# Patient Record
Sex: FEMALE | Race: WHITE | NOT HISPANIC OR LATINO | Employment: UNEMPLOYED | URBAN - METROPOLITAN AREA
[De-identification: names, ages, dates, MRNs, and addresses within clinical notes are randomized per-mention and may not be internally consistent; named-entity substitution may affect disease eponyms.]

---

## 2017-08-05 ENCOUNTER — HOSPITAL ENCOUNTER (OUTPATIENT)
Dept: RADIOLOGY | Facility: MEDICAL CENTER | Age: 47
End: 2017-08-05
Attending: PHYSICAL MEDICINE & REHABILITATION
Payer: MEDICAID

## 2017-08-05 DIAGNOSIS — M54.16 LUMBAR RADICULOPATHY: ICD-10-CM

## 2017-08-05 DIAGNOSIS — M25.562 LEFT KNEE PAIN, UNSPECIFIED CHRONICITY: ICD-10-CM

## 2017-08-05 DIAGNOSIS — M51.36 DEGENERATION OF LUMBAR INTERVERTEBRAL DISC: ICD-10-CM

## 2017-08-05 PROCEDURE — 72148 MRI LUMBAR SPINE W/O DYE: CPT

## 2017-11-09 ENCOUNTER — OFFICE VISIT (OUTPATIENT)
Dept: MEDICAL GROUP | Facility: MEDICAL CENTER | Age: 47
End: 2017-11-09
Attending: INTERNAL MEDICINE
Payer: MEDICAID

## 2017-11-09 VITALS
DIASTOLIC BLOOD PRESSURE: 72 MMHG | HEIGHT: 63 IN | TEMPERATURE: 98.1 F | OXYGEN SATURATION: 93 % | WEIGHT: 238.2 LBS | RESPIRATION RATE: 16 BRPM | BODY MASS INDEX: 42.21 KG/M2 | SYSTOLIC BLOOD PRESSURE: 108 MMHG | HEART RATE: 96 BPM

## 2017-11-09 DIAGNOSIS — F51.01 PRIMARY INSOMNIA: ICD-10-CM

## 2017-11-09 DIAGNOSIS — R00.0 SINUS TACHYCARDIA: ICD-10-CM

## 2017-11-09 DIAGNOSIS — E11.9 TYPE 2 DIABETES MELLITUS WITHOUT COMPLICATION, WITH LONG-TERM CURRENT USE OF INSULIN (HCC): ICD-10-CM

## 2017-11-09 DIAGNOSIS — G89.29 CHRONIC BILATERAL LOW BACK PAIN WITH LEFT-SIDED SCIATICA: ICD-10-CM

## 2017-11-09 DIAGNOSIS — M54.42 CHRONIC BILATERAL LOW BACK PAIN WITH LEFT-SIDED SCIATICA: ICD-10-CM

## 2017-11-09 DIAGNOSIS — F33.1 MODERATE EPISODE OF RECURRENT MAJOR DEPRESSIVE DISORDER (HCC): ICD-10-CM

## 2017-11-09 DIAGNOSIS — I10 ESSENTIAL HYPERTENSION: ICD-10-CM

## 2017-11-09 DIAGNOSIS — G47.33 OSA (OBSTRUCTIVE SLEEP APNEA): ICD-10-CM

## 2017-11-09 DIAGNOSIS — E11.42 DIABETIC POLYNEUROPATHY ASSOCIATED WITH TYPE 2 DIABETES MELLITUS (HCC): ICD-10-CM

## 2017-11-09 DIAGNOSIS — E06.3 HYPOTHYROIDISM DUE TO HASHIMOTO'S THYROIDITIS: ICD-10-CM

## 2017-11-09 DIAGNOSIS — K21.9 GASTROESOPHAGEAL REFLUX DISEASE, ESOPHAGITIS PRESENCE NOT SPECIFIED: ICD-10-CM

## 2017-11-09 DIAGNOSIS — E03.8 HYPOTHYROIDISM DUE TO HASHIMOTO'S THYROIDITIS: ICD-10-CM

## 2017-11-09 DIAGNOSIS — Z79.4 TYPE 2 DIABETES MELLITUS WITHOUT COMPLICATION, WITH LONG-TERM CURRENT USE OF INSULIN (HCC): ICD-10-CM

## 2017-11-09 DIAGNOSIS — K22.4 ESOPHAGEAL SPASM: ICD-10-CM

## 2017-11-09 PROBLEM — M54.50 CHRONIC LOW BACK PAIN: Status: ACTIVE | Noted: 2017-11-09

## 2017-11-09 PROBLEM — E11.40 DIABETIC NEUROPATHY (HCC): Status: ACTIVE | Noted: 2017-11-09

## 2017-11-09 PROCEDURE — 99204 OFFICE O/P NEW MOD 45 MIN: CPT | Performed by: INTERNAL MEDICINE

## 2017-11-09 RX ORDER — CYCLOBENZAPRINE HCL 10 MG
10 TABLET ORAL 3 TIMES DAILY PRN
Qty: 30 TAB | Refills: 0 | Status: SHIPPED | OUTPATIENT
Start: 2017-11-09 | End: 2018-02-28 | Stop reason: SDUPTHER

## 2017-11-09 RX ORDER — GARLIC 180 MG
40 TABLET, DELAYED RELEASE (ENTERIC COATED) ORAL 2 TIMES DAILY
COMMUNITY
Start: 2017-11-09 | End: 2022-07-21

## 2017-11-09 RX ORDER — HYDROCHLOROTHIAZIDE 25 MG/1
25 TABLET ORAL DAILY
Qty: 30 TAB | Status: SHIPPED | DISCHARGE
Start: 2017-11-09 | End: 2017-12-12

## 2017-11-09 RX ORDER — OMEPRAZOLE 40 MG/1
40 CAPSULE, DELAYED RELEASE ORAL 2 TIMES DAILY
Status: SHIPPED | DISCHARGE
Start: 2017-11-09 | End: 2018-01-11 | Stop reason: SDUPTHER

## 2017-11-09 RX ORDER — LEVOTHYROXINE SODIUM 0.15 MG/1
150 TABLET ORAL
Qty: 30 TAB | Status: SHIPPED | DISCHARGE
Start: 2017-11-09 | End: 2018-01-11 | Stop reason: SDUPTHER

## 2017-11-09 RX ORDER — PHENOL 1.4 %
20 AEROSOL, SPRAY (ML) MUCOUS MEMBRANE
COMMUNITY
Start: 2017-11-09 | End: 2022-02-17

## 2017-11-09 RX ORDER — ATORVASTATIN CALCIUM 20 MG/1
20 TABLET, FILM COATED ORAL DAILY
Qty: 30 TAB | Status: SHIPPED | DISCHARGE
Start: 2017-11-09 | End: 2020-02-12 | Stop reason: SDUPTHER

## 2017-11-09 RX ORDER — TRAMADOL HYDROCHLORIDE 50 MG/1
50-100 TABLET ORAL 3 TIMES DAILY
Status: SHIPPED | DISCHARGE
Start: 2017-11-09 | End: 2018-03-01 | Stop reason: SDUPTHER

## 2017-11-09 RX ORDER — TRAZODONE HYDROCHLORIDE 150 MG/1
150 TABLET ORAL
Qty: 30 TAB | Refills: 1 | Status: SHIPPED | OUTPATIENT
Start: 2017-11-09 | End: 2018-01-11 | Stop reason: SDUPTHER

## 2017-11-09 RX ORDER — SPIRONOLACTONE 25 MG/1
25 TABLET ORAL DAILY
Qty: 30 TAB | Refills: 3 | Status: SHIPPED | DISCHARGE
Start: 2017-11-09 | End: 2020-02-12 | Stop reason: SDUPTHER

## 2017-11-09 RX ORDER — GABAPENTIN 300 MG/1
300 CAPSULE ORAL
COMMUNITY
Start: 2017-11-09 | End: 2018-01-11 | Stop reason: SDUPTHER

## 2017-11-09 RX ORDER — BUPROPION HYDROCHLORIDE 150 MG/1
150 TABLET, EXTENDED RELEASE ORAL 2 TIMES DAILY
Qty: 60 TAB | Status: SHIPPED | DISCHARGE
Start: 2017-11-09 | End: 2018-04-20

## 2017-11-09 ASSESSMENT — PATIENT HEALTH QUESTIONNAIRE - PHQ9: CLINICAL INTERPRETATION OF PHQ2 SCORE: 0

## 2017-11-09 ASSESSMENT — PAIN SCALES - GENERAL: PAINLEVEL: 5=MODERATE PAIN

## 2017-11-09 NOTE — ASSESSMENT & PLAN NOTE
Reports a history of esophageal spasm which causes pain and states that she has needed to dilation in the past of her esophagus although she did not have a severe stricture. She was seeing a GI doctor who recently left the state has not reestablished. She states the trazodone she takes is for the esophageal spasms.

## 2017-11-09 NOTE — PROGRESS NOTES
Sarah Friedman is a 46 y.o. female here for numerous chronic medical problems listed below, establish care    HPI:  Has been seen at numerous clinics around [facility] including [facility]  Chronic low back pain  Reports a long history of low back pain. She has been treated conservatively in the past with 4 rounds of physical therapy. She currently sees Dr. Yu for pain management who has her on tramadol. States she has had 3 epidurals in the past which have helped but can't get one approved through her insurance currently. She tries to minimize her tramadol use and only take it when needed. She uses ibuprofen 800 mg about twice a day to help with the pain as well. She will occasionally take Flexeril as needed for muscle spasms. She also has been seen by [facility] [place] who recommended surgery but she was unwilling to have this done. She had an MRI of her lumbar spine done back in August which showed moderate foraminal stenosis at multiple levels.     Type 2 diabetes mellitus without complication (CMS-HCC)  Patient reports she was diagnosed with diabetes in 2010. She has been on pills until one year ago when she was started on insulin due to poor control of her blood sugars. States that she recently had a hemoglobin A1c which was 6.6. She sees for Cherelle landaverde with endocrinology for management. She currently takes fixed toes a, metformin 1000 mg twice daily, basaglar 52 units daily at bedtime. Uses gabapentin for her neuropathy, 300 mg at night.    Moderate episode of recurrent major depressive disorder (CMS-HCC)  Patient reports that she was placed on Wellbutrin and has not come as helpful for her mental health but has noticed it's helped with weight loss. She has an appointment to see Dr. Lamb's for psychiatry coming up in the next week to talk about medication management. She denies suicidal ideation.     Hypothyroidism due to Hashimoto's thyroiditis  Reports being diagnosed with hypothyroidism  "related to Hashimoto's disease when she was 11 years old. States she's been on thyroid labs since then. Her endocrinologist, Emma Dey, recently did thyroid testing on her and did not recommend a dose adjustment. Her levothyroxine dose as 150 µg daily.     Primary insomnia  Patient reports increased difficulty with sleep lately. States that she has trouble falling asleep at night and also trouble staying asleep. She does have obstructive sleep apnea but has stopped wearing her mask because she tends to be a side sleeper and states that the mask leaks when she is on her side. She takes trazodone which she states is for her esophageal spasms 100 mg at night. She also takes 20 mg of melatonin at night which used to be helpful for her sleep but has stopped working. Her gabapentin is also at night to try to help with sleep.     RADHA (obstructive sleep apnea)  Patient follows with pulmonology for her sleep apnea. She recently stopped wearing her mask because she states that it leaks when she lays on her side which is how she normally sleeps. She states she's tried \"a dozen masks\" and none of them have been effective for her.    GERD (gastroesophageal reflux disease)  Reports a long history of heartburn with a hiatal hernia. She was seeing a GI specialist who recently moved out of state. [facility] was [facility] and has not established with a new provider yet. She was put on omeprazole 40 mg twice daily. She has been having difficulty with insurance covering this prescription and has only been able to get it once a day. She denies melena, hematochezia, hematemesis, nausea, vomiting. When she takes the omeprazole her symptoms are well controlled.    Sinus tachycardia  Patient currently sees a cardiologist through [facility]. She states that she is on a medication that is only being trialed in the [place], corlanor, for her sinus tachycardia.    Essential hypertension  Currently takes Wellbutrin and " spironolactone. Blood pressure in clinic today was 108/72.    Esophageal spasm  Reports a history of esophageal spasm which causes pain and states that she has needed to dilation in the past of her esophagus although she did not have a severe stricture. She was seeing a GI doctor who recently left the state has not reestablished. She states the trazodone she takes is for the esophageal spasms.    Current medicines (including changes today)  Current Outpatient Prescriptions   Medication Sig Dispense Refill   • omeprazole (PRILOSEC) 40 MG delayed-release capsule Take 1 Cap by mouth 2 times a day.     • levothyroxine (SYNTHROID) 150 MCG Tab Take 1 Tab by mouth Every morning on an empty stomach. 30 Tab    • tramadol (ULTRAM) 50 MG Tab Take 1-2 Tabs by mouth 3 times a day.     • gabapentin (NEURONTIN) 300 MG Cap Take 1 Cap by mouth every bedtime.     • insulin glargine (BASAGLAR KWIKPEN) 100 UNIT/ML Solution Pen-injector injection Inject 52 Units as instructed every evening.     • buPROPion SR (WELLBUTRIN-SR) 150 MG TABLET SR 12 HR sustained-release tablet Take 1 Tab by mouth 2 times a day. 60 Tab    • hydrochlorothiazide (HYDRODIURIL) 25 MG Tab Take 1 Tab by mouth every day. 30 Tab    • spironolactone (ALDACTONE) 25 MG Tab Take 1 Tab by mouth every day. 30 Tab 3   • atorvastatin (LIPITOR) 20 MG Tab Take 1 Tab by mouth every day. 30 Tab    • ivabradine (CORLANOR) 5 MG Tab tablet Take 1 Tab by mouth 2 times a day, with meals. 60 Tab    • cyclobenzaprine (FLEXERIL) 10 MG Tab Take 1 Tab by mouth 3 times a day as needed. 30 Tab 0   • Black Cohosh 40 MG Cap Take  by mouth 2 Times a Day.     • Melatonin 10 MG Tab Take 20 mg by mouth every bedtime.     • trazodone (DESYREL) 150 MG Tab Take 1 Tab by mouth every bedtime. 30 Tab 1   • metformin (GLUCOPHAGE) 1000 MG tablet Take 1,000 mg by mouth 2 times a day, with meals.     • liraglutide (VICTOZA) 18 MG/3ML Solution Pen-injector injection Inject 1.8 mg as instructed every day.      • ibuprofen (MOTRIN) 800 MG Tab Take 800 mg by mouth every 8 hours as needed.     • acyclovir (ZOVIRAX) 200 MG Cap Take 800 mg by mouth as needed.       No current facility-administered medications for this visit.      She  has a past medical history of Depression; Diabetes (CMS-[facility]); Diabetic neuropathy (CMS-[facility]); Esophageal spasm; GERD (gastroesophageal reflux disease); Hashimoto's disease; Hiatal hernia; Hyperlipidemia; Hypothyroid; and Tachycardia.  She  has a past surgical history that includes tonsillectomy and adenoidectomy; other orthopedic surgery; other; colonoscopy; and endoscopy.  Social History   Substance Use Topics   • Smoking status: Never Smoker   • Smokeless tobacco: Never Used   • Alcohol use No     Social History     Social History Narrative   • No narrative on file     Family History   Problem Relation Age of Onset   • Cancer Mother 70     pancreatic, breast in 30's   • Heart Disease Father    • Cancer Father      lung   • Diabetes Sister    • Diabetes Maternal Uncle    • Heart Disease Maternal Grandmother      MI   • Hyperlipidemia Maternal Grandmother    • Diabetes Maternal Grandfather    • Hyperlipidemia Maternal Grandfather    • Stroke Neg Hx          ROS  As above in HPI  All other systems reviewed and are negative  Complaining of dizziness     Objective:     Blood pressure 108/72, pulse 96, temperature 36.7 °C (98.1 °F), resp. rate 16, weight 108 kg (238 lb 3.2 oz), SpO2 93 %, not currently breastfeeding. Body mass index is 42.2 kg/m².  Physical Exam:    Constitutional: Alert, no distress.  Skin: Warm, dry, good turgor, no rashes in visible areas.  Eye: Equal, round and reactive, conjunctiva clear, lids normal.  ENMT: Lips without lesions, fair dentition, oropharynx clear, TM's clear bilaterally.  Neck: Trachea midline, no masses, no thyromegaly. No cervical or supraclavicular lymphadenopathy.  Respiratory: Unlabored respiratory effort, lungs clear to auscultation, no wheezes, no  ritu.  Cardiovascular: Normal S1, S2, no murmur, no edema.  Abdomen: Soft, obese, non-tender, no masses, no hepatosplenomegaly.  Psych: Alert and oriented x3, normal affect and mood.        Assessment and Plan:   The following treatment plan was discussed    1. Type 2 diabetes mellitus without complication, with long-term current use of insulin (CMS-[facility])  Currently well controlled. Patient will continue current medications and follow-up with endocrinology.  -Victoza 1.8 daily  -Metformin 1000 mg twice a day  - insulin glargine (BASAGLAR KWIKPEN) 100 UNIT/ML Solution Pen-injector injection; Inject 52 Units as instructed every evening.  - atorvastatin (LIPITOR) 20 MG Tab; Take 1 Tab by mouth every day.  Dispense: 30 Tab  -Follow up with endocrinology    2. Chronic bilateral low back pain with left-sided sciatica  Partially controlled. She follows with Dr. Yu as pain management. She is working on getting another epidural done. Tramadol prescription through Dr. Yu  -Ibuprofen 800 mg twice a day when necessary  - tramadol (ULTRAM) 50 MG Tab; Take 1-2 Tabs by mouth 3 times a day.  - cyclobenzaprine (FLEXERIL) 10 MG Tab; Take 1 Tab by mouth 3 times a day as needed.  Dispense: 30 Tab; Refill: 0  -Follow up with pain management    3. Moderate episode of recurrent major depressive disorder (CMS-[facility])  Partially controlled. Patient will establish with psychiatry later this week for management.  - buPROPion SR (WELLBUTRIN-SR) 150 MG TABLET SR 12 HR sustained-release tablet; Take 1 Tab by mouth 2 times a day.  Dispense: 60 Tab  -Follow up psychiatry    4. Gastroesophageal reflux disease, esophagitis presence not specified  Controlled with current medications. Patient plans to establish with a new gastroenterologist.  - omeprazole (PRILOSEC) 40 MG delayed-release capsule; Take 1 Cap by mouth 2 times a day.    5. Hypothyroidism due to Hashimoto's thyroiditis  Stable, recently had thyroid labs through her endocrinologist.  She will continue specialty care  - levothyroxine (SYNTHROID) 150 MCG Tab; Take 1 Tab by mouth Every morning on an empty stomach.  Dispense: 30 Tab  -Follow up endocrinology    6. RADHA (obstructive sleep apnea)  Uncontrolled. Patient has stopped wearing her CPAP. I encouraged her to start using it again as her sleep has been poor without it. I encouraged her to follow-up with her pulmonologist to discuss possibility of different mask.  -Encouraged compliance with CPAP therapy  -Follow-up pulmonology    7. Primary insomnia  Uncontrolled. Suspect related in part to noncompliance with CPAP. We will increase her trazodone dose to see if helpful. She will follow up in 4 weeks  - Melatonin 10 MG Tab; Take 20 mg by mouth every bedtime.  -Increase trazodone (DESYREL) 150 MG Tab; Take 1 Tab by mouth every bedtime.  Dispense: 30 Tab; Refill: 1  -Follow up in 4 weeks    8. Sinus tachycardia  Controlled. Patient follows with cardiology.  - ivabradine (CORLANOR) 5 MG Tab tablet; Take 1 Tab by mouth 2 times a day, with meals.  Dispense: 60 Tab  -Continue follow-up with cardiology    9. Essential hypertension  Stable, well controlled. Continue current medications. Could discuss at next visit stopping HCTZ given blood pressure was 108/72 today.  - hydrochlorothiazide (HYDRODIURIL) 25 MG Tab; Take 1 Tab by mouth every day.  Dispense: 30 Tab  - spironolactone (ALDACTONE) 25 MG Tab; Take 1 Tab by mouth every day.  Dispense: 30 Tab; Refill: 3  -Discuss at next visit stopping HCTZ    10. Esophageal spasm  Stable. Patient will reestablish with a new GI provider for further care.  -Follow up with GI    11. Diabetic polyneuropathy associated with type 2 diabetes mellitus (CMS-HCC)  Stable, controlled with current medications. Patient will continue.  - gabapentin (NEURONTIN) 300 MG Cap; Take 1 Cap by mouth every bedtime.        Followup: Return in about 4 weeks (around 12/7/2017) for insomnia, dizziness, HTN.

## 2017-11-09 NOTE — ASSESSMENT & PLAN NOTE
"Patient follows with pulmonology for her sleep apnea. She recently stopped wearing her mask because she states that it leaks when she lays on her side which is how she normally sleeps. She states she's tried \"a dozen masks\" and none of them have been effective for her.  "

## 2017-11-09 NOTE — ASSESSMENT & PLAN NOTE
Patient reports that she was placed on Wellbutrin and has not come as helpful for her mental health but has noticed it's helped with weight loss. She has an appointment to see Dr. Lamb's for psychiatry coming up in the next week to talk about medication management. She denies suicidal ideation.

## 2017-11-09 NOTE — ASSESSMENT & PLAN NOTE
Reports a long history of low back pain. She has been treated conservatively in the past with 4 rounds of physical therapy. She currently sees Dr. Yu for pain management who has her on tramadol. States she has had 3 epidurals in the past which have helped but can't get one approved through her insurance currently. She tries to minimize her tramadol use and only take it when needed. She uses ibuprofen 800 mg about twice a day to help with the pain as well. She will occasionally take Flexeril as needed for muscle spasms. She also has been seen by [facility] [place] who recommended surgery but she was unwilling to have this done. She had an MRI of her lumbar spine done back in August which showed moderate foraminal stenosis at multiple levels.

## 2017-11-09 NOTE — ASSESSMENT & PLAN NOTE
Patient reports increased difficulty with sleep lately. States that she has trouble falling asleep at night and also trouble staying asleep. She does have obstructive sleep apnea but has stopped wearing her mask because she tends to be a side sleeper and states that the mask leaks when she is on her side. She takes trazodone which she states is for her esophageal spasms 100 mg at night. She also takes 20 mg of melatonin at night which used to be helpful for her sleep but has stopped working. Her gabapentin is also at night to try to help with sleep.

## 2017-11-09 NOTE — ASSESSMENT & PLAN NOTE
Patient currently sees a cardiologist through [facility]. She states that she is on a medication that is only being trialed in the [place], corlanor, for her sinus tachycardia.

## 2017-11-09 NOTE — ASSESSMENT & PLAN NOTE
Reports being diagnosed with hypothyroidism related to Hashimoto's disease when she was 11 years old. States she's been on thyroid labs since then. Her endocrinologist, Emma Dey, recently did thyroid testing on her and did not recommend a dose adjustment. Her levothyroxine dose as 150 µg daily.

## 2017-11-09 NOTE — ASSESSMENT & PLAN NOTE
Reports a long history of heartburn with a hiatal hernia. She was seeing a GI specialist who recently moved out of state. She was [facility] and has not established with a new provider yet. She was put on omeprazole 40 mg twice daily. She has been having difficulty with insurance covering this prescription and has only been able to get it once a day. She denies melena, hematochezia, hematemesis, nausea, vomiting. When she takes the omeprazole her symptoms are well controlled.

## 2017-11-10 DIAGNOSIS — G89.29 CHRONIC BILATERAL LOW BACK PAIN WITH LEFT-SIDED SCIATICA: ICD-10-CM

## 2017-11-10 DIAGNOSIS — M54.42 CHRONIC BILATERAL LOW BACK PAIN WITH LEFT-SIDED SCIATICA: ICD-10-CM

## 2017-11-11 NOTE — TELEPHONE ENCOUNTER
Pt called late afternoon Friday to request refill for tramadol.     Was the patient seen in the last year in this department? Yes     Does patient have an active prescription for medications requested? Yes     Received Request Via: Patient

## 2017-11-13 RX ORDER — TRAMADOL HYDROCHLORIDE 50 MG/1
50-100 TABLET ORAL 3 TIMES DAILY
Qty: 30 TAB | Refills: 0 | OUTPATIENT
Start: 2017-11-13

## 2017-11-21 ENCOUNTER — TELEPHONE (OUTPATIENT)
Dept: MEDICAL GROUP | Facility: MEDICAL CENTER | Age: 47
End: 2017-11-21

## 2017-11-21 NOTE — TELEPHONE ENCOUNTER
Pt notified that tramadol will not be refilled by Dr Almeida, that she will need to follow up with Dr Mckay.

## 2017-12-12 ENCOUNTER — OFFICE VISIT (OUTPATIENT)
Dept: MEDICAL GROUP | Facility: MEDICAL CENTER | Age: 47
End: 2017-12-12
Attending: INTERNAL MEDICINE
Payer: MEDICAID

## 2017-12-12 VITALS
BODY MASS INDEX: 41.64 KG/M2 | OXYGEN SATURATION: 96 % | RESPIRATION RATE: 16 BRPM | TEMPERATURE: 98 F | SYSTOLIC BLOOD PRESSURE: 100 MMHG | DIASTOLIC BLOOD PRESSURE: 62 MMHG | WEIGHT: 235 LBS | HEART RATE: 84 BPM | HEIGHT: 63 IN

## 2017-12-12 DIAGNOSIS — N92.1 MENORRHAGIA WITH IRREGULAR CYCLE: ICD-10-CM

## 2017-12-12 DIAGNOSIS — E66.01 MORBID OBESITY WITH BMI OF 40.0-44.9, ADULT (HCC): ICD-10-CM

## 2017-12-12 DIAGNOSIS — G43.009 MIGRAINE WITHOUT AURA AND WITHOUT STATUS MIGRAINOSUS, NOT INTRACTABLE: ICD-10-CM

## 2017-12-12 DIAGNOSIS — R42 LIGHTHEADEDNESS: ICD-10-CM

## 2017-12-12 PROCEDURE — 99212 OFFICE O/P EST SF 10 MIN: CPT | Performed by: INTERNAL MEDICINE

## 2017-12-12 PROCEDURE — 99214 OFFICE O/P EST MOD 30 MIN: CPT | Performed by: INTERNAL MEDICINE

## 2017-12-12 RX ORDER — HYDROCHLOROTHIAZIDE 12.5 MG/1
12.5 CAPSULE, GELATIN COATED ORAL DAILY
Qty: 30 CAP | Status: SHIPPED | DISCHARGE
Start: 2017-12-12 | End: 2018-05-30

## 2017-12-12 RX ORDER — SUMATRIPTAN 100 MG/1
TABLET, FILM COATED ORAL
Qty: 10 TAB | Refills: 0 | Status: SHIPPED | OUTPATIENT
Start: 2017-12-12 | End: 2018-03-02 | Stop reason: SDUPTHER

## 2017-12-12 NOTE — ASSESSMENT & PLAN NOTE
Patient complains of lightheadedness worsening over the past several months. States that when she walks a few steps, she will get a sudden onset of lightheadedness, has to stop to prevent falling. She denies room spinning. She does have vertigo but states this is completely different. She feels like her hearing becomes muffled, feels faint, and sometimes legs and arms give out. States it happens usually about twice a day, 5 days a week. The sensation lasts anywhere from a few seconds to a few minutes. She endorses good hydration. She denies any associated chest pain, shortness of breath, or palpitations. She had an episode of this in the clinic when I asked her to get up from the exam chair and onto the table. I took her blood pressure shortly after the episode and it was similar to initially, at 100/70. She takes hydrochlorothiazide 12-1/2 mg daily which she states helps with the swelling in her feet, in addition to spironolactone 25 mg daily. This is prescribed by a an outside physician. Sometimes be lightheadedness will be followed by a headache as discussed above.

## 2017-12-12 NOTE — ASSESSMENT & PLAN NOTE
Patient reports a history of regular menstrual cycles until this past month. She had her cycle started on 11/16 and an done 11/30. It then started up again on 12/5 and has 2 new to until present. States she is passing some clots and is requiring a super plus tampon every 4-6 hours. She has normally had regular periods in the past. She complains of lightheadedness but states that this was present before the heavy periods started. She denies chest pain or shortness of breath.

## 2017-12-12 NOTE — PROGRESS NOTES
Subjective:   Sarah Friedman is a 46 y.o. female here today for heavy periods, worsening headaches and lightheadedness    Menorrhagia with irregular cycle  Patient reports a history of regular menstrual cycles until this past month. She had her cycle started on 11/16 and an done 11/30. It then started up again on 12/5 and has 2 new to until present. States she is passing some clots and is requiring a super plus tampon every 4-6 hours. She has normally had regular periods in the past. She complains of lightheadedness but states that this was present before the heavy periods started. She denies chest pain or shortness of breath.    Lightheadedness  Patient complains of lightheadedness worsening over the past several months. States that when she walks a few steps, she will get a sudden onset of lightheadedness, has to stop to prevent falling. She denies room spinning. She does have vertigo but states this is completely different. She feels like her hearing becomes muffled, feels faint, and sometimes legs and arms give out. States it happens usually about twice a day, 5 days a week. The sensation lasts anywhere from a few seconds to a few minutes. She endorses good hydration. She denies any associated chest pain, shortness of breath, or palpitations. She had an episode of this in the clinic when I asked her to get up from the exam chair and onto the table. I took her blood pressure shortly after the episode and it was similar to initially, at 100/70. She takes hydrochlorothiazide 12-1/2 mg daily which she states helps with the swelling in her feet, in addition to spironolactone 25 mg daily. This is prescribed by a an outside physician. Sometimes be lightheadedness will be followed by a headache as discussed above.    Migraine without aura and without status migrainosus, not intractable  Patient reports years of migraine headaches although they have become more frequent lately and have changed in character. She  endorses severe pain that starts in the back of her head and radiates to the top. She describes it as bilateral, pounding, associated with nausea and photophobia. She has not found any medication to be helpful for the headaches including Excedrin Migraine, tramadol, ibuprofen. She has never taken any migraine specific medication. States these headaches usually occur about 3 times per week. They sometimes follow a lightheadedness episode, but not always. Sometimes they can be triggered by movement of her neck into a certain position.        Current medicines (including changes today)  Current Outpatient Prescriptions   Medication Sig Dispense Refill   • hydrochlorothiazide (MICROZIDE) 12.5 MG capsule Take 1 Cap by mouth every day. 30 Cap    • sumatriptan (IMITREX) 100 MG tablet Take 1/2 to 1 full tab daily as needed for migraine headache 10 Tab 0   • omeprazole (PRILOSEC) 40 MG delayed-release capsule Take 1 Cap by mouth 2 times a day.     • levothyroxine (SYNTHROID) 150 MCG Tab Take 1 Tab by mouth Every morning on an empty stomach. 30 Tab    • tramadol (ULTRAM) 50 MG Tab Take 1-2 Tabs by mouth 3 times a day.     • gabapentin (NEURONTIN) 300 MG Cap Take 1 Cap by mouth every bedtime.     • insulin glargine (BASAGLAR KWIKPEN) 100 UNIT/ML Solution Pen-injector injection Inject 52 Units as instructed every evening.     • buPROPion SR (WELLBUTRIN-SR) 150 MG TABLET SR 12 HR sustained-release tablet Take 1 Tab by mouth 2 times a day. 60 Tab    • spironolactone (ALDACTONE) 25 MG Tab Take 1 Tab by mouth every day. 30 Tab 3   • atorvastatin (LIPITOR) 20 MG Tab Take 1 Tab by mouth every day. 30 Tab    • ivabradine (CORLANOR) 5 MG Tab tablet Take 1 Tab by mouth 2 times a day, with meals. 60 Tab    • cyclobenzaprine (FLEXERIL) 10 MG Tab Take 1 Tab by mouth 3 times a day as needed. 30 Tab 0   • Black Cohosh 40 MG Cap Take  by mouth 2 Times a Day.     • Melatonin 10 MG Tab Take 20 mg by mouth every bedtime.     • trazodone  "(DESYREL) 150 MG Tab Take 1 Tab by mouth every bedtime. 30 Tab 1   • metformin (GLUCOPHAGE) 1000 MG tablet Take 1,000 mg by mouth 2 times a day, with meals.     • liraglutide (VICTOZA) 18 MG/3ML Solution Pen-injector injection Inject 1.8 mg as instructed every day.     • ibuprofen (MOTRIN) 800 MG Tab Take 800 mg by mouth every 8 hours as needed.     • acyclovir (ZOVIRAX) 200 MG Cap Take 800 mg by mouth as needed.       No current facility-administered medications for this visit.      She  has a past medical history of Depression; Diabetes (CMS-[facility]); Diabetic neuropathy (CMS-[facility]); Esophageal spasm; GERD (gastroesophageal reflux disease); Hashimoto's disease; Hiatal hernia; Hyperlipidemia; Hypothyroid; and Tachycardia.    ROS   As above in HPI     Objective:     Blood pressure 100/62, pulse 84, temperature 36.7 °C (98 °F), resp. rate 16, height 1.6 m (5' 2.99\"), weight 106.6 kg (235 lb), SpO2 96 %. Body mass index is 41.64 kg/m².   Physical Exam:  Constitutional: Alert, no distress.  Skin: Warm, dry, good turgor, no rashes in visible areas.  Eye: Equal, round and reactive, conjunctiva clear, lids normal.  Psych: Alert and oriented x3, normal affect and mood.      Assessment and Plan:   The following treatment plan was discussed    1. Menorrhagia with irregular cycle  Differential includes griffin-menopausal irregular bleeding, fibroid, endometrial hyperplasia, endometrial carcinoma.  Will obtain transvaginal US for evaluation of endometrial stripe and for fibroids.  GYN referral placed at patient's request  - REFERRAL TO GYNECOLOGY  - US-GYN-PELVIS TRANSVAGINAL; Future  - CBC WITHOUT DIFFERENTIAL; Future    2. Lightheadedness  May be related to relative hypotension Given patient's blood pressure is in the 100s over 70s and she does take hydrochlorothiazide. Her symptoms sound orthostatic as they tend to occur after she changes position. However, there was not a dramatic change in her blood pressure when she became " symptomatic in office today. May also be related to her migraine headaches as the episodes tend to be followed by headache. Less likely arrhythmia given no associated palpitations or chest pain and no syncope.  -Discussed moving the hydrochlorothiazide dose down to every other day to see if she is less symptomatic days when she does not take the medication  -Follow up CT head to ensure no structural abnormalities given concurrent change in headaches  -Could consider cardiac monitor/EKG if above workup is unrevealing    3. Migraine without aura and without status migrainosus, not intractable  Uncontrolled. Patient has never tried any migraine specific medication. We will give her some Imitrex. Given change in character and frequency of headaches, we will obtain a CT as well. Her headaches are quite enough that we could justify a daily prophylactic medication, but I would like to see if she responds to abortive therapy first.  - sumatriptan (IMITREX) 100 MG tablet; Take 1/2 to 1 full tab daily as needed for migraine headache  Dispense: 10 Tab; Refill: 0  - CT-HEAD W/O; Future  -If good response to Imitrex, could consider daily prophylactic migraine medication. Topamax might be a good choice given obesity.  -follow up 4 weeks    4. Morbid obesity with BMI of 40.0-44.9, adult (CMS-HCC)  - Patient identified as having weight management issue.  Appropriate orders and counseling given.      Followup: Return in about 4 weeks (around 1/9/2018) for review imaging, headache.

## 2017-12-12 NOTE — ASSESSMENT & PLAN NOTE
Patient reports years of migraine headaches although they have become more frequent lately and have changed in character. She endorses severe pain that starts in the back of her head and radiates to the top. She describes it as bilateral, pounding, associated with nausea and photophobia. She has not found any medication to be helpful for the headaches including Excedrin Migraine, tramadol, ibuprofen. She has never taken any migraine specific medication. States these headaches usually occur about 3 times per week. They sometimes follow a lightheadedness episode, but not always. Sometimes they can be triggered by movement of her neck into a certain position.

## 2018-01-02 ENCOUNTER — TELEPHONE (OUTPATIENT)
Dept: MEDICAL GROUP | Facility: MEDICAL CENTER | Age: 48
End: 2018-01-02

## 2018-01-02 NOTE — TELEPHONE ENCOUNTER
1. Caller Name: [facility] [employer]                                      Call Back Number: [phone]      Patient approves a detailed voicemail message: yes    Pt has a CT of the head scheduled for Friday that requires a Peer to Peer . The authorizations dept has stated this is due to her having obtained an auth for a CT scan of the sinuses. Insurance is requiring a Peer to Peer review since the pt will be having to CT's of the same body part. The number is [phone], her ID # is [id number]

## 2018-01-05 ENCOUNTER — HOSPITAL ENCOUNTER (OUTPATIENT)
Dept: RADIOLOGY | Facility: MEDICAL CENTER | Age: 48
End: 2018-01-05
Attending: INTERNAL MEDICINE
Payer: MEDICAID

## 2018-01-05 ENCOUNTER — HOSPITAL ENCOUNTER (OUTPATIENT)
Dept: RADIOLOGY | Facility: MEDICAL CENTER | Age: 48
End: 2018-01-05
Attending: OTOLARYNGOLOGY
Payer: MEDICAID

## 2018-01-05 DIAGNOSIS — G43.009 MIGRAINE WITHOUT AURA AND WITHOUT STATUS MIGRAINOSUS, NOT INTRACTABLE: ICD-10-CM

## 2018-01-05 DIAGNOSIS — J32.9 CHRONIC SINUSITIS, UNSPECIFIED LOCATION: ICD-10-CM

## 2018-01-05 DIAGNOSIS — N92.1 MENORRHAGIA WITH IRREGULAR CYCLE: ICD-10-CM

## 2018-01-05 DIAGNOSIS — J34.3 HYPERTROPHY OF NASAL TURBINATES: ICD-10-CM

## 2018-01-05 DIAGNOSIS — R42 LIGHTHEADEDNESS: ICD-10-CM

## 2018-01-05 PROCEDURE — 70450 CT HEAD/BRAIN W/O DYE: CPT

## 2018-01-05 PROCEDURE — 70486 CT MAXILLOFACIAL W/O DYE: CPT

## 2018-01-05 PROCEDURE — 76830 TRANSVAGINAL US NON-OB: CPT

## 2018-01-08 PROBLEM — D21.9 FIBROIDS: Status: ACTIVE | Noted: 2018-01-08

## 2018-01-08 PROBLEM — N92.1 MENORRHAGIA WITH IRREGULAR CYCLE: Status: RESOLVED | Noted: 2017-12-12 | Resolved: 2018-01-08

## 2018-01-08 NOTE — TELEPHONE ENCOUNTER
From result review, appears patient had the CT head done.  Unsure if this was covered by insurance.  Will follow up with the patient.    Zuleima Almeida M.D.

## 2018-01-11 ENCOUNTER — OFFICE VISIT (OUTPATIENT)
Dept: MEDICAL GROUP | Facility: MEDICAL CENTER | Age: 48
End: 2018-01-11
Attending: INTERNAL MEDICINE
Payer: MEDICAID

## 2018-01-11 VITALS
HEIGHT: 63 IN | WEIGHT: 235 LBS | SYSTOLIC BLOOD PRESSURE: 120 MMHG | HEART RATE: 104 BPM | BODY MASS INDEX: 41.64 KG/M2 | DIASTOLIC BLOOD PRESSURE: 82 MMHG | TEMPERATURE: 97.8 F | RESPIRATION RATE: 16 BRPM | OXYGEN SATURATION: 96 %

## 2018-01-11 DIAGNOSIS — K21.9 GASTROESOPHAGEAL REFLUX DISEASE, ESOPHAGITIS PRESENCE NOT SPECIFIED: ICD-10-CM

## 2018-01-11 DIAGNOSIS — K44.9 HIATAL HERNIA: ICD-10-CM

## 2018-01-11 DIAGNOSIS — G89.29 CHRONIC BILATERAL LOW BACK PAIN WITH LEFT-SIDED SCIATICA: ICD-10-CM

## 2018-01-11 DIAGNOSIS — Z79.4 TYPE 2 DIABETES MELLITUS WITHOUT COMPLICATION, WITH LONG-TERM CURRENT USE OF INSULIN (HCC): ICD-10-CM

## 2018-01-11 DIAGNOSIS — E11.42 DIABETIC POLYNEUROPATHY ASSOCIATED WITH TYPE 2 DIABETES MELLITUS (HCC): ICD-10-CM

## 2018-01-11 DIAGNOSIS — E06.3 HYPOTHYROIDISM DUE TO HASHIMOTO'S THYROIDITIS: ICD-10-CM

## 2018-01-11 DIAGNOSIS — F51.01 PRIMARY INSOMNIA: ICD-10-CM

## 2018-01-11 DIAGNOSIS — M54.42 CHRONIC BILATERAL LOW BACK PAIN WITH LEFT-SIDED SCIATICA: ICD-10-CM

## 2018-01-11 DIAGNOSIS — E03.8 HYPOTHYROIDISM DUE TO HASHIMOTO'S THYROIDITIS: ICD-10-CM

## 2018-01-11 DIAGNOSIS — D25.9 UTERINE LEIOMYOMA, UNSPECIFIED LOCATION: ICD-10-CM

## 2018-01-11 DIAGNOSIS — E11.9 TYPE 2 DIABETES MELLITUS WITHOUT COMPLICATION, WITH LONG-TERM CURRENT USE OF INSULIN (HCC): ICD-10-CM

## 2018-01-11 DIAGNOSIS — B00.2 ORAL HERPES: ICD-10-CM

## 2018-01-11 PROCEDURE — 99213 OFFICE O/P EST LOW 20 MIN: CPT | Performed by: INTERNAL MEDICINE

## 2018-01-11 PROCEDURE — 99214 OFFICE O/P EST MOD 30 MIN: CPT | Performed by: INTERNAL MEDICINE

## 2018-01-11 RX ORDER — OMEPRAZOLE 40 MG/1
40 CAPSULE, DELAYED RELEASE ORAL 2 TIMES DAILY
Qty: 60 CAP | Refills: 6 | Status: SHIPPED | OUTPATIENT
Start: 2018-01-11 | End: 2018-08-24 | Stop reason: SDUPTHER

## 2018-01-11 RX ORDER — GABAPENTIN 300 MG/1
900 CAPSULE ORAL
Qty: 90 CAP | Refills: 6 | Status: SHIPPED | OUTPATIENT
Start: 2018-01-11 | End: 2018-08-29 | Stop reason: SDUPTHER

## 2018-01-11 RX ORDER — IBUPROFEN 800 MG/1
800 TABLET ORAL EVERY 8 HOURS PRN
Qty: 90 TAB | Refills: 6 | Status: SHIPPED | OUTPATIENT
Start: 2018-01-11 | End: 2018-05-22 | Stop reason: SDUPTHER

## 2018-01-11 RX ORDER — TRAZODONE HYDROCHLORIDE 150 MG/1
150 TABLET ORAL
Qty: 30 TAB | Refills: 1 | Status: SHIPPED | OUTPATIENT
Start: 2018-01-11 | End: 2018-03-14 | Stop reason: SDUPTHER

## 2018-01-11 RX ORDER — LEVOTHYROXINE SODIUM 0.15 MG/1
150 TABLET ORAL
Qty: 30 TAB | Refills: 6 | Status: SHIPPED | OUTPATIENT
Start: 2018-01-11 | End: 2018-01-16

## 2018-01-11 RX ORDER — ACYCLOVIR 800 MG/1
800 TABLET ORAL 2 TIMES DAILY
Qty: 10 TAB | Refills: 3 | Status: SHIPPED | OUTPATIENT
Start: 2018-01-11 | End: 2019-10-05 | Stop reason: SDUPTHER

## 2018-01-11 ASSESSMENT — PAIN SCALES - GENERAL: PAINLEVEL: 4=SLIGHT-MODERATE PAIN

## 2018-01-11 NOTE — ASSESSMENT & PLAN NOTE
Patient is unsure when her last A1c was completed. She currently takes metformin, Victoza. Does not check blood sugars at home.

## 2018-01-11 NOTE — ASSESSMENT & PLAN NOTE
Patient reports that she has continued to have heavy bleeding following her last visit. She had 2 weeks of bleeding nonstop in November, a break of 5 days, and then another heavy period in December. Her transvaginal ultrasound came back showing numerous fibroids. She reports that both her mom and her grandmother had to have hysterectomies for fibroids.

## 2018-01-11 NOTE — ASSESSMENT & PLAN NOTE
Patient states that her last thyroid labs were done over a year ago. She currently takes 150 µg of Synthroid daily.

## 2018-01-11 NOTE — ASSESSMENT & PLAN NOTE
Patient is requesting a refill of her omeprazole 40 mg twice daily today. She has history of a hiatal hernia as well as severe GERD. She has failed once daily omeprazole at 40 mg dose as well as Prevacid, Nexium, and ranitidine all at their maximum doses. Without her medication, she reports severe painful heartburn. She is established with GI but does not have an appointment for an endoscopy for several months.

## 2018-01-12 NOTE — ASSESSMENT & PLAN NOTE
Patient takes ibuprofen 800 mg 2-3 times daily as needed for pain. She is requesting refills of this today.

## 2018-01-12 NOTE — PROGRESS NOTES
Subjective:   Sarah Friedman is a 47 y.o. female here today for follow-up on imaging studies, medication refills    GERD (gastroesophageal reflux disease)  Patient is requesting a refill of her omeprazole 40 mg twice daily today. She has history of a hiatal hernia as well as severe GERD. She has failed once daily omeprazole at 40 mg dose as well as Prevacid, Nexium, and ranitidine all at their maximum doses. Without her medication, she reports severe painful heartburn. She is established with GI but does not have an appointment for an endoscopy for several months.     Fibroids with menorrhagia  Patient reports that she has continued to have heavy bleeding following her last visit. She had 2 weeks of bleeding nonstop in November, a break of 5 days, and then another heavy period in December. Her transvaginal ultrasound came back showing numerous fibroids. She reports that both her mom and her grandmother had to have hysterectomies for fibroids.    Hypothyroidism due to Hashimoto's thyroiditis  Patient states that her last thyroid labs were done over a year ago. She currently takes 150 µg of Synthroid daily.    Type 2 diabetes mellitus without complication (CMS-[facility])  Patient is unsure when her last A1c was completed. She currently takes metformin, Victoza. Does not check blood sugars at home.    Oral herpes  Has a history of oral herpes with infrequent outbreaks. She has a prescription of acyclovir on hand for any outbreaks, and she just uses up her last pills for her current outbreak which is now healing. She is requesting a refill today.    Primary insomnia  Well-controlled with trazodone 150 mg at night. Patient is requesting a refill today.    Chronic low back pain  Patient takes ibuprofen 800 mg 2-3 times daily as needed for pain. She is requesting refills of this today.       Current medicines (including changes today)  Current Outpatient Prescriptions   Medication Sig Dispense Refill   • levothyroxine  (SYNTHROID) 150 MCG Tab Take 1 Tab by mouth Every morning on an empty stomach. 30 Tab 6   • ibuprofen (MOTRIN) 800 MG Tab Take 1 Tab by mouth every 8 hours as needed. 90 Tab 6   • acyclovir (ZOVIRAX) 800 MG Tab Take 1 Tab by mouth 2 times a day for 5 days. 10 Tab 3   • liraglutide (VICTOZA) 18 MG/3ML Solution Pen-injector injection Inject 0.3 mL as instructed every day. 3 PEN 6   • gabapentin (NEURONTIN) 300 MG Cap Take 3 Caps by mouth every bedtime. 90 Cap 6   • metformin (GLUCOPHAGE) 1000 MG tablet Take 1 Tab by mouth 2 times a day, with meals. 60 Tab 6   • omeprazole (PRILOSEC) 40 MG delayed-release capsule Take 1 Cap by mouth 2 times a day. 60 Cap 6   • trazodone (DESYREL) 150 MG Tab Take 1 Tab by mouth every bedtime. 30 Tab 1   • hydrochlorothiazide (MICROZIDE) 12.5 MG capsule Take 1 Cap by mouth every day. 30 Cap    • sumatriptan (IMITREX) 100 MG tablet Take 1/2 to 1 full tab daily as needed for migraine headache 10 Tab 0   • tramadol (ULTRAM) 50 MG Tab Take 1-2 Tabs by mouth 3 times a day.     • insulin glargine (BASAGLAR KWIKPEN) 100 UNIT/ML Solution Pen-injector injection Inject 52 Units as instructed every evening.     • buPROPion SR (WELLBUTRIN-SR) 150 MG TABLET SR 12 HR sustained-release tablet Take 1 Tab by mouth 2 times a day. 60 Tab    • spironolactone (ALDACTONE) 25 MG Tab Take 1 Tab by mouth every day. 30 Tab 3   • atorvastatin (LIPITOR) 20 MG Tab Take 1 Tab by mouth every day. 30 Tab    • ivabradine (CORLANOR) 5 MG Tab tablet Take 1 Tab by mouth 2 times a day, with meals. 60 Tab    • cyclobenzaprine (FLEXERIL) 10 MG Tab Take 1 Tab by mouth 3 times a day as needed. 30 Tab 0   • Black Cohosh 40 MG Cap Take  by mouth 2 Times a Day.     • Melatonin 10 MG Tab Take 20 mg by mouth every bedtime.       No current facility-administered medications for this visit.      She  has a past medical history of Depression; Diabetes (CMS-HCC); Diabetic neuropathy (CMS-HCC); Esophageal spasm; GERD (gastroesophageal  "reflux disease); Hashimoto's disease; Hiatal hernia; Hyperlipidemia; Hypothyroid; and Tachycardia.    ROS   As above in HPI  Continues to experience intermittent episodes of lightheadedness when she quickly stands     Objective:     Blood pressure 120/82, pulse (!) 104, temperature 36.6 °C (97.8 °F), resp. rate 16, height 1.6 m (5' 2.99\"), weight 106.6 kg (235 lb), SpO2 96 %, not currently breastfeeding. Body mass index is 41.64 kg/m².   Physical Exam:  Constitutional: Alert, no distress.  Skin: Warm, dry, good turgor, no rashes in visible areas.  Eye: Equal, round and reactive, conjunctiva clear, lids normal.  Psych: Alert and oriented x3, normal affect and mood.    Results and Imaging:   Transvaginal US (1/5/18):    FINDINGS:    Both transabdominal and transvaginal scanning was performed to optimally visualize the pelvis.    UTERUS:    The uterus measures 5.74 cm x 9.86 cm x 5.85 cm.  The uterine myometrium demonstrating multiple calcification as well as multiple larger fibroids. The largest prior fibroid is in the uterine fundus measuring up to 2.7 cm. The calcifications measure up to 8 mm. There are nabothian cysts in the cervix.   Questionable small fibroid near the endometrium in the lower uterine segment, may be submucosal.    The endometrium measures 0.70 cm.      OVARIES:    The right ovary measures 3.91 cm x 5.18 cm x 3.51 cm. There is a 3.4 x 2.1 x 2.5 cm the simple cyst in the right ovary.. Duplex Doppler examination of the right ovary shows normal venous outflow and arterial inflow of blood.    The left ovary measures 2.71 cm x 3.46 cm x 2.30 cm. The left ovary is normal in size and appearance. Duplex Doppler examination of the left ovary shows normal venous outflow and arterial inflow of blood.    No adnexal masses are seen.    There is no free fluid seen.   Impression           1. Very heterogeneous uterus with multiple coarse calcifications and fibroids. Questionable small fibroid near the " endometrium in the lower uterine segment, may be submucosal.    2. Simple right ovarian cyst.     CT head (1/5/18):    COMPARISON:  None available    FINDINGS:  There is no evidence of acute intracranial hemorrhage, mass, mass-effect or shift of midline structures.    Gray-white matter differentiation is grossly preserved.    Ventricle size and brain parenchymal volume are appropriate for this patient's stated age.    The basal cisterns are patent.    There are no abnormal extra-axial fluid collections.    No depressed or widely  calvarial fracture is seen.    The visualized paranasal sinuses and temporal bone structures are aerated.    ___________________________________   Impression         1. No acute intracranial abnormality. No evidence of acute intracranial hemorrhage or mass lesion.         Assessment and Plan:   The following treatment plan was discussed    1. Hypothyroidism due to Hashimoto's thyroiditis  We will continue current medications and obtain up-to-date labs.  - levothyroxine (SYNTHROID) 150 MCG Tab; Take 1 Tab by mouth Every morning on an empty stomach.  Dispense: 30 Tab; Refill: 6  - TSH WITH REFLEX TO FT4; Future    2. Diabetic polyneuropathy associated with type 2 diabetes mellitus (CMS-HCC)  Stable, well-controlled. Continue current medications which were refilled today.  - gabapentin (NEURONTIN) 300 MG Cap; Take 3 Caps by mouth every bedtime.  Dispense: 90 Cap; Refill: 6    3. Gastroesophageal reflux disease, esophagitis presence not specified  Stable, well-controlled. Continue current medications which were refilled today.  - omeprazole (PRILOSEC) 40 MG delayed-release capsule; Take 1 Cap by mouth 2 times a day.  Dispense: 60 Cap; Refill: 6    4. Primary insomnia  Stable, well-controlled. Continue current medications which were refilled today.  - trazodone (DESYREL) 150 MG Tab; Take 1 Tab by mouth every bedtime.  Dispense: 30 Tab; Refill: 1      5. Type 2 diabetes mellitus without  complication, with long-term current use of insulin (CMS-HCC)  Stable, we will continue current medications and obtain updated labs.  - liraglutide (VICTOZA) 18 MG/3ML Solution Pen-injector injection; Inject 0.3 mL as instructed every day.  Dispense: 3 PEN; Refill: 6  - metformin (GLUCOPHAGE) 1000 MG tablet; Take 1 Tab by mouth 2 times a day, with meals.  Dispense: 60 Tab; Refill: 6  - HEMOGLOBIN A1C; Future  - LIPID PROFILE; Future  - COMP METABOLIC PANEL; Future  - MICROALBUMIN CREAT RATIO URINE; Future    6. Uterine leiomyoma, unspecified location  Discussed findings with patient which is the cause of her menorrhagia. She has a referral to Dr. Lainez of gynecology. I gave her this information today is that she can follow-up. We discussed the likelihood of a hysterectomy.  -Follow-up GYN    7. Oral herpes  With infrequent outbreaks. I have given her a prescription of acyclovir to have on hand to take at the onset of symptoms.  - acyclovir (ZOVIRAX) 800 MG Tab; Take 1 Tab by mouth 2 times a day for 5 days.  Dispense: 10 Tab; Refill: 3    8. Chronic bilateral low back pain with left-sided sciatica  Stable, controlled with current medications. Refills given today.  - ibuprofen (MOTRIN) 800 MG Tab; Take 1 Tab by mouth every 8 hours as needed.  Dispense: 90 Tab; Refill: 6        Followup: Return in about 3 months (around 4/11/2018), or if symptoms worsen or fail to improve.

## 2018-01-12 NOTE — ASSESSMENT & PLAN NOTE
Has a history of oral herpes with infrequent outbreaks. She has a prescription of acyclovir on hand for any outbreaks, and she just uses up her last pills for her current outbreak which is now healing. She is requesting a refill today.

## 2018-01-15 ENCOUNTER — HOSPITAL ENCOUNTER (OUTPATIENT)
Dept: LAB | Facility: MEDICAL CENTER | Age: 48
End: 2018-01-15
Attending: INTERNAL MEDICINE
Payer: MEDICAID

## 2018-01-15 DIAGNOSIS — E11.9 TYPE 2 DIABETES MELLITUS WITHOUT COMPLICATION, WITH LONG-TERM CURRENT USE OF INSULIN (HCC): ICD-10-CM

## 2018-01-15 DIAGNOSIS — E03.8 HYPOTHYROIDISM DUE TO HASHIMOTO'S THYROIDITIS: ICD-10-CM

## 2018-01-15 DIAGNOSIS — N92.1 MENORRHAGIA WITH IRREGULAR CYCLE: ICD-10-CM

## 2018-01-15 DIAGNOSIS — Z79.4 TYPE 2 DIABETES MELLITUS WITHOUT COMPLICATION, WITH LONG-TERM CURRENT USE OF INSULIN (HCC): ICD-10-CM

## 2018-01-15 DIAGNOSIS — E06.3 HYPOTHYROIDISM DUE TO HASHIMOTO'S THYROIDITIS: ICD-10-CM

## 2018-01-15 LAB
ALBUMIN SERPL BCP-MCNC: 4 G/DL (ref 3.2–4.9)
ALBUMIN/GLOB SERPL: 1.3 G/DL
ALP SERPL-CCNC: 36 U/L (ref 30–99)
ALT SERPL-CCNC: 11 U/L (ref 2–50)
ANION GAP SERPL CALC-SCNC: 7 MMOL/L (ref 0–11.9)
AST SERPL-CCNC: 17 U/L (ref 12–45)
BILIRUB SERPL-MCNC: 0.4 MG/DL (ref 0.1–1.5)
BUN SERPL-MCNC: 13 MG/DL (ref 8–22)
CALCIUM SERPL-MCNC: 9 MG/DL (ref 8.5–10.5)
CHLORIDE SERPL-SCNC: 99 MMOL/L (ref 96–112)
CHOLEST SERPL-MCNC: 114 MG/DL (ref 100–199)
CO2 SERPL-SCNC: 28 MMOL/L (ref 20–33)
CREAT SERPL-MCNC: 0.76 MG/DL (ref 0.5–1.4)
CREAT UR-MCNC: 135.9 MG/DL
ERYTHROCYTE [DISTWIDTH] IN BLOOD BY AUTOMATED COUNT: 47 FL (ref 35.9–50)
EST. AVERAGE GLUCOSE BLD GHB EST-MCNC: 137 MG/DL
GLOBULIN SER CALC-MCNC: 3 G/DL (ref 1.9–3.5)
GLUCOSE SERPL-MCNC: 83 MG/DL (ref 65–99)
HBA1C MFR BLD: 6.4 % (ref 0–5.6)
HCT VFR BLD AUTO: 33.9 % (ref 37–47)
HDLC SERPL-MCNC: 34 MG/DL
HGB BLD-MCNC: 10.3 G/DL (ref 12–16)
LDLC SERPL CALC-MCNC: 60 MG/DL
MCH RBC QN AUTO: 23.3 PG (ref 27–33)
MCHC RBC AUTO-ENTMCNC: 30.4 G/DL (ref 33.6–35)
MCV RBC AUTO: 76.7 FL (ref 81.4–97.8)
MICROALBUMIN UR-MCNC: <0.7 MG/DL
MICROALBUMIN/CREAT UR: NORMAL MG/G (ref 0–30)
PLATELET # BLD AUTO: 419 K/UL (ref 164–446)
PMV BLD AUTO: 8.8 FL (ref 9–12.9)
POTASSIUM SERPL-SCNC: 4.1 MMOL/L (ref 3.6–5.5)
PROT SERPL-MCNC: 7 G/DL (ref 6–8.2)
RBC # BLD AUTO: 4.42 M/UL (ref 4.2–5.4)
SODIUM SERPL-SCNC: 134 MMOL/L (ref 135–145)
T4 FREE SERPL-MCNC: 1.02 NG/DL (ref 0.53–1.43)
TRIGL SERPL-MCNC: 101 MG/DL (ref 0–149)
TSH SERPL DL<=0.005 MIU/L-ACNC: 0.26 UIU/ML (ref 0.38–5.33)
WBC # BLD AUTO: 10.3 K/UL (ref 4.8–10.8)

## 2018-01-15 PROCEDURE — 36415 COLL VENOUS BLD VENIPUNCTURE: CPT

## 2018-01-15 PROCEDURE — 82043 UR ALBUMIN QUANTITATIVE: CPT

## 2018-01-15 PROCEDURE — 80053 COMPREHEN METABOLIC PANEL: CPT

## 2018-01-15 PROCEDURE — 82570 ASSAY OF URINE CREATININE: CPT

## 2018-01-15 PROCEDURE — 80061 LIPID PANEL: CPT

## 2018-01-15 PROCEDURE — 84443 ASSAY THYROID STIM HORMONE: CPT

## 2018-01-15 PROCEDURE — 85027 COMPLETE CBC AUTOMATED: CPT

## 2018-01-15 PROCEDURE — 83036 HEMOGLOBIN GLYCOSYLATED A1C: CPT

## 2018-01-15 PROCEDURE — 84439 ASSAY OF FREE THYROXINE: CPT

## 2018-01-16 DIAGNOSIS — D50.0 IRON DEFICIENCY ANEMIA DUE TO CHRONIC BLOOD LOSS: ICD-10-CM

## 2018-01-16 DIAGNOSIS — E06.3 HYPOTHYROIDISM DUE TO HASHIMOTO'S THYROIDITIS: ICD-10-CM

## 2018-01-16 DIAGNOSIS — E03.8 HYPOTHYROIDISM DUE TO HASHIMOTO'S THYROIDITIS: ICD-10-CM

## 2018-01-16 PROBLEM — D50.9 IRON DEFICIENCY ANEMIA: Status: ACTIVE | Noted: 2018-01-16

## 2018-01-16 RX ORDER — LANOLIN ALCOHOL/MO/W.PET/CERES
325 CREAM (GRAM) TOPICAL 2 TIMES DAILY WITH MEALS
Qty: 60 TAB | Refills: 1 | Status: SHIPPED | OUTPATIENT
Start: 2018-01-16 | End: 2018-03-16

## 2018-01-16 RX ORDER — LEVOTHYROXINE SODIUM 137 UG/1
137 TABLET ORAL
Qty: 30 TAB | Refills: 2 | Status: SHIPPED | OUTPATIENT
Start: 2018-01-16 | End: 2018-05-08 | Stop reason: SDUPTHER

## 2018-01-25 ENCOUNTER — HOSPITAL ENCOUNTER (OUTPATIENT)
Facility: MEDICAL CENTER | Age: 48
End: 2018-01-25
Attending: SPECIALIST
Payer: MEDICAID

## 2018-01-25 PROCEDURE — 88305 TISSUE EXAM BY PATHOLOGIST: CPT

## 2018-02-21 ENCOUNTER — TELEPHONE (OUTPATIENT)
Dept: MEDICAL GROUP | Facility: MEDICAL CENTER | Age: 48
End: 2018-02-21

## 2018-02-21 RX ORDER — PEN NEEDLE, DIABETIC 32GX 5/32"
NEEDLE, DISPOSABLE MISCELLANEOUS
Qty: 100 EACH | Refills: 3 | Status: SHIPPED | OUTPATIENT
Start: 2018-02-21 | End: 2018-08-09 | Stop reason: SDUPTHER

## 2018-02-21 RX ORDER — PEN NEEDLE, DIABETIC 32GX 5/32"
NEEDLE, DISPOSABLE MISCELLANEOUS
COMMUNITY
Start: 2018-01-02 | End: 2018-02-21 | Stop reason: SDUPTHER

## 2018-02-21 NOTE — TELEPHONE ENCOUNTER
Pt called asking to have her script for her pen needles renewed, pt was receiving this from previous Dr but would like to have this through our office.

## 2018-02-23 ENCOUNTER — TELEPHONE (OUTPATIENT)
Dept: MEDICAL GROUP | Facility: MEDICAL CENTER | Age: 48
End: 2018-02-23

## 2018-02-24 NOTE — TELEPHONE ENCOUNTER
Voice mail was left for Pt advising that the paperwork submitted to the prior auth for her medication was approved.

## 2018-02-28 DIAGNOSIS — M54.42 CHRONIC BILATERAL LOW BACK PAIN WITH LEFT-SIDED SCIATICA: ICD-10-CM

## 2018-02-28 DIAGNOSIS — G89.29 CHRONIC BILATERAL LOW BACK PAIN WITH LEFT-SIDED SCIATICA: ICD-10-CM

## 2018-02-28 RX ORDER — CYCLOBENZAPRINE HCL 10 MG
TABLET ORAL
Qty: 30 TAB | Refills: 3 | Status: SHIPPED | OUTPATIENT
Start: 2018-02-28 | End: 2018-05-08 | Stop reason: SDUPTHER

## 2018-03-01 DIAGNOSIS — G89.29 CHRONIC BILATERAL LOW BACK PAIN WITH LEFT-SIDED SCIATICA: ICD-10-CM

## 2018-03-01 DIAGNOSIS — M54.42 CHRONIC BILATERAL LOW BACK PAIN WITH LEFT-SIDED SCIATICA: ICD-10-CM

## 2018-03-01 PROBLEM — K22.4 ESOPHAGEAL DYSMOTILITY: Status: ACTIVE | Noted: 2018-03-01

## 2018-03-01 RX ORDER — TRAMADOL HYDROCHLORIDE 50 MG/1
50 TABLET ORAL 3 TIMES DAILY PRN
Qty: 90 TAB | Refills: 0 | Status: SHIPPED | OUTPATIENT
Start: 2018-03-01 | End: 2018-03-31

## 2018-03-02 DIAGNOSIS — G43.009 MIGRAINE WITHOUT AURA AND WITHOUT STATUS MIGRAINOSUS, NOT INTRACTABLE: ICD-10-CM

## 2018-03-02 RX ORDER — SUMATRIPTAN 100 MG/1
TABLET, FILM COATED ORAL
Qty: 10 TAB | Refills: 3 | Status: SHIPPED | OUTPATIENT
Start: 2018-03-02 | End: 2019-05-08 | Stop reason: SDUPTHER

## 2018-03-14 ENCOUNTER — HOSPITAL ENCOUNTER (OUTPATIENT)
Dept: RADIOLOGY | Facility: MEDICAL CENTER | Age: 48
End: 2018-03-14
Attending: INTERNAL MEDICINE
Payer: MEDICAID

## 2018-03-14 DIAGNOSIS — K21.9 GASTROESOPHAGEAL REFLUX DISEASE, ESOPHAGITIS PRESENCE NOT SPECIFIED: ICD-10-CM

## 2018-03-14 DIAGNOSIS — R13.14 DYSPHAGIA, PHARYNGOESOPHAGEAL PHASE: ICD-10-CM

## 2018-03-14 DIAGNOSIS — F51.01 PRIMARY INSOMNIA: ICD-10-CM

## 2018-03-14 PROCEDURE — 92611 MOTION FLUOROSCOPY/SWALLOW: CPT

## 2018-03-14 PROCEDURE — 74230 X-RAY XM SWLNG FUNCJ C+: CPT

## 2018-03-15 RX ORDER — TRAZODONE HYDROCHLORIDE 150 MG/1
TABLET ORAL
Qty: 90 TAB | Refills: 1 | Status: SHIPPED | OUTPATIENT
Start: 2018-03-15 | End: 2018-09-10 | Stop reason: SDUPTHER

## 2018-03-15 NOTE — OP THERAPY EVALUATION
[facility]  Speech-Language Pathology Department  Modified Barium Swallow Study Summary    Patient: Sarah Friedman     Date of Evaluation:  3/14/2018    Referring MD: Ata Khalil M.D.     Patient History/Reason for Referral:  Pt notes long history of esophageal dysphagia in which she noted food sticking at mid-chest area.  She has had dilations which were helpful for motility.  In past 4-5 months pt feels food is sticking in her throat and she feels like she is choking.  Pt is currently on regular foods/liquids.  Her last dilation was in 2015.    Procedure Results:  The examination was conducted with videofluoroscopy from a   Lateral and Anterior view.  The following textures were presented:   Applesauce, Diced Fruit, Cookie and Thin Liquid       The following observations were made:    Oral Phase Puree Thick Liquids Thin Liquids Mechanical Soft Solid   Anterior spillage        Residue in oral cavity after the swallow WFL N/A WFL WFL WFL   Decreased mastication        Loss of bolus control        Piecemeal deglutition        Slow oral transit time        Premature spillage into the valleculae        Premature spillage into the pyriform sinus           Other Observations:         Pharyngeal Phase Puree Thick Liquids Thin Liquids Mechanical Soft Solid   Delayed triggering of the pharyngeal swallow WFL N/A WFL  WFL WFL   Absent initiation of swallow        Residue on tongue base        Residue on posterior pharyngeal wall        Residue in valleculae        Residue in pyriform sinus        Penetration        Aspiration          Other Observations:  Pt c/o mixed fruit being stuck in her throat when no residue was present.  Coughing/throat clearing when airway/throat were clear.                              Esophageal Phase:  A-P view completed with cookie and thin liquids.  Appeared to have slow motility with cookie, thin liquids did not seem to be a problem.                                   Impressions:  Oral-pharyngeal phase of swallow is within functional limits (WFL).  Presents with s/s of esophageal dysphagia.         Recommendations: Diet: Soft to regular/as tolerated       Liquid:  Thin                                        Medications:  As tolerated        Compensatory Strategies:  1.  Slow rate of intake to allow for esophageal motility  2.  Upright during and 30 mins after meals  3.  Reflux precautions               Medicare only:                    Thank you for the referral.    For further questions, please call [phone].        Eloina Silva MA, CCC-SLP   Speech-Language Pathologist

## 2018-03-16 ENCOUNTER — OFFICE VISIT (OUTPATIENT)
Dept: MEDICAL GROUP | Facility: MEDICAL CENTER | Age: 48
End: 2018-03-16
Attending: INTERNAL MEDICINE
Payer: MEDICAID

## 2018-03-16 VITALS
DIASTOLIC BLOOD PRESSURE: 68 MMHG | HEART RATE: 100 BPM | SYSTOLIC BLOOD PRESSURE: 112 MMHG | TEMPERATURE: 97.3 F | WEIGHT: 235 LBS | RESPIRATION RATE: 16 BRPM | OXYGEN SATURATION: 96 % | BODY MASS INDEX: 41.64 KG/M2 | HEIGHT: 63 IN

## 2018-03-16 DIAGNOSIS — M25.572 CHRONIC PAIN OF LEFT ANKLE: ICD-10-CM

## 2018-03-16 DIAGNOSIS — G89.29 CHRONIC PAIN OF LEFT ANKLE: ICD-10-CM

## 2018-03-16 PROCEDURE — 99212 OFFICE O/P EST SF 10 MIN: CPT | Performed by: INTERNAL MEDICINE

## 2018-03-16 PROCEDURE — 99214 OFFICE O/P EST MOD 30 MIN: CPT | Performed by: INTERNAL MEDICINE

## 2018-03-16 RX ORDER — DIVALPROEX SODIUM 250 MG/1
500 TABLET, DELAYED RELEASE ORAL 2 TIMES DAILY
COMMUNITY
Start: 2018-02-26 | End: 2020-08-17

## 2018-03-16 ASSESSMENT — PAIN SCALES - GENERAL: PAINLEVEL: 4=SLIGHT-MODERATE PAIN

## 2018-03-16 NOTE — PROGRESS NOTES
PharmD Consult received for:  1.) Warfarin dosing (new start):   · Indication: DVT  · INR trend - 2.1 => 2.4  · Per chart, seems patient to d/c to SNF today, would recommend a schedule of 5 mg PO MWF, 7.5 mg all other days.  · Order INRs at SNF  · Will sign off.      Thank you for the consult,    Elida Mary PharmD  [id number]      **Note: Consults are reviewed Monday-Friday 7:30-4pm. The above recommendations are only suggested. The recommendations should be considered in conjunction with all patient factors.**       Subjective:   Sarah Friedman is a 47 y.o. female here today for left ankle pain    Left ankle pain  Patient reports about 3 months ago, she started to develop pain over the anterior part of her left ankle. She denies any history of injury or recent injury to the ankle. She describes the pain as a deep intense burning that last anywhere from one to 3 minutes. It comes on out of the blue and may happen in any position. It doesn't seem to be related to walking or moving. She denies associated swelling. She has not found anything that makes it better or worse. She has tried ibuprofen, Tylenol, tramadol and stretching without relief. She is still able to bear weight on the ankle when it is painful. She does have a history of cysts in the tendon sheath of her hand that she had to have surgically removed.       Current medicines (including changes today)  Current Outpatient Prescriptions   Medication Sig Dispense Refill   • divalproex (DEPAKOTE) 250 MG Tablet Delayed Response Take 1 Tab by mouth every day.     • traZODone (DESYREL) 150 MG Tab TAKE ONE TABLET BY MOUTH AT BEDTIME 90 Tab 1   • sumatriptan (IMITREX) 100 MG tablet Take 1/2 to 1 full tab daily as needed for migraine headache 10 Tab 3   • tramadol (ULTRAM) 50 MG Tab Take 1 Tab by mouth 3 times a day as needed for up to 30 days. 90 Tab 0   • cyclobenzaprine (FLEXERIL) 10 MG Tab TAKE ONE TABLET BY MOUTH THREE TIMES DAILY AS NEEDED 30 Tab 3   • BD PEN NEEDLE JOSE J U/F 32G X 4 MM Misc Use to inject insulin nightly as directed 100 Each 3   • levothyroxine (SYNTHROID) 137 MCG Tab Take 1 Tab by mouth Every morning on an empty stomach. 30 Tab 2   • ibuprofen (MOTRIN) 800 MG Tab Take 1 Tab by mouth every 8 hours as needed. 90 Tab 6   • liraglutide (VICTOZA) 18 MG/3ML Solution Pen-injector injection Inject 0.3 mL as instructed every day. 3 PEN 6   • gabapentin (NEURONTIN) 300 MG Cap Take 3 Caps by mouth every bedtime. 90 Cap 6   • metformin (GLUCOPHAGE) 1000 MG tablet  "Take 1 Tab by mouth 2 times a day, with meals. 60 Tab 6   • omeprazole (PRILOSEC) 40 MG delayed-release capsule Take 1 Cap by mouth 2 times a day. 60 Cap 6   • hydrochlorothiazide (MICROZIDE) 12.5 MG capsule Take 1 Cap by mouth every day. 30 Cap    • insulin glargine (BASAGLAR KWIKPEN) 100 UNIT/ML Solution Pen-injector injection Inject 52 Units as instructed every evening.     • buPROPion SR (WELLBUTRIN-SR) 150 MG TABLET SR 12 HR sustained-release tablet Take 1 Tab by mouth 2 times a day. 60 Tab    • spironolactone (ALDACTONE) 25 MG Tab Take 1 Tab by mouth every day. 30 Tab 3   • atorvastatin (LIPITOR) 20 MG Tab Take 1 Tab by mouth every day. 30 Tab    • ivabradine (CORLANOR) 5 MG Tab tablet Take 1 Tab by mouth 2 times a day, with meals. 60 Tab    • Black Cohosh 40 MG Cap Take  by mouth 2 Times a Day.     • Melatonin 10 MG Tab Take 20 mg by mouth every bedtime.       No current facility-administered medications for this visit.      She  has a past medical history of Depression; Diabetes (CMS-[facility]); Diabetic neuropathy (CMS-[facility]); Esophageal spasm; GERD (gastroesophageal reflux disease); Hashimoto's disease; Hiatal hernia; Hyperlipidemia; Hypothyroid; and Tachycardia.    ROS   As above in HPI     Objective:     Blood pressure 112/68, pulse 100, temperature 36.3 °C (97.3 °F), resp. rate 16, height 1.6 m (5' 2.99\"), weight 106.6 kg (235 lb), SpO2 96 %, not currently breastfeeding. Body mass index is 41.64 kg/m².   Physical Exam:  Constitutional: Alert, no distress.  Skin: Warm, dry, good turgor, no rashes in visible areas.  Eye: Equal, round and reactive, conjunctiva clear, lids normal.  MSK: Tenderness to palpation over anterior anterior lateral aspects of the ankle without swelling, redness, overlying rash. Full active and passive range of motion. Sensation intact.      Assessment and Plan:   The following treatment plan was discussed    1. Chronic pain of left ankle  Differential includes osteoarthritis of the ankle, " neuroma, nerve impingement, ganglion cyst. We will start with an x-ray. If unrevealing, would consider an MRI.  - DX-ANKLE 3+ VIEWS LEFT; Future        Followup: Return in about 3 months (around 6/16/2018), or if symptoms worsen or fail to improve.

## 2018-03-16 NOTE — ASSESSMENT & PLAN NOTE
Patient reports about 3 months ago, she started to develop pain over the anterior part of her left ankle. She denies any history of injury or recent injury to the ankle. She describes the pain as a deep intense burning that last anywhere from one to 3 minutes. It comes on out of the blue and may happen in any position. It doesn't seem to be related to walking or moving. She denies associated swelling. She has not found anything that makes it better or worse. She has tried ibuprofen, Tylenol, tramadol and stretching without relief. She is still able to bear weight on the ankle when it is painful. She does have a history of cysts in the tendon sheath of her hand that she had to have surgically removed.

## 2018-03-22 ENCOUNTER — HOSPITAL ENCOUNTER (OUTPATIENT)
Dept: RADIOLOGY | Facility: MEDICAL CENTER | Age: 48
End: 2018-03-22
Attending: INTERNAL MEDICINE
Payer: MEDICAID

## 2018-03-22 DIAGNOSIS — M25.572 CHRONIC PAIN OF LEFT ANKLE: ICD-10-CM

## 2018-03-22 DIAGNOSIS — G89.29 CHRONIC PAIN OF LEFT ANKLE: ICD-10-CM

## 2018-03-22 PROCEDURE — 73610 X-RAY EXAM OF ANKLE: CPT | Mod: LT

## 2018-03-23 DIAGNOSIS — G89.29 CHRONIC PAIN OF LEFT ANKLE: ICD-10-CM

## 2018-03-23 DIAGNOSIS — M25.572 CHRONIC PAIN OF LEFT ANKLE: ICD-10-CM

## 2018-04-03 ENCOUNTER — HOSPITAL ENCOUNTER (OUTPATIENT)
Dept: RADIOLOGY | Facility: MEDICAL CENTER | Age: 48
End: 2018-04-03
Attending: INTERNAL MEDICINE
Payer: MEDICAID

## 2018-04-03 ENCOUNTER — TELEPHONE (OUTPATIENT)
Dept: MEDICAL GROUP | Facility: MEDICAL CENTER | Age: 48
End: 2018-04-03

## 2018-04-03 DIAGNOSIS — S93.492D SPRAIN OF ANTERIOR TALOFIBULAR LIGAMENT OF LEFT ANKLE, SUBSEQUENT ENCOUNTER: ICD-10-CM

## 2018-04-03 DIAGNOSIS — G89.29 CHRONIC PAIN OF LEFT ANKLE: ICD-10-CM

## 2018-04-03 DIAGNOSIS — M25.572 CHRONIC PAIN OF LEFT ANKLE: ICD-10-CM

## 2018-04-03 PROBLEM — S93.492A SPRAIN OF ANTERIOR TALOFIBULAR LIGAMENT OF LEFT ANKLE: Status: ACTIVE | Noted: 2018-03-16

## 2018-04-03 PROCEDURE — 73721 MRI JNT OF LWR EXTRE W/O DYE: CPT | Mod: LT

## 2018-04-03 NOTE — TELEPHONE ENCOUNTER
----- Message from Zuleima Almeida M.D. sent at 4/3/2018  2:23 PM PDT -----  Please inform patient that her MRI of the ankle came back showing a chronic sprain of the ligament right over where she is having pain.  The best treatment course for this is physical therapy and I have placed a referral for that today.  No need for surgical or orthopedic intervention.    Zuleima Almeida M.D.

## 2018-04-20 ENCOUNTER — APPOINTMENT (OUTPATIENT)
Dept: ADMISSIONS | Facility: MEDICAL CENTER | Age: 48
End: 2018-04-20
Attending: SPECIALIST
Payer: MEDICAID

## 2018-04-23 PROCEDURE — 93005 ELECTROCARDIOGRAM TRACING: CPT

## 2018-04-24 ENCOUNTER — HOSPITAL ENCOUNTER (OUTPATIENT)
Facility: MEDICAL CENTER | Age: 48
End: 2018-04-24
Attending: SPECIALIST | Admitting: SPECIALIST
Payer: MEDICAID

## 2018-04-24 VITALS
HEIGHT: 63 IN | BODY MASS INDEX: 43.79 KG/M2 | HEART RATE: 45 BPM | DIASTOLIC BLOOD PRESSURE: 58 MMHG | WEIGHT: 247.14 LBS | OXYGEN SATURATION: 86 % | SYSTOLIC BLOOD PRESSURE: 112 MMHG | TEMPERATURE: 96.8 F

## 2018-04-24 LAB — GLUCOSE BLD-MCNC: 154 MG/DL (ref 65–99)

## 2018-04-24 PROCEDURE — A4338 INDWELLING CATHETER LATEX: HCPCS | Performed by: SPECIALIST

## 2018-04-24 PROCEDURE — 502703 HCHG DEVICE, LIGASURE V SEALER: Performed by: SPECIALIST

## 2018-04-24 PROCEDURE — 160009 HCHG ANES TIME/MIN: Performed by: SPECIALIST

## 2018-04-24 PROCEDURE — 700101 HCHG RX REV CODE 250

## 2018-04-24 PROCEDURE — 160002 HCHG RECOVERY MINUTES (STAT): Performed by: SPECIALIST

## 2018-04-24 PROCEDURE — A9270 NON-COVERED ITEM OR SERVICE: HCPCS

## 2018-04-24 PROCEDURE — 160041 HCHG SURGERY MINUTES - EA ADDL 1 MIN LEVEL 4: Performed by: SPECIALIST

## 2018-04-24 PROCEDURE — 700111 HCHG RX REV CODE 636 W/ 250 OVERRIDE (IP)

## 2018-04-24 PROCEDURE — 501579 HCHG TROCAR, STEP 5MM: Performed by: SPECIALIST

## 2018-04-24 PROCEDURE — 501838 HCHG SUTURE GENERAL: Performed by: SPECIALIST

## 2018-04-24 PROCEDURE — C1771 REP DEV, URINARY, W/SLING: HCPCS | Performed by: SPECIALIST

## 2018-04-24 PROCEDURE — 160029 HCHG SURGERY MINUTES - 1ST 30 MINS LEVEL 4: Performed by: SPECIALIST

## 2018-04-24 PROCEDURE — 82962 GLUCOSE BLOOD TEST: CPT

## 2018-04-24 PROCEDURE — 500892 HCHG PACK, PERI-GYN: Performed by: SPECIALIST

## 2018-04-24 PROCEDURE — 502240 HCHG MISC OR SUPPLY RC 0272: Performed by: SPECIALIST

## 2018-04-24 PROCEDURE — 700102 HCHG RX REV CODE 250 W/ 637 OVERRIDE(OP)

## 2018-04-24 PROCEDURE — 160036 HCHG PACU - EA ADDL 30 MINS PHASE I: Performed by: SPECIALIST

## 2018-04-24 PROCEDURE — 500854 HCHG NEEDLE, INSUFFLATION FOR STEP: Performed by: SPECIALIST

## 2018-04-24 PROCEDURE — 160048 HCHG OR STATISTICAL LEVEL 1-5: Performed by: SPECIALIST

## 2018-04-24 PROCEDURE — 160035 HCHG PACU - 1ST 60 MINS PHASE I: Performed by: SPECIALIST

## 2018-04-24 PROCEDURE — 88307 TISSUE EXAM BY PATHOLOGIST: CPT

## 2018-04-24 PROCEDURE — 501330 HCHG SET, CYSTO IRRIG TUBING: Performed by: SPECIALIST

## 2018-04-24 PROCEDURE — 500886 HCHG PACK, LAPAROSCOPY: Performed by: SPECIALIST

## 2018-04-24 PROCEDURE — 501577 HCHG TROCAR, STEP 11MM: Performed by: SPECIALIST

## 2018-04-24 PROCEDURE — 502587 HCHG PACK, D&C: Performed by: SPECIALIST

## 2018-04-24 DEVICE — SLING TOT OBTRYX I HALO: Type: IMPLANTABLE DEVICE | Status: FUNCTIONAL

## 2018-04-24 RX ORDER — SIMETHICONE 80 MG
80 TABLET,CHEWABLE ORAL EVERY 8 HOURS PRN
Status: DISCONTINUED | OUTPATIENT
Start: 2018-04-24 | End: 2018-04-24 | Stop reason: HOSPADM

## 2018-04-24 RX ORDER — BUPIVACAINE HYDROCHLORIDE 2.5 MG/ML
INJECTION, SOLUTION EPIDURAL; INFILTRATION; INTRACAUDAL
Status: DISCONTINUED
Start: 2018-04-24 | End: 2018-04-24 | Stop reason: HOSPADM

## 2018-04-24 RX ORDER — KETAMINE HYDROCHLORIDE 50 MG/ML
INJECTION, SOLUTION INTRAMUSCULAR; INTRAVENOUS
Status: DISCONTINUED
Start: 2018-04-24 | End: 2018-04-24 | Stop reason: HOSPADM

## 2018-04-24 RX ORDER — ACETAMINOPHEN 500 MG
TABLET ORAL
Status: COMPLETED
Start: 2018-04-24 | End: 2018-04-24

## 2018-04-24 RX ORDER — EPINEPHRINE 1 MG/ML
INJECTION INTRAMUSCULAR; INTRAVENOUS; SUBCUTANEOUS
Status: DISCONTINUED
Start: 2018-04-24 | End: 2018-04-24 | Stop reason: HOSPADM

## 2018-04-24 RX ORDER — ONDANSETRON 2 MG/ML
4 INJECTION INTRAMUSCULAR; INTRAVENOUS EVERY 6 HOURS PRN
Status: DISCONTINUED | OUTPATIENT
Start: 2018-04-24 | End: 2018-04-24 | Stop reason: HOSPADM

## 2018-04-24 RX ORDER — CELECOXIB 200 MG/1
CAPSULE ORAL
Status: COMPLETED
Start: 2018-04-24 | End: 2018-04-24

## 2018-04-24 RX ORDER — OXYCODONE HCL 5 MG/5 ML
SOLUTION, ORAL ORAL
Status: COMPLETED
Start: 2018-04-24 | End: 2018-04-24

## 2018-04-24 RX ORDER — BUPIVACAINE HYDROCHLORIDE AND EPINEPHRINE 2.5; 5 MG/ML; UG/ML
INJECTION, SOLUTION EPIDURAL; INFILTRATION; INTRACAUDAL; PERINEURAL
Status: DISCONTINUED | OUTPATIENT
Start: 2018-04-24 | End: 2018-04-24 | Stop reason: HOSPADM

## 2018-04-24 RX ORDER — GABAPENTIN 300 MG/1
CAPSULE ORAL
Status: COMPLETED
Start: 2018-04-24 | End: 2018-04-24

## 2018-04-24 RX ORDER — SODIUM CHLORIDE, SODIUM LACTATE, POTASSIUM CHLORIDE, CALCIUM CHLORIDE 600; 310; 30; 20 MG/100ML; MG/100ML; MG/100ML; MG/100ML
INJECTION, SOLUTION INTRAVENOUS CONTINUOUS
Status: DISCONTINUED | OUTPATIENT
Start: 2018-04-24 | End: 2018-04-24 | Stop reason: HOSPADM

## 2018-04-24 RX ORDER — MAGNESIUM SULFATE HEPTAHYDRATE 500 MG/ML
INJECTION, SOLUTION INTRAMUSCULAR; INTRAVENOUS
Status: DISCONTINUED
Start: 2018-04-24 | End: 2018-04-24 | Stop reason: HOSPADM

## 2018-04-24 RX ORDER — METOCLOPRAMIDE HYDROCHLORIDE 5 MG/ML
10 INJECTION INTRAMUSCULAR; INTRAVENOUS EVERY 4 HOURS PRN
Status: DISCONTINUED | OUTPATIENT
Start: 2018-04-24 | End: 2018-04-24 | Stop reason: HOSPADM

## 2018-04-24 RX ORDER — BUPIVACAINE HYDROCHLORIDE AND EPINEPHRINE 2.5; 5 MG/ML; UG/ML
INJECTION, SOLUTION INFILTRATION; PERINEURAL
Status: DISCONTINUED | OUTPATIENT
Start: 2018-04-24 | End: 2018-04-24 | Stop reason: HOSPADM

## 2018-04-24 RX ORDER — OXYCODONE HYDROCHLORIDE 5 MG/1
10 TABLET ORAL
Status: DISCONTINUED | OUTPATIENT
Start: 2018-04-24 | End: 2018-04-24 | Stop reason: HOSPADM

## 2018-04-24 RX ORDER — ACETAMINOPHEN 500 MG
1000 TABLET ORAL EVERY 6 HOURS
Status: DISCONTINUED | OUTPATIENT
Start: 2018-04-24 | End: 2018-04-24 | Stop reason: HOSPADM

## 2018-04-24 RX ADMIN — GABAPENTIN 300 MG: 300 CAPSULE ORAL at 07:30

## 2018-04-24 RX ADMIN — SODIUM CHLORIDE, SODIUM LACTATE, POTASSIUM CHLORIDE, CALCIUM CHLORIDE 1000 ML: 600; 310; 30; 20 INJECTION, SOLUTION INTRAVENOUS at 07:45

## 2018-04-24 RX ADMIN — CELECOXIB 400 MG: 200 CAPSULE ORAL at 07:40

## 2018-04-24 RX ADMIN — OXYCODONE HYDROCHLORIDE 10 MG: 5 SOLUTION ORAL at 11:45

## 2018-04-24 RX ADMIN — FENTANYL CITRATE 50 MCG: 50 INJECTION, SOLUTION INTRAMUSCULAR; INTRAVENOUS at 14:15

## 2018-04-24 RX ADMIN — FENTANYL CITRATE 50 MCG: 50 INJECTION, SOLUTION INTRAMUSCULAR; INTRAVENOUS at 13:00

## 2018-04-24 RX ADMIN — ACETAMINOPHEN 1000 MG: 500 TABLET, FILM COATED ORAL at 07:40

## 2018-04-24 RX ADMIN — FENTANYL CITRATE 25 MCG: 50 INJECTION, SOLUTION INTRAMUSCULAR; INTRAVENOUS at 12:00

## 2018-04-24 RX ADMIN — FENTANYL CITRATE 25 MCG: 50 INJECTION, SOLUTION INTRAMUSCULAR; INTRAVENOUS at 11:45

## 2018-04-24 ASSESSMENT — PAIN SCALES - GENERAL
PAINLEVEL_OUTOF10: 6
PAINLEVEL_OUTOF10: 4
PAINLEVEL_OUTOF10: 6
PAINLEVEL_OUTOF10: 4
PAINLEVEL_OUTOF10: 3
PAINLEVEL_OUTOF10: 4
PAINLEVEL_OUTOF10: 3
PAINLEVEL_OUTOF10: 8
PAINLEVEL_OUTOF10: 3
PAINLEVEL_OUTOF10: 8
PAINLEVEL_OUTOF10: 3
PAINLEVEL_OUTOF10: 7
PAINLEVEL_OUTOF10: 10
PAINLEVEL_OUTOF10: 3
PAINLEVEL_OUTOF10: 3
PAINLEVEL_OUTOF10: 10
PAINLEVEL_OUTOF10: 8
PAINLEVEL_OUTOF10: 4
PAINLEVEL_OUTOF10: 3
PAINLEVEL_OUTOF10: 6

## 2018-04-24 NOTE — DISCHARGE INSTRUCTIONS
ACTIVITY: Rest and take it easy for the first 24 hours.  A responsible adult is recommended to remain with you during that time.  It is normal to feel sleepy.  We encourage you to not do anything that requires balance, judgment or coordination.    MILD FLU-LIKE SYMPTOMS ARE NORMAL. YOU MAY EXPERIENCE GENERALIZED MUSCLE ACHES, THROAT IRRITATION, HEADACHE AND/OR SOME NAUSEA.    FOR 24 HOURS DO NOT:  Drive, operate machinery or run household appliances.  Drink beer or alcoholic beverages.   Make important decisions or sign legal documents.    SPECIAL INSTRUCTIONS: see attached instruction sheet    DIET: To avoid nausea, slowly advance diet as tolerated, avoiding spicy or greasy foods for the first day.  Add more substantial food to your diet according to your physician's instructions.  Babies can be fed formula or breast milk as soon as they are hungry.  INCREASE FLUIDS AND FIBER TO AVOID CONSTIPATION.    SURGICAL DRESSING/BATHING: ok to shower after catheter is removed. May remove bandage in 48 hours. Keep incision clean and dry. No tub baths or hot tubs for 6 weeks.    FOLLOW-UP APPOINTMENT:  A follow-up appointment should be arranged with your doctor Migel in am; call to schedule.    You should CALL YOUR PHYSICIAN if you develop:  Fever greater than 101 degrees F.  Pain not relieved by medication, or persistent nausea or vomiting.  Excessive bleeding (blood soaking through dressing) or unexpected drainage from the wound.  Extreme redness or swelling around the incision site, drainage of pus or foul smelling drainage.  Inability to urinate or empty your bladder within 8 hours.  Problems with breathing or chest pain.    You should call 911 if you develop problems with breathing or chest pain.  If you are unable to contact your doctor or surgical center, you should go to the nearest emergency room or urgent care center.  Physician's telephone #: [phone]    If any questions arise, call your doctor.  If your  doctor is not available, please feel free to call the Surgical Center at [phone].  The Center is open Monday through Friday from 7AM to 7PM.  You can also call the HEALTH HOTLINE open 24 hours/day, 7 days/week and speak to a nurse at [phone], or toll free at [phone].    A registered nurse may call you a few days after your surgery to see how you are doing after your procedure.    MEDICATIONS: Resume taking daily medication.  Take prescribed pain medication with food.  If no medication is prescribed, you may take non-aspirin pain medication if needed.  PAIN MEDICATION CAN BE VERY CONSTIPATING.  Take a stool softener or laxative such as senokot, pericolace, or milk of magnesia if needed.    Prescription given for pain medication in pre op visit.  Last pain medication given at 11:45    If your physician has prescribed pain medication that includes Acetaminophen (Tylenol), do not take additional Acetaminophen (Tylenol) while taking the prescribed medication.    Depression / Suicide Risk    As you are discharged from this [facility] facility, it is important to learn how to keep safe from harming yourself.    Recognize the warning signs:  · Abrupt changes in personality, positive or negative- including increase in energy   · Giving away possessions  · Change in eating patterns- significant weight changes-  positive or negative  · Change in sleeping patterns- unable to sleep or sleeping all the time   · Unwillingness or inability to communicate  · Depression  · Unusual sadness, discouragement and loneliness  · Talk of wanting to die  · Neglect of personal appearance   · Rebelliousness- reckless behavior  · Withdrawal from people/activities they love  · Confusion- inability to concentrate     If you or a loved one observes any of these behaviors or has concerns about self-harm, here's what you can do:  · Talk about it- your feelings and reasons for harming yourself  · Remove any means that you might  use to hurt yourself (examples: pills, rope, extension cords, firearm)  · Get professional help from the community (Mental Health, Substance Abuse, psychological counseling)  · Do not be alone:Call your Safe Contact- someone whom you trust who will be there for you.  · Call your local CRISIS HOTLINE 717-8697 or 487-255-0946  · Call your local Children's Mobile Crisis Response Team [place] (083) 759-7586 or www.Geni  · Call the toll free National Suicide Prevention Hotlines   · National Suicide Prevention Lifeline 770-747-VYGQ (5761)  · National Hope Line Network 800-SUICIDE (726-0285)

## 2018-04-24 NOTE — OP REPORT
DATE OF SERVICE:  4/24/2018     PREOPERATIVE DIAGNOSES:  Symptomatic uterine fibroids with associated menometrorrhagia,   dysmenorrhea, pelvic pain, symptomatic pelvic floor relaxation, Type II stress   urinary incontinence on urodynamic studies     POSTOPERATIVE DIAGNOSES:  Same; final pathology pending; pelvic endometriosis     PROCEDURE:  Laparoscopic-assisted vaginal hysterectomy, bilateral    salpingo-oophorectomy, anterior and posterior vaginal repair, enterocele    repair, perineoplasty, sacrospinous vault suspension, transobturator tape mid    urethral sling procedure, cystoscopy.     SURGEON:  Francisco Javier Lainez MD     ASSISTANT:  Amaury Rob MD     ANESTHESIA:  General     ANESTHESIOLOGIST: Anesthesiologist: Beryl Gregg M.D.     ESTIMATED BLOOD LOSS FOR THE PROCEDURE:  100 mL.     FINDINGS:  Bulbous uterus with bilateral cystic adnexa with endometriosis    lesions located in the posterior cul-de-sac and bilateral pelvic sidewalls,    good support of the anterior and posterior vaginal walls in addition to apical   vaginal tissue without any undue tension in the suture sites.  Cystoscopy    revealed bilateral ureteral efflux, normal bladder, no undue tension noted at    the level of the mid urethra after sling placement on cystoscopic evaluation.     DESCRIPTION OF PROCEDURE:  The patient was taken to the operating room where    general anesthesia was performed without difficulty.  The patient was then    prepped and draped in usual sterile fashion with lower extremities placed in    Monty stirrups.  Attention was first turned to the perineum where a weighted    speculum was placed with the use of Moreno retractor.  Single tooth tenaculum    was placed on the anterior lip of the cervix.  Hulka uterine manipulator was    then placed.  Single tooth tenaculum and retractor was then removed.     Attention was then turned to the umbilicus where a 1 cm incision was made.  A    Veress needle was placed, 3.5 L of  carboperitoneum were then obtained.  A 10    mm trocar port was then placed.  Two separate ports were placed approximately    1/3 of the way from the anterior supra iliac spine lateral to the rectus    muscle under direct laparoscopic visualization with 5 mm ports placed.     Attention was then turned to the pelvis where the distal portion of the    fallopian tubes was then grasped just below the ovary.  This area was grasped,   cauterized, and transected at the level of the infundibulopelvic ligament.     Once the ureters were noted to be coursing far inferior to the operative    field, the broad round main portion of broad ligaments were then isolated,    cauterized, and transected on each side.  The vesicouterine peritoneum was    grasped, incised, the bladder flap was created.  Uterine vessels were then    clamped, cauterized and transected on each side.  Attention was then turned to   the perineum where a weighted speculum was placed with the use of Moreno    retractor, a thyroid tenaculum was placed on the anterior lip, one on the    posterior lips of the cervix.  Cervix was then injected with 10 mL of 0.25%    Marcaine with epinephrine in a circumferential fashion starting anteriorly,    proceeding posterior, proceeding back anterior once again.  The bladder was    pushed anterior and cephalad.  The anterior cul-de-sac was entered sharply.     Right angle Suzanne retractor was placed upon sharp entry in the anterior    cul-de-sac.  A large duckbill speculum was placed upon sharp entry in the    posterior cul-de-sac.  Uterosacral cardinal complex was then grasped with ZED    clamps, transected and suture ligated with 0 Vicryl suture bilaterally.  The    uterus, both fallopian tubes then handed off the field as specimen.  Excellent   hemostasis was noted.  The anterior and posterior leafs of the peritoneum in    addition to the uterosacral cardinal complexes were reapproximated in the    midline in a pursestring  suture using 2-0 Vicryl.  The vaginal cuff was then    reapproximated with figure-of-eight sutures using 0 Vicryl reapproximating    both uterosacral cardinal complexes of the respective vaginal apices.  Once    the vaginal cuff was closed, the anterior vaginal mucosa was then injected    with 10 mL of 0.25% Marcaine with epinephrine.  An incision was made in the    anterior vaginal mucosa.  The vaginal mucosa was then dissected off the    underlying pubocervical fascia kailee until the levator muscles were then    reached.  Horizontal mattress sutures were then used to reapproximate the    pubocervical fascia in the midline in a double 0 closure, thereby reducing the   midline cystocele.  A 10 mL of 0.25% Marcaine with epinephrine were injected    with 5 mL on the right, 5 mL on the left lateral to the clitoris labia crural    fold followed by stab incision made with the use 11 blade scalpel followed by    the placement of the Obtryx halo needles through the labial crural incision    sites through the obturator membrane out through the vaginal mucosal incision    at the level of the mid urethra.  The sling was then applied to the Obtryx    halo needle.  Needle was then taken back up through their original track.     Tensioning of position was then performed.  The Scott catheter was removed,    which had been placed at the beginning of the case for placement of 30-degree    cystoscope, which revealed normal urinary bladder, bilateral ureteral efflux    and no undue tension noted at the level of the mid urethra.  The sling was    then released under direct cystoscopic evaluation.  Tensioning of position was   then finalized.  All excess vaginal mucosa and sling material was then    excised, cystoscope removed.  Scott catheter replaced.  The vaginal mucosa was   then reapproximated with 2-0 Vicryl in running locking fashion in one    segment.  Posterior vaginal mucosa was then injected with 10 mL of 0.25%    Marcaine  with epinephrine.  The vaginal mucosa was then dissected off the    underlying rectovaginal fascia kailee until the levator muscles were then    reached.  Dissection on the sacrospinous processes ischial spine was then    performed on the right, 3 cm medial along the sacrospinous ligament with    straight catheterization needle device, 0 Ethibond suture was taken through    the sacrospinous ligament taken out through the apical portion of the vaginal    cuff on the overlying vaginal mucosa.  Once tied down, there was good    reapproximation of the cuff to the sacrospinous ligament.  The rectovaginal    septum was then reapproximated to the posterior aspect of the vaginal cuff to    reduce a large enterocele located at the superior aspect of dissection in a    double pursestring suture.  Horizontal mattress sutures were then used to    reapproximate the rectovaginal fascia in the midline in a double 0 closure,    thereby reducing the midline rectocele.  Excess vaginal mucosa was then    trimmed.  The vaginal mucosa was then reapproximated with 2-0 Vicryl in    running locking fashion in one segment for a perineoplasty on the perineum.     Attention was then turned back up to the abdomen and pelvis.  Pelvis was    inspected and noted to be hemostatic, 3.5 L of carboperitoneum removed    followed by removal of the ports under direct laparoscopic visualization.  The   incisions were then reapproximated with single interrupted sutures using 4-0    Vicryl, injected with 20 mL of 0.25% Marcaine with epinephrine.  The patient    tolerated the procedure well and was awoken from general anesthesia, was taken   to recovery in stable condition.        ____________________________________     BELINDA MACIAS MD

## 2018-04-24 NOTE — OR SURGEON
Immediate Post OP Note    PreOp Diagnosis: Symptomatic uterine fibroids with associated menometrorrhagia,   dysmenorrhea, pelvic pain, symptomatic pelvic floor relaxation, Type II stress   urinary incontinence on urodynamic studies    PostOp Diagnosis: Same; final pathology pending; pelvic endometriosis    Procedure(s):  VAGINAL HYSTERECTOMY SCOPE TOTAL - Wound Class: Clean Contaminated  SALPINGO OOPHORECTOMY - Wound Class: Clean  ANTERIOR AND POSTERIOR REPAIR - Wound Class: Clean Contaminated  ENTEROCELE REPAIR- PERINEOPLASTY - Wound Class: Clean Contaminated  BLADDER SLING FEMALE- TOT - Wound Class: Clean Contaminated  VAGINAL SUSPENSION- SACROSPINOUS VAULT - Wound Class: Clean Contaminated  CYSTOSCOPY - Wound Class: Clean Contaminated    Surgeon(s):  BILL Verde M.D.    Anesthesiologist/Type of Anesthesia:  Anesthesiologist: Beryl Gregg M.D./General    Surgical Staff:  Circulator: Radha Cha RBETHANY; Rosalina Bryant R.N.  Scrub Person: Sharlene Benavides    Specimens removed if any:  Uterus bilateral fallopian tubes and ovaries    Estimated Blood Loss: 100ccs    Findings: Multiple endometriosis implants located throughout the pelvis in the anterior and the posterior cul de sacs and bilateral pelvic sidewalls, good support of the anterior and the posterior and the apical vaginal tissue without any undue tension at any suture sites, cystoscopy revealed bilateral ureteral efflux, normal bladder and no undue tension at mid urethra after sling placement.    Complications: None        4/24/2018 11:04 AM Francisco Javier Lainez M.D.

## 2018-04-24 NOTE — OR NURSING
1132-Received from OR via [employer]. S/P Total Lap hyst.   bandaid to abd. Scott cath in place. 200cc of urine In bag.  Bedside report received from RN. And Anesthesiologist.    1145-complaining abd. Cramping. Oxycodone and fent. 50mcg. Given.  Sitting up drinking water.    1200-partner at bedside.  Fent. Given.     1300-continues to complain of abd. Pain and cramping. 7/10, fent. Given as needed for pain. 02 sat drops to low 80's on room air. 02 placed back on at 2L.    1400-unable to keep o2 sat greater than 90% on room air. IS given.    1500-up to recliner. Continues to work on IS, pulling 1000.    1600-02 sat 90-93% on room air. IS sent home with pt.    1630-meets discharge criteria. Iv removed. Instructions explained to partner and pt.   All questions answered. Discharged home with responsible party. Escorted to car via wheelchair.

## 2018-04-30 NOTE — H&P
DATE OF SCHEDULED SURGERY:  2018    REASON FOR SURGERY:  Symptomatic uterine fibroids with associated   menometrorrhagia, dysmenorrhea, pelvic pain, symptomatic pelvic floor   relaxation, stress urinary incontinence is noted on urodynamic studies.    Risks, benefits, and alternatives have been addressed with the patient in   detail and she wishes to proceed forward with an attempted definitive surgical   correction with laparoscopic-assisted vaginal hysterectomy, bilateral   salpingo-oophorectomy as she is not interested in proceeding forward with   ovarian conservation after counseling, anterior and posterior vaginal repair,   enterocele repair, perineoplasty, sacrospinous vault suspension,   transobturator tape mid urethral sling procedure, and cystoscopy.  Risks,   benefits, alternatives of all individual portions of the surgery were   addressed with the patient in detail over several visits.  She has asked   appropriate questions, signed the appropriate consents and wishes to proceed   forward with surgery as planned.  Of note, she does understand her pelvic   pain, mixed urinary incontinence symptoms may be unimproved or actually   worsened with the surgery as described above requiring additional medical or   surgical therapy, and even understanding these limitations of surgery, she is   interested in proceeding forward with surgery nonetheless.    PAST MEDICAL HISTORY:  Anemia, migraine headaches, blood clots, hiatal hernia,   hemorrhoids, arthritis, lumbago, adult onset diabetes mellitus.    PAST SURGICAL HISTORY:  Tonsillectomy and adenoidectomy in .    OBSTETRICAL HISTORY:  Two previous deliveries, largest infant 8 pounds 12   ounces in  and .  She has had 1 therapeutic .    GYNECOLOGIC HISTORY:  The patient does report menometrorrhagia, dysmenorrhea,   passage of large clots, incapacitating dysmenorrhea and pelvic pain and bulge   at the vaginal introitus, stress urinary  incontinence with mixed urinary   incontinence symptoms.  She denies any previous sexually transmitted diseases   or pelvic infections.    SOCIAL HISTORY:  She denies use of any alcohol, tobacco, or recreational drug   use.    MEDICATIONS:  Atorvastatin 20 mg p.o. daily, Basaglar KwikPen 100 units as   noted, Corlanor 5 mg p.o. daily, gabapentin 300 mg 3 times daily, ibuprofen   p.r.n., levothyroxine for her hypothyroidism 137 mcg p.o. daily, metformin   1000 mg twice daily, omeprazole 40 mg p.o. daily, Pyridium p.r.n.,   spironolactone/hydrochlorothiazide 25/25 mg tablet p.o. daily, trazodone 150   mg tablet p.o. daily, Wellbutrin 150 mg tablet p.o. daily.    ALLERGIES:  DILANTIN AND MORPHINE.    PHYSICAL EXAMINATION:  VITAL SIGNS:  She is afebrile, hemodynamically stable.  HEART:  Regular rate and rhythm.  CHEST:  Clear to auscultation bilaterally.  ABDOMEN:  Soft, nondistended, nontender.  Bowel sounds are present.  PELVIC:  Exam shows normal external female genitalia.  Small uterus.  No   adnexal mass or tenderness to palpation were appreciated.  She does have a   grade II anterior and posterior apical vaginal relaxation with minimal   tenderness to palpation on examination.  Rectovaginal exam confirmed the   above.  She is guaiac negative.  She does have an irregularly shaped uterus on   bimanual examination suggestive of uterine fibroids.  EXTREMITIES:  Nontender.    LABORATORY DATA:  Endometrial biopsy shows no evidence of hyperplasia or   malignancy.  Pelvic ultrasound shows multiple uterine fibroids with submucosal   fibroids appreciated.  Urodynamic studies did demonstrate confirmed type 2   stress urinary incontinence.  Primary care provider has done a Pap smear,   which she reports was within normal limits.    ASSESSMENT AND PLAN:  A 47-year-old woman  3, para 2 with symptomatic   uterine fibroids with associated menometrorrhagia, dysmenorrhea, pelvic pain,   symptomatic pelvic floor relaxation,  type 2 stress urinary incontinence as   demonstrated on urodynamic studies with mixed urinary incontinence symptoms,   and even understanding the limitations of surgery is interested in proceeding   forward with surgery nonetheless.       ____________________________________     MD JASON MOORE / SYLVIA    DD:  04/16/2018 08:58:43  DT:  04/16/2018 11:02:24    D#:  [id number]  Job#:  [id number]

## 2018-05-05 ENCOUNTER — PATIENT MESSAGE (OUTPATIENT)
Dept: MEDICAL GROUP | Facility: MEDICAL CENTER | Age: 48
End: 2018-05-05

## 2018-05-05 DIAGNOSIS — G89.18 POST-OPERATIVE PAIN: ICD-10-CM

## 2018-05-05 DIAGNOSIS — M54.42 CHRONIC BILATERAL LOW BACK PAIN WITH LEFT-SIDED SCIATICA: ICD-10-CM

## 2018-05-05 DIAGNOSIS — G89.29 CHRONIC BILATERAL LOW BACK PAIN WITH LEFT-SIDED SCIATICA: ICD-10-CM

## 2018-05-08 DIAGNOSIS — M54.42 CHRONIC BILATERAL LOW BACK PAIN WITH LEFT-SIDED SCIATICA: ICD-10-CM

## 2018-05-08 DIAGNOSIS — E06.3 HYPOTHYROIDISM DUE TO HASHIMOTO'S THYROIDITIS: ICD-10-CM

## 2018-05-08 DIAGNOSIS — G89.18 POST-OPERATIVE PAIN: ICD-10-CM

## 2018-05-08 DIAGNOSIS — E03.8 HYPOTHYROIDISM DUE TO HASHIMOTO'S THYROIDITIS: ICD-10-CM

## 2018-05-08 DIAGNOSIS — G89.29 CHRONIC BILATERAL LOW BACK PAIN WITH LEFT-SIDED SCIATICA: ICD-10-CM

## 2018-05-08 RX ORDER — LEVOTHYROXINE SODIUM 137 UG/1
TABLET ORAL
Qty: 30 TAB | Refills: 3 | Status: SHIPPED | OUTPATIENT
Start: 2018-05-08 | End: 2018-09-10 | Stop reason: SDUPTHER

## 2018-05-08 RX ORDER — TRAMADOL HYDROCHLORIDE 50 MG/1
50 TABLET ORAL EVERY 8 HOURS PRN
Qty: 90 TAB | Refills: 0 | Status: SHIPPED | OUTPATIENT
Start: 2018-05-08 | End: 2018-05-30 | Stop reason: SDUPTHER

## 2018-05-08 RX ORDER — CYCLOBENZAPRINE HCL 10 MG
TABLET ORAL
Qty: 90 TAB | Refills: 3 | Status: SHIPPED | OUTPATIENT
Start: 2018-05-08 | End: 2019-09-30 | Stop reason: SDUPTHER

## 2018-05-08 RX ORDER — TRAMADOL HYDROCHLORIDE 50 MG/1
TABLET ORAL
Qty: 90 TAB | Refills: 0 | OUTPATIENT
Start: 2018-05-08 | End: 2018-06-07

## 2018-05-15 ENCOUNTER — HOSPITAL ENCOUNTER (OUTPATIENT)
Facility: MEDICAL CENTER | Age: 48
End: 2018-05-15
Attending: INTERNAL MEDICINE
Payer: MEDICAID

## 2018-05-15 ENCOUNTER — NON-PROVIDER VISIT (OUTPATIENT)
Dept: MEDICAL GROUP | Facility: MEDICAL CENTER | Age: 48
End: 2018-05-15
Attending: INTERNAL MEDICINE
Payer: MEDICAID

## 2018-05-15 VITALS
TEMPERATURE: 97.9 F | SYSTOLIC BLOOD PRESSURE: 120 MMHG | HEART RATE: 95 BPM | OXYGEN SATURATION: 98 % | HEIGHT: 63 IN | RESPIRATION RATE: 16 BRPM | DIASTOLIC BLOOD PRESSURE: 80 MMHG

## 2018-05-15 DIAGNOSIS — R39.9 UTI SYMPTOMS: ICD-10-CM

## 2018-05-15 DIAGNOSIS — R30.0 DYSURIA: ICD-10-CM

## 2018-05-15 LAB
APPEARANCE UR: CLEAR
BILIRUB UR STRIP-MCNC: NEGATIVE MG/DL
COLOR UR AUTO: YELLOW
GLUCOSE UR STRIP.AUTO-MCNC: NEGATIVE MG/DL
KETONES UR STRIP.AUTO-MCNC: NORMAL MG/DL
LEUKOCYTE ESTERASE UR QL STRIP.AUTO: NORMAL
NITRITE UR QL STRIP.AUTO: NEGATIVE
PH UR STRIP.AUTO: 6 [PH] (ref 5–8)
PROT UR QL STRIP: NORMAL MG/DL
RBC UR QL AUTO: NORMAL
SP GR UR STRIP.AUTO: 1.01
UROBILINOGEN UR STRIP-MCNC: NEGATIVE MG/DL

## 2018-05-15 PROCEDURE — 99213 OFFICE O/P EST LOW 20 MIN: CPT | Performed by: INTERNAL MEDICINE

## 2018-05-15 PROCEDURE — 87086 URINE CULTURE/COLONY COUNT: CPT

## 2018-05-15 PROCEDURE — 81002 URINALYSIS NONAUTO W/O SCOPE: CPT | Performed by: INTERNAL MEDICINE

## 2018-05-16 DIAGNOSIS — R39.9 UTI SYMPTOMS: ICD-10-CM

## 2018-05-18 LAB
BACTERIA UR CULT: ABNORMAL
BACTERIA UR CULT: ABNORMAL
SIGNIFICANT IND 70042: ABNORMAL
SITE SITE: ABNORMAL
SOURCE SOURCE: ABNORMAL

## 2018-05-22 ENCOUNTER — OFFICE VISIT (OUTPATIENT)
Dept: MEDICAL GROUP | Facility: MEDICAL CENTER | Age: 48
End: 2018-05-22
Attending: INTERNAL MEDICINE
Payer: MEDICAID

## 2018-05-22 VITALS
BODY MASS INDEX: 42.45 KG/M2 | RESPIRATION RATE: 16 BRPM | SYSTOLIC BLOOD PRESSURE: 80 MMHG | TEMPERATURE: 97.9 F | HEIGHT: 63 IN | HEART RATE: 70 BPM | OXYGEN SATURATION: 96 % | DIASTOLIC BLOOD PRESSURE: 58 MMHG | WEIGHT: 239.6 LBS

## 2018-05-22 DIAGNOSIS — N30.00 ACUTE CYSTITIS WITHOUT HEMATURIA: ICD-10-CM

## 2018-05-22 DIAGNOSIS — D50.0 IRON DEFICIENCY ANEMIA DUE TO CHRONIC BLOOD LOSS: ICD-10-CM

## 2018-05-22 DIAGNOSIS — I95.1 ORTHOSTATIC HYPOTENSION: ICD-10-CM

## 2018-05-22 DIAGNOSIS — R42 DIZZINESS: ICD-10-CM

## 2018-05-22 PROCEDURE — 99213 OFFICE O/P EST LOW 20 MIN: CPT | Performed by: INTERNAL MEDICINE

## 2018-05-22 PROCEDURE — 99214 OFFICE O/P EST MOD 30 MIN: CPT | Performed by: INTERNAL MEDICINE

## 2018-05-22 RX ORDER — IBUPROFEN 800 MG/1
800 TABLET ORAL EVERY 8 HOURS PRN
Qty: 90 TAB | Refills: 6 | Status: SHIPPED | OUTPATIENT
Start: 2018-05-22 | End: 2019-07-01 | Stop reason: SDUPTHER

## 2018-05-22 RX ORDER — CEPHALEXIN 500 MG/1
500 CAPSULE ORAL 2 TIMES DAILY
Qty: 14 CAP | Refills: 0 | Status: SHIPPED | OUTPATIENT
Start: 2018-05-22 | End: 2018-05-29

## 2018-05-22 ASSESSMENT — PAIN SCALES - GENERAL: PAINLEVEL: 5=MODERATE PAIN

## 2018-05-22 NOTE — ASSESSMENT & PLAN NOTE
Complains of dizziness every time she stands up, has been a problem for almost a year but worse over the past few months.  She feels a sensation like she is about to pass out, lightheaded, and like everything moves into slow motion.  Hears her heart pounding in her ears.  Sometimes a/w sweating and blurred vision.  Lasts about 1-5 min before spontaneously resolving.

## 2018-05-23 NOTE — ASSESSMENT & PLAN NOTE
Presents for dysuria, increased urgency and frequency x 2 weeks.  Urine culture from walk in visit growing ,000 cfu strep viridans.  Post op hysterectomy 4 weeks.  Having some vaginal spotting but denies hematuria.  Denies fevers, chills although takes ibuprofen daily.

## 2018-05-23 NOTE — PROGRESS NOTES
Subjective:   Sarah Friedman is a 47 y.o. female here today for follow up dysuria, dizziness    Dizziness  Complains of dizziness every time she stands up, has been a problem for almost a year but worse over the past few months.  She feels a sensation like she is about to pass out, lightheaded, and like everything moves into slow motion.  Hears her heart pounding in her ears.  Sometimes a/w sweating and blurred vision.  Lasts about 1-5 min before spontaneously resolving.      Acute cystitis without hematuria  Presents for dysuria, increased urgency and frequency x 2 weeks.  Urine culture from walk in visit growing ,000 cfu strep viridans.  Post op hysterectomy 4 weeks.  Having some vaginal spotting but denies hematuria.  Denies fevers, chills although takes ibuprofen daily.       Current medicines (including changes today)  Current Outpatient Prescriptions   Medication Sig Dispense Refill   • cephALEXin (KEFLEX) 500 MG Cap Take 1 Cap by mouth 2 times a day for 7 days. 14 Cap 0   • metformin (GLUCOPHAGE) 1000 MG tablet Take 1 Tab by mouth 2 times a day, with meals. 60 Tab 6   • ibuprofen (MOTRIN) 800 MG Tab Take 1 Tab by mouth every 8 hours as needed. 90 Tab 6   • cyclobenzaprine (FLEXERIL) 10 MG Tab TAKE ONE TABLET BY MOUTH THREE TIMES DAILY AS NEEDED 90 Tab 3   • tramadol (ULTRAM) 50 MG Tab Take 1 Tab by mouth every 8 hours as needed for Severe Pain for up to 30 days. 90 Tab 0   • levothyroxine (SYNTHROID) 137 MCG Tab TAKE ONE TABLET BY MOUTH EVERY MORNING ON AN EMPTY STOMACH 30 Tab 3   • Multiple Vitamins-Minerals (MULTIVITAMIN PO) Take 1 Tab by mouth every day.     • divalproex (DEPAKOTE) 250 MG Tablet Delayed Response Take 1 Tab by mouth 2 Times a Day.     • traZODone (DESYREL) 150 MG Tab TAKE ONE TABLET BY MOUTH AT BEDTIME 90 Tab 1   • sumatriptan (IMITREX) 100 MG tablet Take 1/2 to 1 full tab daily as needed for migraine headache 10 Tab 3   • BD PEN NEEDLE JOSE J U/F 32G X 4 MM Misc Use to inject  "insulin nightly as directed 100 Each 3   • liraglutide (VICTOZA) 18 MG/3ML Solution Pen-injector injection Inject 0.3 mL as instructed every day. 3 PEN 6   • gabapentin (NEURONTIN) 300 MG Cap Take 3 Caps by mouth every bedtime. 90 Cap 6   • omeprazole (PRILOSEC) 40 MG delayed-release capsule Take 1 Cap by mouth 2 times a day. 60 Cap 6   • hydrochlorothiazide (MICROZIDE) 12.5 MG capsule Take 1 Cap by mouth every day. 30 Cap    • insulin glargine (BASAGLAR KWIKPEN) 100 UNIT/ML Solution Pen-injector injection Inject 52 Units as instructed every evening.     • spironolactone (ALDACTONE) 25 MG Tab Take 1 Tab by mouth every day. 30 Tab 3   • atorvastatin (LIPITOR) 20 MG Tab Take 1 Tab by mouth every day. 30 Tab    • ivabradine (CORLANOR) 5 MG Tab tablet Take 1 Tab by mouth 2 times a day, with meals. 60 Tab    • Black Cohosh 40 MG Cap Take 40 mg by mouth 2 Times a Day.     • Melatonin 10 MG Tab Take 20 mg by mouth every bedtime.       No current facility-administered medications for this visit.      She  has a past medical history of Blood clotting disorder ([facility]) (2005); Bowel habit changes; Depression; Diabetes ([facility]); Diabetic neuropathy ([facility]); Esophageal spasm; GERD (gastroesophageal reflux disease); Hashimoto's disease; Hiatal hernia; High cholesterol; Hyperlipidemia; Hypothyroid; Sleep apnea; Snoring; and Tachycardia.    ROS   Denies chest pain, shortness of breath  As above in HPI     Objective:     Blood pressure (!) 80/58, pulse 70, temperature 36.6 °C (97.9 °F), resp. rate 16, height 1.6 m (5' 2.99\"), weight 108.7 kg (239 lb 9.6 oz), SpO2 96 %, not currently breastfeeding. Body mass index is 42.45 kg/m².   Physical Exam:  Orthostatic vitals: +drop of 20 points systolic when sitting to standing and +drop of 10 diastolic points when laying to standing  Constitutional: Alert, no distress.  Skin: Warm, dry, good turgor, no rashes in visible areas.  Eye: Equal, round and reactive, conjunctiva clear, lids " normal.  Cardiovascular: Regular rate and rhythm, no murmurs appreciated, no lower extremity edema        Assessment and Plan:   The following treatment plan was discussed    1. Acute cystitis without hematuria  Based on symptoms and positive urine culture, will treat with 7 days course of keflex  - cephALEXin (KEFLEX) 500 MG Cap; Take 1 Cap by mouth 2 times a day for 7 days.  Dispense: 14 Cap; Refill: 0    2. Dizziness  Orthostatics positive.  Will have her stop the HCTZ and follow up in 1 week to recheck    3. Iron deficiency anemia due to chronic blood loss  Will repeat CBC now that she is 1 month post op to assess for anemia which could be contributing to dizziiness  - CBC WITHOUT DIFFERENTIAL; Future    4. Orthostatic hypotension  As discussed above  -stop HCTZ  -trazodone also common cause, patient reluctant to stop as she takesi t for her esophageal spasms  -home BP monnitoring  -f/u 1 week for repeat orthostats        Followup: Return in about 1 week (around 5/29/2018) for blood pressure.

## 2018-05-30 ENCOUNTER — OFFICE VISIT (OUTPATIENT)
Dept: MEDICAL GROUP | Facility: MEDICAL CENTER | Age: 48
End: 2018-05-30
Attending: INTERNAL MEDICINE
Payer: MEDICAID

## 2018-05-30 VITALS
HEIGHT: 63 IN | OXYGEN SATURATION: 97 % | HEART RATE: 78 BPM | WEIGHT: 241.9 LBS | TEMPERATURE: 98.1 F | RESPIRATION RATE: 16 BRPM | DIASTOLIC BLOOD PRESSURE: 60 MMHG | BODY MASS INDEX: 42.86 KG/M2 | SYSTOLIC BLOOD PRESSURE: 94 MMHG

## 2018-05-30 DIAGNOSIS — I95.1 ORTHOSTATIC HYPOTENSION: ICD-10-CM

## 2018-05-30 DIAGNOSIS — G89.29 CHRONIC BILATERAL LOW BACK PAIN WITH LEFT-SIDED SCIATICA: ICD-10-CM

## 2018-05-30 DIAGNOSIS — M54.42 CHRONIC BILATERAL LOW BACK PAIN WITH LEFT-SIDED SCIATICA: ICD-10-CM

## 2018-05-30 PROBLEM — N30.00 ACUTE CYSTITIS WITHOUT HEMATURIA: Status: RESOLVED | Noted: 2018-05-22 | Resolved: 2018-05-30

## 2018-05-30 PROBLEM — R42 DIZZINESS: Status: RESOLVED | Noted: 2018-05-22 | Resolved: 2018-05-30

## 2018-05-30 PROCEDURE — 99214 OFFICE O/P EST MOD 30 MIN: CPT | Performed by: INTERNAL MEDICINE

## 2018-05-30 PROCEDURE — 99213 OFFICE O/P EST LOW 20 MIN: CPT | Performed by: INTERNAL MEDICINE

## 2018-05-30 RX ORDER — HYDROCHLOROTHIAZIDE 12.5 MG/1
TABLET ORAL
Status: SHIPPED | DISCHARGE
Start: 2018-05-30 | End: 2018-12-11 | Stop reason: SDUPTHER

## 2018-05-30 RX ORDER — TRAMADOL HYDROCHLORIDE 50 MG/1
50 TABLET ORAL EVERY 8 HOURS PRN
Qty: 90 TAB | Refills: 0 | Status: SHIPPED | OUTPATIENT
Start: 2018-06-07 | End: 2018-07-07

## 2018-05-30 ASSESSMENT — PAIN SCALES - GENERAL: PAINLEVEL: 10=SEVERE PAIN

## 2018-05-30 NOTE — ASSESSMENT & PLAN NOTE
At last visit, patient had positive orthostatic vitals. We discussed stopping her hydrochlorothiazide. She reports doing this but then developing worsening leg swelling. She has started taking the HCTZ every other day and has noticed resolution of the dizziness as well as good control of the leg swelling.

## 2018-05-30 NOTE — ASSESSMENT & PLAN NOTE
Patient reports recently pulling a muscle in her back when she bent over to reach for food in the grocery store. She states that she felt something pull over the left lower back. She continues to have pain as she has had several more episodes of straining the same area over the past few days. She has been taking her tramadol and Flexeril in the evenings to help with the pain.

## 2018-05-30 NOTE — PROGRESS NOTES
Subjective:   Sarah Friedman is a 47 y.o. female here today for follow up dizziness, acute on chronic back pain    Chronic low back pain  Patient reports recently pulling a muscle in her back when she bent over to reach for food in the grocery store. She states that she felt something pull over the left lower back. She continues to have pain as she has had several more episodes of straining the same area over the past few days. She has been taking her tramadol and Flexeril in the evenings to help with the pain.    Orthostatic hypotension  At last visit, patient had positive orthostatic vitals. We discussed stopping her hydrochlorothiazide. She reports doing this but then developing worsening leg swelling. She has started taking the HCTZ every other day and has noticed resolution of the dizziness as well as good control of the leg swelling.       Current medicines (including changes today)  Current Outpatient Prescriptions   Medication Sig Dispense Refill   • [START ON 6/7/2018] tramadol (ULTRAM) 50 MG Tab Take 1 Tab by mouth every 8 hours as needed for Severe Pain for up to 30 days. 90 Tab 0   • hydroCHLOROthiazide (HYDRODIURIL) 12.5 MG tablet Take 1 tab by mouth every other day for leg swelling     • metformin (GLUCOPHAGE) 1000 MG tablet Take 1 Tab by mouth 2 times a day, with meals. 60 Tab 6   • ibuprofen (MOTRIN) 800 MG Tab Take 1 Tab by mouth every 8 hours as needed. 90 Tab 6   • cyclobenzaprine (FLEXERIL) 10 MG Tab TAKE ONE TABLET BY MOUTH THREE TIMES DAILY AS NEEDED 90 Tab 3   • levothyroxine (SYNTHROID) 137 MCG Tab TAKE ONE TABLET BY MOUTH EVERY MORNING ON AN EMPTY STOMACH 30 Tab 3   • Multiple Vitamins-Minerals (MULTIVITAMIN PO) Take 1 Tab by mouth every day.     • divalproex (DEPAKOTE) 250 MG Tablet Delayed Response Take 1 Tab by mouth 2 Times a Day.     • traZODone (DESYREL) 150 MG Tab TAKE ONE TABLET BY MOUTH AT BEDTIME 90 Tab 1   • sumatriptan (IMITREX) 100 MG tablet Take 1/2 to 1 full tab daily as  "needed for migraine headache 10 Tab 3   • BD PEN NEEDLE JOSE J U/F 32G X 4 MM Misc Use to inject insulin nightly as directed 100 Each 3   • liraglutide (VICTOZA) 18 MG/3ML Solution Pen-injector injection Inject 0.3 mL as instructed every day. 3 PEN 6   • gabapentin (NEURONTIN) 300 MG Cap Take 3 Caps by mouth every bedtime. 90 Cap 6   • omeprazole (PRILOSEC) 40 MG delayed-release capsule Take 1 Cap by mouth 2 times a day. 60 Cap 6   • insulin glargine (BASAGLAR KWIKPEN) 100 UNIT/ML Solution Pen-injector injection Inject 52 Units as instructed every evening.     • spironolactone (ALDACTONE) 25 MG Tab Take 1 Tab by mouth every day. 30 Tab 3   • atorvastatin (LIPITOR) 20 MG Tab Take 1 Tab by mouth every day. 30 Tab    • ivabradine (CORLANOR) 5 MG Tab tablet Take 1 Tab by mouth 2 times a day, with meals. 60 Tab    • Black Cohosh 40 MG Cap Take 40 mg by mouth 2 Times a Day.     • Melatonin 10 MG Tab Take 20 mg by mouth every bedtime.       No current facility-administered medications for this visit.      She  has a past medical history of Blood clotting disorder ([facility]) (2005); Bowel habit changes; Depression; Diabetes ([facility]); Diabetic neuropathy ([facility]); Esophageal spasm; GERD (gastroesophageal reflux disease); Hashimoto's disease; Hiatal hernia; High cholesterol; Hyperlipidemia; Hypothyroid; Sleep apnea; Snoring; and Tachycardia.    ROS   As above in HPI     Objective:     Blood pressure (!) 94/60, pulse 78, temperature 36.7 °C (98.1 °F), resp. rate 16, height 1.6 m (5' 2.99\"), weight 109.7 kg (241 lb 14.4 oz), SpO2 97 %, not currently breastfeeding. Body mass index is 42.86 kg/m².   Physical Exam:  Constitutional: Alert, no distress.  Skin: Warm, dry, good turgor, no rashes in visible areas.  Eye: Equal, round and reactive, conjunctiva clear, lids normal.  MSK: point tenderness to palpation over left upper gluteal region, normal gait, no focal sensory or motor deficits, slight difficulty getting up out of the " chair      Assessment and Plan:   The following treatment plan was discussed    1. Chronic bilateral low back pain with left-sided sciatica  Stable, with recent flare.  I have refilled her tramadol which will not be due until 6/7/18  - tramadol (ULTRAM) 50 MG Tab; Take 1 Tab by mouth every 8 hours as needed for Severe Pain for up to 30 days.  Dispense: 90 Tab; Refill: 0    2. Orthostatic hypotension  Improved with reducing HCTZ to every other day, currently without symptoms of dizziness.  - hydroCHLOROthiazide (HYDRODIURIL) 12.5 MG tablet; Take 1 tab by mouth every other day for leg swelling        Followup: Return if symptoms worsen or fail to improve.

## 2018-06-08 ENCOUNTER — PATIENT MESSAGE (OUTPATIENT)
Dept: MEDICAL GROUP | Facility: MEDICAL CENTER | Age: 48
End: 2018-06-08

## 2018-06-08 DIAGNOSIS — E11.9 TYPE 2 DIABETES MELLITUS WITHOUT COMPLICATION, WITH LONG-TERM CURRENT USE OF INSULIN (HCC): ICD-10-CM

## 2018-06-08 DIAGNOSIS — Z79.4 TYPE 2 DIABETES MELLITUS WITHOUT COMPLICATION, WITH LONG-TERM CURRENT USE OF INSULIN (HCC): ICD-10-CM

## 2018-06-21 DIAGNOSIS — E11.9 TYPE 2 DIABETES MELLITUS WITHOUT COMPLICATION, WITH LONG-TERM CURRENT USE OF INSULIN (HCC): ICD-10-CM

## 2018-06-21 DIAGNOSIS — Z79.4 TYPE 2 DIABETES MELLITUS WITHOUT COMPLICATION, WITH LONG-TERM CURRENT USE OF INSULIN (HCC): ICD-10-CM

## 2018-06-27 DIAGNOSIS — Z90.710 S/P TOTAL HYSTERECTOMY: ICD-10-CM

## 2018-06-27 RX ORDER — ESTRADIOL 1 MG/1
1 TABLET ORAL DAILY
Qty: 30 TAB | Status: SHIPPED | DISCHARGE
Start: 2018-06-27 | End: 2018-09-10 | Stop reason: SDUPTHER

## 2018-07-13 ENCOUNTER — PATIENT MESSAGE (OUTPATIENT)
Dept: MEDICAL GROUP | Facility: MEDICAL CENTER | Age: 48
End: 2018-07-13

## 2018-07-13 DIAGNOSIS — G89.29 CHRONIC BILATERAL LOW BACK PAIN WITH LEFT-SIDED SCIATICA: ICD-10-CM

## 2018-07-13 DIAGNOSIS — M54.42 CHRONIC BILATERAL LOW BACK PAIN WITH LEFT-SIDED SCIATICA: ICD-10-CM

## 2018-07-13 RX ORDER — TRAMADOL HYDROCHLORIDE 50 MG/1
50 TABLET ORAL EVERY 4 HOURS PRN
Qty: 90 TAB | Refills: 0 | Status: SHIPPED | OUTPATIENT
Start: 2018-07-13 | End: 2018-08-12

## 2018-07-18 ENCOUNTER — TELEPHONE (OUTPATIENT)
Dept: MEDICAL GROUP | Facility: MEDICAL CENTER | Age: 48
End: 2018-07-18

## 2018-07-18 ENCOUNTER — PATIENT MESSAGE (OUTPATIENT)
Dept: MEDICAL GROUP | Facility: MEDICAL CENTER | Age: 48
End: 2018-07-18
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
## 2018-07-20 ENCOUNTER — GYNECOLOGY VISIT (OUTPATIENT)
Dept: OBGYN | Facility: CLINIC | Age: 48
End: 2018-07-20
Payer: MEDICAID

## 2018-07-20 VITALS — BODY MASS INDEX: 41.64 KG/M2 | WEIGHT: 235 LBS | SYSTOLIC BLOOD PRESSURE: 90 MMHG | DIASTOLIC BLOOD PRESSURE: 58 MMHG

## 2018-07-20 DIAGNOSIS — Z12.39 SCREENING FOR BREAST CANCER: ICD-10-CM

## 2018-07-20 DIAGNOSIS — N95.1 MENOPAUSAL SYMPTOMS: ICD-10-CM

## 2018-07-20 PROCEDURE — 99204 OFFICE O/P NEW MOD 45 MIN: CPT | Performed by: OBSTETRICS & GYNECOLOGY

## 2018-07-20 RX ORDER — ESTRADIOL 0.05 MG/D
1 PATCH TRANSDERMAL
Qty: 4 PATCH | Refills: 1 | Status: SHIPPED | OUTPATIENT
Start: 2018-07-20 | End: 2018-09-10

## 2018-07-20 NOTE — PROGRESS NOTES
Subjective:      Sarah Friedman is a 47 y.o. female who presents with Gynecologic Exam (discuss pain due to hysterectomy)        CC: menopausal symptoms    HPI: 46 yo  female on Estradiol 1.0 mg daily s/p LAVH, BSO, A&P Repair and sling by Dr. Lainez in  presents with complaint of menopausal symtpoms.  After the surgery she had severe hot flashes all day and night long and severe mood swings, and felt like she was going crazy.  She has been on Estradiol for several months and the symptoms are improved, but she is still having hot flashes.  She continues with mood swings.   No vaginal dryness or pain with intercourse. No leaking of urine.  No bowel or bladder complaints. She is taking Gabapentin 900 mg nightly for neuropathy.    Hx of DVT in  with negative w/u for thrombophilia per patient report.     ROS REVIEW OF SYSTEMS:    Pertinent positives and negatives mentioned in HPI.    All other systems reviewed and are negative or noncontributory.       Objective:     BP (!) 90/58   Wt 106.6 kg (235 lb)   LMP 04/15/2018   BMI 41.64 kg/m²      Physical Exam  GENERAL: Alert, in no apparent distress  PSYCHIATRIC: Appropriate affect, intact insight and judgement.  NECK:  Nontender, no masses.  No Thyromegaly or nodules. No lymphadenopathy.  RESPIRATORY: Normal respiratory effort.  Lungs clear to auscultation.   CARDIOVASCULAR: RRR, no murmur, gallop, or rub.  ABDOMEN: Soft, morbidly obese, nontender, nondistended.  No palpable masses.  No rebound or guarding.  No inguinal lymphadenopathy.  No hernias.  No hepatosplenomegaly.  BACK: No CVA tenderness  SKIN: no rash  EXT: no edema, calves NT.    BREAST: No masses or tenderness.  No skin changes.  No nipple inversion or discharge. No axillary lymphadenopathy.      GENITOURINARY:  Normal external genitalia, no lesions.  Normal urethral meatus, no masses or tenderness.  Normal bladder without fullness or masses.  Vagina well estrogenized, no vaginal discharge  or lesions.  Cervix absent.  Vaginal vault apex well supported.  Uterus absent.  Adnexa nontender, no masses.  Normal anus and perineum.  Rectal exam - not indicated.              Assessment/Plan:     1. Menopausal symptoms  Reviewed the risks, benefits, alternatives, and indications of hormone replacement therapy.  Specifically, that it is recommended that hrt is used at the lowest possible dose for the shortest period of time to control symptoms around the onset of menopause.  Risks of estrogen only hrt include increased risk of thrombosis. The patient feels her symptoms are severe enough to justify the risk.  She understands that she has multiple medical problems and hx of DVT that put her at risk for thrombosis, heart attack, and stroke.  She feels she can not live with the severe hot flashes and mood problems.  Will change to estradiol patch 0.5 mg/day, 1 patch weekly, to minimize thrombotic risk.  She is already on Neurontin at a dose that is indicated for hot flashes.  Already taking Trazadone for pain, so I am hesitant to try Effexor.     2. Screening for breast cancer  - MA-SCREEN MAMMO W/CAD-BILAT; Future    F/U 1 month

## 2018-08-09 DIAGNOSIS — Z79.4 TYPE 2 DIABETES MELLITUS WITHOUT COMPLICATION, WITH LONG-TERM CURRENT USE OF INSULIN (HCC): ICD-10-CM

## 2018-08-09 DIAGNOSIS — E11.9 TYPE 2 DIABETES MELLITUS WITHOUT COMPLICATION, WITH LONG-TERM CURRENT USE OF INSULIN (HCC): ICD-10-CM

## 2018-08-10 RX ORDER — PEN NEEDLE, DIABETIC 32GX 5/32"
NEEDLE, DISPOSABLE MISCELLANEOUS
Qty: 100 EACH | Refills: 5 | Status: SHIPPED | OUTPATIENT
Start: 2018-08-10 | End: 2019-05-20 | Stop reason: SDUPTHER

## 2018-08-10 NOTE — TELEPHONE ENCOUNTER
Was the patient seen in the last year in this department? Yes    Does patient have an active prescription for medications requested? Yes    Received Request Via: Patient     Pt is asking for Rx for alcohol prep pads as well.

## 2018-08-11 ENCOUNTER — PATIENT MESSAGE (OUTPATIENT)
Dept: MEDICAL GROUP | Facility: MEDICAL CENTER | Age: 48
End: 2018-08-11

## 2018-08-14 RX ORDER — PEN NEEDLE, DIABETIC 31 GX5/16"
NEEDLE, DISPOSABLE MISCELLANEOUS
Qty: 100 EACH | Refills: 11 | Status: SHIPPED | OUTPATIENT
Start: 2018-08-14 | End: 2019-08-27

## 2018-08-24 DIAGNOSIS — K21.9 GASTROESOPHAGEAL REFLUX DISEASE, ESOPHAGITIS PRESENCE NOT SPECIFIED: ICD-10-CM

## 2018-08-24 RX ORDER — OMEPRAZOLE 40 MG/1
CAPSULE, DELAYED RELEASE ORAL
Qty: 60 CAP | Refills: 0 | Status: SHIPPED | OUTPATIENT
Start: 2018-08-24 | End: 2018-09-28 | Stop reason: SDUPTHER

## 2018-08-24 NOTE — TELEPHONE ENCOUNTER
Was the patient seen in the last year in this department? Yes    Does patient have an active prescription for medications requested? Yes    Received Request Via: Pharmacy

## 2018-08-29 DIAGNOSIS — E11.42 DIABETIC POLYNEUROPATHY ASSOCIATED WITH TYPE 2 DIABETES MELLITUS (HCC): ICD-10-CM

## 2018-08-29 RX ORDER — GABAPENTIN 300 MG/1
CAPSULE ORAL
Qty: 90 CAP | Refills: 6 | Status: SHIPPED | OUTPATIENT
Start: 2018-08-29 | End: 2019-03-25 | Stop reason: SDUPTHER

## 2018-09-10 ENCOUNTER — GYNECOLOGY VISIT (OUTPATIENT)
Dept: OBGYN | Facility: CLINIC | Age: 48
End: 2018-09-10
Payer: MEDICAID

## 2018-09-10 VITALS — WEIGHT: 240.8 LBS | SYSTOLIC BLOOD PRESSURE: 110 MMHG | BODY MASS INDEX: 42.67 KG/M2 | DIASTOLIC BLOOD PRESSURE: 66 MMHG

## 2018-09-10 DIAGNOSIS — N95.1 MENOPAUSAL SYNDROME ON HORMONE REPLACEMENT THERAPY: ICD-10-CM

## 2018-09-10 DIAGNOSIS — E06.3 HYPOTHYROIDISM DUE TO HASHIMOTO'S THYROIDITIS: ICD-10-CM

## 2018-09-10 DIAGNOSIS — F51.01 PRIMARY INSOMNIA: ICD-10-CM

## 2018-09-10 DIAGNOSIS — L65.9 HAIR LOSS: ICD-10-CM

## 2018-09-10 DIAGNOSIS — E66.01 MORBID OBESITY WITH BMI OF 40.0-44.9, ADULT (HCC): ICD-10-CM

## 2018-09-10 DIAGNOSIS — Z79.890 MENOPAUSAL SYNDROME ON HORMONE REPLACEMENT THERAPY: ICD-10-CM

## 2018-09-10 DIAGNOSIS — E03.8 HYPOTHYROIDISM DUE TO HASHIMOTO'S THYROIDITIS: ICD-10-CM

## 2018-09-10 DIAGNOSIS — R63.5 WEIGHT GAIN: ICD-10-CM

## 2018-09-10 DIAGNOSIS — Z90.710 S/P TOTAL HYSTERECTOMY: ICD-10-CM

## 2018-09-10 PROBLEM — R42 LIGHTHEADEDNESS: Status: RESOLVED | Noted: 2017-12-12 | Resolved: 2018-09-10

## 2018-09-10 PROBLEM — D21.9 FIBROIDS: Status: RESOLVED | Noted: 2018-01-08 | Resolved: 2018-09-10

## 2018-09-10 PROCEDURE — 99214 OFFICE O/P EST MOD 30 MIN: CPT | Performed by: OBSTETRICS & GYNECOLOGY

## 2018-09-10 RX ORDER — ESTRADIOL 1 MG/1
1 TABLET ORAL DAILY
Qty: 30 TAB | Refills: 11 | Status: SHIPPED | OUTPATIENT
Start: 2018-09-10 | End: 2020-02-12 | Stop reason: SDUPTHER

## 2018-09-10 NOTE — NON-PROVIDER
GYN visit follow up. Pt requests refill for Estrace 1 mg tab  WT: 240.8 lb (per patient checked ward at home did preferred not to use our scale)  BP: 110/66  Pt states she is concerned she is gaining weight and hair loss.  Good # [phone]

## 2018-09-10 NOTE — PROGRESS NOTES
Chief Complaint   Patient presents with   • Other     GYN visit, discuss menopause and hormones       History of present illness: Prior patient of mine from [facility]  47 y.o.  presents with above chief complaint. Patient had a LAVH/BSO done 2018 with Dr. Lainez for bleeding, pain, ovarian cysts. After surgery had severe hot flashes and night sweats. Started HRT, estradiol 1mg daily, from Dr. Lainez. Hot flashes markedly improved. Pt feels much better. Pt aware of risks of hormones and her quality of life was so severely impacted prior to HRT that she prefers to have the risks of HRT vs not take HRT.  Now has hair loss and weight gain. Thinks it is due to her estrogen. Wonders what she can do about it. Mom had a hx of hair loss as well.  Since pt last saw me she had a complete hysterectomy. Very happy with surgery results.   Pt sees her cardiologist regularly due to hx of sinus tachycardia.   Pt also has a hx of a lower extremity DVT years ago, unprovoked.    ROS: Pertinent positives documented in HPI and all other systems reviewed & are negative    POBHx: 2 , 1     PGYNHx: no abnl pap, last pap  per pt prior to hysterectomy  LAVH/BSO 2018 due to menorrhagia, ovarian cysts, pelvic pain    All PMH, PSH, meds, allergies, social history and FH reviewed and updated today:  Past Medical History:   Diagnosis Date   • Blood clotting disorder (HCC)     DVT   • Bowel habit changes     constipation   • Depression    • Diabetes (HCC)    • Diabetic neuropathy (HCC)    • Esophageal spasm    • GERD (gastroesophageal reflux disease)    • Hashimoto's disease    • Hiatal hernia    • High cholesterol    • Hyperlipidemia    • Hypothyroid    • Sleep apnea     uses cpap   • Snoring     sleep study done   • Tachycardia        Past Surgical History:   Procedure Laterality Date   • VAGINAL HYSTERECTOMY SCOPE TOTAL N/A 2018    Procedure: VAGINAL HYSTERECTOMY SCOPE TOTAL;  Surgeon: Francisco Javier Lainez M.D.;  " Location: SURGERY SAME DAY [facility] ORS;  Service: Gynecology   • SALPINGO OOPHORECTOMY Bilateral 4/24/2018    Procedure: SALPINGO OOPHORECTOMY;  Surgeon: Francisco Javier Lainez M.D.;  Location: SURGERY SAME DAY [facility] ORS;  Service: Gynecology   • ANTERIOR AND POSTERIOR REPAIR  4/24/2018    Procedure: ANTERIOR AND POSTERIOR REPAIR;  Surgeon: Francisco Javier Lainez M.D.;  Location: SURGERY SAME DAY [facility] ORS;  Service: Gynecology   • ENTEROCELE REPAIR  4/24/2018    Procedure: ENTEROCELE REPAIR- PERINEOPLASTY;  Surgeon: Francisco Javier Lainez M.D.;  Location: SURGERY SAME DAY [facility] ORS;  Service: Gynecology   • BLADDER SLING FEMALE  4/24/2018    Procedure: BLADDER SLING FEMALE- TOT;  Surgeon: Francisco Javier Lainez M.D.;  Location: SURGERY SAME DAY [facility] ORS;  Service: Gynecology   • VAGINAL SUSPENSION N/A 4/24/2018    Procedure: VAGINAL SUSPENSION- SACROSPINOUS VAULT;  Surgeon: Francisco Javier Lainez M.D.;  Location: SURGERY SAME DAY [facility];  Service: Gynecology   • CYSTOSCOPY N/A 4/24/2018    Procedure: CYSTOSCOPY;  Surgeon: Francisco Javier Lainez M.D.;  Location: SURGERY SAME DAY [facility];  Service: Gynecology   • ENDOSCOPY  2016   • COLONOSCOPY  2016   • OTHER  2016    esophageal dilation   • OTHER ORTHOPEDIC SURGERY  2012    bone removed from toe after fracture   • TONSILLECTOMY AND ADENOIDECTOMY  1977   • OTHER  1971, 1978, 1985    left facial cheek and right upper thight reconstructive surgery       Allergies:   Allergies   Allergen Reactions   • Betadine Prepstick Plus [Povidone-Iodine] Rash     Rash swelling   • Dilaudid [Hydromorphone Hcl] Itching     Itching \"head to toe\"   • Morphine Itching     Itching \"head to toe\"   • Tape Itching     Itching \"leave scars\"       Social History     Social History   • Marital status: Single     Spouse name: N/A   • Number of children: N/A   • Years of education: N/A     Occupational History   • Not on file.     Social History Main Topics   • Smoking status: Never Smoker   • Smokeless " tobacco: Never Used   • Alcohol use No   • Drug use: No      Comment: previous medical marijuana use for pain   • Sexual activity: Yes     Partners: Female     Other Topics Concern   • Not on file     Social History Narrative   • No narrative on file       Family History   Problem Relation Age of Onset   • Cancer Mother 70        pancreatic, breast in 30's   • Heart Disease Father    • Cancer Father         lung   • Diabetes Sister    • Diabetes Maternal Uncle    • Heart Disease Maternal Grandmother         MI   • Hyperlipidemia Maternal Grandmother    • Diabetes Maternal Grandfather    • Hyperlipidemia Maternal Grandfather    • Stroke Neg Hx        Physical exam:  Blood pressure 110/66, weight 109.2 kg (240 lb 12.8 oz).    GENERAL APPEARANCE: obese, alert, no distress, cooperative  NEURO Awake, alert and oriented x 3, Normal gait, no sensory deficits  SKIN No rashes, or ulcers or lesions seen  PSYCHIATRIC: Patient shows appropriate affect, is alert and oriented x3, intact judgment and insight.      Assessment:  1. Menopausal syndrome on hormone replacement therapy     2. Hair loss     3. Weight gain     4. S/P total hysterectomy     5. Morbid obesity with BMI of 40.0-44.9, adult (HCC)         Plan:  Lengthy discussion with patient in regards to normal menopausal symptoms and treatment options available. We discussed in detail risks of HRT including DVT, heart attack, PE, stroke. Pt aware of these risks and desires to continue with HRT due to quality of life issues.  Rx estradiol 1mg daily to pharmacy  Pt has appt next week with her cardiologist and will discuss HRT with her as well  Advised pt to take baby ASA daily, she will discuss w/ her cardiologist first  Recommend regular daily exercise to help with weight gain  Mammogram order given to patient  For hair loss we will start topical Rogaine  F/u yearly for annual examination  No need for pap testing due to prior hysterectomy    Spent 30 minutes in face-to-face  patient contact in which greater than 50% of that visit was spent in counseling/coordination of care including medical and surgical options of care.

## 2018-09-11 RX ORDER — LEVOTHYROXINE SODIUM 137 UG/1
TABLET ORAL
Qty: 30 TAB | Refills: 3 | Status: SHIPPED | OUTPATIENT
Start: 2018-09-11 | End: 2019-01-24 | Stop reason: SDUPTHER

## 2018-09-11 RX ORDER — TRAZODONE HYDROCHLORIDE 150 MG/1
TABLET ORAL
Qty: 90 TAB | Refills: 2 | Status: SHIPPED | OUTPATIENT
Start: 2018-09-11 | End: 2019-06-17 | Stop reason: SDUPTHER

## 2018-09-28 ENCOUNTER — HOSPITAL ENCOUNTER (OUTPATIENT)
Dept: LAB | Facility: MEDICAL CENTER | Age: 48
End: 2018-09-28
Attending: INTERNAL MEDICINE
Payer: MEDICAID

## 2018-09-28 ENCOUNTER — HOSPITAL ENCOUNTER (OUTPATIENT)
Dept: LAB | Facility: MEDICAL CENTER | Age: 48
End: 2018-09-28
Attending: NURSE PRACTITIONER
Payer: MEDICAID

## 2018-09-28 DIAGNOSIS — K21.9 GASTROESOPHAGEAL REFLUX DISEASE, ESOPHAGITIS PRESENCE NOT SPECIFIED: ICD-10-CM

## 2018-09-28 LAB
ALBUMIN SERPL BCP-MCNC: 3.9 G/DL (ref 3.2–4.9)
ALBUMIN/GLOB SERPL: 1.1 G/DL
ALP SERPL-CCNC: 38 U/L (ref 30–99)
ALT SERPL-CCNC: 9 U/L (ref 2–50)
ANION GAP SERPL CALC-SCNC: 13 MMOL/L (ref 0–11.9)
AST SERPL-CCNC: 18 U/L (ref 12–45)
BILIRUB SERPL-MCNC: 0.3 MG/DL (ref 0.1–1.5)
BUN SERPL-MCNC: 21 MG/DL (ref 8–22)
CALCIUM SERPL-MCNC: 10.1 MG/DL (ref 8.5–10.5)
CHLORIDE SERPL-SCNC: 99 MMOL/L (ref 96–112)
CHOLEST SERPL-MCNC: 140 MG/DL (ref 100–199)
CO2 SERPL-SCNC: 27 MMOL/L (ref 20–33)
CREAT SERPL-MCNC: 0.82 MG/DL (ref 0.5–1.4)
EST. AVERAGE GLUCOSE BLD GHB EST-MCNC: 146 MG/DL
GLOBULIN SER CALC-MCNC: 3.5 G/DL (ref 1.9–3.5)
GLUCOSE SERPL-MCNC: 65 MG/DL (ref 65–99)
HBA1C MFR BLD: 6.7 % (ref 0–5.6)
HDLC SERPL-MCNC: 43 MG/DL
LDLC SERPL CALC-MCNC: 68 MG/DL
POTASSIUM SERPL-SCNC: 4 MMOL/L (ref 3.6–5.5)
PROT SERPL-MCNC: 7.4 G/DL (ref 6–8.2)
SODIUM SERPL-SCNC: 139 MMOL/L (ref 135–145)
T4 FREE SERPL-MCNC: 1.2 NG/DL (ref 0.53–1.43)
TRIGL SERPL-MCNC: 144 MG/DL (ref 0–149)
TSH SERPL DL<=0.005 MIU/L-ACNC: 0.27 UIU/ML (ref 0.38–5.33)

## 2018-09-28 PROCEDURE — 84439 ASSAY OF FREE THYROXINE: CPT

## 2018-09-28 PROCEDURE — 36415 COLL VENOUS BLD VENIPUNCTURE: CPT

## 2018-09-28 PROCEDURE — 83036 HEMOGLOBIN GLYCOSYLATED A1C: CPT

## 2018-09-28 PROCEDURE — 80061 LIPID PANEL: CPT

## 2018-09-28 PROCEDURE — 84443 ASSAY THYROID STIM HORMONE: CPT

## 2018-09-28 PROCEDURE — 80053 COMPREHEN METABOLIC PANEL: CPT

## 2018-09-28 RX ORDER — OMEPRAZOLE 40 MG/1
40 CAPSULE, DELAYED RELEASE ORAL 2 TIMES DAILY
Qty: 60 CAP | Refills: 11 | Status: SHIPPED | OUTPATIENT
Start: 2018-09-28 | End: 2019-03-04 | Stop reason: SDUPTHER

## 2018-10-12 ENCOUNTER — HOSPITAL ENCOUNTER (OUTPATIENT)
Dept: LAB | Facility: MEDICAL CENTER | Age: 48
End: 2018-10-12
Attending: PODIATRIST
Payer: MEDICAID

## 2018-10-12 LAB
ALBUMIN SERPL BCP-MCNC: 3.8 G/DL (ref 3.2–4.9)
ALP SERPL-CCNC: 38 U/L (ref 30–99)
ALT SERPL-CCNC: 9 U/L (ref 2–50)
AST SERPL-CCNC: 17 U/L (ref 12–45)
BILIRUB CONJ SERPL-MCNC: <0.1 MG/DL (ref 0.1–0.5)
BILIRUB INDIRECT SERPL-MCNC: NORMAL MG/DL (ref 0–1)
BILIRUB SERPL-MCNC: 0.2 MG/DL (ref 0.1–1.5)
ERYTHROCYTE [SEDIMENTATION RATE] IN BLOOD BY WESTERGREN METHOD: 18 MM/HOUR (ref 0–20)
PROT SERPL-MCNC: 7 G/DL (ref 6–8.2)
RHEUMATOID FACT SER IA-ACNC: <10 IU/ML (ref 0–14)
URATE SERPL-MCNC: 6.1 MG/DL (ref 1.9–8.2)

## 2018-10-12 PROCEDURE — 84550 ASSAY OF BLOOD/URIC ACID: CPT

## 2018-10-12 PROCEDURE — 80076 HEPATIC FUNCTION PANEL: CPT

## 2018-10-12 PROCEDURE — 86431 RHEUMATOID FACTOR QUANT: CPT

## 2018-10-12 PROCEDURE — 86038 ANTINUCLEAR ANTIBODIES: CPT

## 2018-10-12 PROCEDURE — 36415 COLL VENOUS BLD VENIPUNCTURE: CPT

## 2018-10-12 PROCEDURE — 85652 RBC SED RATE AUTOMATED: CPT

## 2018-10-15 LAB — NUCLEAR IGG SER QL IA: NORMAL

## 2018-11-27 ENCOUNTER — PATIENT MESSAGE (OUTPATIENT)
Dept: MEDICAL GROUP | Facility: MEDICAL CENTER | Age: 48
End: 2018-11-27

## 2018-11-27 DIAGNOSIS — M54.50 CHRONIC BILATERAL LOW BACK PAIN WITHOUT SCIATICA: ICD-10-CM

## 2018-11-27 DIAGNOSIS — G89.29 CHRONIC BILATERAL LOW BACK PAIN WITHOUT SCIATICA: ICD-10-CM

## 2018-11-27 RX ORDER — TRAMADOL HYDROCHLORIDE 50 MG/1
100 TABLET ORAL EVERY 6 HOURS PRN
Qty: 42 TAB | Refills: 0 | Status: SHIPPED | OUTPATIENT
Start: 2018-11-27 | End: 2018-11-27 | Stop reason: SDUPTHER

## 2018-11-27 RX ORDER — TRAMADOL HYDROCHLORIDE 50 MG/1
100 TABLET ORAL EVERY 6 HOURS PRN
Qty: 42 TAB | Refills: 0 | Status: SHIPPED | OUTPATIENT
Start: 2018-11-27 | End: 2018-12-04

## 2018-12-11 DIAGNOSIS — I95.1 ORTHOSTATIC HYPOTENSION: ICD-10-CM

## 2018-12-11 RX ORDER — HYDROCHLOROTHIAZIDE 12.5 MG/1
TABLET ORAL
Qty: 30 TAB | Refills: 0 | Status: SHIPPED | OUTPATIENT
Start: 2018-12-11 | End: 2019-02-04 | Stop reason: SDUPTHER

## 2019-01-24 DIAGNOSIS — E11.9 TYPE 2 DIABETES MELLITUS WITHOUT COMPLICATION, WITH LONG-TERM CURRENT USE OF INSULIN (HCC): ICD-10-CM

## 2019-01-24 DIAGNOSIS — Z79.4 TYPE 2 DIABETES MELLITUS WITHOUT COMPLICATION, WITH LONG-TERM CURRENT USE OF INSULIN (HCC): ICD-10-CM

## 2019-01-24 DIAGNOSIS — E03.8 HYPOTHYROIDISM DUE TO HASHIMOTO'S THYROIDITIS: ICD-10-CM

## 2019-01-24 DIAGNOSIS — E06.3 HYPOTHYROIDISM DUE TO HASHIMOTO'S THYROIDITIS: ICD-10-CM

## 2019-01-24 RX ORDER — LIRAGLUTIDE 6 MG/ML
1.8 INJECTION SUBCUTANEOUS DAILY
Qty: 9 ML | Refills: 3 | Status: SHIPPED | OUTPATIENT
Start: 2019-01-24 | End: 2019-05-24 | Stop reason: SDUPTHER

## 2019-01-24 RX ORDER — LEVOTHYROXINE SODIUM 137 UG/1
TABLET ORAL
Qty: 30 TAB | Refills: 3 | Status: SHIPPED | OUTPATIENT
Start: 2019-01-24 | End: 2019-05-28 | Stop reason: SDUPTHER

## 2019-01-28 ENCOUNTER — HOSPITAL ENCOUNTER (OUTPATIENT)
Dept: LAB | Facility: MEDICAL CENTER | Age: 49
End: 2019-01-28
Attending: INTERNAL MEDICINE
Payer: MEDICAID

## 2019-01-28 LAB
25(OH)D3 SERPL-MCNC: 27 NG/ML (ref 30–100)
ALBUMIN SERPL BCP-MCNC: 3.7 G/DL (ref 3.2–4.9)
ALBUMIN/GLOB SERPL: 1.3 G/DL
ALP SERPL-CCNC: 29 U/L (ref 30–99)
ALT SERPL-CCNC: 9 U/L (ref 2–50)
ANION GAP SERPL CALC-SCNC: 9 MMOL/L (ref 0–11.9)
AST SERPL-CCNC: 14 U/L (ref 12–45)
BILIRUB SERPL-MCNC: 0.2 MG/DL (ref 0.1–1.5)
BUN SERPL-MCNC: 15 MG/DL (ref 8–22)
CALCIUM SERPL-MCNC: 8.9 MG/DL (ref 8.5–10.5)
CHLORIDE SERPL-SCNC: 102 MMOL/L (ref 96–112)
CHOLEST SERPL-MCNC: 127 MG/DL (ref 100–199)
CO2 SERPL-SCNC: 27 MMOL/L (ref 20–33)
CREAT SERPL-MCNC: 0.73 MG/DL (ref 0.5–1.4)
ERYTHROCYTE [DISTWIDTH] IN BLOOD BY AUTOMATED COUNT: 49.2 FL (ref 35.9–50)
FASTING STATUS PATIENT QL REPORTED: NORMAL
GLOBULIN SER CALC-MCNC: 2.8 G/DL (ref 1.9–3.5)
GLUCOSE SERPL-MCNC: 155 MG/DL (ref 65–99)
HCT VFR BLD AUTO: 35.2 % (ref 37–47)
HDLC SERPL-MCNC: 35 MG/DL
HGB BLD-MCNC: 10.3 G/DL (ref 12–16)
LDLC SERPL CALC-MCNC: 62 MG/DL
MCH RBC QN AUTO: 23.1 PG (ref 27–33)
MCHC RBC AUTO-ENTMCNC: 29.3 G/DL (ref 33.6–35)
MCV RBC AUTO: 78.9 FL (ref 81.4–97.8)
PLATELET # BLD AUTO: 340 K/UL (ref 164–446)
PMV BLD AUTO: 9.1 FL (ref 9–12.9)
POTASSIUM SERPL-SCNC: 4.2 MMOL/L (ref 3.6–5.5)
PROT SERPL-MCNC: 6.5 G/DL (ref 6–8.2)
RBC # BLD AUTO: 4.46 M/UL (ref 4.2–5.4)
SODIUM SERPL-SCNC: 138 MMOL/L (ref 135–145)
TRIGL SERPL-MCNC: 148 MG/DL (ref 0–149)
TSH SERPL DL<=0.005 MIU/L-ACNC: 2.22 UIU/ML (ref 0.38–5.33)
WBC # BLD AUTO: 7.2 K/UL (ref 4.8–10.8)

## 2019-01-28 PROCEDURE — 84443 ASSAY THYROID STIM HORMONE: CPT

## 2019-01-28 PROCEDURE — 80053 COMPREHEN METABOLIC PANEL: CPT

## 2019-01-28 PROCEDURE — 85027 COMPLETE CBC AUTOMATED: CPT

## 2019-01-28 PROCEDURE — 36415 COLL VENOUS BLD VENIPUNCTURE: CPT

## 2019-01-28 PROCEDURE — 80061 LIPID PANEL: CPT

## 2019-01-28 PROCEDURE — 82306 VITAMIN D 25 HYDROXY: CPT

## 2019-02-02 DIAGNOSIS — Z79.4 TYPE 2 DIABETES MELLITUS WITHOUT COMPLICATION, WITH LONG-TERM CURRENT USE OF INSULIN (HCC): ICD-10-CM

## 2019-02-02 DIAGNOSIS — E11.9 TYPE 2 DIABETES MELLITUS WITHOUT COMPLICATION, WITH LONG-TERM CURRENT USE OF INSULIN (HCC): ICD-10-CM

## 2019-02-04 DIAGNOSIS — I95.1 ORTHOSTATIC HYPOTENSION: ICD-10-CM

## 2019-02-05 RX ORDER — HYDROCHLOROTHIAZIDE 12.5 MG/1
TABLET ORAL
Qty: 30 TAB | Refills: 3 | Status: SHIPPED | OUTPATIENT
Start: 2019-02-05 | End: 2020-02-12 | Stop reason: SDUPTHER

## 2019-02-07 ENCOUNTER — HOSPITAL ENCOUNTER (OUTPATIENT)
Dept: RADIOLOGY | Facility: MEDICAL CENTER | Age: 49
End: 2019-02-07
Attending: PHYSICIAN ASSISTANT
Payer: MEDICAID

## 2019-02-07 DIAGNOSIS — M51.36 OTHER INTERVERTEBRAL DISC DEGENERATION, LUMBAR REGION: ICD-10-CM

## 2019-02-07 PROCEDURE — 72148 MRI LUMBAR SPINE W/O DYE: CPT

## 2019-02-13 ENCOUNTER — OFFICE VISIT (OUTPATIENT)
Dept: MEDICAL GROUP | Facility: MEDICAL CENTER | Age: 49
End: 2019-02-13
Attending: INTERNAL MEDICINE
Payer: MEDICAID

## 2019-02-13 VITALS
DIASTOLIC BLOOD PRESSURE: 64 MMHG | OXYGEN SATURATION: 96 % | SYSTOLIC BLOOD PRESSURE: 116 MMHG | BODY MASS INDEX: 41.46 KG/M2 | HEART RATE: 94 BPM | WEIGHT: 234 LBS | RESPIRATION RATE: 16 BRPM | TEMPERATURE: 97.8 F | HEIGHT: 63 IN

## 2019-02-13 DIAGNOSIS — N39.41 URGE INCONTINENCE: ICD-10-CM

## 2019-02-13 DIAGNOSIS — Z79.4 TYPE 2 DIABETES MELLITUS WITHOUT COMPLICATION, WITH LONG-TERM CURRENT USE OF INSULIN (HCC): ICD-10-CM

## 2019-02-13 DIAGNOSIS — M47.816 SPONDYLOSIS OF LUMBAR REGION WITHOUT MYELOPATHY OR RADICULOPATHY: ICD-10-CM

## 2019-02-13 DIAGNOSIS — G89.29 CHRONIC BILATERAL LOW BACK PAIN WITH LEFT-SIDED SCIATICA: ICD-10-CM

## 2019-02-13 DIAGNOSIS — R00.0 SINUS TACHYCARDIA: ICD-10-CM

## 2019-02-13 DIAGNOSIS — E11.9 TYPE 2 DIABETES MELLITUS WITHOUT COMPLICATION, WITH LONG-TERM CURRENT USE OF INSULIN (HCC): ICD-10-CM

## 2019-02-13 DIAGNOSIS — M48.061 NEUROFORAMINAL STENOSIS OF LUMBAR SPINE: ICD-10-CM

## 2019-02-13 DIAGNOSIS — M25.562 CHRONIC PAIN OF LEFT KNEE: ICD-10-CM

## 2019-02-13 DIAGNOSIS — M54.42 CHRONIC BILATERAL LOW BACK PAIN WITH LEFT-SIDED SCIATICA: ICD-10-CM

## 2019-02-13 DIAGNOSIS — G89.29 CHRONIC PAIN OF LEFT KNEE: ICD-10-CM

## 2019-02-13 DIAGNOSIS — D50.9 IRON DEFICIENCY ANEMIA, UNSPECIFIED IRON DEFICIENCY ANEMIA TYPE: ICD-10-CM

## 2019-02-13 LAB
HBA1C MFR BLD: 6.5 % (ref ?–5.8)
INT CON NEG: NEGATIVE
INT CON POS: POSITIVE

## 2019-02-13 PROCEDURE — 99213 OFFICE O/P EST LOW 20 MIN: CPT | Performed by: INTERNAL MEDICINE

## 2019-02-13 PROCEDURE — 83036 HEMOGLOBIN GLYCOSYLATED A1C: CPT | Performed by: INTERNAL MEDICINE

## 2019-02-13 PROCEDURE — 99214 OFFICE O/P EST MOD 30 MIN: CPT | Performed by: INTERNAL MEDICINE

## 2019-02-13 RX ORDER — TRAMADOL HYDROCHLORIDE 50 MG/1
100 TABLET ORAL EVERY 6 HOURS PRN
Qty: 42 TAB | Refills: 0 | Status: SHIPPED | OUTPATIENT
Start: 2019-02-13 | End: 2019-02-20

## 2019-02-13 RX ORDER — OXYBUTYNIN CHLORIDE 5 MG/1
5 TABLET, EXTENDED RELEASE ORAL DAILY
Qty: 30 TAB | Refills: 3 | Status: SHIPPED | OUTPATIENT
Start: 2019-02-13 | End: 2019-06-17 | Stop reason: SDUPTHER

## 2019-02-13 RX ORDER — RISPERIDONE 0.5 MG/1
TABLET ORAL
COMMUNITY
Start: 2019-02-08 | End: 2020-09-17

## 2019-02-13 ASSESSMENT — PATIENT HEALTH QUESTIONNAIRE - PHQ9
6. FEELING BAD ABOUT YOURSELF - OR THAT YOU ARE A FAILURE OR HAVE LET YOURSELF OR YOUR FAMILY DOWN: MORE THAN HALF THE DAYS
SUM OF ALL RESPONSES TO PHQ9 QUESTIONS 1 AND 2: 3
1. LITTLE INTEREST OR PLEASURE IN DOING THINGS: SEVERAL DAYS
4. FEELING TIRED OR HAVING LITTLE ENERGY: NEARLY EVERY DAY
8. MOVING OR SPEAKING SO SLOWLY THAT OTHER PEOPLE COULD HAVE NOTICED. OR THE OPPOSITE, BEING SO FIGETY OR RESTLESS THAT YOU HAVE BEEN MOVING AROUND A LOT MORE THAN USUAL: NOT AT ALL
SUM OF ALL RESPONSES TO PHQ QUESTIONS 1-9: 18
2. FEELING DOWN, DEPRESSED, IRRITABLE, OR HOPELESS: MORE THAN HALF THE DAYS
7. TROUBLE CONCENTRATING ON THINGS, SUCH AS READING THE NEWSPAPER OR WATCHING TELEVISION: NEARLY EVERY DAY
3. TROUBLE FALLING OR STAYING ASLEEP OR SLEEPING TOO MUCH: NEARLY EVERY DAY
9. THOUGHTS THAT YOU WOULD BE BETTER OFF DEAD, OR OF HURTING YOURSELF: SEVERAL DAYS
5. POOR APPETITE OR OVEREATING: NEARLY EVERY DAY

## 2019-02-13 ASSESSMENT — PAIN SCALES - GENERAL: PAINLEVEL: 8=MODERATE-SEVERE PAIN

## 2019-02-13 NOTE — ASSESSMENT & PLAN NOTE
Her hemoglobin A1c in clinic today was 6.5.  She continues to take the Victoza, basaglar, and metformin daily

## 2019-02-13 NOTE — ASSESSMENT & PLAN NOTE
She reports an initial injury of her knee in 2004 when she fell.  She fell again in 2017 and reinjured the knee.  She reports that for the past several months, she has had worsening daily pain.  She has significant discomfort when going up or down stairs and she is unable to bear all of her body weight on the left leg.  In 2017, she completed physical therapy without improvement.  She also tried a cortisone injection in the knee without improvement.  She has not had an MRI of the knee.

## 2019-02-13 NOTE — ASSESSMENT & PLAN NOTE
She has been following up with her cardiologist and is currently wearing a Holter monitor.  There have not been any recent adjustments in her medications.

## 2019-02-13 NOTE — PROGRESS NOTES
Subjective:   Sarah Friedman is a 48 y.o. female here today for incontinence, knee pain, follow-up diabetes, back pain, review labs, numerous chronic medical issues discussed below    Sinus tachycardia  She has been following up with her cardiologist and is currently wearing a Holter monitor.  There have not been any recent adjustments in her medications.      Urge incontinence  She reports that about a month ago, she started to have incontinence of urine.  Feels a great urgency to urinate and if she does not make it to the bathroom immediately, she will have an accident.  She states that she was on oxybutynin many years ago around the time she was diagnosed with diabetes but has not taken it for a while now.  She denies dysuria, hematuria, UTI symptoms and she usually is able to tell when she has a bladder infection because she gets very symptomatic.    Chronic pain of left knee  She reports an initial injury of her knee in 2004 when she fell.  She fell again in 2017 and reinjured the knee.  She reports that for the past several months, she has had worsening daily pain.  She has significant discomfort when going up or down stairs and she is unable to bear all of her body weight on the left leg.  In 2017, she completed physical therapy without improvement.  She also tried a cortisone injection in the knee without improvement.  She has not had an MRI of the knee.    Type 2 diabetes mellitus without complication (HCC)  Her hemoglobin A1c in clinic today was 6.5.  She continues to take the Victoza, basaglar, and metformin daily    Chronic low back pain  She continues to complain of low back pain which she states has been gradually worsening.  She is taking ibuprofen almost 3 times a day most days as well as tramadol occasionally.  Her last fill for tramadol was in November and she has 2 tablets remaining.  She is requesting a refill of this today.  She is now seeing [facility] for pain management and she  recently had an MRI done through them.  She has follow-up with them next week.    Iron deficiency anemia  She has a history of iron deficiency anemia which was initially thought secondary to menorrhagia.  However, she is status post hysterectomy numerous months ago and her most recent blood work still shows persistent anemia.  She denies melena or hematochezia.  She has never had a colonoscopy as she is under 49.  She is not currently taking any supplemental iron.         Current medicines (including changes today)  Current Outpatient Prescriptions   Medication Sig Dispense Refill   • tramadol (ULTRAM) 50 MG Tab Take 2 Tabs by mouth every 6 hours as needed for Moderate Pain for up to 7 days. 42 Tab 0   • oxybutynin SR (DITROPAN-XL) 5 MG TABLET SR 24 HR Take 1 Tab by mouth every day. 30 Tab 3   • metformin (GLUCOPHAGE) 1000 MG tablet TAKE ONE TABLET BY MOUTH TWICE DAILY WITH MEALS 60 Tab 5   • hydroCHLOROthiazide (HYDRODIURIL) 12.5 MG tablet TAKE 1 TABLET BY MOUTH EVERY OTHER DAY FOR LEG SWELLING 30 Tab 3   • VICTOZA 18 MG/3ML Solution Pen-injector injection INJECT 0.3 ML AS INSTRUCTED EVERY DAY 9 mL 3   • levothyroxine (SYNTHROID) 137 MCG Tab TAKE 1 TABLET BY MOUTH IN THE MORNING ON  AN  EMPTY  STOMACH 30 Tab 3   • omeprazole (PRILOSEC) 40 MG delayed-release capsule Take 1 Cap by mouth 2 times a day. 60 Cap 11   • traZODone (DESYREL) 150 MG Tab TAKE 1 TABLET BY MOUTH AT BEDTIME 90 Tab 2   • minoxidil (MINOXIDIL FOR WOMEN) 2 % external solution Apply 1mL to scalp topically twice daily 1 Bottle 11   • estradiol (ESTRACE) 1 MG Tab Take 1 Tab by mouth every day. 30 Tab 11   • gabapentin (NEURONTIN) 300 MG Cap TAKE THREE CAPSULES BY MOUTH AT BEDTIME 90 Cap 6   • Alcohol Swabs (ALCOHOL PREP) Pads Use one pad before giving victoza or insulin injection or before checking blood sugar 100 Each 11   • BD PEN NEEDLE JOSE J U/F 32G X 4 MM Misc Use to inject insulin nightly as directed 100 Each 5   • mupirocin (BACTROBAN) 2 %  "Ointment Apply small amount to sun burned surface on chest twice daily until area heals 45 g 0   • glucose blood (TRUE METRIX BLOOD GLUCOSE TEST) strip Use to test blood sugar three times a day and as needed for symptoms of high or low blood sugar 100 Strip 11   • ibuprofen (MOTRIN) 800 MG Tab Take 1 Tab by mouth every 8 hours as needed. 90 Tab 6   • cyclobenzaprine (FLEXERIL) 10 MG Tab TAKE ONE TABLET BY MOUTH THREE TIMES DAILY AS NEEDED 90 Tab 3   • Multiple Vitamins-Minerals (MULTIVITAMIN PO) Take 1 Tab by mouth every day.     • divalproex (DEPAKOTE) 250 MG Tablet Delayed Response Take 1 Tab by mouth 2 Times a Day.     • sumatriptan (IMITREX) 100 MG tablet Take 1/2 to 1 full tab daily as needed for migraine headache 10 Tab 3   • insulin glargine (BASAGLAR KWIKPEN) 100 UNIT/ML Solution Pen-injector injection Inject 52 Units as instructed every evening.     • spironolactone (ALDACTONE) 25 MG Tab Take 1 Tab by mouth every day. 30 Tab 3   • atorvastatin (LIPITOR) 20 MG Tab Take 1 Tab by mouth every day. 30 Tab    • ivabradine (CORLANOR) 5 MG Tab tablet Take 1 Tab by mouth 2 times a day, with meals. 60 Tab    • Black Cohosh 40 MG Cap Take 40 mg by mouth 2 Times a Day.     • Melatonin 10 MG Tab Take 20 mg by mouth every bedtime.       No current facility-administered medications for this visit.      She  has a past medical history of Blood clotting disorder ([facility]) (2005); Bowel habit changes; Depression; Diabetes ([facility]); Diabetic neuropathy ([facility]); Esophageal spasm; GERD (gastroesophageal reflux disease); Hashimoto's disease; Hiatal hernia; High cholesterol; Hyperlipidemia; Hypothyroid; Sleep apnea; Snoring; and Tachycardia.    ROS   Denies chest pain, shortness of breath  As above in HPI     Objective:     Blood pressure 116/64, pulse 94, temperature 36.6 °C (97.8 °F), temperature source Temporal, resp. rate 16, height 1.6 m (5' 2.99\"), weight 106.1 kg (234 lb), SpO2 96 %, not currently breastfeeding. Body mass index is " 41.46 kg/m².   Physical Exam:  Constitutional: Alert, no distress.  Skin: Warm, dry, good turgor, no rashes in visible areas.  Eye: Equal, round and reactive, conjunctiva clear, lids normal.  Psych: Alert and oriented x3, normal affect and mood.  MSK: Left knee without significant effusion, tender to palpation along medial joint line and medial peripatellar region, crepitus with passive range of motion    Results and Imaging:   Lab Results   Component Value Date/Time    WBC 7.2 01/28/2019 08:35 AM    RBC 4.46 01/28/2019 08:35 AM    HEMOGLOBIN 10.3 (L) 01/28/2019 08:35 AM    HEMATOCRIT 35.2 (L) 01/28/2019 08:35 AM    MCV 78.9 (L) 01/28/2019 08:35 AM    MCH 23.1 (L) 01/28/2019 08:35 AM    MCHC 29.3 (L) 01/28/2019 08:35 AM    MPV 9.1 01/28/2019 08:35 AM      Lab Results   Component Value Date/Time    CHOLSTRLTOT 127 01/28/2019 08:35 AM    LDL 62 01/28/2019 08:35 AM    HDL 35 (A) 01/28/2019 08:35 AM    TRIGLYCERIDE 148 01/28/2019 08:35 AM       Lab Results   Component Value Date/Time    SODIUM 138 01/28/2019 08:35 AM    POTASSIUM 4.2 01/28/2019 08:35 AM    CHLORIDE 102 01/28/2019 08:35 AM    CO2 27 01/28/2019 08:35 AM    GLUCOSE 155 (H) 01/28/2019 08:35 AM    BUN 15 01/28/2019 08:35 AM    CREATININE 0.73 01/28/2019 08:35 AM     Lab Results   Component Value Date/Time    ALKPHOSPHAT 29 (L) 01/28/2019 08:35 AM    ASTSGOT 14 01/28/2019 08:35 AM    ALTSGPT 9 01/28/2019 08:35 AM    TBILIRUBIN 0.2 01/28/2019 08:35 AM         Ref. Range 1/28/2019 08:35   25-Hydroxy   Vitamin D 25 Latest Ref Range: 30 - 100 ng/mL 27 (L)   TSH Latest Ref Range: 0.380 - 5.330 uIU/mL 2.220     POC A1c in clinic today was 6.5    Assessment and Plan:   The following treatment plan was discussed    1. Chronic bilateral low back pain with left-sided sciatica  She is seeing pain management and recently had an MRI done.  She follows up with them next week.  I have refilled her tramadol which she uses rarely.  -Continue pain management follow-up  -  tramadol (ULTRAM) 50 MG Tab; Take 2 Tabs by mouth every 6 hours as needed for Moderate Pain for up to 7 days.  Dispense: 42 Tab; Refill: 0    2. Spondylosis of lumbar region without myelopathy or radiculopathy  - tramadol (ULTRAM) 50 MG Tab; Take 2 Tabs by mouth every 6 hours as needed for Moderate Pain for up to 7 days.  Dispense: 42 Tab; Refill: 0    3. Neuroforaminal stenosis of lumbar spine  - tramadol (ULTRAM) 50 MG Tab; Take 2 Tabs by mouth every 6 hours as needed for Moderate Pain for up to 7 days.  Dispense: 42 Tab; Refill: 0    4. Type 2 diabetes mellitus without complication, with long-term current use of insulin (HCC)  Stable, well-controlled with current medications.  -Continue metformin 1000 mg twice daily, Victoza, long-acting insulin  - POCT  A1C    5. Urge incontinence  Uncontrolled.  We will try her back on oxybutynin at low-dose as below.  She will follow-up in 4 weeks.  - oxybutynin SR (DITROPAN-XL) 5 MG TABLET SR 24 HR; Take 1 Tab by mouth every day.  Dispense: 30 Tab; Refill: 3    6. Sinus tachycardia  She will continue under the care of her cardiologist who will follow-up her current Holter monitor and manage her cardiac medications    7. Iron deficiency anemia, unspecified iron deficiency anemia type  Unclear etiology now that she has persistent anemia status post hysterectomy.  We will get iron studies as well as a fit test to evaluate for iron deficiency and possibility of a GI bleed.  - IRON/TOTAL IRON BIND; Future  - FERRITIN; Future  - OCCULT BLOOD FECES IMMUNOASSAY; Future    8. Chronic pain of left knee  Uncontrolled.  Suspect she has some meniscal or ligamentous damage related to injury 15 years ago and reinjury in 2017.  We will start with an x-ray but will likely need to proceed with an MRI.  Of note, she has not responded to cortisone injection or physical therapy in the past 12 months.  - DX-KNEE COMPLETE 4+ LEFT; Future  -will likely need MRI if negative      Followup: Return in  about 4 weeks (around 3/13/2019), or if symptoms worsen or fail to improve, for knee pain, bladder issues, anemia.

## 2019-02-13 NOTE — ASSESSMENT & PLAN NOTE
She reports that about a month ago, she started to have incontinence of urine.  Feels a great urgency to urinate and if she does not make it to the bathroom immediately, she will have an accident.  She states that she was on oxybutynin many years ago around the time she was diagnosed with diabetes but has not taken it for a while now.  She denies dysuria, hematuria, UTI symptoms and she usually is able to tell when she has a bladder infection because she gets very symptomatic.

## 2019-02-13 NOTE — ASSESSMENT & PLAN NOTE
She has a history of iron deficiency anemia which was initially thought secondary to menorrhagia.  However, she is status post hysterectomy numerous months ago and her most recent blood work still shows persistent anemia.  She denies melena or hematochezia.  She has never had a colonoscopy as she is under 49.  She is not currently taking any supplemental iron.

## 2019-02-13 NOTE — ASSESSMENT & PLAN NOTE
She continues to complain of low back pain which she states has been gradually worsening.  She is taking ibuprofen almost 3 times a day most days as well as tramadol occasionally.  Her last fill for tramadol was in November and she has 2 tablets remaining.  She is requesting a refill of this today.  She is now seeing [facility] for pain management and she recently had an MRI done through them.  She has follow-up with them next week.

## 2019-02-16 ENCOUNTER — HOSPITAL ENCOUNTER (OUTPATIENT)
Facility: MEDICAL CENTER | Age: 49
End: 2019-02-16
Attending: INTERNAL MEDICINE
Payer: MEDICAID

## 2019-02-16 PROCEDURE — 82274 ASSAY TEST FOR BLOOD FECAL: CPT

## 2019-02-21 DIAGNOSIS — D50.9 IRON DEFICIENCY ANEMIA, UNSPECIFIED IRON DEFICIENCY ANEMIA TYPE: ICD-10-CM

## 2019-02-25 ENCOUNTER — HOSPITAL ENCOUNTER (OUTPATIENT)
Dept: LAB | Facility: MEDICAL CENTER | Age: 49
End: 2019-02-25
Attending: PSYCHIATRY & NEUROLOGY
Payer: MEDICAID

## 2019-02-25 ENCOUNTER — HOSPITAL ENCOUNTER (OUTPATIENT)
Dept: RADIOLOGY | Facility: MEDICAL CENTER | Age: 49
End: 2019-02-25
Attending: INTERNAL MEDICINE
Payer: MEDICAID

## 2019-02-25 ENCOUNTER — HOSPITAL ENCOUNTER (OUTPATIENT)
Dept: LAB | Facility: MEDICAL CENTER | Age: 49
End: 2019-02-25
Attending: INTERNAL MEDICINE
Payer: MEDICAID

## 2019-02-25 DIAGNOSIS — G89.29 CHRONIC PAIN OF LEFT KNEE: ICD-10-CM

## 2019-02-25 DIAGNOSIS — M25.562 CHRONIC PAIN OF LEFT KNEE: ICD-10-CM

## 2019-02-25 DIAGNOSIS — D50.9 IRON DEFICIENCY ANEMIA, UNSPECIFIED IRON DEFICIENCY ANEMIA TYPE: ICD-10-CM

## 2019-02-25 LAB
FERRITIN SERPL-MCNC: 4.5 NG/ML (ref 10–291)
IRON SATN MFR SERPL: 8 % (ref 15–55)
IRON SERPL-MCNC: 45 UG/DL (ref 40–170)
TIBC SERPL-MCNC: 594 UG/DL (ref 250–450)
VALPROATE SERPL-MCNC: 39.4 UG/ML (ref 50–100)

## 2019-02-25 PROCEDURE — 83550 IRON BINDING TEST: CPT

## 2019-02-25 PROCEDURE — 36415 COLL VENOUS BLD VENIPUNCTURE: CPT

## 2019-02-25 PROCEDURE — 82728 ASSAY OF FERRITIN: CPT

## 2019-02-25 PROCEDURE — 83540 ASSAY OF IRON: CPT

## 2019-02-25 PROCEDURE — 73564 X-RAY EXAM KNEE 4 OR MORE: CPT | Mod: LT

## 2019-02-25 PROCEDURE — 80164 ASSAY DIPROPYLACETIC ACD TOT: CPT

## 2019-02-26 ENCOUNTER — PATIENT MESSAGE (OUTPATIENT)
Dept: MEDICAL GROUP | Facility: MEDICAL CENTER | Age: 49
End: 2019-02-26

## 2019-02-26 ENCOUNTER — HOSPITAL ENCOUNTER (OUTPATIENT)
Dept: LAB | Facility: MEDICAL CENTER | Age: 49
End: 2019-02-26
Attending: INTERNAL MEDICINE
Payer: MEDICAID

## 2019-02-26 ENCOUNTER — HOSPITAL ENCOUNTER (OUTPATIENT)
Dept: LAB | Facility: MEDICAL CENTER | Age: 49
End: 2019-02-26
Attending: NURSE PRACTITIONER
Payer: MEDICAID

## 2019-02-26 DIAGNOSIS — K21.9 GASTROESOPHAGEAL REFLUX DISEASE, ESOPHAGITIS PRESENCE NOT SPECIFIED: ICD-10-CM

## 2019-02-26 DIAGNOSIS — R00.0 SINUS TACHYCARDIA: ICD-10-CM

## 2019-02-26 DIAGNOSIS — I35.1 AORTIC VALVE INSUFFICIENCY, ETIOLOGY OF CARDIAC VALVE DISEASE UNSPECIFIED: ICD-10-CM

## 2019-02-26 LAB
25(OH)D3 SERPL-MCNC: 37 NG/ML (ref 30–100)
ALBUMIN SERPL BCP-MCNC: 3.7 G/DL (ref 3.2–4.9)
ALBUMIN/GLOB SERPL: 1.3 G/DL
ALP SERPL-CCNC: 33 U/L (ref 30–99)
ALT SERPL-CCNC: 12 U/L (ref 2–50)
ANION GAP SERPL CALC-SCNC: 7 MMOL/L (ref 0–11.9)
ANISOCYTOSIS BLD QL SMEAR: ABNORMAL
AST SERPL-CCNC: 23 U/L (ref 12–45)
BASOPHILS # BLD AUTO: ABNORMAL % (ref 0–1.8)
BASOPHILS # BLD: ABNORMAL K/UL (ref 0–0.12)
BILIRUB SERPL-MCNC: 0.2 MG/DL (ref 0.1–1.5)
BUN SERPL-MCNC: 15 MG/DL (ref 8–22)
CALCIUM SERPL-MCNC: 9.4 MG/DL (ref 8.5–10.5)
CHLORIDE SERPL-SCNC: 101 MMOL/L (ref 96–112)
CHOLEST SERPL-MCNC: 131 MG/DL (ref 100–199)
CO2 SERPL-SCNC: 28 MMOL/L (ref 20–33)
COMMENT 1642: NORMAL
CREAT SERPL-MCNC: 0.83 MG/DL (ref 0.5–1.4)
EOSINOPHIL # BLD AUTO: ABNORMAL K/UL (ref 0–0.51)
EOSINOPHIL NFR BLD: ABNORMAL % (ref 0–6.9)
ERYTHROCYTE [DISTWIDTH] IN BLOOD BY AUTOMATED COUNT: 50.1 FL (ref 35.9–50)
FASTING STATUS PATIENT QL REPORTED: NORMAL
GLOBULIN SER CALC-MCNC: 2.9 G/DL (ref 1.9–3.5)
GLUCOSE SERPL-MCNC: 159 MG/DL (ref 65–99)
GLUCOSE SERPL-MCNC: 160 MG/DL (ref 65–99)
HCT VFR BLD AUTO: 36 % (ref 37–47)
HDLC SERPL-MCNC: 30 MG/DL
HGB BLD-MCNC: 10.7 G/DL (ref 12–16)
HYPOCHROMIA BLD QL SMEAR: ABNORMAL
IMM GRANULOCYTES # BLD AUTO: ABNORMAL K/UL (ref 0–0.11)
IMM GRANULOCYTES NFR BLD AUTO: ABNORMAL % (ref 0–0.9)
LDLC SERPL CALC-MCNC: 65 MG/DL
LYMPHOCYTES # BLD AUTO: ABNORMAL K/UL (ref 1–4.8)
LYMPHOCYTES NFR BLD: ABNORMAL % (ref 22–41)
MACROCYTES BLD QL SMEAR: ABNORMAL
MCH RBC QN AUTO: 23.7 PG (ref 27–33)
MCHC RBC AUTO-ENTMCNC: 29.7 G/DL (ref 33.6–35)
MCV RBC AUTO: 79.8 FL (ref 81.4–97.8)
MICROCYTES BLD QL SMEAR: ABNORMAL
MONOCYTES # BLD AUTO: ABNORMAL K/UL (ref 0–0.85)
MONOCYTES NFR BLD AUTO: ABNORMAL % (ref 0–13.4)
MORPHOLOGY BLD-IMP: NORMAL
NEUTROPHILS # BLD AUTO: ABNORMAL K/UL (ref 2–7.15)
NEUTROPHILS NFR BLD: ABNORMAL % (ref 44–72)
NRBC # BLD AUTO: ABNORMAL K/UL
NRBC BLD-RTO: ABNORMAL /100 WBC
OVALOCYTES BLD QL SMEAR: NORMAL
PLATELET # BLD AUTO: 321 K/UL (ref 164–446)
PLATELET BLD QL SMEAR: NORMAL
PMV BLD AUTO: 8.4 FL (ref 9–12.9)
POIKILOCYTOSIS BLD QL SMEAR: NORMAL
POTASSIUM SERPL-SCNC: 4.3 MMOL/L (ref 3.6–5.5)
PROT SERPL-MCNC: 6.6 G/DL (ref 6–8.2)
RBC # BLD AUTO: 4.51 M/UL (ref 4.2–5.4)
RBC BLD AUTO: PRESENT
SODIUM SERPL-SCNC: 136 MMOL/L (ref 135–145)
TRIGL SERPL-MCNC: 179 MG/DL (ref 0–149)
TSH SERPL DL<=0.005 MIU/L-ACNC: 0.45 UIU/ML (ref 0.38–5.33)
WBC # BLD AUTO: 6.7 K/UL (ref 4.8–10.8)

## 2019-02-26 PROCEDURE — 85025 COMPLETE CBC W/AUTO DIFF WBC: CPT

## 2019-02-26 PROCEDURE — 36415 COLL VENOUS BLD VENIPUNCTURE: CPT

## 2019-02-26 PROCEDURE — 80053 COMPREHEN METABOLIC PANEL: CPT

## 2019-02-26 PROCEDURE — 82947 ASSAY GLUCOSE BLOOD QUANT: CPT

## 2019-02-26 PROCEDURE — 80061 LIPID PANEL: CPT

## 2019-02-26 PROCEDURE — 85027 COMPLETE CBC AUTOMATED: CPT

## 2019-02-26 PROCEDURE — 84681 ASSAY OF C-PEPTIDE: CPT

## 2019-02-26 PROCEDURE — 84443 ASSAY THYROID STIM HORMONE: CPT

## 2019-02-26 PROCEDURE — 82306 VITAMIN D 25 HYDROXY: CPT

## 2019-02-27 LAB — HEMOCCULT STL QL IA: NEGATIVE

## 2019-02-28 NOTE — TELEPHONE ENCOUNTER
"From: Sarah Friedman  To: Zuleima Almeida M.D.  Sent: 2/26/2019 7:08 PM PST  Subject: Non-Urgent Medical Question    Hi Dr. Almeida! I'd like you to refer a cardiologist to me. I was told not to worry about a \"leaky aortic valve\" today. I think there may be more going on than my current (and VERY busy) cardi dr was able to say. My heart doesn't \"feel right\". Also, could you please re-prescribe/preauth my omeprazole for twice a day @40mg? I read your note, and will add 65mg of an iron supplement. Is that enough? Last time, I had to call the insurance comp. to get them to release this dosage. Maybe [facility] can finesse them? Thanks so much! Sarah"

## 2019-03-01 LAB — C PEPTIDE SERPL-MCNC: 4.6 NG/ML (ref 0.8–3.5)

## 2019-03-04 RX ORDER — OMEPRAZOLE 40 MG/1
40 CAPSULE, DELAYED RELEASE ORAL 2 TIMES DAILY
Qty: 60 CAP | Refills: 11 | Status: SHIPPED | OUTPATIENT
Start: 2019-03-04 | End: 2020-02-12 | Stop reason: SDUPTHER

## 2019-03-07 ENCOUNTER — TELEPHONE (OUTPATIENT)
Dept: MEDICAL GROUP | Facility: MEDICAL CENTER | Age: 49
End: 2019-03-07

## 2019-03-07 NOTE — TELEPHONE ENCOUNTER
FINAL PRIOR AUTHORIZATION STATUS:    1.  Name of Medication & Dose: Omeprazole     2. Prior Auth Status: Approved through 3/6/2020     3. Action Taken: Pharmacy Notified: yes Patient Notified: yes

## 2019-03-08 ENCOUNTER — OFFICE VISIT (OUTPATIENT)
Dept: MEDICAL GROUP | Facility: MEDICAL CENTER | Age: 49
End: 2019-03-08
Attending: INTERNAL MEDICINE
Payer: MEDICAID

## 2019-03-08 VITALS
TEMPERATURE: 97.9 F | HEART RATE: 84 BPM | DIASTOLIC BLOOD PRESSURE: 72 MMHG | WEIGHT: 251 LBS | OXYGEN SATURATION: 95 % | SYSTOLIC BLOOD PRESSURE: 112 MMHG | BODY MASS INDEX: 44.47 KG/M2 | RESPIRATION RATE: 16 BRPM | HEIGHT: 63 IN

## 2019-03-08 DIAGNOSIS — E66.01 MORBID OBESITY WITH BMI OF 40.0-44.9, ADULT (HCC): ICD-10-CM

## 2019-03-08 DIAGNOSIS — M25.562 CHRONIC PAIN OF LEFT KNEE: ICD-10-CM

## 2019-03-08 DIAGNOSIS — D50.9 IRON DEFICIENCY ANEMIA, UNSPECIFIED IRON DEFICIENCY ANEMIA TYPE: ICD-10-CM

## 2019-03-08 DIAGNOSIS — G89.29 CHRONIC PAIN OF LEFT KNEE: ICD-10-CM

## 2019-03-08 PROCEDURE — 99214 OFFICE O/P EST MOD 30 MIN: CPT | Performed by: INTERNAL MEDICINE

## 2019-03-08 PROCEDURE — 99213 OFFICE O/P EST LOW 20 MIN: CPT | Performed by: INTERNAL MEDICINE

## 2019-03-08 RX ORDER — METOPROLOL SUCCINATE 25 MG/1
TABLET, EXTENDED RELEASE ORAL EVERY EVENING
COMMUNITY
Start: 2019-02-26 | End: 2020-02-12 | Stop reason: SDUPTHER

## 2019-03-08 ASSESSMENT — PAIN SCALES - GENERAL: PAINLEVEL: 5=MODERATE PAIN

## 2019-03-08 NOTE — ASSESSMENT & PLAN NOTE
She continues to have significant left-sided knee pain.  Her x-ray did show some mild osteoarthritis.  However given her history of several falls, failure of PT and cortisone injection, it is reasonable to obtain an MRI at this point.

## 2019-03-08 NOTE — ASSESSMENT & PLAN NOTE
She continues to have persistent iron deficiency anemia despite being status post hysterectomy.  We recently started her on supplemental iron which she is taking twice daily over-the-counter.  She denies melena, hematochezia, hematemesis.  She has been on the omeprazole twice daily for many years.

## 2019-03-08 NOTE — ASSESSMENT & PLAN NOTE
Patient has many obesity related comorbidities including hypertension, diabetes, RADHA, GERD, and chronic low back pain.  She recently started thinking about getting a gastric bypass surgery.  She was speaking with her spine specialist yesterday who suggested this as well.  She is requesting a referral so that she can learn more information.

## 2019-03-08 NOTE — PROGRESS NOTES
Subjective:   Sarah Friedman is a 48 y.o. female here today for follow-up recent blood work, knee pain, referral to weight loss surgery    Morbid obesity with BMI of 40.0-44.9, adult (HCC)  Patient has many obesity related comorbidities including hypertension, diabetes, RADHA, GERD, and chronic low back pain.  She recently started thinking about getting a gastric bypass surgery.  She was speaking with her spine specialist yesterday who suggested this as well.  She is requesting a referral so that she can learn more information.    Iron deficiency anemia  She continues to have persistent iron deficiency anemia despite being status post hysterectomy.  We recently started her on supplemental iron which she is taking twice daily over-the-counter.  She denies melena, hematochezia, hematemesis.  She has been on the omeprazole twice daily for many years.    Chronic pain of left knee  She continues to have significant left-sided knee pain.  Her x-ray did show some mild osteoarthritis.  However given her history of several falls, failure of PT and cortisone injection, it is reasonable to obtain an MRI at this point.       Current medicines (including changes today)  Current Outpatient Prescriptions   Medication Sig Dispense Refill   • omeprazole (PRILOSEC) 40 MG delayed-release capsule Take 1 Cap by mouth 2 times a day. 60 Cap 11   • oxybutynin SR (DITROPAN-XL) 5 MG TABLET SR 24 HR Take 1 Tab by mouth every day. 30 Tab 3   • risperiDONE (RISPERDAL) 0.5 MG Tab      • metformin (GLUCOPHAGE) 1000 MG tablet TAKE ONE TABLET BY MOUTH TWICE DAILY WITH MEALS 60 Tab 5   • hydroCHLOROthiazide (HYDRODIURIL) 12.5 MG tablet TAKE 1 TABLET BY MOUTH EVERY OTHER DAY FOR LEG SWELLING 30 Tab 3   • VICTOZA 18 MG/3ML Solution Pen-injector injection INJECT 0.3 ML AS INSTRUCTED EVERY DAY 9 mL 3   • levothyroxine (SYNTHROID) 137 MCG Tab TAKE 1 TABLET BY MOUTH IN THE MORNING ON  AN  EMPTY  STOMACH 30 Tab 3   • traZODone (DESYREL) 150 MG Tab TAKE  1 TABLET BY MOUTH AT BEDTIME 90 Tab 2   • minoxidil (MINOXIDIL FOR WOMEN) 2 % external solution Apply 1mL to scalp topically twice daily 1 Bottle 11   • estradiol (ESTRACE) 1 MG Tab Take 1 Tab by mouth every day. 30 Tab 11   • gabapentin (NEURONTIN) 300 MG Cap TAKE THREE CAPSULES BY MOUTH AT BEDTIME 90 Cap 6   • Alcohol Swabs (ALCOHOL PREP) Pads Use one pad before giving victoza or insulin injection or before checking blood sugar 100 Each 11   • BD PEN NEEDLE JOSE J U/F 32G X 4 MM Misc Use to inject insulin nightly as directed 100 Each 5   • glucose blood (TRUE METRIX BLOOD GLUCOSE TEST) strip Use to test blood sugar three times a day and as needed for symptoms of high or low blood sugar 100 Strip 11   • ibuprofen (MOTRIN) 800 MG Tab Take 1 Tab by mouth every 8 hours as needed. 90 Tab 6   • cyclobenzaprine (FLEXERIL) 10 MG Tab TAKE ONE TABLET BY MOUTH THREE TIMES DAILY AS NEEDED 90 Tab 3   • Multiple Vitamins-Minerals (MULTIVITAMIN PO) Take 1 Tab by mouth every day.     • divalproex (DEPAKOTE) 250 MG Tablet Delayed Response Take 1 Tab by mouth 2 Times a Day.     • sumatriptan (IMITREX) 100 MG tablet Take 1/2 to 1 full tab daily as needed for migraine headache 10 Tab 3   • insulin glargine (BASAGLAR KWIKPEN) 100 UNIT/ML Solution Pen-injector injection Inject 52 Units as instructed every evening.     • spironolactone (ALDACTONE) 25 MG Tab Take 1 Tab by mouth every day. 30 Tab 3   • atorvastatin (LIPITOR) 20 MG Tab Take 1 Tab by mouth every day. 30 Tab    • ivabradine (CORLANOR) 5 MG Tab tablet Take 1 Tab by mouth 2 times a day, with meals. 60 Tab    • Black Cohosh 40 MG Cap Take 40 mg by mouth 2 Times a Day.     • Melatonin 10 MG Tab Take 20 mg by mouth every bedtime.       No current facility-administered medications for this visit.      She  has a past medical history of Blood clotting disorder ([facility]) (2005); Bowel habit changes; Depression; Diabetes ([facility]); Diabetic neuropathy ([facility]); Esophageal spasm; GERD  "(gastroesophageal reflux disease); Hashimoto's disease; Hiatal hernia; High cholesterol; Hyperlipidemia; Hypothyroid; Sleep apnea; Snoring; and Tachycardia.    ROS   Denies chest pain, shortness of breath  As above in HPI     Objective:     Blood pressure 112/72, pulse 84, temperature 36.6 °C (97.9 °F), temperature source Temporal, resp. rate 16, height 1.6 m (5' 2.99\"), weight 113.9 kg (251 lb), SpO2 95 %, not currently breastfeeding. Body mass index is 44.47 kg/m².   Physical Exam:  Constitutional: Alert, no distress.  Skin: Warm, dry, good turgor, no rashes in visible areas.  Eye: Equal, round and reactive, conjunctiva clear, lids normal.  Psych: Alert and oriented x3, normal affect and mood.      Results and Imaging:   Lab Results   Component Value Date/Time    WBC 6.7 02/26/2019 09:24 AM    RBC 4.51 02/26/2019 09:24 AM    HEMOGLOBIN 10.7 (L) 02/26/2019 09:24 AM    HEMATOCRIT 36.0 (L) 02/26/2019 09:24 AM    MCV 79.8 (L) 02/26/2019 09:24 AM    MCH 23.7 (L) 02/26/2019 09:24 AM    MCHC 29.7 (L) 02/26/2019 09:24 AM    MPV 8.4 (L) 02/26/2019 09:24 AM    NEUTSPOLYS See Note 02/26/2019 09:24 AM    LYMPHOCYTES RR 02/26/2019 09:24 AM    MONOCYTES RR 02/26/2019 09:24 AM    EOSINOPHILS RR 02/26/2019 09:24 AM    BASOPHILS RR 02/26/2019 09:24 AM    HYPOCHROMIA RR 02/26/2019 09:24 AM    ANISOCYTOSIS RR 02/26/2019 09:24 AM      Lab Results   Component Value Date/Time    CHOLSTRLTOT 131 02/26/2019 09:24 AM    LDL 65 02/26/2019 09:24 AM    HDL 30 (A) 02/26/2019 09:24 AM    TRIGLYCERIDE 179 (H) 02/26/2019 09:24 AM       Lab Results   Component Value Date/Time    SODIUM 136 02/26/2019 09:24 AM    POTASSIUM 4.3 02/26/2019 09:24 AM    CHLORIDE 101 02/26/2019 09:24 AM    CO2 28 02/26/2019 09:24 AM    GLUCOSE 159 (H) 02/26/2019 09:26 AM    BUN 15 02/26/2019 09:24 AM    CREATININE 0.83 02/26/2019 09:24 AM     Lab Results   Component Value Date/Time    ALKPHOSPHAT 33 02/26/2019 09:24 AM    ASTSGOT 23 02/26/2019 09:24 AM    ALTSGPT 12 " 02/26/2019 09:24 AM    TBILIRUBIN 0.2 02/26/2019 09:24 AM      Ref. Range 2/26/2019 09:24   25-Hydroxy   Vitamin D 25 Latest Ref Range: 30 - 100 ng/mL 37   TSH Latest Ref Range: 0.380 - 5.330 uIU/mL 0.450     X ray knee (2/25/19)  FINDINGS:  No acute fracture of the left knee.  Mild tricompartment degenerative changes.  No evidence knee joint effusion.   Impression       No acute fracture. Mild degenerative changes.         Assessment and Plan:   The following treatment plan was discussed    1. Morbid obesity with BMI of 40.0-44.9, adult (HCC)  I do believe she would benefit from a weight loss surgery given her numerous comorbidities.  I have placed that referral today.  - REFERRAL TO BARIATRIC SURGERY    2. Iron deficiency anemia, unspecified iron deficiency anemia type  Suspect secondary to either malabsorption due to chronic PPI use or undiagnosed GI bleed.  She did have a negative fecal occult blood tests.  She will follow up with GI for consideration of colonoscopy/EGD given her severe GERD to rule out GI source of bleed.  If GI workup is negative, likely malabsorptive process and we may be able to get her on iron infusions.  -Follow-up with GI  -Continue oral iron twice daily for now  -Consider iron infusion in future if no GI source is found and patient needs to remain on high-dose PPI    3. Chronic pain of left knee  Suspect either ligamentous or meniscal injury.  We will obtain an MRI for further evaluation given failure to respond to PT, cortisone injection, NSAIDs.  - MR-KNEE-W/O LEFT; Future        Followup: Return in about 3 months (around 6/8/2019), or if symptoms worsen or fail to improve.

## 2019-03-14 ENCOUNTER — PATIENT MESSAGE (OUTPATIENT)
Dept: MEDICAL GROUP | Facility: MEDICAL CENTER | Age: 49
End: 2019-03-14

## 2019-03-14 DIAGNOSIS — Z79.4 TYPE 2 DIABETES MELLITUS WITHOUT COMPLICATION, WITH LONG-TERM CURRENT USE OF INSULIN (HCC): ICD-10-CM

## 2019-03-14 DIAGNOSIS — E11.9 TYPE 2 DIABETES MELLITUS WITHOUT COMPLICATION, WITH LONG-TERM CURRENT USE OF INSULIN (HCC): ICD-10-CM

## 2019-03-18 ENCOUNTER — APPOINTMENT (OUTPATIENT)
Dept: RADIOLOGY | Facility: MEDICAL CENTER | Age: 49
End: 2019-03-18
Attending: INTERNAL MEDICINE
Payer: MEDICAID

## 2019-03-25 DIAGNOSIS — E11.42 DIABETIC POLYNEUROPATHY ASSOCIATED WITH TYPE 2 DIABETES MELLITUS (HCC): ICD-10-CM

## 2019-03-26 ENCOUNTER — HOSPITAL ENCOUNTER (OUTPATIENT)
Dept: RADIOLOGY | Facility: MEDICAL CENTER | Age: 49
End: 2019-03-26
Attending: INTERNAL MEDICINE
Payer: MEDICAID

## 2019-03-26 DIAGNOSIS — G89.29 CHRONIC PAIN OF LEFT KNEE: ICD-10-CM

## 2019-03-26 DIAGNOSIS — M25.562 CHRONIC PAIN OF LEFT KNEE: ICD-10-CM

## 2019-03-26 PROCEDURE — 73721 MRI JNT OF LWR EXTRE W/O DYE: CPT | Mod: LT

## 2019-03-26 RX ORDER — GABAPENTIN 300 MG/1
CAPSULE ORAL
Qty: 90 CAP | Refills: 6 | Status: SHIPPED | OUTPATIENT
Start: 2019-03-26 | End: 2019-10-28 | Stop reason: SDUPTHER

## 2019-03-29 ENCOUNTER — PATIENT MESSAGE (OUTPATIENT)
Dept: MEDICAL GROUP | Facility: MEDICAL CENTER | Age: 49
End: 2019-03-29

## 2019-03-29 DIAGNOSIS — M25.562 CHRONIC PAIN OF LEFT KNEE: ICD-10-CM

## 2019-03-29 DIAGNOSIS — G89.29 CHRONIC BILATERAL LOW BACK PAIN WITH LEFT-SIDED SCIATICA: ICD-10-CM

## 2019-03-29 DIAGNOSIS — G89.29 CHRONIC PAIN OF LEFT KNEE: ICD-10-CM

## 2019-03-29 DIAGNOSIS — M47.816 SPONDYLOSIS OF LUMBAR REGION WITHOUT MYELOPATHY OR RADICULOPATHY: ICD-10-CM

## 2019-03-29 DIAGNOSIS — M54.42 CHRONIC BILATERAL LOW BACK PAIN WITH LEFT-SIDED SCIATICA: ICD-10-CM

## 2019-03-29 DIAGNOSIS — M48.061 NEUROFORAMINAL STENOSIS OF LUMBAR SPINE: ICD-10-CM

## 2019-04-02 RX ORDER — TRAMADOL HYDROCHLORIDE 50 MG/1
100 TABLET ORAL EVERY 6 HOURS PRN
Qty: 42 TAB | Refills: 0 | Status: SHIPPED | OUTPATIENT
Start: 2019-04-02 | End: 2019-04-09

## 2019-04-02 RX ORDER — TRAMADOL HYDROCHLORIDE 50 MG/1
TABLET ORAL
Refills: 0 | OUTPATIENT
Start: 2019-04-02

## 2019-04-25 ENCOUNTER — OFFICE VISIT (OUTPATIENT)
Dept: CARDIOLOGY | Facility: MEDICAL CENTER | Age: 49
End: 2019-04-25
Payer: MEDICAID

## 2019-04-25 VITALS
SYSTOLIC BLOOD PRESSURE: 122 MMHG | HEIGHT: 63 IN | HEART RATE: 90 BPM | BODY MASS INDEX: 44.83 KG/M2 | DIASTOLIC BLOOD PRESSURE: 74 MMHG | OXYGEN SATURATION: 93 % | WEIGHT: 253 LBS

## 2019-04-25 DIAGNOSIS — R00.0 SINUS TACHYCARDIA: ICD-10-CM

## 2019-04-25 DIAGNOSIS — R60.0 LOCALIZED EDEMA: ICD-10-CM

## 2019-04-25 PROCEDURE — 99203 OFFICE O/P NEW LOW 30 MIN: CPT | Performed by: INTERNAL MEDICINE

## 2019-04-25 ASSESSMENT — ENCOUNTER SYMPTOMS
NEUROLOGICAL NEGATIVE: 1
HEARTBURN: 1
CONSTITUTIONAL NEGATIVE: 1
DEPRESSION: 1
RESPIRATORY NEGATIVE: 1

## 2019-04-25 NOTE — PROGRESS NOTES
"Chief Complaint   Patient presents with   • Edema       Subjective:   Sarah Friedman is a 48 y.o. female who is self-referred for evaluation of her cardiac status and echo.  She has a history of sinus tachycardia and has been on beta-blocker for this.  She was also recently started on Corlanor by Dr. JOSHUA Bell for treatment of her heart rate.  The patient has history of sleep apnea has not been \"compliant with her mask.  She has chronic mild edema secondary to history of DVT on the left.  Echo from [place] dated January, 28th 2019 demonstrated normal LV systolic function with borderline LVH.  There was mild aortic insufficiency.  The aortic root was mildly enlarged.  No evidence of pulmonary hypertension.  She had an outpatient Holter monitor that revealed only sinus tachycardia and sinus rhythm and there was no evidence of atrial or ventricular ectopy and no heart block.  She also had an overnight oximetry from 2016 that was mildly abnormal.  The patient has history of type 2 diabetes.  She takes atorvastatin 20 mg a day for hyperlipidemia.  There is no history of cigarette smoking    Past Medical History:   Diagnosis Date   • Blood clotting disorder (HCC) 2005    DVT   • Bowel habit changes     constipation   • Depression    • Diabetes (HCC)    • Diabetic neuropathy (HCC)    • Esophageal spasm    • GERD (gastroesophageal reflux disease)    • Hashimoto's disease    • Hiatal hernia    • High cholesterol    • Hyperlipidemia    • Hypothyroid    • Sleep apnea     uses cpap   • Snoring     sleep study done   • Tachycardia      Past Surgical History:   Procedure Laterality Date   • VAGINAL HYSTERECTOMY SCOPE TOTAL N/A 4/24/2018    Procedure: VAGINAL HYSTERECTOMY SCOPE TOTAL;  Surgeon: Francisco Javier Lainez M.D.;  Location: SURGERY SAME DAY [facility];  Service: Gynecology   • SALPINGO OOPHORECTOMY Bilateral 4/24/2018    Procedure: SALPINGO OOPHORECTOMY;  Surgeon: Francisco Javier Lainez M.D.;  Location: SURGERY SAME " DAY [facility];  Service: Gynecology   • ANTERIOR AND POSTERIOR REPAIR  4/24/2018    Procedure: ANTERIOR AND POSTERIOR REPAIR;  Surgeon: Francisco Javier Lainez M.D.;  Location: SURGERY SAME DAY [facility];  Service: Gynecology   • ENTEROCELE REPAIR  4/24/2018    Procedure: ENTEROCELE REPAIR- PERINEOPLASTY;  Surgeon: Francisco Javier Lainez M.D.;  Location: SURGERY SAME DAY [facility];  Service: Gynecology   • BLADDER SLING FEMALE  4/24/2018    Procedure: BLADDER SLING FEMALE- TOT;  Surgeon: Francisco Javier Lainez M.D.;  Location: SURGERY SAME DAY [facility];  Service: Gynecology   • VAGINAL SUSPENSION N/A 4/24/2018    Procedure: VAGINAL SUSPENSION- SACROSPINOUS VAULT;  Surgeon: Francisco Javier Lainez M.D.;  Location: SURGERY SAME DAY [facility];  Service: Gynecology   • CYSTOSCOPY N/A 4/24/2018    Procedure: CYSTOSCOPY;  Surgeon: Francisco Javier Lainez M.D.;  Location: SURGERY SAME DAY [facility];  Service: Gynecology   • ENDOSCOPY  2016   • COLONOSCOPY  2016   • OTHER  2016    esophageal dilation   • OTHER ORTHOPEDIC SURGERY  2012    bone removed from toe after fracture   • TONSILLECTOMY AND ADENOIDECTOMY  1977   • OTHER  1971, 1978, 1985    left facial cheek and right upper thight reconstructive surgery     Family History   Problem Relation Age of Onset   • Cancer Mother 70        pancreatic, breast in 30's   • Heart Disease Father    • Cancer Father         lung   • Diabetes Sister    • Diabetes Maternal Uncle    • Heart Disease Maternal Grandmother         MI   • Hyperlipidemia Maternal Grandmother    • Diabetes Maternal Grandfather    • Hyperlipidemia Maternal Grandfather    • Stroke Neg Hx      Social History     Social History   • Marital status: Single     Spouse name: N/A   • Number of children: N/A   • Years of education: N/A     Occupational History   • Not on file.     Social History Main Topics   • Smoking status: Never Smoker   • Smokeless tobacco: Never Used   • Alcohol use No   • Drug use: No      Comment: previous  "medical marijuana use for pain   • Sexual activity: Yes     Partners: Female     Other Topics Concern   • Not on file     Social History Narrative   • No narrative on file     Allergies   Allergen Reactions   • Betadine Prepstick Plus [Povidone-Iodine] Rash     Rash swelling   • Dilaudid [Hydromorphone Hcl] Itching     Itching \"head to toe\"   • Morphine Itching     Itching \"head to toe\"   • Tape Itching     Itching \"leave scars\"     Outpatient Encounter Prescriptions as of 4/25/2019   Medication Sig Dispense Refill   • gabapentin (NEURONTIN) 300 MG Cap TAKE 3 CAPSULES BY MOUTH AT BEDTIME 90 Cap 6   • metoprolol SR (TOPROL XL) 25 MG TABLET SR 24 HR      • omeprazole (PRILOSEC) 40 MG delayed-release capsule Take 1 Cap by mouth 2 times a day. 60 Cap 11   • oxybutynin SR (DITROPAN-XL) 5 MG TABLET SR 24 HR Take 1 Tab by mouth every day. 30 Tab 3   • risperiDONE (RISPERDAL) 0.5 MG Tab      • metformin (GLUCOPHAGE) 1000 MG tablet TAKE ONE TABLET BY MOUTH TWICE DAILY WITH MEALS 60 Tab 5   • hydroCHLOROthiazide (HYDRODIURIL) 12.5 MG tablet TAKE 1 TABLET BY MOUTH EVERY OTHER DAY FOR LEG SWELLING 30 Tab 3   • VICTOZA 18 MG/3ML Solution Pen-injector injection INJECT 0.3 ML AS INSTRUCTED EVERY DAY 9 mL 3   • levothyroxine (SYNTHROID) 137 MCG Tab TAKE 1 TABLET BY MOUTH IN THE MORNING ON  AN  EMPTY  STOMACH 30 Tab 3   • traZODone (DESYREL) 150 MG Tab TAKE 1 TABLET BY MOUTH AT BEDTIME 90 Tab 2   • estradiol (ESTRACE) 1 MG Tab Take 1 Tab by mouth every day. 30 Tab 11   • Alcohol Swabs (ALCOHOL PREP) Pads Use one pad before giving victoza or insulin injection or before checking blood sugar 100 Each 11   • BD PEN NEEDLE JOSE J U/F 32G X 4 MM Misc Use to inject insulin nightly as directed 100 Each 5   • glucose blood (TRUE METRIX BLOOD GLUCOSE TEST) strip Use to test blood sugar three times a day and as needed for symptoms of high or low blood sugar 100 Strip 11   • ibuprofen (MOTRIN) 800 MG Tab Take 1 Tab by mouth every 8 hours as " "needed. 90 Tab 6   • Multiple Vitamins-Minerals (MULTIVITAMIN PO) Take 1 Tab by mouth every day.     • divalproex (DEPAKOTE) 250 MG Tablet Delayed Response Take 1 Tab by mouth 2 Times a Day.     • insulin glargine (BASAGLAR KWIKPEN) 100 UNIT/ML Solution Pen-injector injection Inject 52 Units as instructed every evening.     • spironolactone (ALDACTONE) 25 MG Tab Take 1 Tab by mouth every day. 30 Tab 3   • atorvastatin (LIPITOR) 20 MG Tab Take 1 Tab by mouth every day. 30 Tab    • ivabradine (CORLANOR) 5 MG Tab tablet Take 1 Tab by mouth 2 times a day, with meals. 60 Tab    • Black Cohosh 40 MG Cap Take 40 mg by mouth 2 Times a Day.     • Melatonin 10 MG Tab Take 20 mg by mouth every bedtime.     • minoxidil (MINOXIDIL FOR WOMEN) 2 % external solution Apply 1mL to scalp topically twice daily (Patient not taking: Reported on 4/25/2019) 1 Bottle 11   • cyclobenzaprine (FLEXERIL) 10 MG Tab TAKE ONE TABLET BY MOUTH THREE TIMES DAILY AS NEEDED 90 Tab 3   • sumatriptan (IMITREX) 100 MG tablet Take 1/2 to 1 full tab daily as needed for migraine headache 10 Tab 3     No facility-administered encounter medications on file as of 4/25/2019.      Review of Systems   Constitutional: Negative.    HENT: Negative.    Respiratory: Negative.    Cardiovascular: Positive for leg swelling.   Gastrointestinal: Positive for heartburn.   Musculoskeletal: Positive for joint pain.   Skin: Negative.    Neurological: Negative.    Endo/Heme/Allergies: Negative.    Psychiatric/Behavioral: Positive for depression.        Objective:   /74 (BP Location: Left arm, Patient Position: Sitting, BP Cuff Size: Adult)   Pulse 90   Ht 1.6 m (5' 3\")   Wt 114.8 kg (253 lb)   SpO2 93%   BMI 44.82 kg/m²     Physical Exam   Constitutional: She is oriented to person, place, and time. No distress.   HENT:   Head: Normocephalic and atraumatic.   Eyes: Pupils are equal, round, and reactive to light. No scleral icterus.   Neck: No JVD present. "   Cardiovascular: Normal rate and regular rhythm.    No murmur heard.  Pulmonary/Chest: No respiratory distress. She has no wheezes.   Abdominal: Soft. She exhibits no distension. There is no tenderness.   Musculoskeletal:   Trace edema on the left   Neurological: She is alert and oriented to person, place, and time.   Skin: Skin is warm.   Psychiatric: She has a normal mood and affect.       Assessment:     1. Sinus tachycardia     2. Localized edema         Medical Decision Making:  Today's Assessment / Status / Plan:   Sinus tachycardia: The patient's echo was reviewed and shows no evidence of LV dysfunction or pulmonary hypertension.  Her outpatient Holter monitor from [place] was also obtained and reviewed reviewed.  This demonstrated sinus rhythm with sinus tachycardia and no atrial or ventricular ectopy.  There is no evidence of ectopic atrial sinus tachycardia or inappropriate sinus tachycardia during sleep.  She has periods of normal sinus rhythm.  Etiology of her sinus tachycardia may be secondary to her untreated sleep apnea, obesity and deconditioning, or other etiology.  I do not believe that this is been treated with a beta-blocker.  I recommended that she stop her Corlanor as she has normal LV function and this drug should be reserved for patients with severe and end-stage LV dysfunction.    Localized edema.  She has mild swelling of her left foot which is multifactorial secondary to previous DVT, obesity, lack of physical activity, and untreated sleep apnea.    Aortic insufficiency: Mild by recent echo there is also mild dilatation aortic root.  Recommend repeat echo in 2 years.    Diabetes mellitus: Patient has type 2 diabetes mellitus.  She should be on an ACE inhibitor and ARB for renal protection per AHA and ADA guidelines.  She will see her primary care physician in the near future and was reminded to discuss this.    Closely recommend tapering her off of beta-blockers altogether.  Recommend  echo in 2 years.

## 2019-04-25 NOTE — LETTER
"     [facility]   [address]   [address]  [place] [address]  Phone: [phone]  Fax: [phone]              Sarah Friedman  1970    Encounter Date: 4/25/2019    Jonathan Ewing M.D.          PROGRESS NOTE:  Chief Complaint   Patient presents with   • Edema       Subjective:   Sarah Friedman is a 48 y.o. female who is self-referred for evaluation of her cardiac status and echo.  She has a history of sinus tachycardia and has been on beta-blocker for this.  She was also recently started on Corlanor by Dr. JOSHUA Bell for treatment of her heart rate.  The patient has history of sleep apnea has not been \"compliant with her mask.  She has chronic mild edema secondary to history of DVT on the left.  Echo from [place] dated January, 28th 2019 demonstrated normal LV systolic function with borderline LVH.  There was mild aortic insufficiency.  The aortic root was mildly enlarged.  No evidence of pulmonary hypertension.  She had an outpatient Holter monitor that revealed only sinus tachycardia and sinus rhythm and there was no evidence of atrial or ventricular ectopy and no heart block.  She also had an overnight oximetry from 2016 that was mildly abnormal.  The patient has history of type 2 diabetes.  She takes atorvastatin 20 mg a day for hyperlipidemia.  There is no history of cigarette smoking    Past Medical History:   Diagnosis Date   • Blood clotting disorder (HCC) 2005    DVT   • Bowel habit changes     constipation   • Depression    • Diabetes (HCC)    • Diabetic neuropathy ([facility])    • Esophageal spasm    • GERD (gastroesophageal reflux disease)    • Hashimoto's disease    • Hiatal hernia    • High cholesterol    • Hyperlipidemia    • Hypothyroid    • Sleep apnea     uses cpap   • Snoring     sleep study done   • Tachycardia      Past Surgical History:   Procedure Laterality Date   • VAGINAL HYSTERECTOMY SCOPE TOTAL N/A 4/24/2018 "    Procedure: VAGINAL HYSTERECTOMY SCOPE TOTAL;  Surgeon: Francisco Javier Lainez M.D.;  Location: SURGERY SAME DAY [facility];  Service: Gynecology   • SALPINGO OOPHORECTOMY Bilateral 4/24/2018    Procedure: SALPINGO OOPHORECTOMY;  Surgeon: Francisco Javier Lainez M.D.;  Location: SURGERY SAME DAY [facility] ORS;  Service: Gynecology   • ANTERIOR AND POSTERIOR REPAIR  4/24/2018    Procedure: ANTERIOR AND POSTERIOR REPAIR;  Surgeon: Francisco Javier Lainez M.D.;  Location: SURGERY SAME DAY [facility] ORS;  Service: Gynecology   • ENTEROCELE REPAIR  4/24/2018    Procedure: ENTEROCELE REPAIR- PERINEOPLASTY;  Surgeon: Francisco Javier Lainez M.D.;  Location: SURGERY SAME DAY [facility] ORS;  Service: Gynecology   • BLADDER SLING FEMALE  4/24/2018    Procedure: BLADDER SLING FEMALE- TOT;  Surgeon: Francisco Javier Lainez M.D.;  Location: SURGERY SAME DAY [facility];  Service: Gynecology   • VAGINAL SUSPENSION N/A 4/24/2018    Procedure: VAGINAL SUSPENSION- SACROSPINOUS VAULT;  Surgeon: Francisco Javier Lainez M.D.;  Location: SURGERY SAME DAY [facility];  Service: Gynecology   • CYSTOSCOPY N/A 4/24/2018    Procedure: CYSTOSCOPY;  Surgeon: Francisco Javier Lainez M.D.;  Location: SURGERY SAME DAY [facility];  Service: Gynecology   • ENDOSCOPY  2016   • COLONOSCOPY  2016   • OTHER  2016    esophageal dilation   • OTHER ORTHOPEDIC SURGERY  2012    bone removed from toe after fracture   • TONSILLECTOMY AND ADENOIDECTOMY  1977   • OTHER  1971, 1978, 1985    left facial cheek and right upper thight reconstructive surgery     Family History   Problem Relation Age of Onset   • Cancer Mother 70        pancreatic, breast in 30's   • Heart Disease Father    • Cancer Father         lung   • Diabetes Sister    • Diabetes Maternal Uncle    • Heart Disease Maternal Grandmother         MI   • Hyperlipidemia Maternal Grandmother    • Diabetes Maternal Grandfather    • Hyperlipidemia Maternal Grandfather    • Stroke Neg Hx      Social History     Social History   • Marital status:  "Single     Spouse name: N/A   • Number of children: N/A   • Years of education: N/A     Occupational History   • Not on file.     Social History Main Topics   • Smoking status: Never Smoker   • Smokeless tobacco: Never Used   • Alcohol use No   • Drug use: No      Comment: previous medical marijuana use for pain   • Sexual activity: Yes     Partners: Female     Other Topics Concern   • Not on file     Social History Narrative   • No narrative on file     Allergies   Allergen Reactions   • Betadine Prepstick Plus [Povidone-Iodine] Rash     Rash swelling   • Dilaudid [Hydromorphone Hcl] Itching     Itching \"head to toe\"   • Morphine Itching     Itching \"head to toe\"   • Tape Itching     Itching \"leave scars\"     Outpatient Encounter Prescriptions as of 4/25/2019   Medication Sig Dispense Refill   • gabapentin (NEURONTIN) 300 MG Cap TAKE 3 CAPSULES BY MOUTH AT BEDTIME 90 Cap 6   • metoprolol SR (TOPROL XL) 25 MG TABLET SR 24 HR      • omeprazole (PRILOSEC) 40 MG delayed-release capsule Take 1 Cap by mouth 2 times a day. 60 Cap 11   • oxybutynin SR (DITROPAN-XL) 5 MG TABLET SR 24 HR Take 1 Tab by mouth every day. 30 Tab 3   • risperiDONE (RISPERDAL) 0.5 MG Tab      • metformin (GLUCOPHAGE) 1000 MG tablet TAKE ONE TABLET BY MOUTH TWICE DAILY WITH MEALS 60 Tab 5   • hydroCHLOROthiazide (HYDRODIURIL) 12.5 MG tablet TAKE 1 TABLET BY MOUTH EVERY OTHER DAY FOR LEG SWELLING 30 Tab 3   • VICTOZA 18 MG/3ML Solution Pen-injector injection INJECT 0.3 ML AS INSTRUCTED EVERY DAY 9 mL 3   • levothyroxine (SYNTHROID) 137 MCG Tab TAKE 1 TABLET BY MOUTH IN THE MORNING ON  AN  EMPTY  STOMACH 30 Tab 3   • traZODone (DESYREL) 150 MG Tab TAKE 1 TABLET BY MOUTH AT BEDTIME 90 Tab 2   • estradiol (ESTRACE) 1 MG Tab Take 1 Tab by mouth every day. 30 Tab 11   • Alcohol Swabs (ALCOHOL PREP) Pads Use one pad before giving victoza or insulin injection or before checking blood sugar 100 Each 11   • BD PEN NEEDLE JOSE J U/F 32G X 4 MM Misc Use to " "inject insulin nightly as directed 100 Each 5   • glucose blood (TRUE METRIX BLOOD GLUCOSE TEST) strip Use to test blood sugar three times a day and as needed for symptoms of high or low blood sugar 100 Strip 11   • ibuprofen (MOTRIN) 800 MG Tab Take 1 Tab by mouth every 8 hours as needed. 90 Tab 6   • Multiple Vitamins-Minerals (MULTIVITAMIN PO) Take 1 Tab by mouth every day.     • divalproex (DEPAKOTE) 250 MG Tablet Delayed Response Take 1 Tab by mouth 2 Times a Day.     • insulin glargine (BASAGLAR KWIKPEN) 100 UNIT/ML Solution Pen-injector injection Inject 52 Units as instructed every evening.     • spironolactone (ALDACTONE) 25 MG Tab Take 1 Tab by mouth every day. 30 Tab 3   • atorvastatin (LIPITOR) 20 MG Tab Take 1 Tab by mouth every day. 30 Tab    • ivabradine (CORLANOR) 5 MG Tab tablet Take 1 Tab by mouth 2 times a day, with meals. 60 Tab    • Black Cohosh 40 MG Cap Take 40 mg by mouth 2 Times a Day.     • Melatonin 10 MG Tab Take 20 mg by mouth every bedtime.     • minoxidil (MINOXIDIL FOR WOMEN) 2 % external solution Apply 1mL to scalp topically twice daily (Patient not taking: Reported on 4/25/2019) 1 Bottle 11   • cyclobenzaprine (FLEXERIL) 10 MG Tab TAKE ONE TABLET BY MOUTH THREE TIMES DAILY AS NEEDED 90 Tab 3   • sumatriptan (IMITREX) 100 MG tablet Take 1/2 to 1 full tab daily as needed for migraine headache 10 Tab 3     No facility-administered encounter medications on file as of 4/25/2019.      Review of Systems   Constitutional: Negative.    HENT: Negative.    Respiratory: Negative.    Cardiovascular: Positive for leg swelling.   Gastrointestinal: Positive for heartburn.   Musculoskeletal: Positive for joint pain.   Skin: Negative.    Neurological: Negative.    Endo/Heme/Allergies: Negative.    Psychiatric/Behavioral: Positive for depression.        Objective:   /74 (BP Location: Left arm, Patient Position: Sitting, BP Cuff Size: Adult)   Pulse 90   Ht 1.6 m (5' 3\")   Wt 114.8 kg (253 lb)  "  SpO2 93%   BMI 44.82 kg/m²      Physical Exam   Constitutional: She is oriented to person, place, and time. No distress.   HENT:   Head: Normocephalic and atraumatic.   Eyes: Pupils are equal, round, and reactive to light. No scleral icterus.   Neck: No JVD present.   Cardiovascular: Normal rate and regular rhythm.    No murmur heard.  Pulmonary/Chest: No respiratory distress. She has no wheezes.   Abdominal: Soft. She exhibits no distension. There is no tenderness.   Musculoskeletal:   Trace edema on the left   Neurological: She is alert and oriented to person, place, and time.   Skin: Skin is warm.   Psychiatric: She has a normal mood and affect.       Assessment:     1. Sinus tachycardia     2. Localized edema         Medical Decision Making:  Today's Assessment / Status / Plan:   Sinus tachycardia: The patient's echo was reviewed and shows no evidence of LV dysfunction or pulmonary hypertension.  Her outpatient Holter monitor from [place] was also obtained and reviewed reviewed.  This demonstrated sinus rhythm with sinus tachycardia and no atrial or ventricular ectopy.  There is no evidence of ectopic atrial sinus tachycardia or inappropriate sinus tachycardia during sleep.  She has periods of normal sinus rhythm.  Etiology of her sinus tachycardia may be secondary to her untreated sleep apnea, obesity and deconditioning, or other etiology.  I do not believe that this is been treated with a beta-blocker.  I recommended that she stop her Corlanor as she has normal LV function and this drug should be reserved for patients with severe and end-stage LV dysfunction.    Localized edema.  She has mild swelling of her left foot which is multifactorial secondary to previous DVT, obesity, lack of physical activity, and untreated sleep apnea.    Aortic insufficiency: Mild by recent echo there is also mild dilatation aortic root.  Recommend repeat echo in 2 years.    Diabetes mellitus: Patient has type 2 diabetes  mellitus.  She should be on an ACE inhibitor and ARB for renal protection per AHA and ADA guidelines.  She will see her primary care physician in the near future and was reminded to discuss this.    Closely recommend tapering her off of beta-blockers altogether.  Recommend echo in 2 years.      Zuleima Almeida M.D.  [address] [place] [id number]  VIA In Basket

## 2019-05-08 DIAGNOSIS — G43.009 MIGRAINE WITHOUT AURA AND WITHOUT STATUS MIGRAINOSUS, NOT INTRACTABLE: ICD-10-CM

## 2019-05-08 RX ORDER — SUMATRIPTAN 100 MG/1
TABLET, FILM COATED ORAL
Qty: 9 TAB | Refills: 5 | Status: SHIPPED | OUTPATIENT
Start: 2019-05-08 | End: 2020-02-12 | Stop reason: SDUPTHER

## 2019-05-14 ENCOUNTER — TELEPHONE (OUTPATIENT)
Dept: MEDICAL GROUP | Facility: MEDICAL CENTER | Age: 49
End: 2019-05-14

## 2019-05-14 DIAGNOSIS — M54.42 CHRONIC BILATERAL LOW BACK PAIN WITH LEFT-SIDED SCIATICA: ICD-10-CM

## 2019-05-14 DIAGNOSIS — G89.29 CHRONIC BILATERAL LOW BACK PAIN WITH LEFT-SIDED SCIATICA: ICD-10-CM

## 2019-05-14 RX ORDER — TRAMADOL HYDROCHLORIDE 50 MG/1
TABLET ORAL
Qty: 42 TAB | Refills: 0 | Status: SHIPPED | OUTPATIENT
Start: 2019-05-14 | End: 2019-05-21

## 2019-05-20 DIAGNOSIS — Z79.4 TYPE 2 DIABETES MELLITUS WITHOUT COMPLICATION, WITH LONG-TERM CURRENT USE OF INSULIN (HCC): ICD-10-CM

## 2019-05-20 DIAGNOSIS — E11.9 TYPE 2 DIABETES MELLITUS WITHOUT COMPLICATION, WITH LONG-TERM CURRENT USE OF INSULIN (HCC): ICD-10-CM

## 2019-05-21 RX ORDER — PEN NEEDLE, DIABETIC 32GX 5/32"
NEEDLE, DISPOSABLE MISCELLANEOUS
Qty: 100 EACH | Refills: 5 | Status: SHIPPED | OUTPATIENT
Start: 2019-05-21 | End: 2019-08-27

## 2019-05-24 DIAGNOSIS — Z79.4 TYPE 2 DIABETES MELLITUS WITHOUT COMPLICATION, WITH LONG-TERM CURRENT USE OF INSULIN (HCC): ICD-10-CM

## 2019-05-24 DIAGNOSIS — E11.9 TYPE 2 DIABETES MELLITUS WITHOUT COMPLICATION, WITH LONG-TERM CURRENT USE OF INSULIN (HCC): ICD-10-CM

## 2019-05-24 RX ORDER — LIRAGLUTIDE 6 MG/ML
1.8 INJECTION SUBCUTANEOUS DAILY
Qty: 9 ML | Refills: 5 | Status: SHIPPED | OUTPATIENT
Start: 2019-05-24 | End: 2019-11-18 | Stop reason: SDUPTHER

## 2019-05-28 DIAGNOSIS — E06.3 HYPOTHYROIDISM DUE TO HASHIMOTO'S THYROIDITIS: ICD-10-CM

## 2019-05-28 DIAGNOSIS — E03.8 HYPOTHYROIDISM DUE TO HASHIMOTO'S THYROIDITIS: ICD-10-CM

## 2019-05-28 RX ORDER — LEVOTHYROXINE SODIUM 137 UG/1
TABLET ORAL
Qty: 30 TAB | Refills: 5 | Status: SHIPPED | OUTPATIENT
Start: 2019-05-28 | End: 2019-10-02

## 2019-06-17 DIAGNOSIS — N39.41 URGE INCONTINENCE: ICD-10-CM

## 2019-06-17 DIAGNOSIS — F51.01 PRIMARY INSOMNIA: ICD-10-CM

## 2019-06-18 RX ORDER — TRAZODONE HYDROCHLORIDE 150 MG/1
TABLET ORAL
Qty: 90 TAB | Refills: 3 | Status: SHIPPED | OUTPATIENT
Start: 2019-06-18 | End: 2020-02-12 | Stop reason: SDUPTHER

## 2019-06-18 RX ORDER — OXYBUTYNIN CHLORIDE 5 MG/1
TABLET, EXTENDED RELEASE ORAL
Qty: 30 TAB | Refills: 6 | Status: SHIPPED | OUTPATIENT
Start: 2019-06-18 | End: 2020-02-12 | Stop reason: SDUPTHER

## 2019-06-25 ENCOUNTER — PATIENT MESSAGE (OUTPATIENT)
Dept: MEDICAL GROUP | Facility: MEDICAL CENTER | Age: 49
End: 2019-06-25

## 2019-06-25 DIAGNOSIS — Z79.4 TYPE 2 DIABETES MELLITUS WITHOUT COMPLICATION, WITH LONG-TERM CURRENT USE OF INSULIN (HCC): ICD-10-CM

## 2019-06-25 DIAGNOSIS — E11.9 TYPE 2 DIABETES MELLITUS WITHOUT COMPLICATION, WITH LONG-TERM CURRENT USE OF INSULIN (HCC): ICD-10-CM

## 2019-07-05 DIAGNOSIS — E11.9 TYPE 2 DIABETES MELLITUS WITHOUT COMPLICATION, WITH LONG-TERM CURRENT USE OF INSULIN (HCC): ICD-10-CM

## 2019-07-05 DIAGNOSIS — Z79.4 TYPE 2 DIABETES MELLITUS WITHOUT COMPLICATION, WITH LONG-TERM CURRENT USE OF INSULIN (HCC): ICD-10-CM

## 2019-07-08 RX ORDER — IBUPROFEN 800 MG/1
TABLET ORAL
Qty: 90 TAB | Refills: 6 | Status: SHIPPED | OUTPATIENT
Start: 2019-07-08 | End: 2020-02-12 | Stop reason: SDUPTHER

## 2019-07-30 DIAGNOSIS — Z79.4 TYPE 2 DIABETES MELLITUS WITHOUT COMPLICATION, WITH LONG-TERM CURRENT USE OF INSULIN (HCC): ICD-10-CM

## 2019-07-30 DIAGNOSIS — E11.9 TYPE 2 DIABETES MELLITUS WITHOUT COMPLICATION, WITH LONG-TERM CURRENT USE OF INSULIN (HCC): ICD-10-CM

## 2019-08-27 DIAGNOSIS — Z01.810 PRE-OPERATIVE CARDIOVASCULAR EXAMINATION: ICD-10-CM

## 2019-08-27 DIAGNOSIS — Z01.812 PRE-OPERATIVE LABORATORY EXAMINATION: ICD-10-CM

## 2019-08-27 LAB
ANION GAP SERPL CALC-SCNC: 18 MMOL/L (ref 0–11.9)
BUN SERPL-MCNC: 18 MG/DL (ref 8–22)
CALCIUM SERPL-MCNC: 10.1 MG/DL (ref 8.5–10.5)
CHLORIDE SERPL-SCNC: 100 MMOL/L (ref 96–112)
CO2 SERPL-SCNC: 24 MMOL/L (ref 20–33)
CREAT SERPL-MCNC: 0.92 MG/DL (ref 0.5–1.4)
EKG IMPRESSION: NORMAL
ERYTHROCYTE [DISTWIDTH] IN BLOOD BY AUTOMATED COUNT: 49.2 FL (ref 35.9–50)
GLUCOSE SERPL-MCNC: 98 MG/DL (ref 65–99)
HCT VFR BLD AUTO: 41.7 % (ref 37–47)
HGB BLD-MCNC: 13.5 G/DL (ref 12–16)
MCH RBC QN AUTO: 29.8 PG (ref 27–33)
MCHC RBC AUTO-ENTMCNC: 32.4 G/DL (ref 33.6–35)
MCV RBC AUTO: 92.1 FL (ref 81.4–97.8)
PLATELET # BLD AUTO: 250 K/UL (ref 164–446)
PMV BLD AUTO: 8.8 FL (ref 9–12.9)
POTASSIUM SERPL-SCNC: 4.3 MMOL/L (ref 3.6–5.5)
RBC # BLD AUTO: 4.53 M/UL (ref 4.2–5.4)
SODIUM SERPL-SCNC: 142 MMOL/L (ref 135–145)
WBC # BLD AUTO: 9.3 K/UL (ref 4.8–10.8)

## 2019-08-27 PROCEDURE — 93005 ELECTROCARDIOGRAM TRACING: CPT | Performed by: INTERNAL MEDICINE

## 2019-08-27 PROCEDURE — 80048 BASIC METABOLIC PNL TOTAL CA: CPT

## 2019-08-27 PROCEDURE — 85027 COMPLETE CBC AUTOMATED: CPT

## 2019-08-27 PROCEDURE — 93010 ELECTROCARDIOGRAM REPORT: CPT | Performed by: INTERNAL MEDICINE

## 2019-08-27 PROCEDURE — 36415 COLL VENOUS BLD VENIPUNCTURE: CPT

## 2019-08-27 RX ORDER — CETIRIZINE HYDROCHLORIDE 10 MG/1
10 TABLET ORAL DAILY
COMMUNITY
End: 2020-09-17

## 2019-08-27 RX ORDER — ACETAMINOPHEN 500 MG
500-1000 TABLET ORAL EVERY 6 HOURS PRN
COMMUNITY

## 2019-08-30 ENCOUNTER — ANESTHESIA (OUTPATIENT)
Dept: SURGERY | Facility: MEDICAL CENTER | Age: 49
End: 2019-08-30
Payer: MEDICAID

## 2019-08-30 ENCOUNTER — HOSPITAL ENCOUNTER (OUTPATIENT)
Facility: MEDICAL CENTER | Age: 49
End: 2019-08-30
Attending: INTERNAL MEDICINE | Admitting: INTERNAL MEDICINE
Payer: MEDICAID

## 2019-08-30 ENCOUNTER — ANESTHESIA EVENT (OUTPATIENT)
Dept: SURGERY | Facility: MEDICAL CENTER | Age: 49
End: 2019-08-30
Payer: MEDICAID

## 2019-08-30 VITALS
OXYGEN SATURATION: 96 % | WEIGHT: 257.72 LBS | HEIGHT: 63 IN | SYSTOLIC BLOOD PRESSURE: 120 MMHG | HEART RATE: 68 BPM | DIASTOLIC BLOOD PRESSURE: 65 MMHG | TEMPERATURE: 97.2 F | BODY MASS INDEX: 45.66 KG/M2 | RESPIRATION RATE: 16 BRPM

## 2019-08-30 PROBLEM — K29.70 GASTRITIS: Status: ACTIVE | Noted: 2019-08-30

## 2019-08-30 LAB
GLUCOSE BLD-MCNC: 93 MG/DL (ref 65–99)
PATHOLOGY CONSULT NOTE: NORMAL

## 2019-08-30 PROCEDURE — 500066 HCHG BITE BLOCK, ECT: Performed by: INTERNAL MEDICINE

## 2019-08-30 PROCEDURE — A4338 INDWELLING CATHETER LATEX: HCPCS | Performed by: INTERNAL MEDICINE

## 2019-08-30 PROCEDURE — 501838 HCHG SUTURE GENERAL: Performed by: INTERNAL MEDICINE

## 2019-08-30 PROCEDURE — 160002 HCHG RECOVERY MINUTES (STAT): Performed by: INTERNAL MEDICINE

## 2019-08-30 PROCEDURE — 700111 HCHG RX REV CODE 636 W/ 250 OVERRIDE (IP): Performed by: ANESTHESIOLOGY

## 2019-08-30 PROCEDURE — 160025 RECOVERY II MINUTES (STATS): Performed by: INTERNAL MEDICINE

## 2019-08-30 PROCEDURE — 160048 HCHG OR STATISTICAL LEVEL 1-5: Performed by: INTERNAL MEDICINE

## 2019-08-30 PROCEDURE — 160036 HCHG PACU - EA ADDL 30 MINS PHASE I: Performed by: INTERNAL MEDICINE

## 2019-08-30 PROCEDURE — 700101 HCHG RX REV CODE 250: Performed by: ANESTHESIOLOGY

## 2019-08-30 PROCEDURE — 160046 HCHG PACU - 1ST 60 MINS PHASE II: Performed by: INTERNAL MEDICINE

## 2019-08-30 PROCEDURE — 82962 GLUCOSE BLOOD TEST: CPT | Mod: 91

## 2019-08-30 PROCEDURE — 700105 HCHG RX REV CODE 258: Performed by: ANESTHESIOLOGY

## 2019-08-30 PROCEDURE — 88305 TISSUE EXAM BY PATHOLOGIST: CPT

## 2019-08-30 PROCEDURE — 700105 HCHG RX REV CODE 258: Performed by: INTERNAL MEDICINE

## 2019-08-30 PROCEDURE — 88312 SPECIAL STAINS GROUP 1: CPT

## 2019-08-30 PROCEDURE — A9270 NON-COVERED ITEM OR SERVICE: HCPCS | Performed by: ANESTHESIOLOGY

## 2019-08-30 PROCEDURE — 160204 HCHG ENDO MINUTES - 1ST 30 MINS LEVEL 5: Performed by: INTERNAL MEDICINE

## 2019-08-30 PROCEDURE — 700102 HCHG RX REV CODE 250 W/ 637 OVERRIDE(OP): Performed by: ANESTHESIOLOGY

## 2019-08-30 PROCEDURE — 160009 HCHG ANES TIME/MIN: Performed by: INTERNAL MEDICINE

## 2019-08-30 PROCEDURE — 160035 HCHG PACU - 1ST 60 MINS PHASE I: Performed by: INTERNAL MEDICINE

## 2019-08-30 PROCEDURE — 160209 HCHG ENDO MINUTES - EA ADDL 1 MIN LEVEL 5: Performed by: INTERNAL MEDICINE

## 2019-08-30 RX ORDER — DEXTROSE MONOHYDRATE 100 MG/ML
INJECTION, SOLUTION INTRAVENOUS
Status: COMPLETED
Start: 2019-08-30 | End: 2019-08-30

## 2019-08-30 RX ORDER — DEXAMETHASONE SODIUM PHOSPHATE 4 MG/ML
INJECTION, SOLUTION INTRA-ARTICULAR; INTRALESIONAL; INTRAMUSCULAR; INTRAVENOUS; SOFT TISSUE PRN
Status: DISCONTINUED | OUTPATIENT
Start: 2019-08-30 | End: 2019-08-30 | Stop reason: SURG

## 2019-08-30 RX ORDER — ONDANSETRON 2 MG/ML
INJECTION INTRAMUSCULAR; INTRAVENOUS PRN
Status: DISCONTINUED | OUTPATIENT
Start: 2019-08-30 | End: 2019-08-30 | Stop reason: SURG

## 2019-08-30 RX ORDER — ACETAMINOPHEN 500 MG
1000 TABLET ORAL ONCE
Status: COMPLETED | OUTPATIENT
Start: 2019-08-30 | End: 2019-08-30

## 2019-08-30 RX ORDER — SODIUM CHLORIDE, SODIUM LACTATE, POTASSIUM CHLORIDE, CALCIUM CHLORIDE 600; 310; 30; 20 MG/100ML; MG/100ML; MG/100ML; MG/100ML
INJECTION, SOLUTION INTRAVENOUS CONTINUOUS
Status: DISCONTINUED | OUTPATIENT
Start: 2019-08-30 | End: 2019-08-30 | Stop reason: HOSPADM

## 2019-08-30 RX ORDER — DEXTROSE MONOHYDRATE 100 MG/ML
INJECTION, SOLUTION INTRAVENOUS
Status: DISCONTINUED | OUTPATIENT
Start: 2019-08-30 | End: 2019-08-30 | Stop reason: HOSPADM

## 2019-08-30 RX ORDER — SUCCINYLCHOLINE CHLORIDE 20 MG/ML
INJECTION INTRAMUSCULAR; INTRAVENOUS PRN
Status: DISCONTINUED | OUTPATIENT
Start: 2019-08-30 | End: 2019-08-30 | Stop reason: SURG

## 2019-08-30 RX ORDER — HALOPERIDOL 5 MG/ML
1 INJECTION INTRAMUSCULAR
Status: DISCONTINUED | OUTPATIENT
Start: 2019-08-30 | End: 2019-08-30 | Stop reason: HOSPADM

## 2019-08-30 RX ORDER — LIDOCAINE HYDROCHLORIDE 20 MG/ML
INJECTION, SOLUTION EPIDURAL; INFILTRATION; INTRACAUDAL; PERINEURAL PRN
Status: DISCONTINUED | OUTPATIENT
Start: 2019-08-30 | End: 2019-08-30 | Stop reason: SURG

## 2019-08-30 RX ADMIN — ROCURONIUM BROMIDE 5 MG: 10 INJECTION, SOLUTION INTRAVENOUS at 09:54

## 2019-08-30 RX ADMIN — SODIUM CHLORIDE, POTASSIUM CHLORIDE, SODIUM LACTATE AND CALCIUM CHLORIDE: 600; 310; 30; 20 INJECTION, SOLUTION INTRAVENOUS at 09:40

## 2019-08-30 RX ADMIN — PROPOFOL 150 MG: 10 INJECTION, EMULSION INTRAVENOUS at 09:54

## 2019-08-30 RX ADMIN — LIDOCAINE HYDROCHLORIDE 2 ML: 20 INJECTION, SOLUTION EPIDURAL; INFILTRATION; INTRACAUDAL at 09:54

## 2019-08-30 RX ADMIN — SUCCINYLCHOLINE CHLORIDE 140 MG: 20 INJECTION, SOLUTION INTRAMUSCULAR; INTRAVENOUS at 09:54

## 2019-08-30 RX ADMIN — FENTANYL CITRATE 100 MCG: 50 INJECTION, SOLUTION INTRAMUSCULAR; INTRAVENOUS at 09:53

## 2019-08-30 RX ADMIN — DEXAMETHASONE SODIUM PHOSPHATE 8 MG: 4 INJECTION, SOLUTION INTRA-ARTICULAR; INTRALESIONAL; INTRAMUSCULAR; INTRAVENOUS; SOFT TISSUE at 10:02

## 2019-08-30 RX ADMIN — GLYCOPYRROLATE 0.3 MG: 0.2 INJECTION INTRAMUSCULAR; INTRAVENOUS at 10:05

## 2019-08-30 RX ADMIN — ACETAMINOPHEN 1000 MG: 500 TABLET ORAL at 11:13

## 2019-08-30 RX ADMIN — ONDANSETRON 4 MG: 2 INJECTION INTRAMUSCULAR; INTRAVENOUS at 10:02

## 2019-08-30 RX ADMIN — DEXTROSE MONOHYDRATE: 100 INJECTION, SOLUTION INTRAVENOUS at 10:23

## 2019-08-30 ASSESSMENT — ENCOUNTER SYMPTOMS
ABDOMINAL PAIN: 0
COUGH: 0
PALPITATIONS: 0
WHEEZING: 0
HEARTBURN: 0
VOMITING: 0
NAUSEA: 0
FEVER: 0
SHORTNESS OF BREATH: 0
CHILLS: 0

## 2019-08-30 ASSESSMENT — PAIN SCALES - GENERAL: PAIN_LEVEL: 0

## 2019-08-30 NOTE — PROCEDURES
DATE OF SERVICE:  08/30/2019    PREPROCEDURE DIAGNOSES:  Esophageal dysphagia, esophageal spasm and iron   deficiency anemia history.    POSTPROCEDURE DIAGNOSES:  1.  Diffuse gastritis.  2.  Normal-appearing esophagus, empirically dilated with #51 Kiswahili Savary.  3.  Normal colon.    PROCEDURE:  EGD with esophageal dilation and biopsies, colonoscopy to the   cecum.    DESCRIPTION OF PROCEDURE:  The risks, benefits, and alternatives were   explained to the patient and consent obtained.  She was brought to the   operating room suite where a timeout was performed.  Dr. Plummer put her   under general anesthesia.  We then gently rolled her in the left lateral   decubitus position.  The Olympus flexible video endoscope was introduced in   the mouth and gently advanced into the esophagus under direct visualization.    The esophagus appeared normal throughout its length with no evidence of   inflammation, strictures or extrinsic compression.  The esophagus was notably   spastic.  The stomach had diffuse erythema and mild edema.  In the antrum, it   was more patchy with some slightly raised areas of erythematous mucosa and   then pale mucosa in between.  The pylorus was normal.  The fundus and cardia   appeared normal.  I advanced into the duodenum and noticed a normal first and   second portion.  I examined the mucosa under water and there were normal   villi.  Returning to the stomach, I passed a flexible tip guidewire.  Over   this, a #51 Kisw[place]ili Savary was placed with moderate resistance, but no blood   streaking of the dilator.  Repeat esophagoscopy revealed no esophageal mucosal   injury.  I went ahead and biopsied the stomach and the antrum and body   (specimen A) and then the esophagus from mid to distal esophagus to rule out   eosinophilic esophagitis (specimen B).  There was good hemostasis at all   biopsy sites.  Air was evacuated and scope withdrawn.    Digital rectal examination was unrevealing.  The Olympus  flexible video   colonoscope was introduced in the rectum and advanced to the cecum identified   by ileocecal valve and brief use of the appendiceal orifice, which was   partially obscured by turbid fluid containing debris and a pill.  The bowel   prep was fair, but felt to be adequate.  No abnormalities were noted   throughout the entire colon including retroflex exam within the rectum.  Air   was evacuated and scope withdrawn.  The patient tolerated the procedure well   and was in stable condition when I left the room.    PLAN:  1.  She will resume her outpatient medications and I will notify her of the   pathology results when they become available and modify therapy as needed.  2.  She will receive a note regarding the pathology findings in 7-10 days.  3.  I will make arrangements for an office followup to see if the dilation   helped.  If not, she may be a candidate for esophageal manometry to rule out   esophageal dysmotility.       ____________________________________     Ata Khalil MD CMS / SYLVIA    DD:  08/30/2019 10:43:17  DT:  08/30/2019 11:05:59    D#:  [id number]  Job#:  [id number]

## 2019-08-30 NOTE — OR NURSING
1042 Patient arrived from OR on [facility]. Report received from RN and Anesthesia. Patient's VS WNL, patient arousable, stable, breathing even/non labored.   1050 Pt's blood glucose level was 113.   1113 pt c/o headache, requested for tylenol. Called Dr. Plummer, telephone order for tylenol.   1150 Pt up on recliner chair, meets criteria for phase 2.   1230 Patient verbalized readiness to go home. Discharge instructions discussed with patient and partner, copy given, instructed pt and partner taht she should use her cpap even for daytime napping. Patient verbalized understanding to instructions. Belongings with patient.   1242 Discharge criteria met. PIV out, Discharged via wheelchair.

## 2019-08-30 NOTE — ANESTHESIA PREPROCEDURE EVALUATION
Relevant Problems   ANESTHESIA   (+) RADHA (obstructive sleep apnea)      CARDIAC   (+) Essential hypertension   (+) Migraine without aura and without status migrainosus, not intractable      GI   (+) GERD (gastroesophageal reflux disease)   (+) Hiatal hernia      ENDO   (+) Hypothyroidism due to Hashimoto's thyroiditis   (+) Type 2 diabetes mellitus without complication (HCC)      Other   (+) Morbid obesity with BMI of 45.0-49.9, adult (HCC)       Physical Exam    Airway   Mallampati: II  TM distance: <3 FB  Neck ROM: full       Cardiovascular - normal exam  Rhythm: regular  Rate: normal  (-) murmur     Dental - normal exam         Pulmonary - normal exam  Breath sounds clear to auscultation     Abdominal   (+) obese     Neurological - normal exam                 Anesthesia Plan    ASA 3   ASA physical status 3 criteria: morbid obesity - BMI greater than or equal to 40    Plan - general       Airway plan will be ETT        Induction: intravenous    Postoperative Plan: Postoperative administration of opioids is intended.    Pertinent diagnostic labs and testing reviewed    Informed Consent:    Anesthetic plan and risks discussed with patient.    Use of blood products discussed with: patient whom consented to blood products.

## 2019-08-30 NOTE — OR SURGEON
Immediate Post OP Note    PreOp Diagnosis: Esophageal dysphagia, esophageal spasm and iron deficiency anemia    PostOp Diagnosis:   1.  Diffuse gastritis.  2.  Normal appearing esophagus, dilated empirically with #51 Fr Savary.  3.  Normal colon.    Procedure(s):  COLONOSCOPY - Wound Class: Clean Contaminated  GASTROSCOPY - W/DILATION biopsy - Wound Class: Clean Contaminated    Surgeon(s):  Ata Khalil M.D.    Anesthesiologist/Type of Anesthesia:  Anesthesiologist: Sancho Plummer M.D./ELLE    Surgical Staff:  Circulator: Margarito Trevino R.N.  Endoscopy Technician: Ricarda Merchant    Specimens removed if any:  ID Type Source Tests Collected by Time Destination   A :  Tissue Gastric PATHOLOGY SPECIMEN Ata Khalil M.D. 8/30/2019 10:05 AM    B :  Tissue Esophagus PATHOLOGY SPECIMEN Ata Khalil M.D. 8/30/2019 10:07 AM        Estimated Blood Loss: None    Findings:   Esophagus: normal appearance, but spastic.  Dilated empirically #51 Fr.  Biopsied to rule out EoE.  Stomach: diffuse edema and erythema, more patchy in antrum.  Biopsied.  Duodenum: normal appearance, including under water.    Colon: normal to cecum.    Complications: None.    Plan:  Discharge  I will notify of pathology results.        8/30/2019 10:33 AM Ata Khalil M.D.

## 2019-08-30 NOTE — H&P
Physician H&P    Patient ID:  Sarahdaquan Friedman  [id number]  48 y.o. female  1970    History:  Primary Diagnosis: Esophageal dysphagia, esophageal spasm, GERD, iron deficiency anemia    HPI:  This is a 48 year old woman who comes in for EGD with possible dilation and colonoscopy to evaluate the problems noted above.  Please refer to above for issues and my outpatient consultation note.    Past Medical History:  has a past medical history of Anesthesia (08/27/2019), Arrhythmia, Arthritis, Blood clotting disorder ([facility]) (2005), Bowel habit changes, Depression, Diabetes ([facility]) (08/2019), Diabetic neuropathy ([facility]), Esophageal spasm, GERD (gastroesophageal reflux disease), Hashimoto's disease, Hiatal hernia, High cholesterol, Hyperlipidemia, Hypothyroid, Sleep apnea, Snoring, and Tachycardia.  Past Surgical History:  has a past surgical history that includes tonsillectomy and adenoidectomy (1977); endoscopy (2016); colonoscopy (2016); other orthopedic surgery (2012); other (2016); other (1971, 1978, 1985); vaginal hysterectomy scope total (N/A, 4/24/2018); salpingo oophorectomy (Bilateral, 4/24/2018); anterior and posterior repair (4/24/2018); enterocele repair (4/24/2018); bladder sling female (4/24/2018); vaginal suspension (N/A, 4/24/2018); and cystoscopy (N/A, 4/24/2018).  Past Social History:  reports that she has never smoked. She has never used smokeless tobacco. She reports that she does not drink alcohol or use drugs.  Past Family History:   Family History   Problem Relation Age of Onset   • Cancer Mother 70        pancreatic, breast in 30's   • Heart Disease Father    • Cancer Father         lung   • Diabetes Sister    • Diabetes Maternal Uncle    • Heart Disease Maternal Grandmother         MI   • Hyperlipidemia Maternal Grandmother    • Diabetes Maternal Grandfather    • Hyperlipidemia Maternal Grandfather    • Stroke Neg Hx      Allergies: Betadine prepstick plus [povidone-iodine]; Dilaudid [hydromorphone  hcl]; Morphine; and Tape    Current Medications:  Prior to Admission medications    Medication Sig Start Date End Date Taking? Authorizing Provider   cetirizine (ZYRTEC) 10 MG Tab Take 10 mg by mouth every day.    Physician Outpatient   acetaminophen (TYLENOL) 500 MG Tab Take 500-1,000 mg by mouth every 6 hours as needed.    Physician Outpatient   metformin (GLUCOPHAGE) 1000 MG tablet TAKE 1 TABLET BY MOUTH TWICE DAILY WITH MEALS 7/30/19   Zuleima Almeida M.D.   ibuprofen (MOTRIN) 800 MG Tab TAKE 1 TABLET BY MOUTH EVERY 8 HOURS AS NEEDED 7/8/19   Zuleima Almeida M.D.   insulin glargine (BASAGLAR KWIKPEN) 100 UNIT/ML Solution Pen-injector injection Inject 52 Units as instructed every evening. Dispense quantity sufficient for 30 days  Patient taking differently: Inject 70 Units as instructed every evening. Dispense quantity sufficient for 30 days 6/25/19   Zuleima Almeida M.D.   traZODone (DESYREL) 150 MG Tab TAKE 1 TABLET BY MOUTH AT BEDTIME 6/18/19   Zuleima Almeida M.D.   oxybutynin SR (DITROPAN-XL) 5 MG TABLET SR 24 HR TAKE 1 TABLET BY MOUTH ONCE DAILY 6/18/19   Zuleima Almeida M.D.   levothyroxine (SYNTHROID) 137 MCG Tab TAKE 1 TABLET BY MOUTH IN THE MORNING ON  AN  EMPTY  STOMACH 5/28/19   Zuleima Almeida M.D.   VICTOZA 18 MG/3ML Solution Pen-injector injection INJECT 0.3 ML AS INSTRUCTED EVERY DAY 5/24/19   Zuleima Almeida M.D.   sumatriptan (IMITREX) 100 MG tablet TAKE 1/2 TO 1 (ONE-HALF TO ONE) TABLET BY MOUTH AS NEEDED FOR  MIGRAINE/HEADACHE 5/8/19   Zuleima Almeida M.D.   gabapentin (NEURONTIN) 300 MG Cap TAKE 3 CAPSULES BY MOUTH AT BEDTIME 3/26/19   Zuleima Almeida M.D.   metoprolol SR (TOPROL XL) 25 MG TABLET SR 24 HR every evening. 2/26/19   Physician Outpatient   omeprazole (PRILOSEC) 40 MG delayed-release capsule Take 1 Cap by mouth 2 times a day. 3/4/19   Zuleima Almeida M.D.   risperiDONE (RISPERDAL) 0.5 MG Tab  2/8/19   Physician Outpatient  "  hydroCHLOROthiazide (HYDRODIURIL) 12.5 MG tablet TAKE 1 TABLET BY MOUTH EVERY OTHER DAY FOR LEG SWELLING  Patient taking differently: Take 12.5 mg by mouth every day. 2/5/19   Zuleima Almeida M.D.   estradiol (ESTRACE) 1 MG Tab Take 1 Tab by mouth every day. 9/10/18   Afsaneh Tucker M.D.   cyclobenzaprine (FLEXERIL) 10 MG Tab TAKE ONE TABLET BY MOUTH THREE TIMES DAILY AS NEEDED 5/8/18   Zuleima Almeida M.D.   Multiple Vitamins-Minerals (MULTIVITAMIN PO) Take 1 Tab by mouth every day.    Physician Outpatient   divalproex (DEPAKOTE) 250 MG Tablet Delayed Response Take 1 Tab by mouth 2 Times a Day. 2/26/18   Physician Outpatient   spironolactone (ALDACTONE) 25 MG Tab Take 1 Tab by mouth every day. 11/9/17   Zuleima Almeida M.D.   atorvastatin (LIPITOR) 20 MG Tab Take 1 Tab by mouth every day. 11/9/17   Zuleima Almeida M.D.   ivabradine (CORLANOR) 5 MG Tab tablet Take 1 Tab by mouth 2 times a day, with meals. 11/9/17   Zuleima Almeida M.D.   Black Cohosh 40 MG Cap Take 40 mg by mouth 2 Times a Day. 11/9/17   Zuleima Almeida M.D.   Melatonin 10 MG Tab Take 20 mg by mouth every bedtime. 11/9/17   Zuleima Almeida M.D.       Review of Systems:  Review of Systems   Constitutional: Negative for chills and fever.   Respiratory: Negative for cough, shortness of breath and wheezing.    Cardiovascular: Negative for chest pain and palpitations.   Gastrointestinal: Negative for abdominal pain, heartburn, nausea and vomiting.     BP (!) 98/63   Pulse 72   Temp 36.6 °C (97.8 °F) (Temporal)   Resp 18   Ht 1.6 m (5' 3\")   Wt 116.9 kg (257 lb 11.5 oz)   SpO2 93%     Physical Examination:  Physical Exam   Constitutional: She is oriented to person, place, and time. No distress.   HENT:   Head: Normocephalic and atraumatic.   Eyes: Pupils are equal, round, and reactive to light.   Cardiovascular: Normal rate and regular rhythm.   No murmur heard.  Pulmonary/Chest: Breath sounds normal. No " respiratory distress. She has no wheezes.   Abdominal: Soft. Bowel sounds are normal. She exhibits distension (Obese). She exhibits no mass. There is no tenderness. There is no guarding.   Neurological: She is alert and oriented to person, place, and time.       Impression:  # Esophageal dysphagia  # Esophageal spasm  # Iron deficiency anemia history  # Sleep apnea.  #  Morbid obesity.    Plan:  EGD with possible dilation and colonoscopy today with monitored anesthesia care.    Pre Procedure Assessment:  <EXAMSHORT2>      Pre Procedure update:   No changes.    Ata Khalil  8/30/2019

## 2019-08-30 NOTE — ANESTHESIA QCDR
2019 [facility] Clinical Data Registry (for Quality Improvement)     Postoperative nausea/vomiting risk protocol (Adult = 18 yrs and Pediatric 3-17 yrs)- (430 and 463)  General inhalation anesthetic (NOT TIVA) with PONV risk factors: Yes  Provision of anti-emetic therapy with at least 2 different classes of agents: Yes   Patient DID NOT receive anti-emetic therapy and reason is documented in Medical Record:  N/A    Multimodal Pain Management- (AQI59)  Patient undergoing Elective Surgery (i.e. Outpatient, or ASC, or Prescheduled Surgery prior to Hospital Admission): Yes  Use of Multimodal Pain Management, two or more drugs and/or interventions, NOT including systemic opioids: No   Exception: Documented allergy to multiple classes of analgesics: No        PACU assessment of acute postoperative pain prior to Anesthesia Care End- Applies to Patients Age = 18- (ABG7)  Initial PACU pain score is which of the following: < 7/10  Patient unable to report pain score: N/A    Post-anesthetic transfer of care checklist/protocol to PACU/ICU- (426 and 427)  Upon conclusion of case, patient transferred to which of the following locations: PACU/Non-ICU  Use of transfer checklist/protocol: Yes  Exclusion: Service Performed in Patient Hospital Room (and thus did not require transfer): N/A    PACU Reintubation- (AQI31)  General anesthesia requiring endotracheal intubation (ETT) along with subsequent extubation in OR or PACU: Yes  Required reintubation in the PACU: No   Extubation was a planned trial documented in the medical record prior to removal of the original airway device:  N/A    Unplanned admission to ICU related to anesthesia service up through end of PACU care- (MD51)  Unplanned admission to ICU (not initially anticipated at anesthesia start time): No

## 2019-08-30 NOTE — ANESTHESIA POSTPROCEDURE EVALUATION
Patient: Sarah Friedman    Procedure Summary     Date:  08/30/19 Room / Location:  [facility] ROOM 21 / SURGERY SAME DAY [facility]    Anesthesia Start:  0946 Anesthesia Stop:  1045    Procedures:       COLONOSCOPY (N/A Anus)      GASTROSCOPY - W/DILATION biopsy (N/A Mouth) Diagnosis:  ( perfuse gastritis normal esophgus)    Surgeon:  Ata Khalil M.D. Responsible Provider:  Sancho Plummer M.D.    Anesthesia Type:  general ASA Status:  3          Final Anesthesia Type: general  Last vitals  BP   Blood Pressure: (!) 98/63    Temp   36.2 °C (97.2 °F)    Pulse   Pulse: 92, Heart Rate (Monitored): 92   Resp   16    SpO2   94 %      Anesthesia Post Evaluation    Patient location during evaluation: PACU  Patient participation: complete - patient participated  Level of consciousness: awake and alert  Pain score: 0    Airway patency: patent  Anesthetic complications: no  Cardiovascular status: hemodynamically stable  Respiratory status: acceptable  Hydration status: euvolemic    PONV: none           Nurse Pain Score: 0 (NPRS)

## 2019-08-30 NOTE — ANESTHESIA TIME REPORT
Anesthesia Start and Stop Event Times     Date Time Event    8/30/2019 0800 Ready for Procedure     0946 Anesthesia Start     1045 Anesthesia Stop        Responsible Staff  08/30/19    Name Role Begin End    Sancho Plummer M.D. Anesth 0946 1045        Preop Diagnosis (Free Text):  Pre-op Diagnosis     ESOPHAGEAL DYSPHAGIA, ESOPHAGEAL SPASM, GASTROESOPHAGEAL REFLUX DISEASE        Preop Diagnosis (Codes):    Post op Diagnosis  Gastritis      Premium Reason  Non-Premium    Comments:

## 2019-08-30 NOTE — ANESTHESIA PROCEDURE NOTES
Airway  Date/Time: 8/30/2019 9:55 AM  Performed by: Sancho Plummer M.D.  Authorized by: Sancho Plummer M.D.     Location:  OR  Urgency:  Elective  Indications for Airway Management:  Anesthesia  Spontaneous Ventilation: absent    Sedation Level:  Deep  Preoxygenated: Yes    Patient Position:  Sniffing  Final Airway Type:  Endotracheal airway  Final Endotracheal Airway:  ETT  Cuffed: Yes    Technique Used for Successful ETT Placement:  Video laryngoscopy  Insertion Site:  Oral  Blade Type:  Gagan  Laryngoscope Blade/Videolaryngoscope Blade Size:  3  ETT Size (mm):  6.5  Measured from:  Teeth  ETT to Teeth (cm):  23  Placement Verified by: auscultation and capnometry    Cormack-Lehane Classification:  Grade IIa - partial view of glottis  Number of Attempts at Approach:  1  Number of Other Approaches Attempted:  0

## 2019-08-30 NOTE — DISCHARGE INSTRUCTIONS
ENDOSCOPY HOME CARE INSTRUCTIONS        COLONOSCOPY OR FLEXIBLE SIGMOIDOSCOPY  1. If you received a barium enema, take a mild laxative such as dulcolax, Laura's M.O., or Milk of Magnesia to clean out the barium.  2. Drink plenty of fluids. Eat a diet high in fiber (whole-grain breads, fresh fruit and vegetables).  3. You may notice a few drops of blood with your first bowel movement. If you develop any large amount of bleeding, black stools, a fever, or abdominal pain, call your doctor right away.  4. Your Doctor will send you pathology results in 7-10 days.  5. Don't drive or drink alcohol for 24 hours. The medication you received will make you too drowsy.  6. No heavy lifting, no Aspirin products or Aspirin for 5 days    GASTROSCOPY OR ERCP  1. Don't eat or drink anything for about an hour after the test. You can then resume your regular diet.  2. Don't drive or drink alcohol for 24 hours. The medication you received will make you too drowsy.  3. Don't take any coffee, tea, or aspirin products until after you see your doctor. These can harm the lining of your stomach.  4. If you begin to vomit bloody material, or develop black or bloody stools, call your doctor as soon as possible.  5. If you have any neck, chest, abdominal pain or temp of 100 degrees, call your doctor.        You should call [phone] if you develop problems with breathing or chest pain.  If any questions arise, call your doctor. If your doctor is not available, please feel free to call [phone]. You can also call the HEALTH HOTLINE open 24 hours/day, 7 days/week and speak to a nurse at [phone], or toll free [phone].    Dr. Khalil [phone]  Depression / Suicide Risk    As you are discharged from this [facility] facility, it is important to learn how to keep safe from harming yourself.    Recognize the warning signs:  · Abrupt changes in personality, positive or negative- including increase in energy   · Giving away  possessions  · Change in eating patterns- significant weight changes-  positive or negative  · Change in sleeping patterns- unable to sleep or sleeping all the time   · Unwillingness or inability to communicate  · Depression  · Unusual sadness, discouragement and loneliness  · Talk of wanting to die  · Neglect of personal appearance   · Rebelliousness- reckless behavior  · Withdrawal from people/activities they love  · Confusion- inability to concentrate     If you or a loved one observes any of these behaviors or has concerns about self-harm, here's what you can do:  · Talk about it- your feelings and reasons for harming yourself  · Remove any means that you might use to hurt yourself (examples: pills, rope, extension cords, firearm)  · Get professional help from the community (Mental Health, Substance Abuse, psychological counseling)  · Do not be alone:Call your Safe Contact- someone whom you trust who will be there for you.  · Call your local CRISIS HOTLINE 042-9081 or 836-918-6245  · Call your local Children's Mobile Crisis Response Team [place] (708) 439-7512 or wwwLink Trigger  · Call the toll free National Suicide Prevention Hotlines   · National Suicide Prevention Lifeline 945-889-MUBU (5295)  · National Hope Line Network 800-SUICIDE (967-7485)    Depression / Suicide Risk    As you are discharged from this [facility] facility, it is important to learn how to keep safe from harming yourself.    Recognize the warning signs:  · Abrupt changes in personality, positive or negative- including increase in energy   · Giving away possessions  · Change in eating patterns- significant weight changes-  positive or negative  · Change in sleeping patterns- unable to sleep or sleeping all the time   · Unwillingness or inability to communicate  · Depression  · Unusual sadness, discouragement and loneliness  · Talk of wanting to die  · Neglect of personal appearance   · Rebelliousness- reckless  behavior  · Withdrawal from people/activities they love  · Confusion- inability to concentrate     If you or a loved one observes any of these behaviors or has concerns about self-harm, here's what you can do:  · Talk about it- your feelings and reasons for harming yourself  · Remove any means that you might use to hurt yourself (examples: pills, rope, extension cords, firearm)  · Get professional help from the community (Mental Health, Substance Abuse, psychological counseling)  · Do not be alone:Call your Safe Contact- someone whom you trust who will be there for you.  · Call your local CRISIS HOTLINE 305-6743 or 793-559-3759  · Call your local Children's Mobile Crisis Response Team [place] (562) 444-2782 or www.Snapcious  · Call the toll free National Suicide Prevention Hotlines   · National Suicide Prevention Lifeline 954-463-IHRO (2894)  · National Hope Line Network 800-SUICIDE (633-2152)    I acknowledge receipt and understanding of these Home Care Instructions.

## 2019-09-03 LAB — GLUCOSE BLD-MCNC: 113 MG/DL (ref 65–99)

## 2019-09-27 ENCOUNTER — OFFICE VISIT (OUTPATIENT)
Dept: MEDICAL GROUP | Facility: MEDICAL CENTER | Age: 49
End: 2019-09-27
Attending: INTERNAL MEDICINE
Payer: MEDICAID

## 2019-09-27 VITALS
RESPIRATION RATE: 16 BRPM | SYSTOLIC BLOOD PRESSURE: 124 MMHG | TEMPERATURE: 97.2 F | HEIGHT: 63 IN | WEIGHT: 260 LBS | DIASTOLIC BLOOD PRESSURE: 80 MMHG | BODY MASS INDEX: 46.07 KG/M2 | OXYGEN SATURATION: 97 % | HEART RATE: 96 BPM

## 2019-09-27 DIAGNOSIS — D50.9 IRON DEFICIENCY ANEMIA, UNSPECIFIED IRON DEFICIENCY ANEMIA TYPE: ICD-10-CM

## 2019-09-27 DIAGNOSIS — E06.3 HYPOTHYROIDISM DUE TO HASHIMOTO'S THYROIDITIS: ICD-10-CM

## 2019-09-27 DIAGNOSIS — F33.1 MODERATE EPISODE OF RECURRENT MAJOR DEPRESSIVE DISORDER (HCC): ICD-10-CM

## 2019-09-27 DIAGNOSIS — E11.9 TYPE 2 DIABETES MELLITUS WITHOUT COMPLICATION, WITH LONG-TERM CURRENT USE OF INSULIN (HCC): ICD-10-CM

## 2019-09-27 DIAGNOSIS — E03.8 HYPOTHYROIDISM DUE TO HASHIMOTO'S THYROIDITIS: ICD-10-CM

## 2019-09-27 DIAGNOSIS — G89.29 CHRONIC BILATERAL LOW BACK PAIN WITH LEFT-SIDED SCIATICA: ICD-10-CM

## 2019-09-27 DIAGNOSIS — M54.42 CHRONIC BILATERAL LOW BACK PAIN WITH LEFT-SIDED SCIATICA: ICD-10-CM

## 2019-09-27 DIAGNOSIS — Z79.4 TYPE 2 DIABETES MELLITUS WITHOUT COMPLICATION, WITH LONG-TERM CURRENT USE OF INSULIN (HCC): ICD-10-CM

## 2019-09-27 PROBLEM — S93.492A SPRAIN OF ANTERIOR TALOFIBULAR LIGAMENT OF LEFT ANKLE: Status: RESOLVED | Noted: 2018-03-16 | Resolved: 2019-09-27

## 2019-09-27 PROBLEM — I95.1 ORTHOSTATIC HYPOTENSION: Status: RESOLVED | Noted: 2018-05-22 | Resolved: 2019-09-27

## 2019-09-27 LAB
HBA1C MFR BLD: 6.1 % (ref 0–5.6)
INT CON NEG: NEGATIVE
INT CON POS: POSITIVE

## 2019-09-27 PROCEDURE — 99213 OFFICE O/P EST LOW 20 MIN: CPT | Performed by: INTERNAL MEDICINE

## 2019-09-27 PROCEDURE — 83036 HEMOGLOBIN GLYCOSYLATED A1C: CPT | Performed by: INTERNAL MEDICINE

## 2019-09-27 PROCEDURE — 99214 OFFICE O/P EST MOD 30 MIN: CPT | Performed by: INTERNAL MEDICINE

## 2019-09-27 RX ORDER — TRAMADOL HYDROCHLORIDE 50 MG/1
50 TABLET ORAL EVERY 4 HOURS PRN
Qty: 42 TAB | Refills: 0 | Status: SHIPPED | OUTPATIENT
Start: 2019-09-27 | End: 2019-10-04

## 2019-09-27 ASSESSMENT — PAIN SCALES - GENERAL: PAINLEVEL: 4=SLIGHT-MODERATE PAIN

## 2019-09-27 NOTE — LETTER
September 27, 2019        To whom it may concern:    Sarah Friedman is currently a patient under my care at the [facility].  At this time, she benefits from having her cat as her emotional support animal to help with anxiety and depression.    If you have any questions or concerns, please don't hesitate to call.        Sincerely,        Zuleima Almeida M.D.    Electronically Signed

## 2019-09-28 NOTE — PROGRESS NOTES
Subjective:   Sarah Friedman is a 48 y.o. female here today for difficulty controlling diabetes, medication refills, requesting labs    Type 2 diabetes mellitus without complication (HCC)  She reports that her blood sugars have been more difficult to control lately.  She has had to uptitrate her insulin to 80 units at night and she is still getting readings of 140 in the mornings.  She continues on metformin 1000 mg twice daily and Victoza daily as well.    Chronic low back pain  She continues with rare tramadol use.  Most of the time she is able to control her symptoms with over-the-counter Tylenol or ibuprofen.  She is requesting a tramadol refill as she will soon be moving out of state.    Hypothyroidism due to Hashimoto's thyroiditis  She remains on levothyroxine 137 mcg daily.  Her last TSH checked in February was at goal.    Moderate episode of recurrent major depressive disorder (CMS-HCC) (HCC)  She continues to follow-up with psychiatry.  States that they are requesting we order Depakote level for her.  She is currently taking 250 mg twice daily.    Iron deficiency anemia  She continues on supplemental iron.  Last iron studies were checked in February of this year.  She is status post hysterectomy.  She had an EGD and colonoscopy done this month which were both normal except for some gastritis.  She was instructed to minimize ibuprofen use.       Current medicines (including changes today)  Current Outpatient Medications   Medication Sig Dispense Refill   • tramadol (ULTRAM) 50 MG Tab Take 1 Tab by mouth every four hours as needed for up to 7 days. 42 Tab 0   • Empagliflozin (JARDIANCE) 10 MG Tab Take 1 Tab by mouth every day. 30 Tab 3   • insulin glargine (LANTUS) 100 UNIT/ML Solution Pen-injector injection Inject 74 Units as instructed every evening. Dispense quantity sufficient for 30 days 8 PEN 5   • cetirizine (ZYRTEC) 10 MG Tab Take 10 mg by mouth every day.     • acetaminophen (TYLENOL) 500 MG  Tab Take 500-1,000 mg by mouth every 6 hours as needed.     • metformin (GLUCOPHAGE) 1000 MG tablet TAKE 1 TABLET BY MOUTH TWICE DAILY WITH MEALS 60 Tab 11   • ibuprofen (MOTRIN) 800 MG Tab TAKE 1 TABLET BY MOUTH EVERY 8 HOURS AS NEEDED 90 Tab 6   • traZODone (DESYREL) 150 MG Tab TAKE 1 TABLET BY MOUTH AT BEDTIME 90 Tab 3   • oxybutynin SR (DITROPAN-XL) 5 MG TABLET SR 24 HR TAKE 1 TABLET BY MOUTH ONCE DAILY 30 Tab 6   • levothyroxine (SYNTHROID) 137 MCG Tab TAKE 1 TABLET BY MOUTH IN THE MORNING ON  AN  EMPTY  STOMACH 30 Tab 5   • VICTOZA 18 MG/3ML Solution Pen-injector injection INJECT 0.3 ML AS INSTRUCTED EVERY DAY 9 mL 5   • sumatriptan (IMITREX) 100 MG tablet TAKE 1/2 TO 1 (ONE-HALF TO ONE) TABLET BY MOUTH AS NEEDED FOR  MIGRAINE/HEADACHE 9 Tab 5   • gabapentin (NEURONTIN) 300 MG Cap TAKE 3 CAPSULES BY MOUTH AT BEDTIME 90 Cap 6   • metoprolol SR (TOPROL XL) 25 MG TABLET SR 24 HR every evening.     • omeprazole (PRILOSEC) 40 MG delayed-release capsule Take 1 Cap by mouth 2 times a day. 60 Cap 11   • risperiDONE (RISPERDAL) 0.5 MG Tab      • hydroCHLOROthiazide (HYDRODIURIL) 12.5 MG tablet TAKE 1 TABLET BY MOUTH EVERY OTHER DAY FOR LEG SWELLING (Patient taking differently: Take 12.5 mg by mouth every day.) 30 Tab 3   • estradiol (ESTRACE) 1 MG Tab Take 1 Tab by mouth every day. 30 Tab 11   • cyclobenzaprine (FLEXERIL) 10 MG Tab TAKE ONE TABLET BY MOUTH THREE TIMES DAILY AS NEEDED 90 Tab 3   • Multiple Vitamins-Minerals (MULTIVITAMIN PO) Take 1 Tab by mouth every day.     • divalproex (DEPAKOTE) 250 MG Tablet Delayed Response Take 1 Tab by mouth 2 Times a Day.     • spironolactone (ALDACTONE) 25 MG Tab Take 1 Tab by mouth every day. 30 Tab 3   • atorvastatin (LIPITOR) 20 MG Tab Take 1 Tab by mouth every day. 30 Tab    • ivabradine (CORLANOR) 5 MG Tab tablet Take 1 Tab by mouth 2 times a day, with meals. 60 Tab    • Black Cohosh 40 MG Cap Take 40 mg by mouth 2 Times a Day.     • Melatonin 10 MG Tab Take 20 mg by  mouth every bedtime.       No current facility-administered medications for this visit.      She  has a past medical history of Anesthesia (08/27/2019), Arrhythmia, Arthritis, Blood clotting disorder ([facility]) (2005), Bowel habit changes, Depression, Diabetes ([facility]) (08/2019), Diabetic neuropathy ([facility]), Esophageal spasm, GERD (gastroesophageal reflux disease), Hashimoto's disease, Hiatal hernia, High cholesterol, Hyperlipidemia, Hypothyroid, Sleep apnea, Snoring, and Tachycardia.    ROS   Denies chest pain, shortness of breath  As above in HPI     Objective:     Vitals:    09/27/19 1707   BP: 124/80   Pulse: 96   Resp: 16   Temp: 36.2 °C (97.2 °F)   SpO2: 97%     Body mass index is 46.07 kg/m².   Physical Exam:  Constitutional: Alert, no distress.  Skin: Warm, dry, good turgor, no rashes in visible areas.  Eye: Equal, round and reactive, conjunctiva clear, lids normal.  ENMT: Lips without lesions, good dentition, oropharynx clear, TM's clear bilaterally  Neck: Trachea midline, no masses, no thyromegaly. No cervical or supraclavicular lymphadenopathy  Respiratory: Unlabored respiratory effort, lungs clear to auscultation, no wheezes, no ronchi.  Cardiovascular: Regular rate and rhythm, no murmurs appreciated, no lower extremity edema  Abdomen: Soft, non-tender, no masses, no hepatosplenomegaly.  Psych: Alert and oriented x3, normal affect and mood.      Results and Imaging:   POC A1c in clinic today was 6.1    Assessment and Plan:   The following treatment plan was discussed    1. Type 2 diabetes mellitus without complication, with long-term current use of insulin ([facility])  Well-controlled although she has had increase her insulin significantly.  In effort to get her back down on that we will add Jardiance.  I have asked her to go back down to the 74 units which she has been previously using and will start Jardiance 10 mg daily.  We discussed down titrating as needed.  -Continue Victoza, metformin  - Lipid Profile; Future  -  POCT  A1C  - Empagliflozin (JARDIANCE) 10 MG Tab; Take 1 Tab by mouth every day.  Dispense: 30 Tab; Refill: 3  - insulin glargine (LANTUS) 100 UNIT/ML Solution Pen-injector injection; Inject 74 Units as instructed every evening. Dispense quantity sufficient for 30 days  Dispense: 8 PEN; Refill: 5    2. Hypothyroidism due to Hashimoto's thyroiditis  We will obtain updated lab  -Continue levothyroxine 137 mcg daily  - TSH WITH REFLEX TO FT4; Future    3. Moderate episode of recurrent major depressive disorder (HCC)  Stable, currently under the care of psychiatry who is managing her meds.  I will obtain a Depakote level at the request.  - FREE VALPROIC ACID (DEPAKOTE)    4. Chronic bilateral low back pain with left-sided sciatica  We have refilled her tramadol.  She has been instructed to minimize ibuprofen use.  - tramadol (ULTRAM) 50 MG Tab; Take 1 Tab by mouth every four hours as needed for up to 7 days.  Dispense: 42 Tab; Refill: 0    5. Iron deficiency anemia, unspecified iron deficiency anemia type  We will obtain updated labs.  She remains on supplemental iron  - IRON/TOTAL IRON BIND; Future  - FERRITIN; Future      No follow-up scheduled today as patient will be leaving the state shortly  Followup: Return if symptoms worsen or fail to improve.

## 2019-09-28 NOTE — ASSESSMENT & PLAN NOTE
She continues on supplemental iron.  Last iron studies were checked in February of this year.  She is status post hysterectomy.  She had an EGD and colonoscopy done this month which were both normal except for some gastritis.  She was instructed to minimize ibuprofen use.

## 2019-09-28 NOTE — ASSESSMENT & PLAN NOTE
She continues with rare tramadol use.  Most of the time she is able to control her symptoms with over-the-counter Tylenol or ibuprofen.  She is requesting a tramadol refill as she will soon be moving out of state.

## 2019-09-28 NOTE — ASSESSMENT & PLAN NOTE
She continues to follow-up with psychiatry.  States that they are requesting we order Depakote level for her.  She is currently taking 250 mg twice daily.

## 2019-09-28 NOTE — ASSESSMENT & PLAN NOTE
She reports that her blood sugars have been more difficult to control lately.  She has had to uptitrate her insulin to 80 units at night and she is still getting readings of 140 in the mornings.  She continues on metformin 1000 mg twice daily and Victoza daily as well.

## 2019-09-30 DIAGNOSIS — M54.42 CHRONIC BILATERAL LOW BACK PAIN WITH LEFT-SIDED SCIATICA: ICD-10-CM

## 2019-09-30 DIAGNOSIS — G89.29 CHRONIC BILATERAL LOW BACK PAIN WITH LEFT-SIDED SCIATICA: ICD-10-CM

## 2019-10-01 ENCOUNTER — HOSPITAL ENCOUNTER (OUTPATIENT)
Dept: LAB | Facility: MEDICAL CENTER | Age: 49
End: 2019-10-01
Attending: INTERNAL MEDICINE
Payer: MEDICAID

## 2019-10-01 DIAGNOSIS — E06.3 HYPOTHYROIDISM DUE TO HASHIMOTO'S THYROIDITIS: ICD-10-CM

## 2019-10-01 DIAGNOSIS — E03.8 HYPOTHYROIDISM DUE TO HASHIMOTO'S THYROIDITIS: ICD-10-CM

## 2019-10-01 DIAGNOSIS — D50.9 IRON DEFICIENCY ANEMIA, UNSPECIFIED IRON DEFICIENCY ANEMIA TYPE: ICD-10-CM

## 2019-10-01 DIAGNOSIS — Z79.4 TYPE 2 DIABETES MELLITUS WITHOUT COMPLICATION, WITH LONG-TERM CURRENT USE OF INSULIN (HCC): ICD-10-CM

## 2019-10-01 DIAGNOSIS — E11.9 TYPE 2 DIABETES MELLITUS WITHOUT COMPLICATION, WITH LONG-TERM CURRENT USE OF INSULIN (HCC): ICD-10-CM

## 2019-10-01 LAB
CHOLEST SERPL-MCNC: 157 MG/DL (ref 100–199)
FERRITIN SERPL-MCNC: 47.8 NG/ML (ref 10–291)
HDLC SERPL-MCNC: 34 MG/DL
IRON SATN MFR SERPL: 14 % (ref 15–55)
IRON SERPL-MCNC: 60 UG/DL (ref 40–170)
LDLC SERPL CALC-MCNC: 74 MG/DL
T4 FREE SERPL-MCNC: 1.64 NG/DL (ref 0.53–1.43)
TIBC SERPL-MCNC: 423 UG/DL (ref 250–450)
TRIGL SERPL-MCNC: 244 MG/DL (ref 0–149)
TSH SERPL DL<=0.005 MIU/L-ACNC: 0.22 UIU/ML (ref 0.38–5.33)

## 2019-10-01 PROCEDURE — 83540 ASSAY OF IRON: CPT

## 2019-10-01 PROCEDURE — 84443 ASSAY THYROID STIM HORMONE: CPT

## 2019-10-01 PROCEDURE — 80061 LIPID PANEL: CPT

## 2019-10-01 PROCEDURE — 36415 COLL VENOUS BLD VENIPUNCTURE: CPT

## 2019-10-01 PROCEDURE — 84439 ASSAY OF FREE THYROXINE: CPT

## 2019-10-01 PROCEDURE — 82728 ASSAY OF FERRITIN: CPT

## 2019-10-01 PROCEDURE — 83550 IRON BINDING TEST: CPT

## 2019-10-01 RX ORDER — CYCLOBENZAPRINE HCL 10 MG
TABLET ORAL
Qty: 90 TAB | Refills: 3 | Status: SHIPPED | OUTPATIENT
Start: 2019-10-01 | End: 2020-02-12 | Stop reason: SDUPTHER

## 2019-10-02 RX ORDER — LEVOTHYROXINE SODIUM 0.12 MG/1
125 TABLET ORAL
Qty: 30 TAB | Refills: 1 | Status: SHIPPED | OUTPATIENT
Start: 2019-10-02 | End: 2020-02-12 | Stop reason: SDUPTHER

## 2019-10-05 DIAGNOSIS — B00.2 ORAL HERPES: ICD-10-CM

## 2019-10-08 RX ORDER — ACYCLOVIR 800 MG/1
TABLET ORAL
Qty: 10 TAB | Refills: 3 | Status: SHIPPED | OUTPATIENT
Start: 2019-10-08 | End: 2020-08-17

## 2019-10-09 ENCOUNTER — TELEPHONE (OUTPATIENT)
Dept: OBGYN | Facility: CLINIC | Age: 49
End: 2019-10-09

## 2019-10-09 NOTE — TELEPHONE ENCOUNTER
Pt called requesting refill on Estradiol. Pt stated she is moving out of state beginning of next month, adv pt she needs annual appt, offered appt for 10/14/19 at 3:45pm, pt agrees and scheduled. Notified pt we will authorized 1 month refill and needs to come in for annual appt. Pt understood and had no other questions or concerns.  Called [facility], spoke with Valerie and authorized 1 month refill for Estradiol.

## 2019-10-14 ENCOUNTER — GYNECOLOGY VISIT (OUTPATIENT)
Dept: OBGYN | Facility: CLINIC | Age: 49
End: 2019-10-14
Payer: MEDICAID

## 2019-10-14 VITALS — SYSTOLIC BLOOD PRESSURE: 98 MMHG | BODY MASS INDEX: 45.71 KG/M2 | DIASTOLIC BLOOD PRESSURE: 60 MMHG | WEIGHT: 258 LBS

## 2019-10-14 DIAGNOSIS — N95.1 VASOMOTOR SYMPTOMS DUE TO MENOPAUSE: ICD-10-CM

## 2019-10-14 PROCEDURE — 99213 OFFICE O/P EST LOW 20 MIN: CPT | Performed by: OBSTETRICS & GYNECOLOGY

## 2019-10-14 RX ORDER — ESTRADIOL 1 MG/1
1 TABLET ORAL DAILY
Qty: 30 TAB | Refills: 2 | Status: SHIPPED
Start: 2019-10-14 | End: 2020-02-12

## 2019-10-14 RX ORDER — PAROXETINE 10 MG/1
10 TABLET, FILM COATED ORAL DAILY
Qty: 30 TAB | Refills: 11 | Status: SHIPPED | OUTPATIENT
Start: 2019-10-14 | End: 2020-02-12 | Stop reason: SDUPTHER

## 2019-10-14 NOTE — NON-PROVIDER
Pt here for annual. Needs refill on Estradiol.   Pt states will get mammo done when she moves to a different state this month.   Good # [phone]

## 2019-10-14 NOTE — PROGRESS NOTES
"GYN F/U    Cc: Desires refill on hormone therapy    HPI: 48 y.o.  here to request refill on hormone therapy prior to moving out of [facility].  Patient [place] in [place], has been just over a year since her annual and has run out of refills on her estradiol which she takes for vasomotor symptoms.  Plans to establish with provider immediately upon arrival in [place].    Has been on oral estradiol since last year for vasomotor symptoms and reports she does extremely well with this.  Strongly desires to stay on it.  Patient has a personal history of DVT, unprovoked, and without an identified inheritable thrombophilia.  Patient is status post hysterectomy with BSO.    ROS:  Gen: denies fevers, general concerns      Past Medical History:   Diagnosis Date   • Anesthesia 2019    \"hard to wake up\"  \"sleep apnea\"   • Arrhythmia     \" sinus Tach\"   • Arthritis     osteo   • Blood clotting disorder ([facility])     DVT   • Bowel habit changes     constipation   • Depression     \"BPD\"   • Diabetes ([facility]) 2019    insulin   • Diabetic neuropathy ([facility])    • Esophageal spasm    • GERD (gastroesophageal reflux disease)    • Hashimoto's disease    • Hiatal hernia    • High cholesterol    • Hyperlipidemia    • Hypothyroid    • Sleep apnea     uses cpap   • Snoring     sleep study done   • Tachycardia        Current Outpatient Medications on File Prior to Visit   Medication Sig Dispense Refill   • acyclovir (ZOVIRAX) 800 MG Tab TAKE ONE TABLET BY MOUTH TWICE DAILY FOR 5 DAYS 10 Tab 3   • levothyroxine (SYNTHROID) 125 MCG Tab Take 1 Tab by mouth Every morning on an empty stomach. Note decreased dose 30 Tab 1   • cyclobenzaprine (FLEXERIL) 10 MG Tab TAKE 1 TABLET BY MOUTH THREE TIMES DAILY AS NEEDED 90 Tab 3   • Empagliflozin (JARDIANCE) 10 MG Tab Take 1 Tab by mouth every day. 30 Tab 3   • insulin glargine (LANTUS) 100 UNIT/ML Solution Pen-injector injection Inject 74 Units as instructed " every evening. Dispense quantity sufficient for 30 days 8 PEN 5   • cetirizine (ZYRTEC) 10 MG Tab Take 10 mg by mouth every day.     • acetaminophen (TYLENOL) 500 MG Tab Take 500-1,000 mg by mouth every 6 hours as needed.     • metformin (GLUCOPHAGE) 1000 MG tablet TAKE 1 TABLET BY MOUTH TWICE DAILY WITH MEALS 60 Tab 11   • ibuprofen (MOTRIN) 800 MG Tab TAKE 1 TABLET BY MOUTH EVERY 8 HOURS AS NEEDED 90 Tab 6   • traZODone (DESYREL) 150 MG Tab TAKE 1 TABLET BY MOUTH AT BEDTIME 90 Tab 3   • oxybutynin SR (DITROPAN-XL) 5 MG TABLET SR 24 HR TAKE 1 TABLET BY MOUTH ONCE DAILY 30 Tab 6   • VICTOZA 18 MG/3ML Solution Pen-injector injection INJECT 0.3 ML AS INSTRUCTED EVERY DAY 9 mL 5   • sumatriptan (IMITREX) 100 MG tablet TAKE 1/2 TO 1 (ONE-HALF TO ONE) TABLET BY MOUTH AS NEEDED FOR  MIGRAINE/HEADACHE 9 Tab 5   • gabapentin (NEURONTIN) 300 MG Cap TAKE 3 CAPSULES BY MOUTH AT BEDTIME 90 Cap 6   • metoprolol SR (TOPROL XL) 25 MG TABLET SR 24 HR every evening.     • omeprazole (PRILOSEC) 40 MG delayed-release capsule Take 1 Cap by mouth 2 times a day. 60 Cap 11   • risperiDONE (RISPERDAL) 0.5 MG Tab      • hydroCHLOROthiazide (HYDRODIURIL) 12.5 MG tablet TAKE 1 TABLET BY MOUTH EVERY OTHER DAY FOR LEG SWELLING (Patient taking differently: Take 12.5 mg by mouth every day.) 30 Tab 3   • estradiol (ESTRACE) 1 MG Tab Take 1 Tab by mouth every day. 30 Tab 11   • Multiple Vitamins-Minerals (MULTIVITAMIN PO) Take 1 Tab by mouth every day.     • divalproex (DEPAKOTE) 250 MG Tablet Delayed Response Take 1 Tab by mouth 2 Times a Day.     • spironolactone (ALDACTONE) 25 MG Tab Take 1 Tab by mouth every day. 30 Tab 3   • atorvastatin (LIPITOR) 20 MG Tab Take 1 Tab by mouth every day. 30 Tab    • ivabradine (CORLANOR) 5 MG Tab tablet Take 1 Tab by mouth 2 times a day, with meals. 60 Tab    • Black Cohosh 40 MG Cap Take 40 mg by mouth 2 Times a Day.     • Melatonin 10 MG Tab Take 20 mg by mouth every bedtime.       No current  facility-administered medications on file prior to visit.          BP (!) 98/60   Wt 117 kg (258 lb)   LMP 2018 (Approximate) Comment: HCG from  negative  BMI 45.71 kg/m²   Gen; ALEC CARABALLO      A/P: 48 y.o.  with vasomotor symptoms of menopause, history of DVT  Discussed her history of DVT, as well as her other comorbidities, put her at extremely increased risk for venous thromboembolism with estrogen use.  Advised her that I typically do not prescribe patients estradiol with a history of blood clots regardless of other physicians prescribing practices or patient preferences regarding acceptance of risk.  I strongly encouraged patient to try alternate FDA approved medication for vasomotor symptom control, paroxetine.  Patient is interested in trying this, but requests today a refill of her estradiol purely to get her through her move until she establishes care out of state.  We reviewed the risk of VTE including DVT, PE and death as well as stroke, patient expressed understanding.  Patient would like prescription for paroxetine today so that she may try it, discussed to wean off estrogen with 0.5mg daily then space out to every other day etc until stops.  Discussed to expect some increase in vasomotor sxs with weaning/stopping estrogen. 2 mos rx for estradiol sent to pharmacy to transition pt to new state, paroxetine also sent and pt to establish with new OB/GYN asap.        Gia Pacheco MD  [facility], Women's Health

## 2019-10-16 ENCOUNTER — TELEPHONE (OUTPATIENT)
Dept: MEDICAL GROUP | Facility: MEDICAL CENTER | Age: 49
End: 2019-10-16

## 2019-10-16 RX ORDER — INSULIN GLARGINE 100 [IU]/ML
INJECTION, SOLUTION SUBCUTANEOUS
Qty: 30 ML | Refills: 2 | Status: SHIPPED | OUTPATIENT
Start: 2019-10-16 | End: 2020-02-12 | Stop reason: SDUPTHER

## 2019-10-17 ENCOUNTER — PATIENT MESSAGE (OUTPATIENT)
Dept: MEDICAL GROUP | Facility: MEDICAL CENTER | Age: 49
End: 2019-10-17

## 2019-10-17 NOTE — TELEPHONE ENCOUNTER
Received notification that Lantus Is not being covered by insurance. Basaglar kwikpen 100unit 30ml per 30 day is covered by insurance. Prior auth ? Or change.

## 2019-10-28 DIAGNOSIS — E11.42 DIABETIC POLYNEUROPATHY ASSOCIATED WITH TYPE 2 DIABETES MELLITUS (HCC): ICD-10-CM

## 2019-10-29 RX ORDER — GABAPENTIN 300 MG/1
CAPSULE ORAL
Qty: 90 CAP | Refills: 0 | Status: SHIPPED | OUTPATIENT
Start: 2019-10-29 | End: 2019-12-02 | Stop reason: SDUPTHER

## 2019-12-02 ENCOUNTER — PATIENT MESSAGE (OUTPATIENT)
Dept: MEDICAL GROUP | Facility: MEDICAL CENTER | Age: 49
End: 2019-12-02

## 2019-12-02 ENCOUNTER — PATIENT MESSAGE (OUTPATIENT)
Dept: HEALTH INFORMATION MANAGEMENT | Facility: OTHER | Age: 49
End: 2019-12-02

## 2019-12-02 DIAGNOSIS — E11.42 DIABETIC POLYNEUROPATHY ASSOCIATED WITH TYPE 2 DIABETES MELLITUS (HCC): ICD-10-CM

## 2019-12-02 NOTE — PATIENT COMMUNICATION
Was the patient seen in the last year in this department? Yes    Does patient have an active prescription for medications requested? Yes    Received Request Via: patient

## 2019-12-03 RX ORDER — GABAPENTIN 300 MG/1
CAPSULE ORAL
Qty: 270 CAP | Refills: 0 | Status: SHIPPED | OUTPATIENT
Start: 2019-12-03 | End: 2020-02-12 | Stop reason: SDUPTHER

## 2020-02-10 ENCOUNTER — PATIENT MESSAGE (OUTPATIENT)
Dept: MEDICAL GROUP | Facility: MEDICAL CENTER | Age: 50
End: 2020-02-10

## 2020-02-10 DIAGNOSIS — K21.9 GASTROESOPHAGEAL REFLUX DISEASE, ESOPHAGITIS PRESENCE NOT SPECIFIED: ICD-10-CM

## 2020-02-10 DIAGNOSIS — N39.41 URGE INCONTINENCE: ICD-10-CM

## 2020-02-10 DIAGNOSIS — G43.009 MIGRAINE WITHOUT AURA AND WITHOUT STATUS MIGRAINOSUS, NOT INTRACTABLE: ICD-10-CM

## 2020-02-10 DIAGNOSIS — E11.9 TYPE 2 DIABETES MELLITUS WITHOUT COMPLICATION, WITH LONG-TERM CURRENT USE OF INSULIN (HCC): ICD-10-CM

## 2020-02-10 DIAGNOSIS — N95.1 VASOMOTOR SYMPTOMS DUE TO MENOPAUSE: ICD-10-CM

## 2020-02-10 DIAGNOSIS — I95.1 ORTHOSTATIC HYPOTENSION: ICD-10-CM

## 2020-02-10 DIAGNOSIS — Z79.4 TYPE 2 DIABETES MELLITUS WITHOUT COMPLICATION, WITH LONG-TERM CURRENT USE OF INSULIN (HCC): ICD-10-CM

## 2020-02-10 DIAGNOSIS — I10 ESSENTIAL HYPERTENSION: ICD-10-CM

## 2020-02-10 DIAGNOSIS — R00.0 SINUS TACHYCARDIA: ICD-10-CM

## 2020-02-10 DIAGNOSIS — E11.42 DIABETIC POLYNEUROPATHY ASSOCIATED WITH TYPE 2 DIABETES MELLITUS (HCC): ICD-10-CM

## 2020-02-10 DIAGNOSIS — G89.29 CHRONIC BILATERAL LOW BACK PAIN WITH LEFT-SIDED SCIATICA: ICD-10-CM

## 2020-02-10 DIAGNOSIS — M54.42 CHRONIC BILATERAL LOW BACK PAIN WITH LEFT-SIDED SCIATICA: ICD-10-CM

## 2020-02-10 DIAGNOSIS — F51.01 PRIMARY INSOMNIA: ICD-10-CM

## 2020-02-12 RX ORDER — IBUPROFEN 800 MG/1
800 TABLET ORAL EVERY 8 HOURS PRN
Qty: 60 TAB | Refills: 5 | Status: SHIPPED | OUTPATIENT
Start: 2020-02-12 | End: 2020-08-20 | Stop reason: SDUPTHER

## 2020-02-12 RX ORDER — METOPROLOL SUCCINATE 25 MG/1
25 TABLET, EXTENDED RELEASE ORAL EVERY EVENING
Qty: 30 TAB | Refills: 5 | Status: SHIPPED | OUTPATIENT
Start: 2020-02-12 | End: 2020-08-20 | Stop reason: SDUPTHER

## 2020-02-12 RX ORDER — TRAZODONE HYDROCHLORIDE 150 MG/1
TABLET ORAL
Qty: 30 TAB | Refills: 5 | Status: SHIPPED | OUTPATIENT
Start: 2020-02-12 | End: 2020-08-20 | Stop reason: SDUPTHER

## 2020-02-12 RX ORDER — SPIRONOLACTONE 25 MG/1
25 TABLET ORAL DAILY
Qty: 30 TAB | Refills: 5 | Status: SHIPPED | OUTPATIENT
Start: 2020-02-12 | End: 2020-09-17 | Stop reason: SDUPTHER

## 2020-02-12 RX ORDER — CYCLOBENZAPRINE HCL 10 MG
TABLET ORAL
Qty: 90 TAB | Refills: 5 | Status: SHIPPED | OUTPATIENT
Start: 2020-02-12 | End: 2020-09-17 | Stop reason: SDUPTHER

## 2020-02-12 RX ORDER — PAROXETINE 10 MG/1
10 TABLET, FILM COATED ORAL DAILY
Qty: 30 TAB | Refills: 5 | Status: SHIPPED
Start: 2020-02-12 | End: 2020-02-21

## 2020-02-12 RX ORDER — OMEPRAZOLE 40 MG/1
40 CAPSULE, DELAYED RELEASE ORAL 2 TIMES DAILY
Qty: 60 CAP | Refills: 5 | Status: SHIPPED | OUTPATIENT
Start: 2020-02-12 | End: 2020-08-20 | Stop reason: SDUPTHER

## 2020-02-12 RX ORDER — UBIQUINOL 100 MG
CAPSULE ORAL
Qty: 100 EACH | Refills: 11 | Status: SHIPPED | OUTPATIENT
Start: 2020-02-12 | End: 2021-10-02 | Stop reason: SDUPTHER

## 2020-02-12 RX ORDER — ESTRADIOL 1 MG/1
1 TABLET ORAL DAILY
Qty: 30 TAB | Refills: 5 | Status: SHIPPED | OUTPATIENT
Start: 2020-02-12 | End: 2020-08-20 | Stop reason: SDUPTHER

## 2020-02-12 RX ORDER — ATORVASTATIN CALCIUM 20 MG/1
20 TABLET, FILM COATED ORAL DAILY
Qty: 30 TAB | Refills: 5 | Status: SHIPPED | OUTPATIENT
Start: 2020-02-12 | End: 2020-08-20 | Stop reason: SDUPTHER

## 2020-02-12 RX ORDER — GABAPENTIN 300 MG/1
CAPSULE ORAL
Qty: 90 CAP | Refills: 5 | Status: SHIPPED | OUTPATIENT
Start: 2020-02-12 | End: 2020-08-20 | Stop reason: SDUPTHER

## 2020-02-12 RX ORDER — INSULIN GLARGINE 100 [IU]/ML
INJECTION, SOLUTION SUBCUTANEOUS
Qty: 30 ML | Refills: 5 | Status: SHIPPED | OUTPATIENT
Start: 2020-02-12 | End: 2020-09-17 | Stop reason: SDUPTHER

## 2020-02-12 RX ORDER — HYDROCHLOROTHIAZIDE 12.5 MG/1
12.5 TABLET ORAL DAILY
Qty: 30 TAB | Refills: 5 | Status: SHIPPED | OUTPATIENT
Start: 2020-02-12 | End: 2020-09-17 | Stop reason: SDUPTHER

## 2020-02-12 RX ORDER — IVABRADINE 5 MG/1
5 TABLET, FILM COATED ORAL 2 TIMES DAILY WITH MEALS
Qty: 60 TAB | Refills: 5 | Status: SHIPPED | OUTPATIENT
Start: 2020-02-12 | End: 2020-09-17

## 2020-02-12 RX ORDER — OXYBUTYNIN CHLORIDE 5 MG/1
5 TABLET, EXTENDED RELEASE ORAL DAILY
Qty: 30 TAB | Refills: 5 | Status: SHIPPED
Start: 2020-02-12 | End: 2020-02-21

## 2020-02-12 RX ORDER — EMPAGLIFLOZIN 10 MG/1
1 TABLET, FILM COATED ORAL DAILY
Qty: 30 TAB | Refills: 5 | Status: SHIPPED | OUTPATIENT
Start: 2020-02-12 | End: 2020-08-17 | Stop reason: SDUPTHER

## 2020-02-12 RX ORDER — LIRAGLUTIDE 6 MG/ML
1.8 INJECTION SUBCUTANEOUS DAILY
Qty: 9 PEN | Refills: 5 | Status: SHIPPED | OUTPATIENT
Start: 2020-02-12 | End: 2020-08-17 | Stop reason: SDUPTHER

## 2020-02-12 RX ORDER — SUMATRIPTAN 100 MG/1
TABLET, FILM COATED ORAL
Qty: 9 TAB | Refills: 5 | Status: SHIPPED | OUTPATIENT
Start: 2020-02-12 | End: 2020-09-17 | Stop reason: SDUPTHER

## 2020-02-12 RX ORDER — LEVOTHYROXINE SODIUM 0.12 MG/1
125 TABLET ORAL
Qty: 30 TAB | Refills: 5 | Status: SHIPPED | OUTPATIENT
Start: 2020-02-12 | End: 2020-02-25 | Stop reason: SDUPTHER

## 2020-02-13 ENCOUNTER — TELEPHONE (OUTPATIENT)
Dept: MEDICAL GROUP | Facility: MEDICAL CENTER | Age: 50
End: 2020-02-13

## 2020-02-13 NOTE — TELEPHONE ENCOUNTER
MEDICATION PRIOR AUTHORIZATION NEEDED:    1. Name of Medication: Corlanor    2. Requested By ([facility] of [facility]): [facility]     3. Is insurance on file current? yes    4. What is the name & phone number of the 3rd party payor? Iglesia mejia auth filed with cover my meds

## 2020-02-18 ENCOUNTER — TELEPHONE (OUTPATIENT)
Dept: MEDICAL GROUP | Facility: MEDICAL CENTER | Age: 50
End: 2020-02-18

## 2020-02-18 NOTE — TELEPHONE ENCOUNTER
Please call patient and let her know that the Corlanor has been denied by insurance and she will need to get this through her cardiologist's office.    Zuleima Almeida M.D.

## 2020-02-18 NOTE — TELEPHONE ENCOUNTER
FINAL PRIOR AUTHORIZATION STATUS:    1.  Name of Medication & Dose: Corlanor 5 mg tablet      2. Prior Auth Status: Denied.  Reason: only covered through certain illness under [place] [facility] 2, 3,4 Please see document scanned into media    3. Action Taken: Pharmacy Notified: yes Patient Notified: yes

## 2020-02-19 ENCOUNTER — APPOINTMENT (OUTPATIENT)
Dept: MEDICAL GROUP | Facility: MEDICAL CENTER | Age: 50
End: 2020-02-19
Attending: INTERNAL MEDICINE
Payer: MEDICAID

## 2020-02-21 ENCOUNTER — OFFICE VISIT (OUTPATIENT)
Dept: MEDICAL GROUP | Facility: MEDICAL CENTER | Age: 50
End: 2020-02-21
Attending: INTERNAL MEDICINE
Payer: MEDICAID

## 2020-02-21 ENCOUNTER — HOSPITAL ENCOUNTER (OUTPATIENT)
Dept: LAB | Facility: MEDICAL CENTER | Age: 50
End: 2020-02-21
Attending: INTERNAL MEDICINE
Payer: MEDICAID

## 2020-02-21 VITALS
WEIGHT: 224.5 LBS | DIASTOLIC BLOOD PRESSURE: 68 MMHG | HEART RATE: 90 BPM | SYSTOLIC BLOOD PRESSURE: 90 MMHG | HEIGHT: 63 IN | BODY MASS INDEX: 39.78 KG/M2 | RESPIRATION RATE: 16 BRPM | OXYGEN SATURATION: 99 % | TEMPERATURE: 97.1 F

## 2020-02-21 DIAGNOSIS — E11.9 TYPE 2 DIABETES MELLITUS WITHOUT COMPLICATION, WITH LONG-TERM CURRENT USE OF INSULIN (HCC): ICD-10-CM

## 2020-02-21 DIAGNOSIS — Z79.4 TYPE 2 DIABETES MELLITUS WITHOUT COMPLICATION, WITH LONG-TERM CURRENT USE OF INSULIN (HCC): ICD-10-CM

## 2020-02-21 DIAGNOSIS — E03.8 HYPOTHYROIDISM DUE TO HASHIMOTO'S THYROIDITIS: ICD-10-CM

## 2020-02-21 DIAGNOSIS — E06.3 HYPOTHYROIDISM DUE TO HASHIMOTO'S THYROIDITIS: ICD-10-CM

## 2020-02-21 DIAGNOSIS — M48.061 NEUROFORAMINAL STENOSIS OF LUMBAR SPINE: ICD-10-CM

## 2020-02-21 DIAGNOSIS — R25.1 TREMOR: ICD-10-CM

## 2020-02-21 DIAGNOSIS — M54.42 CHRONIC BILATERAL LOW BACK PAIN WITH LEFT-SIDED SCIATICA: ICD-10-CM

## 2020-02-21 DIAGNOSIS — N39.41 URGE INCONTINENCE: ICD-10-CM

## 2020-02-21 DIAGNOSIS — G89.29 CHRONIC BILATERAL LOW BACK PAIN WITH LEFT-SIDED SCIATICA: ICD-10-CM

## 2020-02-21 PROBLEM — K29.70 GASTRITIS: Status: RESOLVED | Noted: 2019-08-30 | Resolved: 2020-02-21

## 2020-02-21 LAB
HBA1C MFR BLD: 5.6 % (ref 0–5.6)
INT CON NEG: NEGATIVE
INT CON POS: POSITIVE

## 2020-02-21 PROCEDURE — 36415 COLL VENOUS BLD VENIPUNCTURE: CPT

## 2020-02-21 PROCEDURE — 99213 OFFICE O/P EST LOW 20 MIN: CPT | Performed by: INTERNAL MEDICINE

## 2020-02-21 PROCEDURE — 99214 OFFICE O/P EST MOD 30 MIN: CPT | Performed by: INTERNAL MEDICINE

## 2020-02-21 PROCEDURE — 84443 ASSAY THYROID STIM HORMONE: CPT

## 2020-02-21 PROCEDURE — 84439 ASSAY OF FREE THYROXINE: CPT

## 2020-02-21 PROCEDURE — 83036 HEMOGLOBIN GLYCOSYLATED A1C: CPT | Performed by: INTERNAL MEDICINE

## 2020-02-21 RX ORDER — OXYBUTYNIN CHLORIDE 10 MG/1
10 TABLET, EXTENDED RELEASE ORAL DAILY
Qty: 30 TAB | Refills: 5 | Status: SHIPPED | OUTPATIENT
Start: 2020-02-21 | End: 2020-09-17 | Stop reason: SDUPTHER

## 2020-02-21 RX ORDER — TRAMADOL HYDROCHLORIDE 50 MG/1
50 TABLET ORAL EVERY 4 HOURS PRN
Qty: 42 TAB | Refills: 0 | Status: SHIPPED | OUTPATIENT
Start: 2020-02-21 | End: 2020-02-28

## 2020-02-21 ASSESSMENT — PAIN SCALES - GENERAL: PAINLEVEL: NO PAIN

## 2020-02-21 NOTE — ASSESSMENT & PLAN NOTE
She reports losing about 30 pounds over the past few months.  She continues to take her Victoza daily as well as her Jardiance.  She is also using 76 units of insulin nightly.  Has been checking her blood sugars in the mornings and reports they are usually in the 90-1 20 range.  She also checks blood sugar before she gives herself her long-acting insulin at night.  If it is under 100 she does not give the insulin.  She recently started seeing a dietitian and taking a diabetes education class through [facility].

## 2020-02-21 NOTE — ASSESSMENT & PLAN NOTE
She reports for about a year she has had a tremor in her hands and feet.  The left hand seems to be the most affected.  She states that it can come on at random.  She has not identified any triggering events.  She has not found it to be associated with her blood sugar.  She states it can occur both at rest and when she is trying to do things.  She describes it as a fine shaking of both of her hands.  She also notices it in her feet and legs especially when she is seated.  She denies any muscle rigidity, changes in gait, changes in memory, recent falls, headaches, changes in vision or hearing.

## 2020-02-21 NOTE — ASSESSMENT & PLAN NOTE
She reports that over the past several months, she has had increased urinary urgency with some incontinence episodes.  She did have a total hysterectomy 1 year ago with Dr. Lainez and had a bladder sling at that time.  She has been taking oxybutynin 5 mg daily which worked initially but states that it has become less effective.  She would like to try an increased dose.  She denies dysuria, suprapubic pain, fevers, chills.

## 2020-02-22 LAB
T4 FREE SERPL-MCNC: 0.66 NG/DL (ref 0.53–1.43)
TSH SERPL DL<=0.005 MIU/L-ACNC: 15.77 UIU/ML (ref 0.38–5.33)

## 2020-02-22 NOTE — PROGRESS NOTES
Subjective:   Sarah Friedman is a 49 y.o. female here today for incontinence, tremor, follow-up diabetes, medication review    Urge incontinence  She reports that over the past several months, she has had increased urinary urgency with some incontinence episodes.  She did have a total hysterectomy 1 year ago with Dr. Lainez and had a bladder sling at that time.  She has been taking oxybutynin 5 mg daily which worked initially but states that it has become less effective.  She would like to try an increased dose.  She denies dysuria, suprapubic pain, fevers, chills.      Tremor  She reports for about a year she has had a tremor in her hands and feet.  The left hand seems to be the most affected.  She states that it can come on at random.  She has not identified any triggering events.  She has not found it to be associated with her blood sugar.  She states it can occur both at rest and when she is trying to do things.  She describes it as a fine shaking of both of her hands.  She also notices it in her feet and legs especially when she is seated.  She denies any muscle rigidity, changes in gait, changes in memory, recent falls, headaches, changes in vision or hearing.    Type 2 diabetes mellitus without complication (HCC)  She reports losing about 30 pounds over the past few months.  She continues to take her Victoza daily as well as her Jardiance.  She is also using 76 units of insulin nightly.  Has been checking her blood sugars in the mornings and reports they are usually in the 90-1 20 range.  She also checks blood sugar before she gives herself her long-acting insulin at night.  If it is under 100 she does not give the insulin.  She recently started seeing a dietitian and taking a diabetes education class through [facility].      Neuroforaminal stenosis of lumbar spine  She continues to suffer with intermittent episodes of low back pain.  She will occasionally use tramadol when the pain is severe.  Usually a  prescription for 42 tablets lasts her several months.  She is also taking gabapentin 300 mg 3 times daily which helps quite a bit.  She is requesting a refill on her tramadol today.  Her last fill was on 9/30/2019.    Hypothyroidism due to Hashimoto's thyroiditis  She continues on levothyroxine 125 mcg daily.  Her last TSH checked in October 2019 was slightly low and we did decrease her dose at that time.  She has not had a follow-up TSH since then.         Current medicines (including changes today)  Current Outpatient Medications   Medication Sig Dispense Refill   • tramadol (ULTRAM) 50 MG Tab Take 1 Tab by mouth every four hours as needed for Severe Pain for up to 7 days. 42 Tab 0   • oxybutynin SR (DITROPAN-XL) 10 MG CR tablet Take 1 Tab by mouth every day. 30 Tab 5   • atorvastatin (LIPITOR) 20 MG Tab Take 1 Tab by mouth every day. 30 Tab 5   • cyclobenzaprine (FLEXERIL) 10 MG Tab TAKE 1 TABLET BY MOUTH THREE TIMES DAILY AS NEEDED 90 Tab 5   • Empagliflozin (JARDIANCE) 10 MG Tab Take 1 Tab by mouth every day. 30 Tab 5   • estradiol (ESTRACE) 1 MG Tab Take 1 Tab by mouth every day. 30 Tab 5   • gabapentin (NEURONTIN) 300 MG Cap TAKE 3 CAPSULES BY MOUTH AT BEDTIME 90 Cap 5   • hydroCHLOROthiazide (HYDRODIURIL) 12.5 MG tablet Take 1 Tab by mouth every day. 30 Tab 5   • ibuprofen (MOTRIN) 800 MG Tab Take 1 Tab by mouth every 8 hours as needed for Moderate Pain. 60 Tab 5   • insulin glargine (INSULIN GLARGINE) 100 UNIT/ML Solution Pen-injector injection Use to inject 74 u every evening.  Dispense quantity sufficient for 30 days.  To replace lantus 30 mL 5   • ivabradine (CORLANOR) 5 MG Tab tablet Take 1 Tab by mouth 2 times a day, with meals. 60 Tab 5   • levothyroxine (SYNTHROID) 125 MCG Tab Take 1 Tab by mouth Every morning on an empty stomach. Note decreased dose 30 Tab 5   • metformin (GLUCOPHAGE) 1000 MG tablet TAKE 1 TABLET BY MOUTH TWICE DAILY WITH MEALS 60 Tab 11   • metoprolol SR (TOPROL XL) 25 MG TABLET SR  24 HR Take 1 Tab by mouth every evening. 30 Tab 5   • omeprazole (PRILOSEC) 40 MG delayed-release capsule Take 1 Cap by mouth 2 times a day. 60 Cap 5   • spironolactone (ALDACTONE) 25 MG Tab Take 1 Tab by mouth every day. 30 Tab 5   • sumatriptan (IMITREX) 100 MG tablet TAKE 1/2 TO 1 (ONE-HALF TO ONE) TABLET BY MOUTH AS NEEDED FOR  MIGRAINE/HEADACHE 9 Tab 5   • traZODone (DESYREL) 150 MG Tab TAKE 1 TABLET BY MOUTH AT BEDTIME 30 Tab 5   • liraglutide (VICTOZA) 18 MG/3ML Solution Pen-injector Inject 1.8 mg as instructed every day. 9 PEN 5   • glucose blood strip Use to test blood sugar TID or PRN sx high or low blood sugar 100 Strip 11   • Alcohol Swabs (ALCOHOL PREP) 70 % Pads Use three times daily to clean finger before blood glucose testing 100 Each 11   • acyclovir (ZOVIRAX) 800 MG Tab TAKE ONE TABLET BY MOUTH TWICE DAILY FOR 5 DAYS 10 Tab 3   • cetirizine (ZYRTEC) 10 MG Tab Take 10 mg by mouth every day.     • acetaminophen (TYLENOL) 500 MG Tab Take 500-1,000 mg by mouth every 6 hours as needed.     • risperiDONE (RISPERDAL) 0.5 MG Tab      • Multiple Vitamins-Minerals (MULTIVITAMIN PO) Take 1 Tab by mouth every day.     • divalproex (DEPAKOTE) 250 MG Tablet Delayed Response Take 1 Tab by mouth 2 Times a Day.     • Black Cohosh 40 MG Cap Take 40 mg by mouth 2 Times a Day.     • Melatonin 10 MG Tab Take 20 mg by mouth every bedtime.       No current facility-administered medications for this visit.      She  has a past medical history of Anesthesia (08/27/2019), Arrhythmia, Arthritis, Blood clotting disorder ([facility]) (2005), Bowel habit changes, Depression, Diabetes ([facility]) (08/2019), Diabetic neuropathy ([facility]), Esophageal spasm, GERD (gastroesophageal reflux disease), Hashimoto's disease, Hiatal hernia, High cholesterol, Hyperlipidemia, Hypothyroid, Sleep apnea, Snoring, and Tachycardia.    ROS   Denies chest pain, shortness of breath  As above in HPI     Objective:     Vitals:    02/21/20 1505   BP: (!) 90/68   Pulse:  90   Resp: 16   Temp: 36.2 °C (97.1 °F)   SpO2: 99%     Body mass index is 39.78 kg/m².   Physical Exam:  Constitutional: Alert, no distress.  Skin: Warm, dry, good turgor, no rashes in visible areas.  Eye: Equal, round and reactive, conjunctiva clear, lids normal.  ENMT: Lips without lesions, good dentition, oropharynx clear, TM's clear bilaterally  Neck: Trachea midline, no masses, no thyromegaly. No cervical or supraclavicular lymphadenopathy  Respiratory: Unlabored respiratory effort, lungs clear to auscultation, no wheezes, no ronchi.  Cardiovascular: Regular rate and rhythm, no murmurs appreciated, no lower extremity edema  Abdomen: Soft, non-tender, no masses, no hepatosplenomegaly.  Psych: Alert and oriented x3, normal affect and mood.  Neuro: Cranial nerves II through XII grossly intact, no cogwheeling in upper or lower extremities, sensation to light touch grossly intact over upper and lower extremities, normal gait, no tremor elicited with finger-to-nose testing or rapid alternating movements    Results and Imaging:   POC A1c in clinic today is 5.6    Assessment and Plan:   The following treatment plan was discussed    1. Type 2 diabetes mellitus without complication, with long-term current use of insulin (HCC)  Stable, very well controlled.  Patient prefers to stay on her current medications and since she is not having any hypoglycemic episodes I think this is okay.  We did discuss that it is okay to hold her insulin if her blood sugars under 100 when she tests at night.  -Continue Jardiance 10 mg daily, Victoza  -May continue insulin for now however she can hold it on nights when her blood sugars are low and we will start working on weaning her down if she maintains a low A1c at her next visit in 3-month  - POCT  A1C    2. Neuroforaminal stenosis of lumbar spine  Stable, controlled with as needed tramadol which I have refilled today  - tramadol (ULTRAM) 50 MG Tab; Take 1 Tab by mouth every four hours as  needed for Severe Pain for up to 7 days.  Dispense: 42 Tab; Refill: 0    3. Chronic bilateral low back pain with left-sided sciatica  - tramadol (ULTRAM) 50 MG Tab; Take 1 Tab by mouth every four hours as needed for Severe Pain for up to 7 days.  Dispense: 42 Tab; Refill: 0    4. Urge incontinence  Has worsened lately.  We will increase her oxybutynin to 10 mg.  If this is not effective, she is interested in another visit with gynecology given her recent bladder sling.  - oxybutynin SR (DITROPAN-XL) 10 MG CR tablet; Take 1 Tab by mouth every day.  Dispense: 30 Tab; Refill: 5    5. Tremor  We discussed the possibility that this could be a medication side effect given she is on such an extensive medication regimen.  She does not have any physical exam findings to suggest early Parkinson's disease.  We will make sure she is not hyperthyroid with a repeat TSH.  I have referred her to neurology for further work-up.  - REFERRAL TO NEUROLOGY    6. Hypothyroidism due to Hashimoto's thyroiditis  -Continue levothyroxine 125 mcg daily  - TSH WITH REFLEX TO FT4; Future        Followup: Return in about 3 months (around 5/21/2020), or if symptoms worsen or fail to improve.

## 2020-02-22 NOTE — ASSESSMENT & PLAN NOTE
She continues to suffer with intermittent episodes of low back pain.  She will occasionally use tramadol when the pain is severe.  Usually a prescription for 42 tablets lasts her several months.  She is also taking gabapentin 300 mg 3 times daily which helps quite a bit.  She is requesting a refill on her tramadol today.  Her last fill was on 9/30/2019.

## 2020-02-22 NOTE — ASSESSMENT & PLAN NOTE
She continues on levothyroxine 125 mcg daily.  Her last TSH checked in October 2019 was slightly low and we did decrease her dose at that time.  She has not had a follow-up TSH since then.

## 2020-02-25 DIAGNOSIS — E06.3 HYPOTHYROIDISM DUE TO HASHIMOTO'S THYROIDITIS: ICD-10-CM

## 2020-02-25 DIAGNOSIS — E03.8 HYPOTHYROIDISM DUE TO HASHIMOTO'S THYROIDITIS: ICD-10-CM

## 2020-02-25 RX ORDER — LEVOTHYROXINE SODIUM 137 UG/1
TABLET ORAL
Qty: 8 TAB | Refills: 3 | Status: SHIPPED | OUTPATIENT
Start: 2020-02-25 | End: 2020-06-15

## 2020-02-25 RX ORDER — LEVOTHYROXINE SODIUM 0.12 MG/1
TABLET ORAL
Qty: 20 TAB | Refills: 3 | Status: SHIPPED | OUTPATIENT
Start: 2020-02-25 | End: 2020-07-24

## 2020-03-07 ENCOUNTER — PATIENT MESSAGE (OUTPATIENT)
Dept: MEDICAL GROUP | Facility: MEDICAL CENTER | Age: 50
End: 2020-03-07

## 2020-03-10 RX ORDER — POLYETHYLENE GLYCOL 3350 17 G/17G
17 POWDER, FOR SOLUTION ORAL
Qty: 1 BOTTLE | Refills: 5 | Status: SHIPPED | OUTPATIENT
Start: 2020-03-10 | End: 2020-09-17 | Stop reason: SDUPTHER

## 2020-03-19 ENCOUNTER — TELEPHONE (OUTPATIENT)
Dept: MEDICAL GROUP | Facility: MEDICAL CENTER | Age: 50
End: 2020-03-19

## 2020-03-20 ENCOUNTER — PATIENT MESSAGE (OUTPATIENT)
Dept: MEDICAL GROUP | Facility: MEDICAL CENTER | Age: 50
End: 2020-03-20

## 2020-03-20 ENCOUNTER — TELEPHONE (OUTPATIENT)
Dept: MEDICAL GROUP | Facility: MEDICAL CENTER | Age: 50
End: 2020-03-20

## 2020-03-20 DIAGNOSIS — Z79.4 TYPE 2 DIABETES MELLITUS WITHOUT COMPLICATION, WITH LONG-TERM CURRENT USE OF INSULIN (HCC): ICD-10-CM

## 2020-03-20 DIAGNOSIS — E11.9 TYPE 2 DIABETES MELLITUS WITHOUT COMPLICATION, WITH LONG-TERM CURRENT USE OF INSULIN (HCC): ICD-10-CM

## 2020-03-20 DIAGNOSIS — E03.8 HYPOTHYROIDISM DUE TO HASHIMOTO'S THYROIDITIS: ICD-10-CM

## 2020-03-20 DIAGNOSIS — E06.3 HYPOTHYROIDISM DUE TO HASHIMOTO'S THYROIDITIS: ICD-10-CM

## 2020-03-20 NOTE — TELEPHONE ENCOUNTER
MEDICATION PRIOR AUTHORIZATION NEEDED:    1. Name of Medication: omeprazole 40 mg dr cap     2. Requested By ([facility] of [facility]): [facility]     3. Is insurance on file current? Yes     4. What is the name & phone number of the 3rd party payor? Iglesia Barron via cover my meds

## 2020-03-20 NOTE — TELEPHONE ENCOUNTER
FINAL PRIOR AUTHORIZATION STATUS:    1.  Name of Medication & Dose: omeprazole      2. Prior Auth Status: Approved through 3/20/21     3. Action Taken: Pharmacy Notified: yes Patient Notified: yes

## 2020-03-24 NOTE — TELEPHONE ENCOUNTER
From: Sarah Friedman  To: Zuleima Almeida M.D.  Sent: 3/20/2020 4:54 PM PDT  Subject: Non-Urgent Medical Question    Rodney Almeida... Actually the reason I was asking for a referral to endo was for my thyroid issues, not for diabetic reasons. I'm just concerned about my TSH and such levels. I'm losing hair a lot faster lately and it's worrying me. Now that I've crossed the threshold of 49 yrs old, I wonder what differently can be done for me, if anything. My mom had thyroid cancer and I worry about that. As far as Nisha ... I saw her before and was hoping to get to see her again. I wouldn't mind talking to the pharmacist that you mentioned, but I do have a good history with Nisha. It's worth a shot at [facility] if possible. Thanks so much!      ----- Message -----   From:Zuleima Almeida M.D.   Sent:3/10/2020 7:46 AM PDT   To:Sarah Friedman   Subject:RE: Non-Urgent Medical Question    Rodney Smith,  I think at this point because your A1c is so well controlled that you probably do not need to see endocrinology. Since you were in town last, they lost another provider and they are really shortstaffed so they are rejecting a lot of referrals. I am afraid they might not see you because you are so well controlled. However, if you are interested in seeing someone for diabetes management and really good diet education, we now have a diabetic pharmacist that works at our clinic. She is excellent and has helped out quite a few of our patients. She has plenty of room to see new patients and I would be happy to set you up with her. She does quite a bit of diet education as well as medication management and medication education. If you would like me to do that, let me know and I will put in a referral for her. I am happy to send over the MiraLAX for you today.    Zuleima Almeida M.D.      ----- Message -----   From:Sarah Friedman   Sent:3/7/2020 7:53 PM PST   To:Zuleima Almeida M.D.    Subject:Non-Urgent Medical Question    Hi Dr. Almeida! I would greatly appreciate it if you would send over 2 separate referrals to [facility]: 1 for me to be seen by an Endocrinologist, and the other to be seen by their nutritionist, Nisha, the same one I saw before. I would also appreciate it if you could send a script over to [facility] for the prescription version of Miralax, the laxative/stool softener, the clear version. I'm currently using the OTC version and it's not that potent. Thank you for your help.  Sarah Friedman

## 2020-04-10 ENCOUNTER — PATIENT MESSAGE (OUTPATIENT)
Dept: MEDICAL GROUP | Facility: MEDICAL CENTER | Age: 50
End: 2020-04-10

## 2020-04-10 DIAGNOSIS — E11.9 TYPE 2 DIABETES MELLITUS WITHOUT COMPLICATION, WITH LONG-TERM CURRENT USE OF INSULIN (HCC): ICD-10-CM

## 2020-04-10 DIAGNOSIS — Z79.4 TYPE 2 DIABETES MELLITUS WITHOUT COMPLICATION, WITH LONG-TERM CURRENT USE OF INSULIN (HCC): ICD-10-CM

## 2020-04-10 RX ORDER — PEN NEEDLE, DIABETIC 30 GX3/16"
1 NEEDLE, DISPOSABLE MISCELLANEOUS 2 TIMES DAILY
Qty: 100 EACH | Refills: 3 | Status: SHIPPED | OUTPATIENT
Start: 2020-04-10 | End: 2020-09-17 | Stop reason: SDUPTHER

## 2020-04-10 NOTE — TELEPHONE ENCOUNTER
From: Sarah Friedman  To: Zuleima Almeida M.D.  Sent: 4/10/2020 10:23 AM PDT  Subject: Prescription Question    Hi Dr. Almeida! Hope you're not too busy with this coronavirus thing going on. Would you please send over a new script for pen needles for me, to [facility]? I'm not that far from running out of them right now. And I can't remember: did you send a referral over to [facility] for me regarding Endocrinology? I know they're not going to be able to see me right now, but I just wanted to get the referral out of the way. Thanks for everything! Sarah

## 2020-05-05 ENCOUNTER — PATIENT MESSAGE (OUTPATIENT)
Dept: MEDICAL GROUP | Facility: MEDICAL CENTER | Age: 50
End: 2020-05-05

## 2020-05-05 DIAGNOSIS — N30.00 ACUTE CYSTITIS WITHOUT HEMATURIA: ICD-10-CM

## 2020-05-06 RX ORDER — NITROFURANTOIN 25; 75 MG/1; MG/1
100 CAPSULE ORAL 2 TIMES DAILY
Qty: 10 CAP | Refills: 0 | Status: SHIPPED | OUTPATIENT
Start: 2020-05-06 | End: 2020-05-11

## 2020-05-10 ENCOUNTER — PATIENT MESSAGE (OUTPATIENT)
Dept: MEDICAL GROUP | Facility: MEDICAL CENTER | Age: 50
End: 2020-05-10

## 2020-05-10 DIAGNOSIS — N30.00 ACUTE CYSTITIS WITHOUT HEMATURIA: ICD-10-CM

## 2020-05-12 RX ORDER — SULFAMETHOXAZOLE AND TRIMETHOPRIM 800; 160 MG/1; MG/1
1 TABLET ORAL 2 TIMES DAILY
Qty: 6 TAB | Refills: 0 | Status: SHIPPED | OUTPATIENT
Start: 2020-05-12 | End: 2020-05-15

## 2020-05-12 NOTE — TELEPHONE ENCOUNTER
"From: Sarah Friedman  To: Zuleima Almeida M.D.  Sent: 5/10/2020 9:50 AM PDT  Subject: Prescription Question    Hi again... Unfortunately, the med you prescribed for me has not worked. The burning has lessened, but the odor is still very strong and the cloudiness is still there. In the past, I've had Bactrim and Cipro given to me and both have worked to get rid of this. Perhaps one of those would be better? I don't know why my body didn't respond to the one you gave me, but I'm ready to be rid of this, let me tell you. LOL!! Hope you're doing well. Sarah.      ----- Message -----   From:Zuleima Almeida M.D.   Sent:5/6/2020 2:13 PM PDT   To:Sarah Friedman   Subject:RE: Prescription Question    Rodney Smith,  I agree with you, it does sound like you might have a UTI. I will send over an antibiotic for you called Macrobid. You will take it twice daily for 5 days. A Z-Sal generally does not work quite as well for UTIs, so I would recommend we use something that is a little bit more targeted towards urine infections. The Macrobid is one of the best antibiotics for UTIs. If you do not get better after completing the antibiotics then I would like to see you in the office. I will send over your prescription to [facility].    Zuleima Almeida M.D.      ----- Message -----   From:Sarah Friedman   Sent:5/5/2020 6:38 PM PDT   To:Zuleima Almeida M.D.   Subject:Prescription Question    Rodney Almeida! I'm hoping we can fix something without a visit to your office. I believe I have a UTI. I have all the symptoms: burning when I go, very strong smelling urine, and I've been feeling feverish for the last week or so. The color of my urine has been extremely cloudy. I've had these in the past, and have had a \"Z\" pack given to me to work on getting rid of it. I don't get them that often, but that's what has been prescribed to me in the past. If you feel I should still come in to be tested, please let me " know.     Thanks so much!!   Sarah Friedman

## 2020-05-27 ENCOUNTER — HOSPITAL ENCOUNTER (OUTPATIENT)
Dept: LAB | Facility: MEDICAL CENTER | Age: 50
End: 2020-05-27
Attending: INTERNAL MEDICINE
Payer: MEDICAID

## 2020-05-27 DIAGNOSIS — E03.8 HYPOTHYROIDISM DUE TO HASHIMOTO'S THYROIDITIS: ICD-10-CM

## 2020-05-27 DIAGNOSIS — E06.3 HYPOTHYROIDISM DUE TO HASHIMOTO'S THYROIDITIS: ICD-10-CM

## 2020-05-27 DIAGNOSIS — N30.00 ACUTE CYSTITIS WITHOUT HEMATURIA: ICD-10-CM

## 2020-05-27 LAB
APPEARANCE UR: CLEAR
BILIRUB UR QL STRIP.AUTO: NEGATIVE
COLOR UR: YELLOW
GLUCOSE UR STRIP.AUTO-MCNC: >=1000 MG/DL
KETONES UR STRIP.AUTO-MCNC: ABNORMAL MG/DL
LEUKOCYTE ESTERASE UR QL STRIP.AUTO: NEGATIVE
MICRO URNS: ABNORMAL
NITRITE UR QL STRIP.AUTO: NEGATIVE
PH UR STRIP.AUTO: 5.5 [PH] (ref 5–8)
PROT UR QL STRIP: NEGATIVE MG/DL
RBC UR QL AUTO: NEGATIVE
SP GR UR STRIP.AUTO: 1.03
TSH SERPL DL<=0.005 MIU/L-ACNC: 0.66 UIU/ML (ref 0.38–5.33)
UROBILINOGEN UR STRIP.AUTO-MCNC: 1 MG/DL

## 2020-05-27 PROCEDURE — 36415 COLL VENOUS BLD VENIPUNCTURE: CPT

## 2020-05-27 PROCEDURE — 81003 URINALYSIS AUTO W/O SCOPE: CPT

## 2020-05-27 PROCEDURE — 84443 ASSAY THYROID STIM HORMONE: CPT

## 2020-05-28 NOTE — RESULT ENCOUNTER NOTE
Rodney Smith,       My name is Shivani, I am a nurse practitioner working with Dr. Almeida.  I have reviewed your most recent lab results.  Your thyroid hormone level has returned to normal, so please continue your current levothyroxine (Synthroid) dose.  As far as your urinalysis, you do not have a urinary tract infection but you do have large amounts of sugar in your urine.  This can cause the burning sensation, odor, and cloudiness.  How have your blood sugars been recently?  Shivani

## 2020-05-29 DIAGNOSIS — M48.061 NEUROFORAMINAL STENOSIS OF LUMBAR SPINE: ICD-10-CM

## 2020-05-29 RX ORDER — TRAMADOL HYDROCHLORIDE 50 MG/1
50 TABLET ORAL EVERY 4 HOURS PRN
Qty: 42 TAB | Refills: 0 | Status: SHIPPED | OUTPATIENT
Start: 2020-05-29 | End: 2020-06-05

## 2020-05-29 NOTE — PROGRESS NOTES
Patient requesting a refill for tramadol. She will come into the office to sign a controlled substance agreement before receiving a script.

## 2020-06-15 DIAGNOSIS — E06.3 HYPOTHYROIDISM DUE TO HASHIMOTO'S THYROIDITIS: ICD-10-CM

## 2020-06-15 DIAGNOSIS — E03.8 HYPOTHYROIDISM DUE TO HASHIMOTO'S THYROIDITIS: ICD-10-CM

## 2020-06-15 RX ORDER — LEVOTHYROXINE SODIUM 137 UG/1
TABLET ORAL
Qty: 8 TAB | Refills: 0 | Status: SHIPPED | OUTPATIENT
Start: 2020-06-15 | End: 2020-07-24

## 2020-06-15 NOTE — TELEPHONE ENCOUNTER
Received request via: Pharmacy    Was the patient seen in the last year in this department? Yes      Does the patient have an active prescription (recently filled or refills available) for medication(s) requested? No

## 2020-07-22 ENCOUNTER — TELEPHONE (OUTPATIENT)
Dept: MEDICAL GROUP | Facility: MEDICAL CENTER | Age: 50
End: 2020-07-22

## 2020-07-22 NOTE — TELEPHONE ENCOUNTER
MEDICATION PRIOR AUTHORIZATION NEEDED:    1. Name of Medication: omeprazole    2. Requested By (Name of [facility]): [facility]     3. Is insurance on file current? yes    4. What is the name & phone number of the 3rd party payor? [employer] via cover my meds         Pt Mom called in wanting to know if she needs vaccinations.  Please Advise

## 2020-07-24 DIAGNOSIS — E06.3 HYPOTHYROIDISM DUE TO HASHIMOTO'S THYROIDITIS: ICD-10-CM

## 2020-07-24 DIAGNOSIS — E03.8 HYPOTHYROIDISM DUE TO HASHIMOTO'S THYROIDITIS: ICD-10-CM

## 2020-07-24 RX ORDER — LEVOTHYROXINE SODIUM 137 UG/1
TABLET ORAL
Qty: 8 TAB | Refills: 0 | Status: SHIPPED | OUTPATIENT
Start: 2020-07-24 | End: 2020-08-20 | Stop reason: SDUPTHER

## 2020-08-06 DIAGNOSIS — Z79.4 TYPE 2 DIABETES MELLITUS WITHOUT COMPLICATION, WITH LONG-TERM CURRENT USE OF INSULIN (HCC): ICD-10-CM

## 2020-08-06 DIAGNOSIS — E11.9 TYPE 2 DIABETES MELLITUS WITHOUT COMPLICATION, WITH LONG-TERM CURRENT USE OF INSULIN (HCC): ICD-10-CM

## 2020-08-17 ENCOUNTER — OFFICE VISIT (OUTPATIENT)
Dept: MEDICAL GROUP | Facility: MEDICAL CENTER | Age: 50
End: 2020-08-17
Payer: COMMERCIAL

## 2020-08-17 VITALS
RESPIRATION RATE: 12 BRPM | BODY MASS INDEX: 39.65 KG/M2 | TEMPERATURE: 98.3 F | WEIGHT: 223.77 LBS | HEIGHT: 63 IN | HEART RATE: 92 BPM | OXYGEN SATURATION: 91 % | DIASTOLIC BLOOD PRESSURE: 64 MMHG | SYSTOLIC BLOOD PRESSURE: 92 MMHG

## 2020-08-17 DIAGNOSIS — G89.29 CHRONIC BILATERAL LOW BACK PAIN WITH LEFT-SIDED SCIATICA: ICD-10-CM

## 2020-08-17 DIAGNOSIS — M54.42 CHRONIC BILATERAL LOW BACK PAIN WITH LEFT-SIDED SCIATICA: ICD-10-CM

## 2020-08-17 DIAGNOSIS — Z79.4 TYPE 2 DIABETES MELLITUS WITHOUT COMPLICATION, WITH LONG-TERM CURRENT USE OF INSULIN (HCC): ICD-10-CM

## 2020-08-17 DIAGNOSIS — E11.9 TYPE 2 DIABETES MELLITUS WITHOUT COMPLICATION, WITH LONG-TERM CURRENT USE OF INSULIN (HCC): ICD-10-CM

## 2020-08-17 DIAGNOSIS — E06.3 HYPOTHYROIDISM DUE TO HASHIMOTO'S THYROIDITIS: ICD-10-CM

## 2020-08-17 DIAGNOSIS — E03.8 HYPOTHYROIDISM DUE TO HASHIMOTO'S THYROIDITIS: ICD-10-CM

## 2020-08-17 PROBLEM — R60.0 LOCALIZED EDEMA: Status: RESOLVED | Noted: 2019-04-25 | Resolved: 2020-08-17

## 2020-08-17 PROBLEM — E66.01 MORBID OBESITY WITH BMI OF 45.0-49.9, ADULT (HCC): Status: RESOLVED | Noted: 2017-12-12 | Resolved: 2020-08-17

## 2020-08-17 PROCEDURE — 99999 PR NO CHARGE: CPT | Performed by: FAMILY MEDICINE

## 2020-08-17 RX ORDER — EMPAGLIFLOZIN 10 MG/1
1 TABLET, FILM COATED ORAL DAILY
Qty: 30 TAB | Refills: 0 | Status: SHIPPED | OUTPATIENT
Start: 2020-08-17 | End: 2020-08-20 | Stop reason: SDUPTHER

## 2020-08-17 RX ORDER — LIRAGLUTIDE 6 MG/ML
1.8 INJECTION SUBCUTANEOUS DAILY
Qty: 3 EACH | Refills: 6 | Status: SHIPPED | OUTPATIENT
Start: 2020-08-17 | End: 2020-09-17

## 2020-08-17 RX ORDER — TRAMADOL HYDROCHLORIDE 50 MG/1
50 TABLET ORAL EVERY 4 HOURS PRN
COMMUNITY
End: 2022-04-11

## 2020-08-17 SDOH — HEALTH STABILITY: MENTAL HEALTH: HOW OFTEN DO YOU HAVE A DRINK CONTAINING ALCOHOL?: NEVER

## 2020-08-17 ASSESSMENT — PATIENT HEALTH QUESTIONNAIRE - PHQ9
9. THOUGHTS THAT YOU WOULD BE BETTER OFF DEAD, OR OF HURTING YOURSELF: NOT AT ALL
SUM OF ALL RESPONSES TO PHQ QUESTIONS 1-9: 5
4. FEELING TIRED OR HAVING LITTLE ENERGY: SEVERAL DAYS
3. TROUBLE FALLING OR STAYING ASLEEP OR SLEEPING TOO MUCH: MORE THAN HALF THE DAYS
1. LITTLE INTEREST OR PLEASURE IN DOING THINGS: SEVERAL DAYS
2. FEELING DOWN, DEPRESSED, IRRITABLE, OR HOPELESS: SEVERAL DAYS
6. FEELING BAD ABOUT YOURSELF - OR THAT YOU ARE A FAILURE OR HAVE LET YOURSELF OR YOUR FAMILY DOWN: NOT AL ALL
8. MOVING OR SPEAKING SO SLOWLY THAT OTHER PEOPLE COULD HAVE NOTICED. OR THE OPPOSITE, BEING SO FIGETY OR RESTLESS THAT YOU HAVE BEEN MOVING AROUND A LOT MORE THAN USUAL: NOT AT ALL
7. TROUBLE CONCENTRATING ON THINGS, SUCH AS READING THE NEWSPAPER OR WATCHING TELEVISION: NOT AT ALL
5. POOR APPETITE OR OVEREATING: NOT AT ALL
SUM OF ALL RESPONSES TO PHQ9 QUESTIONS 1 AND 2: 2

## 2020-08-17 ASSESSMENT — FIBROSIS 4 INDEX: FIB4 SCORE: 1.33

## 2020-08-17 NOTE — ASSESSMENT & PLAN NOTE
This is a chronic problem. The patient is currently on levothyroxine 125mcg M-W, S and 137mcg Th & Sunday. Last TSH WNL - 0.65 on 5/27/20.

## 2020-08-17 NOTE — PROGRESS NOTES
Subjective:     CC:  Diagnoses of Type 2 diabetes mellitus without complication, with long-term current use of insulin (HCC), Hypothyroidism due to Hashimoto's thyroiditis, and Chronic bilateral low back pain with left-sided sciatica were pertinent to this visit.    HISTORY OF THE PRESENT ILLNESS: Patient is a 49 y.o. female. She is here today to establish care.  Prior PCP: Dr. Zuleima Almeida.     Type 2 diabetes mellitus without complication (HCC)  This is a chronic problem. The patient is working on lifestyle modification and has lost 30 pounds over the last year. She is on Victoza, Jardiance and insulin intermittently - she states she takes 76 units at night if her blood sugar is >160. Her last HgbA1C was 5.6 on 2/21/20. She does have occasional low blood sugar.    Hypothyroidism due to Hashimoto's thyroiditis  This is a chronic problem. The patient is currently on levothyroxine 125mcg M-W, S and 137mcg Th & Sunday. Last TSH WNL - 0.65 on 5/27/20.    Chronic low back pain  This is a chronic problem. The patient reports she has been seen at [facility] and recommended to have surgery which she would like to avoid. She is on tylenol, ibuprofen, and occasionally tramadol 50mg for her pain.    I discussed possible referral to pain management or physiatry to discuss other options and pain management modalities. At this time the patient's partner become rather upset stating the patient absolutely does not need a referral to specialists. As this situation escalated it became clear that I would not be a good fit for this patient with her spouse involved with her care. The patient and partner agreed. The visit was thus stopped at this time.    The patient did ask I refill several medications as she is almost out. These included jardiance and victoza. She asked her estrogen be refilled however given the patient has a past history of blood clots I informed her I do not feel comfortable refilling this medication. She  verbalized understanding.       Allergies: Betadine prepstick plus [povidone-iodine], Hydromorphone hcl, Morphine, and Tape    Current Outpatient Medications Ordered in Epic   Medication Sig Dispense Refill   • tramadol (ULTRAM) 50 MG Tab Take 50 mg by mouth every four hours as needed.     • BIOTIN PO Take  by mouth.     • Omega-3 Fatty Acids (OMEGA-3 FISH OIL PO) Take  by mouth.     • liraglutide (VICTOZA) 18 MG/3ML Solution Pen-injector Inject 1.8 mg as instructed every day. 3 Each 6   • Empagliflozin (JARDIANCE) 10 MG Tab Take 1 Tab by mouth every day. 30 Tab 0   • levothyroxine (SYNTHROID) 137 MCG Tab TAKE 1 TABLET BY MOUTH EVERY THURSDAY AND SUNDAY (Patient taking differently: Take  by mouth. TAKE 1 TABLET BY MOUTH  Daily) 8 Tab 0   • atorvastatin (LIPITOR) 20 MG Tab Take 1 Tab by mouth every day. 30 Tab 5   • cyclobenzaprine (FLEXERIL) 10 MG Tab TAKE 1 TABLET BY MOUTH THREE TIMES DAILY AS NEEDED 90 Tab 5   • estradiol (ESTRACE) 1 MG Tab Take 1 Tab by mouth every day. 30 Tab 5   • gabapentin (NEURONTIN) 300 MG Cap TAKE 3 CAPSULES BY MOUTH AT BEDTIME 90 Cap 5   • hydroCHLOROthiazide (HYDRODIURIL) 12.5 MG tablet Take 1 Tab by mouth every day. 30 Tab 5   • ibuprofen (MOTRIN) 800 MG Tab Take 1 Tab by mouth every 8 hours as needed for Moderate Pain. 60 Tab 5   • insulin glargine (INSULIN GLARGINE) 100 UNIT/ML Solution Pen-injector injection Use to inject 74 u every evening.  Dispense quantity sufficient for 30 days.  To replace lantus 30 mL 5   • metformin (GLUCOPHAGE) 1000 MG tablet TAKE 1 TABLET BY MOUTH TWICE DAILY WITH MEALS 60 Tab 11   • omeprazole (PRILOSEC) 40 MG delayed-release capsule Take 1 Cap by mouth 2 times a day. 60 Cap 5   • spironolactone (ALDACTONE) 25 MG Tab Take 1 Tab by mouth every day. 30 Tab 5   • traZODone (DESYREL) 150 MG Tab TAKE 1 TABLET BY MOUTH AT BEDTIME 30 Tab 5   • cetirizine (ZYRTEC) 10 MG Tab Take 10 mg by mouth every day.     • acetaminophen (TYLENOL) 500 MG Tab Take 500-1,000 mg  "by mouth every 6 hours as needed.     • risperiDONE (RISPERDAL) 0.5 MG Tab      • Multiple Vitamins-Minerals (MULTIVITAMIN PO) Take 1 Tab by mouth every day.     • Black Cohosh 40 MG Cap Take 40 mg by mouth 2 Times a Day.     • Melatonin 10 MG Tab Take 20 mg by mouth every bedtime.     • glucose blood strip Use to test blood sugar TID or PRN sx high or low blood sugar 100 Strip 0   • Insulin Pen Needle (PEN NEEDLES) 32G X 4 MM Misc 1 Units by Does not apply route 2 Times a Day. 100 Each 3   • polyethylene glycol 3350 (MIRALAX) Powder Take 17 g by mouth 1 time daily as needed (constipation). 1 Bottle 5   • oxybutynin SR (DITROPAN-XL) 10 MG CR tablet Take 1 Tab by mouth every day. 30 Tab 5   • ivabradine (CORLANOR) 5 MG Tab tablet Take 1 Tab by mouth 2 times a day, with meals. 60 Tab 5   • metoprolol SR (TOPROL XL) 25 MG TABLET SR 24 HR Take 1 Tab by mouth every evening. (Patient not taking: Reported on 8/17/2020) 30 Tab 5   • sumatriptan (IMITREX) 100 MG tablet TAKE 1/2 TO 1 (ONE-HALF TO ONE) TABLET BY MOUTH AS NEEDED FOR  MIGRAINE/HEADACHE 9 Tab 5   • Alcohol Swabs (ALCOHOL PREP) 70 % Pads Use three times daily to clean finger before blood glucose testing 100 Each 11     No current [facility]-ordered facility-administered medications on file.        Past Medical History:   Diagnosis Date   • Anesthesia 08/27/2019    \"hard to wake up\"  \"sleep apnea\"   • Arrhythmia     \" sinus Tach\"   • Arthritis     osteo   • Blood clotting disorder ([facility]) 2005    DVT   • Bowel habit changes     constipation   • Depression     \"BPD\"   • Diabetes ([facility]) 08/2019    insulin   • Diabetic neuropathy ([facility])    • Esophageal spasm    • GERD (gastroesophageal reflux disease)    • Hashimoto's disease    • Hiatal hernia    • High cholesterol    • Hyperlipidemia    • Hypothyroid    • Sleep apnea     uses cpap   • Snoring     sleep study done   • Tachycardia        Past Surgical History:   Procedure Laterality Date   • COLONOSCOPY N/A 8/30/2019    " Procedure: COLONOSCOPY;  Surgeon: Ata Khalil M.D.;  Location: SURGERY SAME DAY [facility];  Service: Gastroenterology   • GASTROSCOPY N/A 8/30/2019    Procedure: GASTROSCOPY - W/DILATION biopsy;  Surgeon: Ata Khalil M.D.;  Location: SURGERY SAME DAY [facility];  Service: Gastroenterology   • VAGINAL HYSTERECTOMY SCOPE TOTAL N/A 4/24/2018    Procedure: VAGINAL HYSTERECTOMY SCOPE TOTAL;  Surgeon: Francisco Javier Lainez M.D.;  Location: SURGERY SAME DAY [facility];  Service: Gynecology   • SALPINGO OOPHORECTOMY Bilateral 4/24/2018    Procedure: SALPINGO OOPHORECTOMY;  Surgeon: Francisco Javier Lainez M.D.;  Location: SURGERY SAME DAY [facility];  Service: Gynecology   • ANTERIOR AND POSTERIOR REPAIR  4/24/2018    Procedure: ANTERIOR AND POSTERIOR REPAIR;  Surgeon: Francisco Javier Lainez M.D.;  Location: SURGERY SAME DAY [facility];  Service: Gynecology   • ENTEROCELE REPAIR  4/24/2018    Procedure: ENTEROCELE REPAIR- PERINEOPLASTY;  Surgeon: Francisco Javier Lainez M.D.;  Location: SURGERY SAME DAY [facility];  Service: Gynecology   • BLADDER SLING FEMALE  4/24/2018    Procedure: BLADDER SLING FEMALE- TOT;  Surgeon: Francisco Javier Lainez M.D.;  Location: SURGERY SAME DAY [facility];  Service: Gynecology   • VAGINAL SUSPENSION N/A 4/24/2018    Procedure: VAGINAL SUSPENSION- SACROSPINOUS VAULT;  Surgeon: Farncisco Javier Lainez M.D.;  Location: SURGERY SAME DAY [facility];  Service: Gynecology   • CYSTOSCOPY N/A 4/24/2018    Procedure: CYSTOSCOPY;  Surgeon: Francisco Javier Lainez M.D.;  Location: SURGERY SAME DAY [facility];  Service: Gynecology   • ENDOSCOPY  2016   • COLONOSCOPY  2016   • OTHER  2016    esophageal dilation   • OTHER ORTHOPEDIC SURGERY  2012    bone removed from toe after fracture   • TONSILLECTOMY AND ADENOIDECTOMY  1977   • OTHER  1971, 1978, 1985    left facial cheek and right upper thight reconstructive surgery       Social History     Tobacco Use   • Smoking status: Never Smoker   • Smokeless tobacco: Never Used  "  Substance Use Topics   • Alcohol use: Never     Frequency: Never   • Drug use: No     Comment: previous medical marijuana use for pain       Social History     Social History Narrative   • Not on file       Family History   Problem Relation Age of Onset   • Cancer Mother 70        pancreatic, breast in 30's   • Heart Disease Father    • Cancer Father         lung   • Diabetes Sister    • Diabetes Maternal Uncle    • Heart Disease Maternal Grandmother         MI   • Hyperlipidemia Maternal Grandmother    • Diabetes Maternal Grandfather    • Hyperlipidemia Maternal Grandfather    • Stroke Neg Hx        Health Maintenance: Did not review with patient as our visit was cut short, please see HPI and A/P    ROS:   Not reviewed with patient      Objective:     Exam: BP (!) 92/64 (BP Location: Left arm, Patient Position: Sitting, BP Cuff Size: Adult long)   Pulse 92   Temp 36.8 °C (98.3 °F) (Temporal)   Resp 12   Ht 1.6 m (5' 3\")   Wt 101.5 kg (223 lb 12.3 oz)   SpO2 91%  Body mass index is 39.64 kg/m².    Constitutional: Obese; Alert, no distress, well-groomed  Skin: Warm, dry, good turgor, no rashes in visible areas  Eyes: Equal, round and reactive, conjunctiva clear, no ptosis  ENMT: Lips without lesions, good dentition, moist mucous membranes  Neck: Trachea midline, no obvious thyromegaly  Respiratory: Unlabored respiratory effort  Neuro: Grossly non-focal  Psych: Alert and oriented, normal affect and mood      Assessment & Plan:   49 y.o. female with the following -    *This visit was stopped early as the patient's wife was verbally aggressive. Upon further discussion with patient and spouse we agreed the patient would be better served by another primary care physician. The patient has asked I refill the below medications. She is also out of other medications however is unsure the specific medications and will call with her request. As today's visit was incomplete I am billing a No Charge.    1. Type 2 diabetes " mellitus without complication, with long-term current use of insulin (HCC)  - liraglutide (VICTOZA) 18 MG/3ML Solution Pen-injector; Inject 1.8 mg as instructed every day.  Dispense: 3 Each; Refill: 6  - Empagliflozin (JARDIANCE) 10 MG Tab; Take 1 Tab by mouth every day.  Dispense: 30 Tab; Refill: 0    Please note this dictation was created using voice recognition software. I have made every reasonable attempt to correct obvious errors, but I expect there may be errors of grammar, and possibly content, that I did not discover before finalizing the note.

## 2020-08-17 NOTE — ASSESSMENT & PLAN NOTE
This is a chronic problem. The patient is working on lifestyle modification and has lost 30 pounds over the last year. She is on Victoza, Jardiance and insulin intermittently - she states she takes 76 units at night if her blood sugar is >160. Her last HgbA1C was 5.6 on 2/21/20. She does have occasional low blood sugar.

## 2020-08-17 NOTE — ASSESSMENT & PLAN NOTE
This is a chronic problem. The patient reports she has been seen at [facility] and recommended to have surgery which she would like to avoid. She is on tylenol, ibuprofen, and occasionally tramadol 50mg for her pain.    I discussed possible referral to pain management or physiatry to discuss other options and pain management modalities. At this time the patient's partner become rather upset stating the patient absolutely does not need a referral to specialists. As this situation escalated it became clear that I would not be a good fit for this patient with her spouse involved with her care. The patient and partner agreed. The visit was thus stopped at this time.    The patient did ask I refill several medications as she is almost out. These included jardiance and victoza. She asked her estrogen be refilled however given the patient has a past history of blood clots I informed her I do not feel comfortable refilling this medication. She verbalized understanding.

## 2020-08-20 ENCOUNTER — PATIENT MESSAGE (OUTPATIENT)
Dept: MEDICAL GROUP | Facility: MEDICAL CENTER | Age: 50
End: 2020-08-20

## 2020-08-20 DIAGNOSIS — K21.9 GASTROESOPHAGEAL REFLUX DISEASE, ESOPHAGITIS PRESENCE NOT SPECIFIED: ICD-10-CM

## 2020-08-20 DIAGNOSIS — M47.816 SPONDYLOSIS OF LUMBAR REGION WITHOUT MYELOPATHY OR RADICULOPATHY: ICD-10-CM

## 2020-08-20 DIAGNOSIS — Z90.710 S/P TOTAL HYSTERECTOMY: ICD-10-CM

## 2020-08-20 DIAGNOSIS — R00.0 SINUS TACHYCARDIA: ICD-10-CM

## 2020-08-20 DIAGNOSIS — Z79.4 TYPE 2 DIABETES MELLITUS WITHOUT COMPLICATION, WITH LONG-TERM CURRENT USE OF INSULIN (HCC): ICD-10-CM

## 2020-08-20 DIAGNOSIS — E06.3 HYPOTHYROIDISM DUE TO HASHIMOTO'S THYROIDITIS: ICD-10-CM

## 2020-08-20 DIAGNOSIS — E03.8 HYPOTHYROIDISM DUE TO HASHIMOTO'S THYROIDITIS: ICD-10-CM

## 2020-08-20 DIAGNOSIS — E11.42 DIABETIC POLYNEUROPATHY ASSOCIATED WITH TYPE 2 DIABETES MELLITUS (HCC): ICD-10-CM

## 2020-08-20 DIAGNOSIS — F51.01 PRIMARY INSOMNIA: ICD-10-CM

## 2020-08-20 DIAGNOSIS — E11.9 TYPE 2 DIABETES MELLITUS WITHOUT COMPLICATION, WITH LONG-TERM CURRENT USE OF INSULIN (HCC): ICD-10-CM

## 2020-08-20 NOTE — PATIENT COMMUNICATION
Received request via: Patient    Was the patient seen in the last year in this department? Yes    Does the patient have an active prescription (recently filled or refills available) for medication(s) requested? No     Pt is trying to get established with new PCP as insurance has changed

## 2020-08-21 DIAGNOSIS — K21.9 GASTROESOPHAGEAL REFLUX DISEASE, ESOPHAGITIS PRESENCE NOT SPECIFIED: ICD-10-CM

## 2020-08-21 DIAGNOSIS — Z79.4 TYPE 2 DIABETES MELLITUS WITHOUT COMPLICATION, WITH LONG-TERM CURRENT USE OF INSULIN (HCC): ICD-10-CM

## 2020-08-21 DIAGNOSIS — E03.8 HYPOTHYROIDISM DUE TO HASHIMOTO'S THYROIDITIS: ICD-10-CM

## 2020-08-21 DIAGNOSIS — E06.3 HYPOTHYROIDISM DUE TO HASHIMOTO'S THYROIDITIS: ICD-10-CM

## 2020-08-21 DIAGNOSIS — E11.9 TYPE 2 DIABETES MELLITUS WITHOUT COMPLICATION, WITH LONG-TERM CURRENT USE OF INSULIN (HCC): ICD-10-CM

## 2020-08-21 DIAGNOSIS — F51.01 PRIMARY INSOMNIA: ICD-10-CM

## 2020-08-22 RX ORDER — TRAZODONE HYDROCHLORIDE 150 MG/1
TABLET ORAL
Qty: 30 TAB | Refills: 1 | Status: SHIPPED | OUTPATIENT
Start: 2020-08-22 | End: 2020-09-17 | Stop reason: SDUPTHER

## 2020-08-22 RX ORDER — METOPROLOL SUCCINATE 25 MG/1
TABLET, EXTENDED RELEASE ORAL
Qty: 30 TAB | Refills: 0 | OUTPATIENT
Start: 2020-08-22

## 2020-08-22 RX ORDER — ATORVASTATIN CALCIUM 20 MG/1
TABLET, FILM COATED ORAL
Qty: 30 TAB | Refills: 0 | OUTPATIENT
Start: 2020-08-22

## 2020-08-22 RX ORDER — METOPROLOL SUCCINATE 25 MG/1
25 TABLET, EXTENDED RELEASE ORAL EVERY EVENING
Qty: 30 TAB | Refills: 1 | Status: SHIPPED | OUTPATIENT
Start: 2020-08-22 | End: 2020-09-17 | Stop reason: SDUPTHER

## 2020-08-22 RX ORDER — EMPAGLIFLOZIN 10 MG/1
1 TABLET, FILM COATED ORAL DAILY
Qty: 30 TAB | Refills: 1 | Status: SHIPPED | OUTPATIENT
Start: 2020-08-22 | End: 2020-09-17

## 2020-08-22 RX ORDER — OMEPRAZOLE 40 MG/1
40 CAPSULE, DELAYED RELEASE ORAL 2 TIMES DAILY
Qty: 60 CAP | Refills: 1 | Status: SHIPPED | OUTPATIENT
Start: 2020-08-22 | End: 2020-09-17 | Stop reason: SDUPTHER

## 2020-08-22 RX ORDER — ESTRADIOL 1 MG/1
1 TABLET ORAL DAILY
Qty: 30 TAB | Refills: 1 | Status: SHIPPED | OUTPATIENT
Start: 2020-08-22 | End: 2020-09-17 | Stop reason: SDUPTHER

## 2020-08-22 RX ORDER — IBUPROFEN 800 MG/1
TABLET ORAL
Qty: 60 TAB | Refills: 0 | OUTPATIENT
Start: 2020-08-22

## 2020-08-22 RX ORDER — LEVOTHYROXINE SODIUM 137 UG/1
TABLET ORAL
Qty: 8 TAB | Refills: 1 | Status: SHIPPED | OUTPATIENT
Start: 2020-08-22 | End: 2020-09-17 | Stop reason: SDUPTHER

## 2020-08-22 RX ORDER — LEVOTHYROXINE SODIUM 137 UG/1
TABLET ORAL
Qty: 8 TAB | Refills: 0 | OUTPATIENT
Start: 2020-08-22

## 2020-08-22 RX ORDER — ATORVASTATIN CALCIUM 20 MG/1
20 TABLET, FILM COATED ORAL DAILY
Qty: 30 TAB | Refills: 1 | Status: SHIPPED | OUTPATIENT
Start: 2020-08-22 | End: 2020-09-17 | Stop reason: SDUPTHER

## 2020-08-22 RX ORDER — GABAPENTIN 300 MG/1
CAPSULE ORAL
Qty: 90 CAP | Refills: 1 | Status: SHIPPED | OUTPATIENT
Start: 2020-08-22 | End: 2020-09-17 | Stop reason: SDUPTHER

## 2020-08-22 RX ORDER — IBUPROFEN 800 MG/1
800 TABLET ORAL EVERY 8 HOURS PRN
Qty: 60 TAB | Refills: 1 | Status: SHIPPED | OUTPATIENT
Start: 2020-08-22 | End: 2020-09-17 | Stop reason: SDUPTHER

## 2020-08-22 RX ORDER — TRAZODONE HYDROCHLORIDE 150 MG/1
TABLET ORAL
Qty: 30 TAB | Refills: 0 | OUTPATIENT
Start: 2020-08-22

## 2020-08-22 RX ORDER — ESTRADIOL 1 MG/1
TABLET ORAL
Qty: 30 TAB | Refills: 0 | OUTPATIENT
Start: 2020-08-22

## 2020-08-22 RX ORDER — OMEPRAZOLE 40 MG/1
CAPSULE, DELAYED RELEASE ORAL
Qty: 60 CAP | Refills: 0 | OUTPATIENT
Start: 2020-08-22

## 2020-09-17 ENCOUNTER — OFFICE VISIT (OUTPATIENT)
Dept: MEDICAL GROUP | Facility: MEDICAL CENTER | Age: 50
End: 2020-09-17
Payer: COMMERCIAL

## 2020-09-17 VITALS
TEMPERATURE: 97.9 F | HEIGHT: 63 IN | OXYGEN SATURATION: 96 % | RESPIRATION RATE: 16 BRPM | BODY MASS INDEX: 39.16 KG/M2 | WEIGHT: 221 LBS | DIASTOLIC BLOOD PRESSURE: 68 MMHG | HEART RATE: 80 BPM | SYSTOLIC BLOOD PRESSURE: 100 MMHG

## 2020-09-17 DIAGNOSIS — E11.42 DIABETIC POLYNEUROPATHY ASSOCIATED WITH TYPE 2 DIABETES MELLITUS (HCC): ICD-10-CM

## 2020-09-17 DIAGNOSIS — Z79.4 TYPE 2 DIABETES MELLITUS WITHOUT COMPLICATION, WITH LONG-TERM CURRENT USE OF INSULIN (HCC): ICD-10-CM

## 2020-09-17 DIAGNOSIS — F51.01 PRIMARY INSOMNIA: ICD-10-CM

## 2020-09-17 DIAGNOSIS — I10 ESSENTIAL HYPERTENSION: ICD-10-CM

## 2020-09-17 DIAGNOSIS — K21.9 GASTROESOPHAGEAL REFLUX DISEASE, ESOPHAGITIS PRESENCE NOT SPECIFIED: ICD-10-CM

## 2020-09-17 DIAGNOSIS — M47.816 SPONDYLOSIS OF LUMBAR REGION WITHOUT MYELOPATHY OR RADICULOPATHY: ICD-10-CM

## 2020-09-17 DIAGNOSIS — G43.009 MIGRAINE WITHOUT AURA AND WITHOUT STATUS MIGRAINOSUS, NOT INTRACTABLE: ICD-10-CM

## 2020-09-17 DIAGNOSIS — K22.4 ESOPHAGEAL SPASM: ICD-10-CM

## 2020-09-17 DIAGNOSIS — K44.9 HIATAL HERNIA: ICD-10-CM

## 2020-09-17 DIAGNOSIS — K59.00 CONSTIPATION, UNSPECIFIED CONSTIPATION TYPE: ICD-10-CM

## 2020-09-17 DIAGNOSIS — Z01.419 ENCOUNTER FOR WELL WOMAN EXAM WITH ROUTINE GYNECOLOGICAL EXAM: ICD-10-CM

## 2020-09-17 DIAGNOSIS — M48.061 NEUROFORAMINAL STENOSIS OF LUMBAR SPINE: ICD-10-CM

## 2020-09-17 DIAGNOSIS — M54.42 CHRONIC BILATERAL LOW BACK PAIN WITH LEFT-SIDED SCIATICA: ICD-10-CM

## 2020-09-17 DIAGNOSIS — E06.3 HYPOTHYROIDISM DUE TO HASHIMOTO'S THYROIDITIS: ICD-10-CM

## 2020-09-17 DIAGNOSIS — R00.0 SINUS TACHYCARDIA: ICD-10-CM

## 2020-09-17 DIAGNOSIS — G89.29 CHRONIC BILATERAL LOW BACK PAIN WITH LEFT-SIDED SCIATICA: ICD-10-CM

## 2020-09-17 DIAGNOSIS — E11.9 TYPE 2 DIABETES MELLITUS WITHOUT COMPLICATION, WITH LONG-TERM CURRENT USE OF INSULIN (HCC): ICD-10-CM

## 2020-09-17 DIAGNOSIS — N39.41 URGE INCONTINENCE: ICD-10-CM

## 2020-09-17 DIAGNOSIS — Z90.710 S/P TOTAL HYSTERECTOMY: ICD-10-CM

## 2020-09-17 DIAGNOSIS — F33.42 RECURRENT MAJOR DEPRESSIVE DISORDER, IN FULL REMISSION (HCC): ICD-10-CM

## 2020-09-17 DIAGNOSIS — Z79.890 HORMONE REPLACEMENT THERAPY: ICD-10-CM

## 2020-09-17 DIAGNOSIS — E55.9 VITAMIN D INSUFFICIENCY: ICD-10-CM

## 2020-09-17 DIAGNOSIS — E03.8 HYPOTHYROIDISM DUE TO HASHIMOTO'S THYROIDITIS: ICD-10-CM

## 2020-09-17 PROCEDURE — 99215 OFFICE O/P EST HI 40 MIN: CPT | Performed by: INTERNAL MEDICINE

## 2020-09-17 RX ORDER — METOPROLOL SUCCINATE 25 MG/1
25 TABLET, EXTENDED RELEASE ORAL EVERY EVENING
Qty: 30 TAB | Refills: 3 | Status: SHIPPED | OUTPATIENT
Start: 2020-09-17 | End: 2021-02-05 | Stop reason: SDUPTHER

## 2020-09-17 RX ORDER — OMEPRAZOLE 40 MG/1
40 CAPSULE, DELAYED RELEASE ORAL 2 TIMES DAILY
Qty: 60 CAP | Refills: 2 | Status: SHIPPED | OUTPATIENT
Start: 2020-09-17 | End: 2020-10-17

## 2020-09-17 RX ORDER — EMPAGLIFLOZIN 10 MG/1
10 TABLET, FILM COATED ORAL DAILY
Qty: 30 TAB | Refills: 11 | Status: SHIPPED | OUTPATIENT
Start: 2020-09-17 | End: 2021-02-05 | Stop reason: SDUPTHER

## 2020-09-17 RX ORDER — OXYBUTYNIN CHLORIDE 10 MG/1
10 TABLET, EXTENDED RELEASE ORAL DAILY
Qty: 30 TAB | Refills: 5 | Status: SHIPPED | OUTPATIENT
Start: 2020-09-17 | End: 2020-12-01 | Stop reason: SDUPTHER

## 2020-09-17 RX ORDER — EMPAGLIFLOZIN 10 MG/1
10 TABLET, FILM COATED ORAL DAILY
Qty: 30 TAB | Refills: 11 | Status: SHIPPED | OUTPATIENT
Start: 2020-09-17 | End: 2020-09-17 | Stop reason: SDUPTHER

## 2020-09-17 RX ORDER — ATORVASTATIN CALCIUM 20 MG/1
20 TABLET, FILM COATED ORAL DAILY
Qty: 30 TAB | Refills: 5 | Status: SHIPPED | OUTPATIENT
Start: 2020-09-17 | End: 2021-02-05 | Stop reason: SDUPTHER

## 2020-09-17 RX ORDER — INSULIN GLARGINE 100 [IU]/ML
INJECTION, SOLUTION SUBCUTANEOUS
Qty: 30 ML | Refills: 5 | Status: SHIPPED | OUTPATIENT
Start: 2020-09-17 | End: 2020-09-17

## 2020-09-17 RX ORDER — LEVOTHYROXINE SODIUM 137 UG/1
TABLET ORAL
Qty: 8 TAB | Refills: 1 | Status: SHIPPED | OUTPATIENT
Start: 2020-09-17 | End: 2020-11-13

## 2020-09-17 RX ORDER — LEVOTHYROXINE SODIUM 0.12 MG/1
TABLET ORAL
Qty: 30 TAB | Refills: 11 | Status: SHIPPED | OUTPATIENT
Start: 2020-09-17 | End: 2021-02-05 | Stop reason: SDUPTHER

## 2020-09-17 RX ORDER — CYCLOBENZAPRINE HCL 10 MG
10 TABLET ORAL NIGHTLY PRN
Qty: 30 TAB | Refills: 5 | Status: SHIPPED | OUTPATIENT
Start: 2020-09-17

## 2020-09-17 RX ORDER — POLYETHYLENE GLYCOL 3350 17 G/17G
17 POWDER, FOR SOLUTION ORAL
Qty: 116 G | Refills: 5 | Status: SHIPPED | OUTPATIENT
Start: 2020-09-17 | End: 2020-09-17 | Stop reason: SDUPTHER

## 2020-09-17 RX ORDER — HYDROCHLOROTHIAZIDE 12.5 MG/1
12.5 TABLET ORAL DAILY
Qty: 30 TAB | Refills: 5 | Status: SHIPPED | OUTPATIENT
Start: 2020-09-17 | End: 2021-02-05 | Stop reason: SDUPTHER

## 2020-09-17 RX ORDER — INSULIN GLARGINE 100 [IU]/ML
39 INJECTION, SOLUTION SUBCUTANEOUS EVERY EVENING
Qty: 30 ML | Refills: 5 | COMMUNITY
Start: 2020-09-17 | End: 2021-02-05 | Stop reason: SDUPTHER

## 2020-09-17 RX ORDER — ESTRADIOL 1 MG/1
1 TABLET ORAL DAILY
Qty: 30 TAB | Refills: 1 | Status: SHIPPED | OUTPATIENT
Start: 2020-09-17 | End: 2020-11-13

## 2020-09-17 RX ORDER — TRAZODONE HYDROCHLORIDE 150 MG/1
150 TABLET ORAL NIGHTLY PRN
Qty: 30 TAB | Refills: 11 | Status: SHIPPED | OUTPATIENT
Start: 2020-09-17 | End: 2021-02-05 | Stop reason: SDUPTHER

## 2020-09-17 RX ORDER — SPIRONOLACTONE 25 MG/1
25 TABLET ORAL DAILY
Qty: 30 TAB | Refills: 5 | Status: SHIPPED | OUTPATIENT
Start: 2020-09-17 | End: 2021-02-05 | Stop reason: SDUPTHER

## 2020-09-17 RX ORDER — DIVALPROEX SODIUM 500 MG/1
500 TABLET, DELAYED RELEASE ORAL 3 TIMES DAILY
COMMUNITY
Start: 2020-08-21

## 2020-09-17 RX ORDER — IBUPROFEN 800 MG/1
800 TABLET ORAL EVERY 8 HOURS PRN
Qty: 60 TAB | Refills: 1 | Status: SHIPPED | OUTPATIENT
Start: 2020-09-17 | End: 2020-10-06

## 2020-09-17 RX ORDER — GABAPENTIN 300 MG/1
CAPSULE ORAL
Qty: 90 CAP | Refills: 2 | Status: SHIPPED | OUTPATIENT
Start: 2020-09-17 | End: 2021-01-22

## 2020-09-17 RX ORDER — RISPERIDONE 2 MG/1
TABLET ORAL
COMMUNITY
Start: 2020-08-28 | End: 2023-10-19

## 2020-09-17 RX ORDER — INSULIN GLARGINE 100 [IU]/ML
74 INJECTION, SOLUTION SUBCUTANEOUS EVERY EVENING
Qty: 30 ML | Refills: 5 | COMMUNITY
Start: 2020-09-17 | End: 2020-09-17

## 2020-09-17 RX ORDER — POLYETHYLENE GLYCOL 3350 17 G/17G
17 POWDER, FOR SOLUTION ORAL
Qty: 510 G | Refills: 5 | Status: SHIPPED | OUTPATIENT
Start: 2020-09-17

## 2020-09-17 RX ORDER — SUMATRIPTAN 100 MG/1
TABLET, FILM COATED ORAL
Qty: 9 TAB | Refills: 5 | Status: SHIPPED | OUTPATIENT
Start: 2020-09-17 | End: 2024-02-15

## 2020-09-17 RX ORDER — LEVOTHYROXINE SODIUM 0.12 MG/1
125 TABLET ORAL DAILY
COMMUNITY
Start: 2020-08-21 | End: 2020-09-17 | Stop reason: SDUPTHER

## 2020-09-17 RX ORDER — PEN NEEDLE, DIABETIC 30 GX3/16"
1 NEEDLE, DISPOSABLE MISCELLANEOUS 2 TIMES DAILY
Qty: 100 EACH | Refills: 3 | Status: SHIPPED | OUTPATIENT
Start: 2020-09-17 | End: 2021-02-05

## 2020-09-17 ASSESSMENT — FIBROSIS 4 INDEX: FIB4 SCORE: 1.33

## 2020-09-17 NOTE — ASSESSMENT & PLAN NOTE
S/p total hysterectomy.  Started on estradiol 1 mg by her previous GYN   Referral to OBGYN to help manage with her hormone replacement

## 2020-09-17 NOTE — ASSESSMENT & PLAN NOTE
Happened after total hysterectomy. Had a bladder sling at that time.  She does Kegel exercises  Currently on oxybutynin 10 mg daily as 5 mg was not as effective.  Denies dysuria, suprapubic pain, fever, chills.

## 2020-09-17 NOTE — ASSESSMENT & PLAN NOTE
Levothyroxine 125 mcg MW, Sat and 137 mcg Thurs & Sun     Ref. Range 5/27/2020 11:15   TSH Latest Ref Range: 0.380 - 5.330 uIU/mL 0.656

## 2020-09-17 NOTE — ASSESSMENT & PLAN NOTE
Chronic,      Ref. Range 2/21/2020 15:06   Glycohemoglobin Latest Ref Range: 0.0 - 5.6 % 5.6     on glargine 74 unit every evening.   Jardiance, metformin

## 2020-09-17 NOTE — ASSESSMENT & PLAN NOTE
Hiatal hernia, severe GERD  Failed once daily omeprazole 40 mg, as well as Prevacid, Nexium, ranitidine all at their max doses.  Without her medication, she reports severe painful heartburn.     Follows with GI

## 2020-09-17 NOTE — PROGRESS NOTES
New Patient to Establish    Reason to establish: New patient to establish    Sarah Friedman is a 49 y.o. female who presents today with the following:    CC:   Chief Complaint   Patient presents with   • Establish Care   • Medication Refill       HPI:     Neuroforaminal stenosis of lumbar spine  Used to see spine [facility], does not want surgery  Referral of pain management       Type 2 diabetes mellitus without complication (HCC)  Chronic,      Ref. Range 2/21/2020 15:06   Glycohemoglobin Latest Ref Range: 0.0 - 5.6 % 5.6     on glargine 74 unit every evening.   Jardiance, metformin      Recurrent major depressive disorder, in full remission (HCC)  Follows with psychiatry         GERD (gastroesophageal reflux disease)  Hiatal hernia, severe GERD  Failed once daily omeprazole 40 mg, as well as Prevacid, Nexium, ranitidine all at their max doses.  Without her medication, she reports severe painful heartburn.     Follows with GI      Hypothyroidism due to Hashimoto's thyroiditis  Levothyroxine 125 mcg MW, Sat and 137 mcg Thurs & Sun     Ref. Range 5/27/2020 11:15   TSH Latest Ref Range: 0.380 - 5.330 uIU/mL 0.656         Primary insomnia  Stable on trazodone 150 mg at night prn      Sinus tachycardia  On metoprolol XR  Following with [facility]      Essential hypertension  Stable on spironolactone and HCTZ      Esophageal spasm  Following with GI  She states the trazodone she takes is for esophageal spasm      Diabetic neuropathy (HCC)  On gabapentin at night      Migraine without aura and without status migrainosus, not intractable  Stable on imitrex prn       Hiatal hernia  Follow with GI      Hormone replacement therapy  S/p total hysterectomy.  Started on estradiol 1 mg by her previous GYN   Referral to OBGYN to help manage with her hormone replacement      Urge incontinence  Happened after total hysterectomy. Had a bladder sling at that time.  She does Kegel exercises  Currently on oxybutynin 10 mg daily  as 5 mg was not as effective.  Denies dysuria, suprapubic pain, fever, chills.       Constipation  Stable on miralax prn          Current Outpatient Medications:   •  divalproex (DEPAKOTE) 500 MG Tablet Delayed Response, TAKE 1 TABLET BY MOUTH TWICE DAILY AFTER MEALS, Disp: , Rfl:   •  risperiDONE (RISPERDAL) 2 MG Tab, TAKE 1 TABLET BY MOUTH ONCE DAILY AT NIGHT AS DIRECTED, Disp: , Rfl:   •  traZODone (DESYREL) 150 MG Tab, Take 1 Tab by mouth at bedtime as needed. TAKE 1 TABLET BY MOUTH AT BEDTIME, Disp: 30 Tab, Rfl: 11  •  sumatriptan (IMITREX) 100 MG tablet, TAKE 1/2 TO 1 (ONE-HALF TO ONE) TABLET BY MOUTH AS NEEDED FOR  MIGRAINE/HEADACHE, Disp: 9 Tab, Rfl: 5  •  metformin (GLUCOPHAGE) 1000 MG tablet, TAKE 1 TABLET BY MOUTH TWICE DAILY WITH MEALS, Disp: 60 Tab, Rfl: 11  •  levothyroxine (SYNTHROID) 137 MCG Tab, TAKE 1 TABLET BY MOUTH  Every Sunday and Thursday, Disp: 8 Tab, Rfl: 1  •  levothyroxine (SYNTHROID) 125 MCG Tab, Take 125 mcg every Mondays, Tuesdays, Wednesdays, Fridays and Saturdays, Disp: 30 Tab, Rfl: 11  •  oxybutynin SR (DITROPAN-XL) 10 MG CR tablet, Take 1 Tab by mouth every day., Disp: 30 Tab, Rfl: 5  •  hydroCHLOROthiazide (HYDRODIURIL) 12.5 MG tablet, Take 1 Tab by mouth every day., Disp: 30 Tab, Rfl: 5  •  atorvastatin (LIPITOR) 20 MG Tab, Take 1 Tab by mouth every day., Disp: 30 Tab, Rfl: 5  •  spironolactone (ALDACTONE) 25 MG Tab, Take 1 Tab by mouth every day., Disp: 30 Tab, Rfl: 5  •  metoprolol SR (TOPROL XL) 25 MG TABLET SR 24 HR, Take 1 Tab by mouth every evening., Disp: 30 Tab, Rfl: 3  •  omeprazole (PRILOSEC) 40 MG delayed-release capsule, Take 1 Cap by mouth 2 times a day for 30 days., Disp: 60 Cap, Rfl: 2  •  ibuprofen (MOTRIN) 800 MG Tab, Take 1 Tab by mouth every 8 hours as needed for Moderate Pain., Disp: 60 Tab, Rfl: 1  •  gabapentin (NEURONTIN) 300 MG Cap, TAKE 3 CAPSULES BY MOUTH AT BEDTIME, Disp: 90 Cap, Rfl: 2  •  cyclobenzaprine (FLEXERIL) 10 MG Tab, Take 1 Tab by mouth at  bedtime as needed for Muscle Spasms., Disp: 30 Tab, Rfl: 5  •  estradiol (ESTRACE) 1 MG Tab, Take 1 Tab by mouth every day., Disp: 30 Tab, Rfl: 1  •  Insulin Pen Needle (PEN NEEDLES) 32G X 4 MM Misc, 1 Units by Does not apply route 2 Times a Day., Disp: 100 Each, Rfl: 3  •  Empagliflozin (JARDIANCE) 10 MG Tab, Take 10 mg by mouth every day., Disp: 30 Tab, Rfl: 11  •  polyethylene glycol 3350 (MIRALAX) 17 GM/SCOOP Powder, Take 17 g by mouth 1 time daily as needed (constipation)., Disp: 510 g, Rfl: 5  •  insulin glargine (LANTUS SOLOSTAR) 100 UNIT/ML Solution Pen-injector injection, Inject 74 Units as instructed every evening., Disp: 30 mL, Rfl: 5  •  tramadol (ULTRAM) 50 MG Tab, Take 50 mg by mouth every four hours as needed., Disp: , Rfl:   •  BIOTIN PO, Take  by mouth., Disp: , Rfl:   •  Omega-3 Fatty Acids (OMEGA-3 FISH OIL PO), Take  by mouth., Disp: , Rfl:   •  Alcohol Swabs (ALCOHOL PREP) 70 % Pads, Use three times daily to clean finger before blood glucose testing, Disp: 100 Each, Rfl: 11  •  acetaminophen (TYLENOL) 500 MG Tab, Take 500-1,000 mg by mouth every 6 hours as needed., Disp: , Rfl:   •  Multiple Vitamins-Minerals (MULTIVITAMIN PO), Take 1 Tab by mouth every day., Disp: , Rfl:   •  Black Cohosh 40 MG Cap, Take 40 mg by mouth 2 Times a Day., Disp: , Rfl:   •  Melatonin 10 MG Tab, Take 20 mg by mouth every bedtime., Disp: , Rfl:     Allergies, past medical history, past surgical history, medications, family history, social history reviewed and updated.    ROS     Constitutional: Denies fevers or chills  Eyes: Denies changes in vision  Ears/Nose/Throat/Mouth: Denies nasal congestion or sore throat   Cardiovascular: Denies chest pain or palpitations   Respiratory: Denies shortness of breath , Denies cough  Gastrointestinal/Hepatic: heartburn Denies abd pain, nausea, vomiting, diarrhea  Genitourinary: Denies dysuria or frequency  Musculoskeletal/Rheum: chronic low back pain  Neurological: Denies  "headache  Psychiatric: Denies mood disorder   Endocrine: Denies hx of diabetes or thyroid dysfunction  Heme/Oncology/Lymph Nodes: Denies weight changes or enlarged LNs.    Physical Exam  /68 (BP Location: Left arm, Patient Position: Sitting, BP Cuff Size: Adult)   Pulse 80   Temp 36.6 °C (97.9 °F) (Temporal)   Resp 16   Ht 1.6 m (5' 3\")   Wt 100.2 kg (221 lb) Comment: pt. stated  LMP 04/20/2018 (Approximate) Comment: HCG from 4/23 negative  SpO2 96%   BMI 39.15 kg/m²   General: Normal appearance.  Well developed, well nourished, no acute distress.  HEENT: Normocephalic.  Extraocular motion intact. Pupils are equally round, reactive to light and accommodation, conjunctiva clear, no scleral icterus.  Ears: normal shape and contour, ear canals clear, tympanic membranes intact. Hearing intact.  Oropharynx clear, no erythema, edema or exudate noted.  NECK: Thyroid is not enlarged. No JVD.  No carotid bruits. No masses.  Cardiovascular: Regular rhythm and rate. No murmur/rubs/gallops.   Respiratory: Normal respiratory effort, clear to auscultation bilaterally. No wheezing/rales/rhonchi.    Abdomen: Bowel sounds present, soft, nontender, nondistended, no rebound, no guarding. No hepatosplenomegaly.  : No suprapubic tenderness. No CVA tenderness.   EXT: no LE edema b/l. No cyanosis.  No clubbing.  Lymph: No cervical, supraclavicular or axillary lymph nodes are palpable  Skin: Warm and dry.  No suspicious lesions or rashes.   Neurologic: No focal deficits.    Psych: AAOx3,  Normal mood and affect, normal judgment and insight, memory within normal limits        Assessment and Plan    1. Type 2 diabetes mellitus without complication, with long-term current use of insulin (HCC)  - metformin (GLUCOPHAGE) 1000 MG tablet; TAKE 1 TABLET BY MOUTH TWICE DAILY WITH MEALS  Dispense: 60 Tab; Refill: 11  - atorvastatin (LIPITOR) 20 MG Tab; Take 1 Tab by mouth every day.  Dispense: 30 Tab; Refill: 5  - insulin glargine " (LANTUS SOLOSTAR) 100 UNIT/ML Solution Pen-injector injection; Use to inject 74 units every evening.  Dispense quantity sufficient for 30 days.  To replace lantus  Dispense: 30 mL; Refill: 5  - Comp Metabolic Panel; Future  - HEMOGLOBIN A1C; Future  - Lipid Profile; Future  - MICROALBUMIN CREAT RATIO URINE; Future  - Insulin Pen Needle (PEN NEEDLES) 32G X 4 MM Misc; 1 Units by Does not apply route 2 Times a Day.  Dispense: 100 Each; Refill: 3  - Empagliflozin (JARDIANCE) 10 MG Tab; Take 10 mg by mouth every day.  Dispense: 30 Tab; Refill: 11    2. Primary insomnia  - traZODone (DESYREL) 150 MG Tab; Take 1 Tab by mouth at bedtime as needed. TAKE 1 TABLET BY MOUTH AT BEDTIME  Dispense: 30 Tab; Refill: 11    3. Migraine without aura and without status migrainosus, not intractable  - sumatriptan (IMITREX) 100 MG tablet; TAKE 1/2 TO 1 (ONE-HALF TO ONE) TABLET BY MOUTH AS NEEDED FOR  MIGRAINE/HEADACHE  Dispense: 9 Tab; Refill: 5    4. Hypothyroidism due to Hashimoto's thyroiditis  - levothyroxine (SYNTHROID) 137 MCG Tab; TAKE 1 TABLET BY MOUTH  Every Sunday and Thursday  Dispense: 8 Tab; Refill: 1  - levothyroxine (SYNTHROID) 125 MCG Tab; Take 125 mcg every Mondays, Tuesdays, Wednesdays, Fridays and Saturdays  Dispense: 30 Tab; Refill: 11  - TSH WITH REFLEX TO FT4; Future    5. Essential hypertension  - hydroCHLOROthiazide (HYDRODIURIL) 12.5 MG tablet; Take 1 Tab by mouth every day.  Dispense: 30 Tab; Refill: 5  - spironolactone (ALDACTONE) 25 MG Tab; Take 1 Tab by mouth every day.  Dispense: 30 Tab; Refill: 5  - CBC WITH DIFFERENTIAL; Future    6. Sinus tachycardia  - metoprolol SR (TOPROL XL) 25 MG TABLET SR 24 HR; Take 1 Tab by mouth every evening.  Dispense: 30 Tab; Refill: 3  Follow with cardiology    7. Gastroesophageal reflux disease, esophagitis presence not specified  8. Esophageal spasm  9. Hiatal hernia  Failed once daily omeprazole 40 mg, as well as Prevacid, Nexium, ranitidine all at their max doses.  Without  her medication, she reports severe painful heartburn.   - omeprazole (PRILOSEC) 40 MG delayed-release capsule; Take 1 Cap by mouth 2 times a day for 30 days.  Dispense: 60 Cap; Refill: 2  Follow with GI      10. Chronic bilateral low back pain with left-sided sciatica  11. Spondylosis of lumbar region without myelopathy or radiculopathy  12. Neuroforaminal stenosis of lumbar spine  - REFERRAL TO PAIN MANAGEMENT   Tylenol 500 mg-1000 mg every 8 hours as needed  - cyclobenzaprine (FLEXERIL) 10 MG Tab; Take 1 Tab by mouth at bedtime as needed for Muscle Spasms.  Dispense: 30 Tab; Refill: 5  - ibuprofen (MOTRIN) 800 MG Tab; Take 1 Tab by mouth every 8 hours as needed for Moderate Pain.  Dispense: 60 Tab; Refill: 1 (due to her GI symptoms, recommend to minimizes her NSAID use)  She also take Tramadol prn    13. Diabetic polyneuropathy associated with type 2 diabetes mellitus (HCC)  - gabapentin (NEURONTIN) 300 MG Cap; TAKE 3 CAPSULES BY MOUTH AT BEDTIME  Dispense: 90 Cap; Refill: 2    14. Encounter for well woman exam with routine gynecological exam  - REFERRAL TO OB/GYN    15. Hormone replacement therapy  - estradiol (ESTRACE) 1 MG Tab; Take 1 Tab by mouth every day.  Dispense: 30 Tab; Refill: 1  - REFERRAL TO OB/GYN    16. S/P total hysterectomy  - estradiol (ESTRACE) 1 MG Tab; Take 1 Tab by mouth every day.  Dispense: 30 Tab; Refill: 1  - REFERRAL TO OB/GYN    17. Urge incontinence  - oxybutynin SR (DITROPAN-XL) 10 MG CR tablet; Take 1 Tab by mouth every day.  Dispense: 30 Tab; Refill: 5  - REFERRAL TO OB/GYN    18. Constipation, unspecified constipation type  - polyethylene glycol 3350 (MIRALAX) 17 GM/SCOOP Powder; Take 17 g by mouth 1 time daily as needed (constipation).  Dispense: 510 g; Refill: 5    19. Recurrent major depressive disorder, in full remission (HCC)  She see psychiatry, who manages her depakote and risperdal.     20. Vitamin D insufficiency  - VITAMIN D,25 HYDROXY; Future  replacement    Patient was  seen for more than 60 minutes face to face of which > 50% of appointment time was spent on counseling and coordination of care regarding the above.    Follow-up:Return in about 3 weeks (around 10/8/2020), or if symptoms worsen or fail to improve, for Long.

## 2020-09-18 ENCOUNTER — HOSPITAL ENCOUNTER (OUTPATIENT)
Dept: LAB | Facility: MEDICAL CENTER | Age: 50
End: 2020-09-18
Attending: INTERNAL MEDICINE
Payer: COMMERCIAL

## 2020-09-18 DIAGNOSIS — E55.9 VITAMIN D INSUFFICIENCY: ICD-10-CM

## 2020-09-18 DIAGNOSIS — E06.3 HYPOTHYROIDISM DUE TO HASHIMOTO'S THYROIDITIS: ICD-10-CM

## 2020-09-18 DIAGNOSIS — E03.8 HYPOTHYROIDISM DUE TO HASHIMOTO'S THYROIDITIS: ICD-10-CM

## 2020-09-18 DIAGNOSIS — Z79.4 TYPE 2 DIABETES MELLITUS WITHOUT COMPLICATION, WITH LONG-TERM CURRENT USE OF INSULIN (HCC): ICD-10-CM

## 2020-09-18 DIAGNOSIS — I10 ESSENTIAL HYPERTENSION: ICD-10-CM

## 2020-09-18 DIAGNOSIS — E11.9 TYPE 2 DIABETES MELLITUS WITHOUT COMPLICATION, WITH LONG-TERM CURRENT USE OF INSULIN (HCC): ICD-10-CM

## 2020-09-18 LAB
25(OH)D3 SERPL-MCNC: 55 NG/ML (ref 30–100)
ALBUMIN SERPL BCP-MCNC: 4.2 G/DL (ref 3.2–4.9)
ALBUMIN SERPL BCP-MCNC: 4.3 G/DL (ref 3.2–4.9)
ALBUMIN/GLOB SERPL: 1.4 G/DL
ALBUMIN/GLOB SERPL: 1.6 G/DL
ALP SERPL-CCNC: 37 U/L (ref 30–99)
ALP SERPL-CCNC: 37 U/L (ref 30–99)
ALT SERPL-CCNC: 6 U/L (ref 2–50)
ALT SERPL-CCNC: 7 U/L (ref 2–50)
ANION GAP SERPL CALC-SCNC: 15 MMOL/L (ref 7–16)
ANION GAP SERPL CALC-SCNC: 16 MMOL/L (ref 7–16)
AST SERPL-CCNC: 13 U/L (ref 12–45)
AST SERPL-CCNC: 14 U/L (ref 12–45)
BASOPHILS # BLD AUTO: 0.3 % (ref 0–1.8)
BASOPHILS # BLD: 0.02 K/UL (ref 0–0.12)
BILIRUB SERPL-MCNC: 0.3 MG/DL (ref 0.1–1.5)
BILIRUB SERPL-MCNC: 0.3 MG/DL (ref 0.1–1.5)
BUN SERPL-MCNC: 20 MG/DL (ref 8–22)
BUN SERPL-MCNC: 20 MG/DL (ref 8–22)
CALCIUM SERPL-MCNC: 9.5 MG/DL (ref 8.5–10.5)
CALCIUM SERPL-MCNC: 9.6 MG/DL (ref 8.5–10.5)
CHLORIDE SERPL-SCNC: 97 MMOL/L (ref 96–112)
CHLORIDE SERPL-SCNC: 98 MMOL/L (ref 96–112)
CHOLEST SERPL-MCNC: 156 MG/DL (ref 100–199)
CHOLEST SERPL-MCNC: 159 MG/DL (ref 100–199)
CO2 SERPL-SCNC: 25 MMOL/L (ref 20–33)
CO2 SERPL-SCNC: 25 MMOL/L (ref 20–33)
CREAT SERPL-MCNC: 0.7 MG/DL (ref 0.5–1.4)
CREAT SERPL-MCNC: 0.72 MG/DL (ref 0.5–1.4)
CREAT UR-MCNC: 123.52 MG/DL
EOSINOPHIL # BLD AUTO: 0.23 K/UL (ref 0–0.51)
EOSINOPHIL NFR BLD: 2.9 % (ref 0–6.9)
ERYTHROCYTE [DISTWIDTH] IN BLOOD BY AUTOMATED COUNT: 46.1 FL (ref 35.9–50)
EST. AVERAGE GLUCOSE BLD GHB EST-MCNC: 134 MG/DL
EST. AVERAGE GLUCOSE BLD GHB EST-MCNC: 137 MG/DL
GLOBULIN SER CALC-MCNC: 2.7 G/DL (ref 1.9–3.5)
GLOBULIN SER CALC-MCNC: 2.9 G/DL (ref 1.9–3.5)
GLUCOSE SERPL-MCNC: 89 MG/DL (ref 65–99)
GLUCOSE SERPL-MCNC: 90 MG/DL (ref 65–99)
HBA1C MFR BLD: 6.3 % (ref 0–5.6)
HBA1C MFR BLD: 6.4 % (ref 0–5.6)
HCT VFR BLD AUTO: 47.2 % (ref 37–47)
HDLC SERPL-MCNC: 50 MG/DL
HDLC SERPL-MCNC: 51 MG/DL
HGB BLD-MCNC: 15.5 G/DL (ref 12–16)
IMM GRANULOCYTES # BLD AUTO: 0.03 K/UL (ref 0–0.11)
IMM GRANULOCYTES NFR BLD AUTO: 0.4 % (ref 0–0.9)
LDLC SERPL CALC-MCNC: 78 MG/DL
LDLC SERPL CALC-MCNC: 79 MG/DL
LYMPHOCYTES # BLD AUTO: 2.6 K/UL (ref 1–4.8)
LYMPHOCYTES NFR BLD: 32.6 % (ref 22–41)
MCH RBC QN AUTO: 30.8 PG (ref 27–33)
MCHC RBC AUTO-ENTMCNC: 32.8 G/DL (ref 33.6–35)
MCV RBC AUTO: 93.8 FL (ref 81.4–97.8)
MICROALBUMIN UR-MCNC: <1.2 MG/DL
MICROALBUMIN/CREAT UR: NORMAL MG/G (ref 0–30)
MONOCYTES # BLD AUTO: 0.59 K/UL (ref 0–0.85)
MONOCYTES NFR BLD AUTO: 7.4 % (ref 0–13.4)
NEUTROPHILS # BLD AUTO: 4.51 K/UL (ref 2–7.15)
NEUTROPHILS NFR BLD: 56.4 % (ref 44–72)
NRBC # BLD AUTO: 0 K/UL
NRBC BLD-RTO: 0 /100 WBC
PLATELET # BLD AUTO: 279 K/UL (ref 164–446)
PMV BLD AUTO: 9.1 FL (ref 9–12.9)
POTASSIUM SERPL-SCNC: 4.3 MMOL/L (ref 3.6–5.5)
POTASSIUM SERPL-SCNC: 4.3 MMOL/L (ref 3.6–5.5)
PROT SERPL-MCNC: 7 G/DL (ref 6–8.2)
PROT SERPL-MCNC: 7.1 G/DL (ref 6–8.2)
RBC # BLD AUTO: 5.03 M/UL (ref 4.2–5.4)
SODIUM SERPL-SCNC: 138 MMOL/L (ref 135–145)
SODIUM SERPL-SCNC: 138 MMOL/L (ref 135–145)
TRIGL SERPL-MCNC: 141 MG/DL (ref 0–149)
TRIGL SERPL-MCNC: 143 MG/DL (ref 0–149)
TSH SERPL DL<=0.005 MIU/L-ACNC: 0.47 UIU/ML (ref 0.38–5.33)
WBC # BLD AUTO: 8 K/UL (ref 4.8–10.8)

## 2020-09-18 PROCEDURE — 80061 LIPID PANEL: CPT

## 2020-09-18 PROCEDURE — 85025 COMPLETE CBC W/AUTO DIFF WBC: CPT

## 2020-09-18 PROCEDURE — 82570 ASSAY OF URINE CREATININE: CPT

## 2020-09-18 PROCEDURE — 82306 VITAMIN D 25 HYDROXY: CPT

## 2020-09-18 PROCEDURE — 83036 HEMOGLOBIN GLYCOSYLATED A1C: CPT | Mod: 91

## 2020-09-18 PROCEDURE — 84443 ASSAY THYROID STIM HORMONE: CPT

## 2020-09-18 PROCEDURE — 80053 COMPREHEN METABOLIC PANEL: CPT

## 2020-09-18 PROCEDURE — 80061 LIPID PANEL: CPT | Mod: 91

## 2020-09-18 PROCEDURE — 80053 COMPREHEN METABOLIC PANEL: CPT | Mod: 91

## 2020-09-18 PROCEDURE — 82043 UR ALBUMIN QUANTITATIVE: CPT

## 2020-09-18 PROCEDURE — 83036 HEMOGLOBIN GLYCOSYLATED A1C: CPT

## 2020-09-18 PROCEDURE — 36415 COLL VENOUS BLD VENIPUNCTURE: CPT

## 2020-10-06 ENCOUNTER — OFFICE VISIT (OUTPATIENT)
Dept: MEDICAL GROUP | Facility: MEDICAL CENTER | Age: 50
End: 2020-10-06
Payer: COMMERCIAL

## 2020-10-06 VITALS
HEIGHT: 63 IN | DIASTOLIC BLOOD PRESSURE: 64 MMHG | BODY MASS INDEX: 39.69 KG/M2 | RESPIRATION RATE: 16 BRPM | SYSTOLIC BLOOD PRESSURE: 102 MMHG | OXYGEN SATURATION: 95 % | WEIGHT: 224 LBS | TEMPERATURE: 98.6 F | HEART RATE: 79 BPM

## 2020-10-06 DIAGNOSIS — Z79.4 TYPE 2 DIABETES MELLITUS WITHOUT COMPLICATION, WITH LONG-TERM CURRENT USE OF INSULIN (HCC): ICD-10-CM

## 2020-10-06 DIAGNOSIS — E11.9 TYPE 2 DIABETES MELLITUS WITHOUT COMPLICATION, WITH LONG-TERM CURRENT USE OF INSULIN (HCC): ICD-10-CM

## 2020-10-06 DIAGNOSIS — M54.42 CHRONIC BILATERAL LOW BACK PAIN WITH LEFT-SIDED SCIATICA: ICD-10-CM

## 2020-10-06 DIAGNOSIS — M48.061 NEUROFORAMINAL STENOSIS OF LUMBAR SPINE: ICD-10-CM

## 2020-10-06 DIAGNOSIS — G89.29 CHRONIC BILATERAL LOW BACK PAIN WITH LEFT-SIDED SCIATICA: ICD-10-CM

## 2020-10-06 DIAGNOSIS — L60.8 DEFORMITY OF TOENAIL: ICD-10-CM

## 2020-10-06 DIAGNOSIS — M47.816 SPONDYLOSIS OF LUMBAR REGION WITHOUT MYELOPATHY OR RADICULOPATHY: ICD-10-CM

## 2020-10-06 PROCEDURE — 99214 OFFICE O/P EST MOD 30 MIN: CPT | Performed by: INTERNAL MEDICINE

## 2020-10-06 ASSESSMENT — FIBROSIS 4 INDEX: FIB4 SCORE: 1.02

## 2020-10-06 NOTE — ASSESSMENT & PLAN NOTE
Chronic low back pain, taking tylenol, ibuprofen and occasionally tramadol prn for her back pain  Referral to pain management

## 2020-10-06 NOTE — PROGRESS NOTES
Established Patient    Sarah Friedman is a 49 y.o. female who presents today with the following:    CC:   Chief Complaint   Patient presents with   • Follow-Up     Labs   • Referral Needed     Pain Management       HPI:     Type 2 diabetes mellitus without complication (HCC)  Chronic, controlled     Ref. Range 9/18/2020 11:03   Glycohemoglobin Latest Ref Range: 0.0 - 5.6 % 6.3 (H)   Estim. Avg Glu Latest Units: mg/dL 134      Ref. Range 2/21/2020 15:06   Glycohemoglobin Latest Ref Range: 0.0 - 5.6 % 5.6     on glargine 60 unit every evening.   Jardiance, metformin      Chronic low back pain  Chronic low back pain, taking tylenol, ibuprofen and occasionally tramadol prn for her back pain  Referral to pain management      Degenerative joint disease (DJD) of lumbar spine  Used to see [facility]. Referral to pain management    Neuroforaminal stenosis of lumbar spine  Used to see [facility], does not want surgery  Referral of pain management       Deformity of toenail  Bilateral big toenail deformities.        Current Outpatient Medications   Medication Sig Dispense Refill   • divalproex (DEPAKOTE) 500 MG Tablet Delayed Response TAKE 1 TABLET BY MOUTH TWICE DAILY AFTER MEALS     • risperiDONE (RISPERDAL) 2 MG Tab TAKE 1 TABLET BY MOUTH ONCE DAILY AT NIGHT AS DIRECTED     • traZODone (DESYREL) 150 MG Tab Take 1 Tab by mouth at bedtime as needed. TAKE 1 TABLET BY MOUTH AT BEDTIME 30 Tab 11   • sumatriptan (IMITREX) 100 MG tablet TAKE 1/2 TO 1 (ONE-HALF TO ONE) TABLET BY MOUTH AS NEEDED FOR  MIGRAINE/HEADACHE 9 Tab 5   • metformin (GLUCOPHAGE) 1000 MG tablet TAKE 1 TABLET BY MOUTH TWICE DAILY WITH MEALS 60 Tab 11   • levothyroxine (SYNTHROID) 137 MCG Tab TAKE 1 TABLET BY MOUTH  Every Sunday and Thursday 8 Tab 1   • levothyroxine (SYNTHROID) 125 MCG Tab Take 125 mcg every Mondays, Tuesdays, Wednesdays, Fridays and Saturdays 30 Tab 11   • oxybutynin SR (DITROPAN-XL) 10 MG CR tablet Take 1 Tab by mouth every day. 30 Tab 5   •  hydroCHLOROthiazide (HYDRODIURIL) 12.5 MG tablet Take 1 Tab by mouth every day. 30 Tab 5   • atorvastatin (LIPITOR) 20 MG Tab Take 1 Tab by mouth every day. 30 Tab 5   • spironolactone (ALDACTONE) 25 MG Tab Take 1 Tab by mouth every day. 30 Tab 5   • metoprolol SR (TOPROL XL) 25 MG TABLET SR 24 HR Take 1 Tab by mouth every evening. 30 Tab 3   • omeprazole (PRILOSEC) 40 MG delayed-release capsule Take 1 Cap by mouth 2 times a day for 30 days. 60 Cap 2   • gabapentin (NEURONTIN) 300 MG Cap TAKE 3 CAPSULES BY MOUTH AT BEDTIME 90 Cap 2   • cyclobenzaprine (FLEXERIL) 10 MG Tab Take 1 Tab by mouth at bedtime as needed for Muscle Spasms. 30 Tab 5   • estradiol (ESTRACE) 1 MG Tab Take 1 Tab by mouth every day. 30 Tab 1   • Insulin Pen Needle (PEN NEEDLES) 32G X 4 MM Misc 1 Units by Does not apply route 2 Times a Day. 100 Each 3   • Empagliflozin (JARDIANCE) 10 MG Tab Take 10 mg by mouth every day. 30 Tab 11   • polyethylene glycol 3350 (MIRALAX) 17 GM/SCOOP Powder Take 17 g by mouth 1 time daily as needed (constipation). 510 g 5   • insulin glargine (LANTUS SOLOSTAR) 100 UNIT/ML Solution Pen-injector injection Inject 60 Units as instructed every evening. 30 mL 5   • tramadol (ULTRAM) 50 MG Tab Take 50 mg by mouth every four hours as needed.     • BIOTIN PO Take  by mouth.     • Omega-3 Fatty Acids (OMEGA-3 FISH OIL PO) Take  by mouth.     • Alcohol Swabs (ALCOHOL PREP) 70 % Pads Use three times daily to clean finger before blood glucose testing 100 Each 11   • acetaminophen (TYLENOL) 500 MG Tab Take 500-1,000 mg by mouth every 6 hours as needed.     • Multiple Vitamins-Minerals (MULTIVITAMIN PO) Take 1 Tab by mouth every day.     • Black Cohosh 40 MG Cap Take 40 mg by mouth 2 Times a Day.     • Melatonin 10 MG Tab Take 20 mg by mouth every bedtime.       No current facility-administered medications for this visit.        Allergies, past medical history, past surgical history, medications, family history, social history  "reviewed and updated.    ROS   Constitutional: Denies fevers or chills  Eyes: Denies changes in vision  Ears/Nose/Throat/Mouth: Denies nasal congestion or sore throat   Cardiovascular: Denies chest pain or palpitations   Respiratory: Denies shortness of breath , Denies cough  Gastrointestinal/Hepatic: Denies abd pain, nausea, vomiting   Genitourinary: Denies dysuria or frequency  Musculoskeletal/Rheum: chronic back pain  Neurological: Denies headache  Psychiatric: Denies mood disorder   Endocrine:  hx of diabetes, thyroid dysfunction  Heme/Oncology/Lymph Nodes: Denies weight changes or enlarged LNs.    Physical Exam  Vitals: /64 (BP Location: Left arm, Patient Position: Sitting, BP Cuff Size: Adult)   Pulse 79   Temp 37 °C (98.6 °F) (Temporal)   Resp 16   Ht 1.6 m (5' 3\")   Wt 101.6 kg (224 lb)   LMP 04/20/2018 (Approximate) Comment: HCG from 4/23 negative  SpO2 95%   BMI 39.68 kg/m²   General: Alert, pleasant, NAD  HEENT: Normocephalic.  EOMI, no icterus or pallor.  Conjunctivae and lids normal. External ears normal. Oropharynx non-erythematous, mucous membranes moist.  Neck supple.  No thyromegaly or masses palpated.   Lymph: No cervical or supraclavicular lymphadenopathy.  Cardiovascular: Regular rate and rhythm.    Respiratory: Normal respiratory effort.  Clear to auscultation bilaterally.  Abdomen: Non-distended, soft, non-tender  Skin: Warm, dry.  Musculoskeletal: Gait is normal.  Moves all extremities well.  Extremities: No leg edema. Bilateral big toenail deformities  Psych:  Affect/mood is normal, judgement is good, memory is intact, grooming is appropriate.      Labs (9/18/20) were reviewed and discussed with patients.  All questions were answered.      Assessment and Plan    1. Chronic bilateral low back pain with left-sided sciatica  2. Spondylosis of lumbar region without myelopathy or radiculopathy  3. Neuroforaminal stenosis of lumbar spine  - REFERRAL TO PAIN MANAGEMENT  Tylenol " prn  Minimize ibuprofen if possible due to it's potential side effect after long term use  Tramadol prn    4. Deformity of toenail  - REFERRAL TO PODIATRY    5. Type 2 diabetes mellitus without complication, with long-term current use of insulin (HCC)  Continue Lantus, metformin, jardiance  Keep glucose log    Follow-up:Return in about 1 month (around 11/6/2020), or if symptoms worsen or fail to improve.    This note was created using voice recognition software. There may be unintended errors in spelling, grammar or content.

## 2020-10-29 ENCOUNTER — HOSPITAL ENCOUNTER (OUTPATIENT)
Dept: LAB | Facility: MEDICAL CENTER | Age: 50
End: 2020-10-29
Attending: STUDENT IN AN ORGANIZED HEALTH CARE EDUCATION/TRAINING PROGRAM
Payer: COMMERCIAL

## 2020-10-29 LAB
ALBUMIN SERPL BCP-MCNC: 3.9 G/DL (ref 3.2–4.9)
ALBUMIN/GLOB SERPL: 1.4 G/DL
ALP SERPL-CCNC: 35 U/L (ref 30–99)
ALT SERPL-CCNC: 9 U/L (ref 2–50)
ANION GAP SERPL CALC-SCNC: 13 MMOL/L (ref 7–16)
AST SERPL-CCNC: 14 U/L (ref 12–45)
BASOPHILS # BLD AUTO: 0.5 % (ref 0–1.8)
BASOPHILS # BLD: 0.04 K/UL (ref 0–0.12)
BILIRUB SERPL-MCNC: 0.2 MG/DL (ref 0.1–1.5)
BUN SERPL-MCNC: 18 MG/DL (ref 8–22)
CALCIUM SERPL-MCNC: 9.3 MG/DL (ref 8.5–10.5)
CHLORIDE SERPL-SCNC: 101 MMOL/L (ref 96–112)
CO2 SERPL-SCNC: 27 MMOL/L (ref 20–33)
CREAT SERPL-MCNC: 0.63 MG/DL (ref 0.5–1.4)
EOSINOPHIL # BLD AUTO: 0.21 K/UL (ref 0–0.51)
EOSINOPHIL NFR BLD: 2.8 % (ref 0–6.9)
ERYTHROCYTE [DISTWIDTH] IN BLOOD BY AUTOMATED COUNT: 47.8 FL (ref 35.9–50)
GLOBULIN SER CALC-MCNC: 2.8 G/DL (ref 1.9–3.5)
GLUCOSE SERPL-MCNC: 72 MG/DL (ref 65–99)
HCT VFR BLD AUTO: 46.3 % (ref 37–47)
HGB BLD-MCNC: 14.7 G/DL (ref 12–16)
IMM GRANULOCYTES # BLD AUTO: 0.04 K/UL (ref 0–0.11)
IMM GRANULOCYTES NFR BLD AUTO: 0.5 % (ref 0–0.9)
LIPASE SERPL-CCNC: 40 U/L (ref 11–82)
LYMPHOCYTES # BLD AUTO: 2.38 K/UL (ref 1–4.8)
LYMPHOCYTES NFR BLD: 31.2 % (ref 22–41)
MCH RBC QN AUTO: 30.1 PG (ref 27–33)
MCHC RBC AUTO-ENTMCNC: 31.7 G/DL (ref 33.6–35)
MCV RBC AUTO: 94.9 FL (ref 81.4–97.8)
MONOCYTES # BLD AUTO: 0.62 K/UL (ref 0–0.85)
MONOCYTES NFR BLD AUTO: 8.1 % (ref 0–13.4)
NEUTROPHILS # BLD AUTO: 4.33 K/UL (ref 2–7.15)
NEUTROPHILS NFR BLD: 56.9 % (ref 44–72)
NRBC # BLD AUTO: 0 K/UL
NRBC BLD-RTO: 0 /100 WBC
PLATELET # BLD AUTO: 284 K/UL (ref 164–446)
PMV BLD AUTO: 9.3 FL (ref 9–12.9)
POTASSIUM SERPL-SCNC: 4.2 MMOL/L (ref 3.6–5.5)
PROT SERPL-MCNC: 6.7 G/DL (ref 6–8.2)
RBC # BLD AUTO: 4.88 M/UL (ref 4.2–5.4)
SODIUM SERPL-SCNC: 141 MMOL/L (ref 135–145)
WBC # BLD AUTO: 7.6 K/UL (ref 4.8–10.8)

## 2020-10-29 PROCEDURE — 80053 COMPREHEN METABOLIC PANEL: CPT

## 2020-10-29 PROCEDURE — 36415 COLL VENOUS BLD VENIPUNCTURE: CPT

## 2020-10-29 PROCEDURE — 83690 ASSAY OF LIPASE: CPT

## 2020-10-29 PROCEDURE — 85025 COMPLETE CBC W/AUTO DIFF WBC: CPT

## 2020-11-04 ENCOUNTER — GYNECOLOGY VISIT (OUTPATIENT)
Dept: OBGYN | Facility: CLINIC | Age: 50
End: 2020-11-04
Payer: COMMERCIAL

## 2020-11-04 VITALS — BODY MASS INDEX: 40.57 KG/M2 | WEIGHT: 229 LBS | DIASTOLIC BLOOD PRESSURE: 65 MMHG | SYSTOLIC BLOOD PRESSURE: 92 MMHG

## 2020-11-04 DIAGNOSIS — N39.41 URGE INCONTINENCE: ICD-10-CM

## 2020-11-04 DIAGNOSIS — Z80.3 FAMILY HISTORY OF BREAST CANCER: ICD-10-CM

## 2020-11-04 DIAGNOSIS — Z79.890 HORMONE REPLACEMENT THERAPY: ICD-10-CM

## 2020-11-04 PROCEDURE — 99214 OFFICE O/P EST MOD 30 MIN: CPT | Performed by: OBSTETRICS & GYNECOLOGY

## 2020-11-04 RX ORDER — ESTRADIOL 1 MG/1
1 TABLET ORAL DAILY
Qty: 30 TAB | Refills: 12 | Status: SHIPPED | OUTPATIENT
Start: 2020-11-04 | End: 2020-12-11 | Stop reason: SDUPTHER

## 2020-11-04 RX ORDER — OMEPRAZOLE 40 MG/1
CAPSULE, DELAYED RELEASE ORAL
COMMUNITY
Start: 2020-10-31 | End: 2021-02-05

## 2020-11-04 ASSESSMENT — FIBROSIS 4 INDEX: FIB4 SCORE: 0.82

## 2020-11-04 NOTE — NON-PROVIDER
Pt here for medication refill  Pt states that she is also having bladder issues that she would like to address.  Pharmacy verified  [facility] #[phone]

## 2020-11-04 NOTE — PROGRESS NOTES
"Chief Complaint   Patient presents with   • Gynecologic Exam   Chief complaint: Hormone replacement therapy and urge incontinence    History of present illness:   49 y.o.  presents with above chief complaint.  Patient presents today for follow-up and refill of hormone replacement therapy.  She underwent a hysterectomy with bilateral salpingo-oophorectomy in 2018.  Also had mid urethral sling placed at that time.    Patient has also been on extended release oxybutynin due to urge incontinence.  She does report some worsening symptoms in the last few months and feels like the medication may not be working correctly for her.  She reports she goes to the bathroom many times a day and has had some near misses and episodes of incontinence.    Family history significant for breast cancer in her mother.  Patient not completely sure when she was diagnosed but believes it was in her 40s.  Mother also has history of ovarian cancer.  Maternal grandmother and maternal great grandmother also affected with breast cancer apparently.    ROS: Pertinent positives documented in HPI and all other systems reviewed & are negative    POBHx: Para 2 para 2-0-0-2    PGYNHx: None    All PMH, PSH, meds, allergies, social history and FH reviewed and updated today:  Past Medical History:   Diagnosis Date   • Anesthesia 2019    \"hard to wake up\"  \"sleep apnea\"   • Arrhythmia     \" sinus Tach\"   • Arthritis     osteo   • Blood clotting disorder ([facility])     DVT   • Bowel habit changes     constipation   • Depression     \"BPD\"   • Diabetes ([facility]) 2019    insulin   • Diabetic neuropathy ([facility])    • Esophageal spasm    • GERD (gastroesophageal reflux disease)    • Hashimoto's disease    • Hiatal hernia    • High cholesterol    • Hyperlipidemia    • Hypothyroid    • Sleep apnea     uses cpap   • Snoring     sleep study done   • Tachycardia        Past Surgical History:   Procedure Laterality Date   • COLONOSCOPY N/A 2019    " Procedure: COLONOSCOPY;  Surgeon: Ata Khalil M.D.;  Location: SURGERY SAME DAY [facility] ORS;  Service: Gastroenterology   • GASTROSCOPY N/A 8/30/2019    Procedure: GASTROSCOPY - W/DILATION biopsy;  Surgeon: Ata Khalil M.D.;  Location: SURGERY SAME DAY [facility] ORS;  Service: Gastroenterology   • VAGINAL HYSTERECTOMY SCOPE TOTAL N/A 4/24/2018    Procedure: VAGINAL HYSTERECTOMY SCOPE TOTAL;  Surgeon: Francisco Javier Lainez M.D.;  Location: SURGERY SAME DAY [facility] OR[facility];  Service: Gynecology   • SALPINGO OOPHORECTOMY Bilateral 4/24/2018    Procedure: SALPINGO OOPHORECTOMY;  Surgeon: Francisco Javier Lainez M.D.;  Location: SURGERY SAME DAY [facility];  Service: Gynecology   • ANTERIOR AND POSTERIOR REPAIR  4/24/2018    Procedure: ANTERIOR AND POSTERIOR REPAIR;  Surgeon: Francisco Javier Lainez M.D.;  Location: SURGERY SAME DAY [facility];  Service: Gynecology   • ENTEROCELE REPAIR  4/24/2018    Procedure: ENTEROCELE REPAIR- PERINEOPLASTY;  Surgeon: Francisco Javier Lainez M.D.;  Location: SURGERY SAME DAY [facility];  Service: Gynecology   • BLADDER SLING FEMALE  4/24/2018    Procedure: BLADDER SLING FEMALE- TOT;  Surgeon: Francisco Javier Lainez M.D.;  Location: SURGERY SAME DAY [facility];  Service: Gynecology   • VAGINAL SUSPENSION N/A 4/24/2018    Procedure: VAGINAL SUSPENSION- SACROSPINOUS VAULT;  Surgeon: Francisco Javier Lainez M.D.;  Location: SURGERY SAME DAY [facility];  Service: Gynecology   • CYSTOSCOPY N/A 4/24/2018    Procedure: CYSTOSCOPY;  Surgeon: Francisco Javier Lainez M.D.;  Location: SURGERY SAME DAY [facility];  Service: Gynecology   • ENDOSCOPY  2016   • COLONOSCOPY  2016   • OTHER  2016    esophageal dilation   • OTHER ORTHOPEDIC SURGERY  2012    bone removed from toe after fracture   • TONSILLECTOMY AND ADENOIDECTOMY  1977   • OTHER  1971, 1978, 1985    left facial cheek and right upper thight reconstructive surgery       Allergies:   Allergies   Allergen Reactions   • Betadine Prepstick Plus [Povidone-Iodine] Rash      "Rash swelling   • Hydromorphone Hcl Itching     Itching \"head to toe\"   • Morphine Itching     Itching \"head to toe\"   • Povidone Iodine Hives   • Tape Itching     Itching \"leave scars\"       Social History     Socioeconomic History   • Marital status: Single     Spouse name: Not on file   • Number of children: Not on file   • Years of education: Not on file   • Highest education level: Not on file   Occupational History   • Not on file   Social Needs   • Financial resource strain: Not on file   • Food insecurity     Worry: Not on file     Inability: Not on file   • Transportation needs     Medical: Not on file     Non-medical: Not on file   Tobacco Use   • Smoking status: Never Smoker   • Smokeless tobacco: Never Used   Substance and Sexual Activity   • Alcohol use: Never     Frequency: Never   • Drug use: No     Comment: previous medical marijuana use for pain   • Sexual activity: Yes     Partners: Female   Lifestyle   • Physical activity     Days per week: Not on file     Minutes per session: Not on file   • Stress: Not on file   Relationships   • Social connections     Talks on phone: Not on file     Gets together: Not on file     Attends [employer] service: Not on file     Active member of club or organization: Not on file     Attends meetings of clubs or organizations: Not on file     Relationship status: Not on file   • Intimate partner violence     Fear of current or ex partner: Not on file     Emotionally abused: Not on file     Physically abused: Not on file     Forced sexual activity: Not on file   Other Topics Concern   • Not on file   Social History Narrative   • Not on file       Family History   Problem Relation Age of Onset   • Cancer Mother 70        pancreatic, breast in 30's   • Heart Disease Father    • Cancer Father         lung   • Diabetes Sister    • Diabetes Maternal Uncle    • Heart Disease Maternal Grandmother         MI   • Hyperlipidemia Maternal Grandmother    • Diabetes Maternal " Grandfather    • Hyperlipidemia Maternal Grandfather    • Stroke Neg Hx        Physical exam:  BP (!) 92/65   Wt 103.9 kg (229 lb)     GENERAL APPEARANCE: healthy, alert, no distress  EXTREMITIES:negative clubbing, cyanosis, edema    NEURO Awake, alert and oriented x 3, Normal gait, no sensory deficits  SKIN No rashes, or ulcers or lesions seen  PSYCHIATRIC: Patient shows appropriate affect, is alert and oriented x3, intact judgment and insight.      Assessment:  1. Urge incontinence     2. Hormone replacement therapy     3. Family history of breast cancer         Plan:    The goal of hormonal therapy is to relieve menopausal symptoms, most importantly hot flashes (vasomotor symptoms). Other symptoms associated with perimenopause and menopause that respond to hormonal therapy include mood lability/depression, vaginal atrophy, dyspareunia, sleep disturbances (when related to hot flashes), and, in some cases, joint aches and pains.         ESTIMATES OF RISK IN WOMEN 50 TO 59 YEARS:     Estrogen-alone therapy - Number of cases (additional or fewer) per 1000 women per five years of hormone use when compared with placebo:  •CHD - 5.5 fewer cases  •Invasive breast cancer - 2.5 fewer cases  •Stroke - 0.5 fewer cases  •Pulmonary embolism - 1.5 additional case  •Colorectal cancer - 0.5 fewer cases  •Hip fracture - 1.5 additional cases  •All-cause mortality - 5.5 fewer events     Extended use of MHT:     Both the [employer] American Menopause Society and the American College of Obstetrics and Gynecology agree that use of menopausal hormone therapy should be individualized and not discontinued solely based upon patient age. They suggest that extended use of MHT (beyond age 60 or even 65 years) may be reasonable when the clinician and patient agree that the benefits of symptom relief outweigh the risks.       In fact, over 40 percent of women ages 60 to 65 years have persistent hot flashes that can impair sleep and quality of  life.     After thorough discussion of all the risks and benefits, patient would like to be restarted on hormonal replacement therapy.     For women who choose extended use of MHT (more than five years or beyond age 60 years), I restart estrogen at the lowest dose possible and make plans for a future attempt to stop the estrogen.  I discussed this with the patient and she agrees to revisit the need for estrogen therapy on an yearly basis.    As far as the urge incontinence concerned, will increase oxybutynin to 20 mg/day.  If no improvement, may discuss change to a different medication versus referral to urology for possible Botox injections versus InterStim    Will refer to genetic counseling for possible BRCA testing given her family history of breast cancer    Follow-up 2 weeks    30 minutes spent in direct face-to-face counseling and plan of care

## 2020-11-13 ENCOUNTER — OFFICE VISIT (OUTPATIENT)
Dept: MEDICAL GROUP | Facility: MEDICAL CENTER | Age: 50
End: 2020-11-13
Payer: COMMERCIAL

## 2020-11-13 ENCOUNTER — TELEPHONE (OUTPATIENT)
Dept: OBGYN | Facility: CLINIC | Age: 50
End: 2020-11-13

## 2020-11-13 VITALS
HEIGHT: 63 IN | WEIGHT: 227.74 LBS | BODY MASS INDEX: 40.35 KG/M2 | TEMPERATURE: 98.5 F | DIASTOLIC BLOOD PRESSURE: 62 MMHG | HEART RATE: 72 BPM | OXYGEN SATURATION: 95 % | RESPIRATION RATE: 16 BRPM | SYSTOLIC BLOOD PRESSURE: 92 MMHG

## 2020-11-13 DIAGNOSIS — H60.501 ACUTE OTITIS EXTERNA OF RIGHT EAR, UNSPECIFIED TYPE: ICD-10-CM

## 2020-11-13 DIAGNOSIS — E11.9 TYPE 2 DIABETES MELLITUS WITHOUT COMPLICATION, WITH LONG-TERM CURRENT USE OF INSULIN (HCC): ICD-10-CM

## 2020-11-13 DIAGNOSIS — Z79.4 TYPE 2 DIABETES MELLITUS WITHOUT COMPLICATION, WITH LONG-TERM CURRENT USE OF INSULIN (HCC): ICD-10-CM

## 2020-11-13 PROBLEM — D50.9 IRON DEFICIENCY ANEMIA: Status: RESOLVED | Noted: 2018-01-16 | Resolved: 2020-11-13

## 2020-11-13 PROCEDURE — 99214 OFFICE O/P EST MOD 30 MIN: CPT | Performed by: INTERNAL MEDICINE

## 2020-11-13 RX ORDER — NEOMYCIN SULFATE, POLYMYXIN B SULFATE AND HYDROCORTISONE 10; 3.5; 1 MG/ML; MG/ML; [USP'U]/ML
4 SUSPENSION/ DROPS AURICULAR (OTIC) 4 TIMES DAILY
Qty: 12 ML | Refills: 0 | Status: SHIPPED | OUTPATIENT
Start: 2020-11-13 | End: 2020-11-20

## 2020-11-13 ASSESSMENT — FIBROSIS 4 INDEX: FIB4 SCORE: 0.82

## 2020-11-13 NOTE — ASSESSMENT & PLAN NOTE
Right external ear canal redness and pain for a few days.   No hearing changes, she wears ear plugs a lot at work and when she sleeps.   Her tympanic membrane intact.

## 2020-11-13 NOTE — TELEPHONE ENCOUNTER
Pt called stating pharmacy has not received Rx for Oxybutynin 20 mg  Adv pt will let Dr. Maloney know and call her back once I speak with him Pt understood and agreed to plan  1412 Per Dr. Maloney there is no 20 mg. Pt will need to take 2-10mg tabs, which she already has an Rx from other provider.  1414 Called pt, unable to reach her. TCB    11/17/20 1216 Pt LM on  returning our call  1459 Spoke with pt and informed her of Dr. Maloney's recommendation, to take 2-10 mg tabs. Pt stated she will need a new Rx with right quantity and new directions. Pt also inquired about increasing dosage on her Estradiol. Informed pt will talk to the provider on Thursday and call her back with a response. Pt understood and agreed to plan.

## 2020-11-13 NOTE — ASSESSMENT & PLAN NOTE
Chronic, controlled  Per her insulin and BG log, she mostly use glargine 60 units three times per week. We will spread out to every evening 25 units every evening instead and adjust her insulin base on her BG, patient is agree with the plan.   Ref. Range 9/18/2020 11:03   Glycohemoglobin Latest Ref Range: 0.0 - 5.6 % 6.3 (H)   Estim. Avg Glu Latest Units: mg/dL 134      Ref. Range 2/21/2020 15:06   Glycohemoglobin Latest Ref Range: 0.0 - 5.6 % 5.6     on glargine 25 unit every evening,  increase 2 units every 3 days, until fasting blood glucose level are in target range ( mg/dL)  If hypoglycemia occurs, or fasting level < 80 mg/dL, reduce bedtime dose by 4 units or 10%- whichever is greater.     Jardiance, metformin

## 2020-11-13 NOTE — PROGRESS NOTES
Established Patient    Sarah Friedman is a 49 y.o. female who presents today with the following:    CC:   Chief Complaint   Patient presents with   • Follow-Up     Labs, diabetes   • Otalgia     R ear, x 1 day       HPI:     Type 2 diabetes mellitus without complication (HCC)  Chronic, controlled  Per her insulin and BG log, she mostly use glargine 60 units three times per week. We will spread out to every evening 25 units every evening instead and adjust her insulin base on her BG, patient is agree with the plan.    on glargine 25 unit every evening,  increase 2 units every 3 days, until fasting blood glucose level are in target range ( mg/dL)  If hypoglycemia occurs, or fasting level < 80 mg/dL, reduce bedtime dose by 4 units or 10%- whichever is greater.        Ref. Range 9/18/2020 11:03   Glycohemoglobin Latest Ref Range: 0.0 - 5.6 % 6.3 (H)   Estim. Avg Glu Latest Units: mg/dL 134      Ref. Range 2/21/2020 15:06   Glycohemoglobin Latest Ref Range: 0.0 - 5.6 % 5.6       Jardiance, metformin      Acute otitis externa of right ear  Right external ear canal redness and pain for a few days.   No hearing changes, she wears ear plugs a lot at work and when she sleeps.   Her tympanic membrane intact.           Current Outpatient Medications   Medication Sig Dispense Refill   • neomycin-polymyxin-HC (PEDIOTIC HC) 3.5-53168-5 Suspension Administer 4 Drops into affected ear(s) 4 times a day for 7 days. 12 mL 0   • levothyroxine (SYNTHROID) 137 MCG Tab TAKE 1 TABLET BY MOUTH EVERY SUNDAY AND THURSDAY 8 Tab 3   • omeprazole (PRILOSEC) 40 MG delayed-release capsule      • estradiol (ESTRACE) 1 MG Tab Take 1 Tab by mouth every day. 30 Tab 12   • divalproex (DEPAKOTE) 500 MG Tablet Delayed Response TAKE 1 TABLET BY MOUTH TWICE DAILY AFTER MEALS     • risperiDONE (RISPERDAL) 2 MG Tab TAKE 1 TABLET BY MOUTH ONCE DAILY AT NIGHT AS DIRECTED     • traZODone (DESYREL) 150 MG Tab Take 1 Tab by mouth at bedtime as needed.  TAKE 1 TABLET BY MOUTH AT BEDTIME 30 Tab 11   • sumatriptan (IMITREX) 100 MG tablet TAKE 1/2 TO 1 (ONE-HALF TO ONE) TABLET BY MOUTH AS NEEDED FOR  MIGRAINE/HEADACHE 9 Tab 5   • metformin (GLUCOPHAGE) 1000 MG tablet TAKE 1 TABLET BY MOUTH TWICE DAILY WITH MEALS 60 Tab 11   • levothyroxine (SYNTHROID) 125 MCG Tab Take 125 mcg every Mondays, Tuesdays, Wednesdays, Fridays and Saturdays 30 Tab 11   • oxybutynin SR (DITROPAN-XL) 10 MG CR tablet Take 1 Tab by mouth every day. 30 Tab 5   • hydroCHLOROthiazide (HYDRODIURIL) 12.5 MG tablet Take 1 Tab by mouth every day. 30 Tab 5   • atorvastatin (LIPITOR) 20 MG Tab Take 1 Tab by mouth every day. 30 Tab 5   • spironolactone (ALDACTONE) 25 MG Tab Take 1 Tab by mouth every day. 30 Tab 5   • metoprolol SR (TOPROL XL) 25 MG TABLET SR 24 HR Take 1 Tab by mouth every evening. 30 Tab 3   • gabapentin (NEURONTIN) 300 MG Cap TAKE 3 CAPSULES BY MOUTH AT BEDTIME 90 Cap 2   • cyclobenzaprine (FLEXERIL) 10 MG Tab Take 1 Tab by mouth at bedtime as needed for Muscle Spasms. 30 Tab 5   • Insulin Pen Needle (PEN NEEDLES) 32G X 4 MM Misc 1 Units by Does not apply route 2 Times a Day. 100 Each 3   • Empagliflozin (JARDIANCE) 10 MG Tab Take 10 mg by mouth every day. 30 Tab 11   • polyethylene glycol 3350 (MIRALAX) 17 GM/SCOOP Powder Take 17 g by mouth 1 time daily as needed (constipation). 510 g 5   • insulin glargine (LANTUS SOLOSTAR) 100 UNIT/ML Solution Pen-injector injection Inject 25 Units under the skin every evening. 30 mL 5   • tramadol (ULTRAM) 50 MG Tab Take 50 mg by mouth every four hours as needed.     • BIOTIN PO Take  by mouth.     • Omega-3 Fatty Acids (OMEGA-3 FISH OIL PO) Take  by mouth.     • Alcohol Swabs (ALCOHOL PREP) 70 % Pads Use three times daily to clean finger before blood glucose testing 100 Each 11   • acetaminophen (TYLENOL) 500 MG Tab Take 500-1,000 mg by mouth every 6 hours as needed.     • Multiple Vitamins-Minerals (MULTIVITAMIN PO) Take 1 Tab by mouth every day.   "   • Black Cohosh 40 MG Cap Take 40 mg by mouth 2 Times a Day.     • Melatonin 10 MG Tab Take 20 mg by mouth every bedtime.       No current facility-administered medications for this visit.        Allergies, past medical history, past surgical history, medications, family history, social history reviewed and updated.    ROS   Constitutional: Denies fevers or chills  Eyes: Denies changes in vision  Ears/Nose/Throat/Mouth: Denies nasal congestion or sore throat   Cardiovascular: Denies chest pain or palpitations   Respiratory: Denies shortness of breath , Denies cough  Gastrointestinal/Hepatic: Denies abd pain, nausea, vomiting   Genitourinary: Denies dysuria or frequency  Musculoskeletal/Rheum: Denies joint pain and swelling   Neurological: Denies headache  Psychiatric: Denies mood disorder   Endocrine: hx of diabetes, hypothyroidism  Heme/Oncology/Lymph Nodes: Denies weight changes or enlarged LNs.    Physical Exam  Vitals: BP (!) 92/62 (BP Location: Left arm, Patient Position: Sitting, BP Cuff Size: Adult)   Pulse 72   Temp 36.9 °C (98.5 °F) (Temporal)   Resp 16   Ht 1.6 m (5' 3\")   Wt 103.3 kg (227 lb 11.8 oz)   LMP 04/20/2018 (Approximate) Comment: HCG from 4/23 negative  SpO2 95%   BMI 40.34 kg/m²   General: Alert, pleasant, NAD  HEENT: Normocephalic.  EOMI, no icterus or pallor.  Conjunctivae and lids normal. External ears normal.  tenderness. Oropharynx non-erythematous, mucous membranes moist.  Neck supple.  No thyromegaly or masses palpated. Right external ear canal redness and tenderness  No Tenderness of the tragus/pinna, otorrhea, regional lymphadenopathy  No Tympanic erythema, cellulitis of the pinna.   Lymph: No cervical or supraclavicular lymphadenopathy.  Cardiovascular: Regular rate and rhythm.  S1 and S2 normal.  No murmurs appreciated.  Respiratory: Normal respiratory effort.  Clear to auscultation bilaterally.  Abdomen: Non-distended, soft  Skin: Warm, dry, no rashes.  Musculoskeletal: " Gait is normal.  Moves all extremities well.  Extremities: No leg edema.   Psych:  Affect/mood is normal, judgement is good, memory is intact, grooming is appropriate.      Labs (10/29/20) were reviewed and discussed with patients.  All questions were answered.      Assessment and Plan    1. Acute otitis externa of right ear, unspecified type  - neomycin-polymyxin-HC (PEDIOTIC HC) 3.5-75344-4 Suspension; Administer 4 Drops into affected ear(s) 4 times a day for 7 days.  Dispense: 12 mL; Refill: 0    -Keep ear dry for 7 days, including no swimming, if water cannot be avoided, recommend cotton in year coated on outside with petroleum jelly to keep canal dry.  -Avoid cleaning the ear canal at home(to avoid injury)  -May take 1 or 2-week to resolve  -Follow-up if not improved within 3 days       2. Type 2 diabetes mellitus without complication, with long-term current use of insulin ([facility])  - Comp Metabolic Panel; Future  - HEMOGLOBIN A1C; Future  on glargine 25 unit every evening,  increase 2 units every 3 days, until fasting blood glucose level are in target range ( mg/dL)  If hypoglycemia occurs, or fasting level < 80 mg/dL, reduce bedtime dose by 4 units or 10%- whichever is greater.  Continue metformin and jardiance      Follow-up:Return in about 3 months (around 2/13/2021), or if symptoms worsen or fail to improve.    This note was created using voice recognition software. There may be unintended errors in spelling, grammar or content.

## 2020-12-01 DIAGNOSIS — N39.41 URGE INCONTINENCE: ICD-10-CM

## 2020-12-01 RX ORDER — OXYBUTYNIN CHLORIDE 10 MG/1
10 TABLET, EXTENDED RELEASE ORAL 2 TIMES DAILY
Qty: 60 TAB | Refills: 12 | Status: SHIPPED | OUTPATIENT
Start: 2020-12-01 | End: 2021-12-29 | Stop reason: SDUPTHER

## 2020-12-11 ENCOUNTER — GYNECOLOGY VISIT (OUTPATIENT)
Dept: OBGYN | Facility: CLINIC | Age: 50
End: 2020-12-11
Payer: COMMERCIAL

## 2020-12-11 VITALS — BODY MASS INDEX: 40.57 KG/M2 | WEIGHT: 229 LBS | DIASTOLIC BLOOD PRESSURE: 72 MMHG | SYSTOLIC BLOOD PRESSURE: 111 MMHG

## 2020-12-11 DIAGNOSIS — Z79.890 HORMONE REPLACEMENT THERAPY: ICD-10-CM

## 2020-12-11 PROCEDURE — 99213 OFFICE O/P EST LOW 20 MIN: CPT | Performed by: OBSTETRICS & GYNECOLOGY

## 2020-12-11 RX ORDER — ESTRADIOL 2 MG/1
2 TABLET ORAL DAILY
Qty: 60 TAB | Refills: 7 | Status: SHIPPED | OUTPATIENT
Start: 2020-12-11 | End: 2021-03-05 | Stop reason: DRUGHIGH

## 2020-12-11 ASSESSMENT — FIBROSIS 4 INDEX: FIB4 SCORE: 0.82

## 2020-12-11 NOTE — NON-PROVIDER
Pt here for Gyn/Follow-up visit  Good Phone#: [phone]  Pharmacy verified.  Pt refused to get a Wt. check. Pt states her Wt. was 229 lb.  Pt states would like to discuss increase dosage of Estradiol. Pt is currently taking Estradiol 1 mg.  Pt states no other complaints for today.

## 2020-12-11 NOTE — PROGRESS NOTES
"Chief Complaint   Patient presents with   • Follow-Up   Chief complaint: Hot flashes      History of present illness: 49 y.o. presents to office for discussion of hot flashes.  Patient has been on 1 mg estradiol for hot flashes and menopausal symptoms.  Unfortunately, these are not controlling her symptoms and she would like to increase the dose.    On another note, increasing the oxybutynin dose to twice daily has improved her symptoms.    Review of systems:  Pertinent positives documented in HPI and a comprehensive review of system is negative    All PMH, PSH, allergies, social history and FH reviewed and updated today:  Past Medical History:   Diagnosis Date   • Anesthesia 08/27/2019    \"hard to wake up\"  \"sleep apnea\"   • Arrhythmia     \" sinus Tach\"   • Arthritis     osteo   • Blood clotting disorder ([facility]) 2005    DVT   • Bowel habit changes     constipation   • Depression     \"BPD\"   • Diabetes ([facility]) 08/2019    insulin   • Diabetic neuropathy ([facility])    • Esophageal spasm    • GERD (gastroesophageal reflux disease)    • Hashimoto's disease    • Hiatal hernia    • High cholesterol    • Hyperlipidemia    • Hypothyroid    • Sleep apnea     uses cpap   • Snoring     sleep study done   • Tachycardia        Past Surgical History:   Procedure Laterality Date   • COLONOSCOPY N/A 8/30/2019    Procedure: COLONOSCOPY;  Surgeon: Ata Khalil M.D.;  Location: SURGERY SAME DAY [facility];  Service: Gastroenterology   • GASTROSCOPY N/A 8/30/2019    Procedure: GASTROSCOPY - W/DILATION biopsy;  Surgeon: Ata Khalil M.D.;  Location: SURGERY SAME DAY [facility];  Service: Gastroenterology   • VAGINAL HYSTERECTOMY SCOPE TOTAL N/A 4/24/2018    Procedure: VAGINAL HYSTERECTOMY SCOPE TOTAL;  Surgeon: Francisco Javier Lainez M.D.;  Location: SURGERY SAME DAY [facility];  Service: Gynecology   • SALPINGO OOPHORECTOMY Bilateral 4/24/2018    Procedure: SALPINGO OOPHORECTOMY;  Surgeon: Francisco Javier Lainez M.D.;  Location: SURGERY SAME DAY " "[facility];  Service: Gynecology   • ANTERIOR AND POSTERIOR REPAIR  4/24/2018    Procedure: ANTERIOR AND POSTERIOR REPAIR;  Surgeon: Francisco Javier Lainez M.D.;  Location: SURGERY SAME DAY [facility];  Service: Gynecology   • ENTEROCELE REPAIR  4/24/2018    Procedure: ENTEROCELE REPAIR- PERINEOPLASTY;  Surgeon: Francisco Javier Lainez M.D.;  Location: SURGERY SAME DAY [facility];  Service: Gynecology   • BLADDER SLING FEMALE  4/24/2018    Procedure: BLADDER SLING FEMALE- TOT;  Surgeon: Francisco Javier Lainez M.D.;  Location: SURGERY SAME DAY [facility];  Service: Gynecology   • VAGINAL SUSPENSION N/A 4/24/2018    Procedure: VAGINAL SUSPENSION- SACROSPINOUS VAULT;  Surgeon: Francisco Javier Lainez M.D.;  Location: SURGERY SAME DAY [facility];  Service: Gynecology   • CYSTOSCOPY N/A 4/24/2018    Procedure: CYSTOSCOPY;  Surgeon: Francisco Javier Lainez M.D.;  Location: SURGERY SAME DAY [facility];  Service: Gynecology   • ENDOSCOPY  2016   • COLONOSCOPY  2016   • OTHER  2016    esophageal dilation   • OTHER ORTHOPEDIC SURGERY  2012    bone removed from toe after fracture   • TONSILLECTOMY AND ADENOIDECTOMY  1977   • OTHER  1971, 1978, 1985    left facial cheek and right upper thight reconstructive surgery       Allergies:   Allergies   Allergen Reactions   • Betadine Prepstick Plus [Povidone-Iodine] Rash     Rash swelling   • Hydromorphone Hcl Itching     Itching \"head to toe\"   • Morphine Itching     Itching \"head to toe\"   • Povidone Iodine Hives   • Tape Itching     Itching \"leave scars\"       Social History     Socioeconomic History   • Marital status: Single     Spouse name: Not on file   • Number of children: Not on file   • Years of education: Not on file   • Highest education level: Not on file   Occupational History   • Not on file   Social Needs   • Financial resource strain: Not on file   • Food insecurity     Worry: Not on file     Inability: Not on file   • Transportation needs     Medical: Not on file     Non-medical: Not on " file   Tobacco Use   • Smoking status: Never Smoker   • Smokeless tobacco: Never Used   Substance and Sexual Activity   • Alcohol use: Never     Frequency: Never   • Drug use: No     Comment: previous medical marijuana use for pain   • Sexual activity: Yes     Partners: Female   Lifestyle   • Physical activity     Days per week: Not on file     Minutes per session: Not on file   • Stress: Not on file   Relationships   • Social connections     Talks on phone: Not on file     Gets together: Not on file     Attends Confucianism service: Not on file     Active member of club or organization: Not on file     Attends meetings of clubs or organizations: Not on file     Relationship status: Not on file   • Intimate partner violence     Fear of current or ex partner: Not on file     Emotionally abused: Not on file     Physically abused: Not on file     Forced sexual activity: Not on file   Other Topics Concern   • Not on file   Social History Narrative   • Not on file       Family History   Problem Relation Age of Onset   • Cancer Mother 70        pancreatic, breast in 30's   • Heart Disease Father    • Cancer Father         lung   • Diabetes Sister    • Diabetes Maternal Uncle    • Heart Disease Maternal Grandmother         MI   • Hyperlipidemia Maternal Grandmother    • Diabetes Maternal Grandfather    • Hyperlipidemia Maternal Grandfather    • Stroke Neg Hx        Physical exam:  /72   Wt 103.9 kg (229 lb)     GENERAL APPEARANCE: healthy, alert, no distress  NEURO Awake, alert and oriented x 3, Normal gait, no sensory deficits  SKIN No rashes, or ulcers or lesions seen  PSYCHIATRIC: Patient shows appropriate affect, is alert and oriented x3, intact judgment and insight.      Assessment:  1. Hormone replacement therapy         Plan:    Discussed with patient side effects of hormone replacement therapy.  Including increased risk of DVT/pulmonary embolism with higher doses of estrogen.    Patient understands the risks  but as her symptoms are severely affecting her quality of life, she would like to increase her dose at this time      Questions answered    Spent  15 minutes in face-to-face patient contact in which greater than 50% of that visit was spent in counseling/coordination of care including medical and surgical options of care.    Follow-up in 1 month

## 2021-01-22 DIAGNOSIS — E11.42 DIABETIC POLYNEUROPATHY ASSOCIATED WITH TYPE 2 DIABETES MELLITUS (HCC): ICD-10-CM

## 2021-01-22 RX ORDER — GABAPENTIN 300 MG/1
CAPSULE ORAL
Qty: 90 CAP | Refills: 0 | Status: SHIPPED | OUTPATIENT
Start: 2021-01-22 | End: 2021-02-05

## 2021-01-22 NOTE — TELEPHONE ENCOUNTER
Received request via: Pharmacy    Was the patient seen in the last year in this department? Yes    Does the patient have an active prescription (recently filled or refills available) for medication(s) requested? No     Requested Prescriptions     Pending Prescriptions Disp Refills   • gabapentin (NEURONTIN) 300 MG Cap [Pharmacy Med Name: Gabapentin 300 MG Oral Capsule] 90 Cap 0     Sig: TAKE 3 CAPSULES BY MOUTH AT BEDTIME

## 2021-02-05 ENCOUNTER — OFFICE VISIT (OUTPATIENT)
Dept: MEDICAL GROUP | Facility: MEDICAL CENTER | Age: 51
End: 2021-02-05
Payer: COMMERCIAL

## 2021-02-05 VITALS
HEART RATE: 82 BPM | TEMPERATURE: 97.7 F | DIASTOLIC BLOOD PRESSURE: 58 MMHG | WEIGHT: 227 LBS | SYSTOLIC BLOOD PRESSURE: 96 MMHG | HEIGHT: 63 IN | RESPIRATION RATE: 18 BRPM | OXYGEN SATURATION: 95 % | BODY MASS INDEX: 40.22 KG/M2

## 2021-02-05 DIAGNOSIS — K22.4 ESOPHAGEAL DYSMOTILITY: ICD-10-CM

## 2021-02-05 DIAGNOSIS — F51.01 PRIMARY INSOMNIA: ICD-10-CM

## 2021-02-05 DIAGNOSIS — E11.9 TYPE 2 DIABETES MELLITUS WITHOUT COMPLICATION, WITH LONG-TERM CURRENT USE OF INSULIN (HCC): ICD-10-CM

## 2021-02-05 DIAGNOSIS — Z79.4 TYPE 2 DIABETES MELLITUS WITHOUT COMPLICATION, WITH LONG-TERM CURRENT USE OF INSULIN (HCC): ICD-10-CM

## 2021-02-05 DIAGNOSIS — E03.8 HYPOTHYROIDISM DUE TO HASHIMOTO'S THYROIDITIS: ICD-10-CM

## 2021-02-05 DIAGNOSIS — E06.3 HYPOTHYROIDISM DUE TO HASHIMOTO'S THYROIDITIS: ICD-10-CM

## 2021-02-05 DIAGNOSIS — R09.81 CONGESTION OF NASAL SINUS: ICD-10-CM

## 2021-02-05 DIAGNOSIS — K59.00 CONSTIPATION, UNSPECIFIED CONSTIPATION TYPE: ICD-10-CM

## 2021-02-05 DIAGNOSIS — J34.2 DEVIATED NASAL SEPTUM: ICD-10-CM

## 2021-02-05 DIAGNOSIS — K22.4 ESOPHAGEAL SPASM: ICD-10-CM

## 2021-02-05 DIAGNOSIS — E11.42 DIABETIC POLYNEUROPATHY ASSOCIATED WITH TYPE 2 DIABETES MELLITUS (HCC): ICD-10-CM

## 2021-02-05 DIAGNOSIS — K21.9 GASTROESOPHAGEAL REFLUX DISEASE, UNSPECIFIED WHETHER ESOPHAGITIS PRESENT: ICD-10-CM

## 2021-02-05 DIAGNOSIS — K44.9 HIATAL HERNIA: ICD-10-CM

## 2021-02-05 DIAGNOSIS — R00.0 SINUS TACHYCARDIA: ICD-10-CM

## 2021-02-05 DIAGNOSIS — R04.0 NOSEBLEED: ICD-10-CM

## 2021-02-05 DIAGNOSIS — G89.29 CHRONIC TMJ PAIN: ICD-10-CM

## 2021-02-05 DIAGNOSIS — Z79.890 HORMONE REPLACEMENT THERAPY: ICD-10-CM

## 2021-02-05 DIAGNOSIS — G43.009 MIGRAINE WITHOUT AURA AND WITHOUT STATUS MIGRAINOSUS, NOT INTRACTABLE: ICD-10-CM

## 2021-02-05 DIAGNOSIS — M26.629 CHRONIC TMJ PAIN: ICD-10-CM

## 2021-02-05 DIAGNOSIS — I10 ESSENTIAL HYPERTENSION: ICD-10-CM

## 2021-02-05 PROCEDURE — 99215 OFFICE O/P EST HI 40 MIN: CPT | Performed by: INTERNAL MEDICINE

## 2021-02-05 RX ORDER — HYDROCHLOROTHIAZIDE 12.5 MG/1
12.5 TABLET ORAL DAILY
Qty: 90 TAB | Refills: 3 | Status: SHIPPED | OUTPATIENT
Start: 2021-02-05 | End: 2022-03-07

## 2021-02-05 RX ORDER — INSULIN GLARGINE 100 [IU]/ML
39 INJECTION, SOLUTION SUBCUTANEOUS EVERY EVENING
Qty: 36 ML | Refills: 2 | Status: SHIPPED | OUTPATIENT
Start: 2021-02-05 | End: 2021-10-11

## 2021-02-05 RX ORDER — EMPAGLIFLOZIN 10 MG/1
10 TABLET, FILM COATED ORAL DAILY
Qty: 90 TAB | Refills: 3 | Status: SHIPPED | OUTPATIENT
Start: 2021-02-05 | End: 2022-04-15

## 2021-02-05 RX ORDER — SPIRONOLACTONE 25 MG/1
25 TABLET ORAL DAILY
Qty: 90 TAB | Refills: 3 | Status: SHIPPED | OUTPATIENT
Start: 2021-02-05 | End: 2022-03-07

## 2021-02-05 RX ORDER — GABAPENTIN 300 MG/1
900 CAPSULE ORAL EVERY EVENING
Qty: 270 CAP | Refills: 2 | Status: SHIPPED | OUTPATIENT
Start: 2021-02-05 | End: 2021-11-01

## 2021-02-05 RX ORDER — LEVOTHYROXINE SODIUM 137 UG/1
TABLET ORAL
Qty: 24 TAB | Refills: 3 | Status: SHIPPED | OUTPATIENT
Start: 2021-02-05 | End: 2022-04-15

## 2021-02-05 RX ORDER — IBUPROFEN 800 MG/1
1 TABLET ORAL 2 TIMES DAILY
COMMUNITY
Start: 2020-12-29 | End: 2023-02-06 | Stop reason: SDUPTHER

## 2021-02-05 RX ORDER — OMEPRAZOLE 40 MG/1
40 CAPSULE, DELAYED RELEASE ORAL 2 TIMES DAILY
Qty: 180 CAP | Refills: 2 | Status: SHIPPED | OUTPATIENT
Start: 2021-02-05 | End: 2021-10-04

## 2021-02-05 RX ORDER — ATORVASTATIN CALCIUM 20 MG/1
20 TABLET, FILM COATED ORAL DAILY
Qty: 90 TAB | Refills: 3 | Status: SHIPPED | OUTPATIENT
Start: 2021-02-05 | End: 2022-07-06 | Stop reason: SDUPTHER

## 2021-02-05 RX ORDER — METOPROLOL SUCCINATE 25 MG/1
25 TABLET, EXTENDED RELEASE ORAL EVERY EVENING
Qty: 90 TAB | Refills: 3 | Status: SHIPPED | OUTPATIENT
Start: 2021-02-05 | End: 2022-02-07

## 2021-02-05 RX ORDER — TRAZODONE HYDROCHLORIDE 150 MG/1
150 TABLET ORAL NIGHTLY PRN
Qty: 90 TAB | Refills: 3 | Status: SHIPPED | OUTPATIENT
Start: 2021-02-05 | End: 2022-02-09

## 2021-02-05 RX ORDER — LEVOTHYROXINE SODIUM 0.12 MG/1
TABLET ORAL
Qty: 90 TAB | Refills: 3 | Status: SHIPPED | OUTPATIENT
Start: 2021-02-05 | End: 2022-03-21

## 2021-02-05 ASSESSMENT — FIBROSIS 4 INDEX: FIB4 SCORE: 0.84

## 2021-02-05 NOTE — ASSESSMENT & PLAN NOTE
Hx of deviated nasal septum  Was told may need repair, however, she did not get it done   She is interested in seeing ENT

## 2021-02-06 NOTE — ASSESSMENT & PLAN NOTE
S/p dilation with GI Jan 2018.  Biopsies negative for eosinophilic esophagitis  Following with GI

## 2021-02-06 NOTE — ASSESSMENT & PLAN NOTE
S/p total hysterectomy.   On estradiol which helps with her severe hot flashes and menopausal symptoms   She understanding the risks of increase risk of DVT/PE while on estradiol  Following with GYN

## 2021-02-06 NOTE — PROGRESS NOTES
Established Patient    Sarah Friedman is a 50 y.o. female who presents today with the following:    CC:   Chief Complaint   Patient presents with   • Follow-Up   • Diabetes   • Temporomandibular Joint Pain     worse x2 weeks        HPI:     Chronic TMJ pain  Chronic bilateral TMJ pain for years      Deviated nasal septum  Hx of deviated nasal septum  Was told may need repair, however, she did not get it done   She is interested in seeing ENT        Nosebleed  Hx of intermittent nosebleeds, stop with compressions  Worse with dry weather        Type 2 diabetes mellitus without complication (HCC)  Chronic, controlled  Jardiance, metformin  Insulin Glargine 39 units every evening per patient     Ref. Range 9/18/2020 11:03   Glycohemoglobin Latest Ref Range: 0.0 - 5.6 % 6.3 (H)   Estim. Avg Glu Latest Units: mg/dL 134      Ref. Range 2/21/2020 15:06   Glycohemoglobin Latest Ref Range: 0.0 - 5.6 % 5.6             GERD (gastroesophageal reflux disease)  Hiatal hernia, severe GERD    Failed once daily omeprazole 40 mg, as well as Prevacid, Nexium, ranitidine all at their max doses.  Without her medication, she reports severe painful heartburn.     She is taking omeprazole 40 mg BID  Follows with GI      Hypothyroidism due to Hashimoto's thyroiditis  Levothyroxine 125 mcg MW, Sat and 137 mcg Thurs & Sun     Ref. Range 5/27/2020 11:15   TSH Latest Ref Range: 0.380 - 5.330 uIU/mL 0.656         Primary insomnia  Stable on trazodone 150 mg at night prn      Essential hypertension  Stable on spironolactone and HCTZ      Esophageal spasm  Following with GI  She states the trazodone she takes is for esophageal spasm      Diabetic neuropathy (HCC)  On gabapentin at night      Migraine without aura and without status migrainosus, not intractable  Stable on imitrex prn       Hiatal hernia  Follow with GI  On PPI      Esophageal dysmotility  S/p dilation with GI Jan 2018.  Biopsies negative for eosinophilic esophagitis  Following  with GI      Hormone replacement therapy  S/p total hysterectomy.   On estradiol which helps with her severe hot flashes and menopausal symptoms   She understanding the risks of increase risk of DVT/PE while on estradiol  Following with GYN      Constipation  Stable on miralax prn      Congestion of nasal sinus  Chronic sinus congestion for years  She does not like to use nasal spray medications  Will try Zyrtec or Claritin and referral to ENT as she has long hx of sinus problems          Current Outpatient Medications   Medication Sig Dispense Refill   • Insulin Pen Needle 32 G x 4 mm Use one pen needle in pen device to inject insulin once daily. 100 Each 0   • insulin glargine (LANTUS SOLOSTAR) 100 UNIT/ML Solution Pen-injector injection Inject 39 Units under the skin every evening for 90 days. 36 mL 2   • spironolactone (ALDACTONE) 25 MG Tab Take 1 Tab by mouth every day. 90 Tab 3   • atorvastatin (LIPITOR) 20 MG Tab Take 1 Tab by mouth every day. 90 Tab 3   • metoprolol SR (TOPROL XL) 25 MG TABLET SR 24 HR Take 1 Tab by mouth every evening. 90 Tab 3   • traZODone (DESYREL) 150 MG Tab Take 1 Tab by mouth at bedtime as needed. TAKE 1 TABLET BY MOUTH AT BEDTIME 90 Tab 3   • hydroCHLOROthiazide (HYDRODIURIL) 12.5 MG tablet Take 1 Tab by mouth every day. 90 Tab 3   • Empagliflozin (JARDIANCE) 10 MG Tab Take 10 mg by mouth every day. 90 Tab 3   • levothyroxine (SYNTHROID) 137 MCG Tab TAKE 1 TABLET BY MOUTH EVERY SUNDAY AND THURSDAY 24 Tab 3   • levothyroxine (SYNTHROID) 125 MCG Tab Take 125 mcg every Mondays, Tuesdays, Wednesdays, Fridays and Saturdays 90 Tab 3   • metformin (GLUCOPHAGE) 1000 MG tablet TAKE 1 TABLET BY MOUTH TWICE DAILY WITH MEALS 180 Tab 3   • omeprazole (PRILOSEC) 40 MG delayed-release capsule Take 1 Cap by mouth 2 times a day. 180 Cap 2   • gabapentin (NEURONTIN) 300 MG Cap Take 3 Caps by mouth every evening. 270 Cap 2   • ibuprofen (MOTRIN) 800 MG Tab Take 1 Tab by mouth 2 Times a Day.     •  estradiol (ESTRACE) 2 MG Tab Take 1 Tab by mouth every day. 60 Tab 7   • oxybutynin SR (DITROPAN-XL) 10 MG CR tablet Take 1 Tab by mouth 2 Times a Day. 60 Tab 12   • divalproex (DEPAKOTE) 500 MG Tablet Delayed Response TAKE 1 TABLET BY MOUTH TWICE DAILY AFTER MEALS     • risperiDONE (RISPERDAL) 2 MG Tab TAKE 1 TABLET BY MOUTH ONCE DAILY AT NIGHT AS DIRECTED     • sumatriptan (IMITREX) 100 MG tablet TAKE 1/2 TO 1 (ONE-HALF TO ONE) TABLET BY MOUTH AS NEEDED FOR  MIGRAINE/HEADACHE 9 Tab 5   • cyclobenzaprine (FLEXERIL) 10 MG Tab Take 1 Tab by mouth at bedtime as needed for Muscle Spasms. 30 Tab 5   • polyethylene glycol 3350 (MIRALAX) 17 GM/SCOOP Powder Take 17 g by mouth 1 time daily as needed (constipation). 510 g 5   • tramadol (ULTRAM) 50 MG Tab Take 50 mg by mouth every four hours as needed.     • BIOTIN PO Take  by mouth.     • Omega-3 Fatty Acids (OMEGA-3 FISH OIL PO) Take  by mouth.     • Alcohol Swabs (ALCOHOL PREP) 70 % Pads Use three times daily to clean finger before blood glucose testing 100 Each 11   • acetaminophen (TYLENOL) 500 MG Tab Take 500-1,000 mg by mouth every 6 hours as needed.     • Multiple Vitamins-Minerals (MULTIVITAMIN PO) Take 1 Tab by mouth every day.     • Black Cohosh 40 MG Cap Take 40 mg by mouth 2 Times a Day.     • Melatonin 10 MG Tab Take 20 mg by mouth every bedtime.       No current facility-administered medications for this visit.        Allergies, past medical history, past surgical history, medications, family history, social history reviewed and updated.    ROS   Constitutional: Denies fevers or chills  Eyes: Denies changes in vision  Ears/Nose/Throat/Mouth: Denies nasal congestion or sore throat   Cardiovascular: Denies chest pain or palpitations   Respiratory: Denies shortness of breath , Denies cough  Gastrointestinal/Hepatic: Denies abd pain, nausea, vomiting   Genitourinary: Denies dysuria or frequency  Musculoskeletal/Rheum: Denies joint pain and swelling   Neurological:  "Denies headache  Psychiatric: Denies mood disorder   Endocrine:  hx of diabetes, hypothyroidism  Heme/Oncology/Lymph Nodes: Denies weight changes or enlarged LNs.    Physical Exam  Vitals: BP (!) 96/58 (BP Location: Left arm, Patient Position: Sitting, BP Cuff Size: Adult)   Pulse 82   Temp 36.5 °C (97.7 °F) (Temporal)   Resp 18   Ht 1.61 m (5' 3.39\")   Wt 103 kg (227 lb)   LMP 04/20/2018 (Approximate) Comment: HCG from 4/23 negative  SpO2 95%   BMI 39.72 kg/m²   General: Alert, pleasant, NAD  HEENT: Normocephalic.  EOMI, no icterus or pallor.  Conjunctivae and lids normal. External ears normal. Oropharynx non-erythematous, mucous membranes moist.  Neck supple.  No thyromegaly or masses palpated.   Lymph: No cervical or supraclavicular lymphadenopathy.  Cardiovascular: Regular rate and rhythm.    Respiratory: Normal respiratory effort.  Clear to auscultation bilaterally.  Abdomen: Non-distended, soft  Skin: Warm, dry.  Musculoskeletal: Gait is normal.  Moves all extremities well.  Extremities: No leg edema.    Psych:  Affect/mood is normal, judgement is good, memory is intact, grooming is appropriate.          Assessment and Plan    1. Chronic TMJ pain  - REFERRAL TO TMJ PAIN CLINIC  Isometric exercise hand out provided  She is already on antiinflammatory medication    2. Deviated nasal septum  - REFERRAL TO ENT    3. Congestion of nasal sinus  - REFERRAL TO ENT    4. Nosebleed  - REFERRAL TO ENT  Compression prn    5. Type 2 diabetes mellitus without complication, with long-term current use of insulin ([facility])  - Insulin Pen Needle 32 G x 4 mm; Use one pen needle in pen device to inject insulin once daily.  Dispense: 100 Each; Refill: 0  - insulin glargine (LANTUS SOLOSTAR) 100 UNIT/ML Solution Pen-injector injection; Inject 39 Units under the skin every evening for 90 days.  Dispense: 36 mL; Refill: 2  - atorvastatin (LIPITOR) 20 MG Tab; Take 1 Tab by mouth every day.  Dispense: 90 Tab; Refill: 3  - " Empagliflozin (JARDIANCE) 10 MG Tab; Take 10 mg by mouth every day.  Dispense: 90 Tab; Refill: 3  - metformin (GLUCOPHAGE) 1000 MG tablet; TAKE 1 TABLET BY MOUTH TWICE DAILY WITH MEALS  Dispense: 180 Tab; Refill: 3  - Lipid Profile; Future    6. Essential hypertension  - spironolactone (ALDACTONE) 25 MG Tab; Take 1 Tab by mouth every day.  Dispense: 90 Tab; Refill: 3  - hydroCHLOROthiazide (HYDRODIURIL) 12.5 MG tablet; Take 1 Tab by mouth every day.  Dispense: 90 Tab; Refill: 3  - CBC WITH DIFFERENTIAL; Future  - Lipid Profile; Future    7. Sinus tachycardia  controlled  - metoprolol SR (TOPROL XL) 25 MG TABLET SR 24 HR; Take 1 Tab by mouth every evening.  Dispense: 90 Tab; Refill: 3    8. Primary insomnia  - traZODone (DESYREL) 150 MG Tab; Take 1 Tab by mouth at bedtime as needed. TAKE 1 TABLET BY MOUTH AT BEDTIME  Dispense: 90 Tab; Refill: 3    9. Hypothyroidism due to Hashimoto's thyroiditis  - levothyroxine (SYNTHROID) 137 MCG Tab; TAKE 1 TABLET BY MOUTH EVERY SUNDAY AND THURSDAY  Dispense: 24 Tab; Refill: 3  - levothyroxine (SYNTHROID) 125 MCG Tab; Take 125 mcg every Mondays, Tuesdays, Wednesdays, Fridays and Saturdays  Dispense: 90 Tab; Refill: 3  - TSH WITH REFLEX TO FT4; Future    10. Hiatal hernia  - omeprazole (PRILOSEC) 40 MG delayed-release capsule; Take 1 Cap by mouth 2 times a day.  Dispense: 180 Cap; Refill: 2  Follow with GI    11. Gastroesophageal reflux disease, unspecified whether esophagitis present  - omeprazole (PRILOSEC) 40 MG delayed-release capsule; Take 1 Cap by mouth 2 times a day.  Dispense: 180 Cap; Refill: 2  Follow with GI    12. Esophageal spasm  Follow with GI    13. Diabetic polyneuropathy associated with type 2 diabetes mellitus (HCC)  - gabapentin (NEURONTIN) 300 MG Cap; Take 3 Caps by mouth every evening.  Dispense: 270 Cap; Refill: 2    14. Migraine without aura and without status migrainosus, not intractable  imitrex prn    15. Esophageal dysmotility  Follow with GI    16.  Hormone replacement therapy  Following with GYN  She understand the risks of estradiol    17. Constipation, unspecified constipation type  Recommend adequate hydration, high fiber diet, dietary fibers, squatty potty and miralax prn.       Total of 45 minute spent to prep to see patient such as reviewing of tests, reviewing recent notes, perform medically appropriate evaluation, physical exam, ordering tests and medications, counseling and educating patient, coordination of care regarding the above assessment and plan.       Follow-up:Return in about 6 weeks (around 3/19/2021), or if symptoms worsen or fail to improve.    This note was created using voice recognition software. There may be unintended errors in spelling, grammar or content.

## 2021-02-06 NOTE — ASSESSMENT & PLAN NOTE
Chronic sinus congestion for years  She does not like to use nasal spray medications  Will try Zyrtec or Claritin and referral to ENT as she has long hx of sinus problems

## 2021-02-06 NOTE — ASSESSMENT & PLAN NOTE
Hiatal hernia, severe GERD    Failed once daily omeprazole 40 mg, as well as Prevacid, Nexium, ranitidine all at their max doses.  Without her medication, she reports severe painful heartburn.     She is taking omeprazole 40 mg BID  Follows with GI

## 2021-02-06 NOTE — ASSESSMENT & PLAN NOTE
Chronic, controlled  Jardiance, metformin  Insulin Glargine 39 units every evening per patient     Ref. Range 9/18/2020 11:03   Glycohemoglobin Latest Ref Range: 0.0 - 5.6 % 6.3 (H)   Estim. Avg Glu Latest Units: mg/dL 134      Ref. Range 2/21/2020 15:06   Glycohemoglobin Latest Ref Range: 0.0 - 5.6 % 5.6

## 2021-02-18 ENCOUNTER — HOSPITAL ENCOUNTER (OUTPATIENT)
Dept: LAB | Facility: MEDICAL CENTER | Age: 51
End: 2021-02-18
Attending: STUDENT IN AN ORGANIZED HEALTH CARE EDUCATION/TRAINING PROGRAM
Payer: COMMERCIAL

## 2021-02-18 ENCOUNTER — HOSPITAL ENCOUNTER (OUTPATIENT)
Dept: RADIOLOGY | Facility: MEDICAL CENTER | Age: 51
End: 2021-02-18
Attending: STUDENT IN AN ORGANIZED HEALTH CARE EDUCATION/TRAINING PROGRAM
Payer: COMMERCIAL

## 2021-02-18 ENCOUNTER — HOSPITAL ENCOUNTER (OUTPATIENT)
Dept: LAB | Facility: MEDICAL CENTER | Age: 51
End: 2021-02-18
Attending: PODIATRIST
Payer: COMMERCIAL

## 2021-02-18 ENCOUNTER — HOSPITAL ENCOUNTER (OUTPATIENT)
Dept: LAB | Facility: MEDICAL CENTER | Age: 51
End: 2021-02-18
Attending: INTERNAL MEDICINE
Payer: COMMERCIAL

## 2021-02-18 DIAGNOSIS — K21.9 GASTROESOPHAGEAL REFLUX DISEASE, UNSPECIFIED WHETHER ESOPHAGITIS PRESENT: ICD-10-CM

## 2021-02-18 DIAGNOSIS — I10 ESSENTIAL HYPERTENSION: ICD-10-CM

## 2021-02-18 DIAGNOSIS — R13.19 OTHER DYSPHAGIA: ICD-10-CM

## 2021-02-18 DIAGNOSIS — E06.3 HYPOTHYROIDISM DUE TO HASHIMOTO'S THYROIDITIS: ICD-10-CM

## 2021-02-18 DIAGNOSIS — R10.13 EPIGASTRIC PAIN: ICD-10-CM

## 2021-02-18 DIAGNOSIS — E11.9 TYPE 2 DIABETES MELLITUS WITHOUT COMPLICATION, WITH LONG-TERM CURRENT USE OF INSULIN (HCC): ICD-10-CM

## 2021-02-18 DIAGNOSIS — E03.8 HYPOTHYROIDISM DUE TO HASHIMOTO'S THYROIDITIS: ICD-10-CM

## 2021-02-18 DIAGNOSIS — Z79.4 TYPE 2 DIABETES MELLITUS WITHOUT COMPLICATION, WITH LONG-TERM CURRENT USE OF INSULIN (HCC): ICD-10-CM

## 2021-02-18 LAB
ALBUMIN SERPL BCP-MCNC: 4.2 G/DL (ref 3.2–4.9)
ALBUMIN SERPL BCP-MCNC: 4.3 G/DL (ref 3.2–4.9)
ALBUMIN SERPL BCP-MCNC: 4.3 G/DL (ref 3.2–4.9)
ALBUMIN/GLOB SERPL: 1.3 G/DL
ALBUMIN/GLOB SERPL: 1.4 G/DL
ALP SERPL-CCNC: 35 U/L (ref 30–99)
ALP SERPL-CCNC: 36 U/L (ref 30–99)
ALP SERPL-CCNC: 36 U/L (ref 30–99)
ALT SERPL-CCNC: 5 U/L (ref 2–50)
ALT SERPL-CCNC: 6 U/L (ref 2–50)
ALT SERPL-CCNC: 8 U/L (ref 2–50)
ANION GAP SERPL CALC-SCNC: 14 MMOL/L (ref 7–16)
ANION GAP SERPL CALC-SCNC: 14 MMOL/L (ref 7–16)
AST SERPL-CCNC: 16 U/L (ref 12–45)
AST SERPL-CCNC: 16 U/L (ref 12–45)
AST SERPL-CCNC: 20 U/L (ref 12–45)
BASOPHILS # BLD AUTO: 0.5 % (ref 0–1.8)
BASOPHILS # BLD AUTO: 0.5 % (ref 0–1.8)
BASOPHILS # BLD: 0.04 K/UL (ref 0–0.12)
BASOPHILS # BLD: 0.04 K/UL (ref 0–0.12)
BILIRUB CONJ SERPL-MCNC: <0.2 MG/DL (ref 0.1–0.5)
BILIRUB INDIRECT SERPL-MCNC: NORMAL MG/DL (ref 0–1)
BILIRUB SERPL-MCNC: 0.2 MG/DL (ref 0.1–1.5)
BUN SERPL-MCNC: 15 MG/DL (ref 8–22)
BUN SERPL-MCNC: 15 MG/DL (ref 8–22)
CALCIUM SERPL-MCNC: 9.7 MG/DL (ref 8.5–10.5)
CALCIUM SERPL-MCNC: 9.8 MG/DL (ref 8.5–10.5)
CHLORIDE SERPL-SCNC: 98 MMOL/L (ref 96–112)
CHLORIDE SERPL-SCNC: 98 MMOL/L (ref 96–112)
CHOLEST SERPL-MCNC: 153 MG/DL (ref 100–199)
CO2 SERPL-SCNC: 27 MMOL/L (ref 20–33)
CO2 SERPL-SCNC: 27 MMOL/L (ref 20–33)
CREAT SERPL-MCNC: 0.73 MG/DL (ref 0.5–1.4)
CREAT SERPL-MCNC: 0.73 MG/DL (ref 0.5–1.4)
EOSINOPHIL # BLD AUTO: 0.22 K/UL (ref 0–0.51)
EOSINOPHIL # BLD AUTO: 0.23 K/UL (ref 0–0.51)
EOSINOPHIL NFR BLD: 2.8 % (ref 0–6.9)
EOSINOPHIL NFR BLD: 2.9 % (ref 0–6.9)
ERYTHROCYTE [DISTWIDTH] IN BLOOD BY AUTOMATED COUNT: 47.9 FL (ref 35.9–50)
ERYTHROCYTE [DISTWIDTH] IN BLOOD BY AUTOMATED COUNT: 48.6 FL (ref 35.9–50)
EST. AVERAGE GLUCOSE BLD GHB EST-MCNC: 146 MG/DL
GLOBULIN SER CALC-MCNC: 3 G/DL (ref 1.9–3.5)
GLOBULIN SER CALC-MCNC: 3.2 G/DL (ref 1.9–3.5)
GLUCOSE SERPL-MCNC: 92 MG/DL (ref 65–99)
GLUCOSE SERPL-MCNC: 93 MG/DL (ref 65–99)
HBA1C MFR BLD: 6.7 % (ref 0–5.6)
HCT VFR BLD AUTO: 47.3 % (ref 37–47)
HCT VFR BLD AUTO: 48.2 % (ref 37–47)
HDLC SERPL-MCNC: 45 MG/DL
HGB BLD-MCNC: 15.5 G/DL (ref 12–16)
HGB BLD-MCNC: 15.5 G/DL (ref 12–16)
IMM GRANULOCYTES # BLD AUTO: 0.03 K/UL (ref 0–0.11)
IMM GRANULOCYTES # BLD AUTO: 0.03 K/UL (ref 0–0.11)
IMM GRANULOCYTES NFR BLD AUTO: 0.4 % (ref 0–0.9)
IMM GRANULOCYTES NFR BLD AUTO: 0.4 % (ref 0–0.9)
LDLC SERPL CALC-MCNC: 77 MG/DL
LIPASE SERPL-CCNC: 35 U/L (ref 11–82)
LYMPHOCYTES # BLD AUTO: 2.48 K/UL (ref 1–4.8)
LYMPHOCYTES # BLD AUTO: 2.49 K/UL (ref 1–4.8)
LYMPHOCYTES NFR BLD: 31.4 % (ref 22–41)
LYMPHOCYTES NFR BLD: 31.4 % (ref 22–41)
MCH RBC QN AUTO: 30.9 PG (ref 27–33)
MCH RBC QN AUTO: 31.7 PG (ref 27–33)
MCHC RBC AUTO-ENTMCNC: 32.2 G/DL (ref 33.6–35)
MCHC RBC AUTO-ENTMCNC: 32.8 G/DL (ref 33.6–35)
MCV RBC AUTO: 96 FL (ref 81.4–97.8)
MCV RBC AUTO: 96.7 FL (ref 81.4–97.8)
MONOCYTES # BLD AUTO: 0.62 K/UL (ref 0–0.85)
MONOCYTES # BLD AUTO: 0.69 K/UL (ref 0–0.85)
MONOCYTES NFR BLD AUTO: 7.8 % (ref 0–13.4)
MONOCYTES NFR BLD AUTO: 8.7 % (ref 0–13.4)
NEUTROPHILS # BLD AUTO: 4.44 K/UL (ref 2–7.15)
NEUTROPHILS # BLD AUTO: 4.52 K/UL (ref 2–7.15)
NEUTROPHILS NFR BLD: 56.2 % (ref 44–72)
NEUTROPHILS NFR BLD: 57 % (ref 44–72)
NRBC # BLD AUTO: 0 K/UL
NRBC # BLD AUTO: 0 K/UL
NRBC BLD-RTO: 0 /100 WBC
NRBC BLD-RTO: 0 /100 WBC
PLATELET # BLD AUTO: 265 K/UL (ref 164–446)
PLATELET # BLD AUTO: 270 K/UL (ref 164–446)
PMV BLD AUTO: 9.2 FL (ref 9–12.9)
PMV BLD AUTO: 9.3 FL (ref 9–12.9)
POTASSIUM SERPL-SCNC: 4.2 MMOL/L (ref 3.6–5.5)
POTASSIUM SERPL-SCNC: 4.2 MMOL/L (ref 3.6–5.5)
PROT SERPL-MCNC: 7.3 G/DL (ref 6–8.2)
PROT SERPL-MCNC: 7.3 G/DL (ref 6–8.2)
PROT SERPL-MCNC: 7.5 G/DL (ref 6–8.2)
RBC # BLD AUTO: 4.89 M/UL (ref 4.2–5.4)
RBC # BLD AUTO: 5.02 M/UL (ref 4.2–5.4)
SODIUM SERPL-SCNC: 139 MMOL/L (ref 135–145)
SODIUM SERPL-SCNC: 139 MMOL/L (ref 135–145)
TRIGL SERPL-MCNC: 156 MG/DL (ref 0–149)
TSH SERPL DL<=0.005 MIU/L-ACNC: 2.39 UIU/ML (ref 0.38–5.33)
WBC # BLD AUTO: 7.9 K/UL (ref 4.8–10.8)
WBC # BLD AUTO: 7.9 K/UL (ref 4.8–10.8)

## 2021-02-18 PROCEDURE — 83036 HEMOGLOBIN GLYCOSYLATED A1C: CPT

## 2021-02-18 PROCEDURE — 76700 US EXAM ABDOM COMPLETE: CPT

## 2021-02-18 PROCEDURE — 80053 COMPREHEN METABOLIC PANEL: CPT

## 2021-02-18 PROCEDURE — 80053 COMPREHEN METABOLIC PANEL: CPT | Mod: 91

## 2021-02-18 PROCEDURE — 85025 COMPLETE CBC W/AUTO DIFF WBC: CPT

## 2021-02-18 PROCEDURE — 80061 LIPID PANEL: CPT

## 2021-02-18 PROCEDURE — 80076 HEPATIC FUNCTION PANEL: CPT

## 2021-02-18 PROCEDURE — 36415 COLL VENOUS BLD VENIPUNCTURE: CPT

## 2021-02-18 PROCEDURE — 84443 ASSAY THYROID STIM HORMONE: CPT

## 2021-02-18 PROCEDURE — 83690 ASSAY OF LIPASE: CPT

## 2021-02-18 PROCEDURE — 85025 COMPLETE CBC W/AUTO DIFF WBC: CPT | Mod: 91

## 2021-03-05 ENCOUNTER — GYNECOLOGY VISIT (OUTPATIENT)
Dept: OBGYN | Facility: CLINIC | Age: 51
End: 2021-03-05
Payer: COMMERCIAL

## 2021-03-05 VITALS — BODY MASS INDEX: 40.25 KG/M2 | DIASTOLIC BLOOD PRESSURE: 57 MMHG | SYSTOLIC BLOOD PRESSURE: 86 MMHG | WEIGHT: 230 LBS

## 2021-03-05 DIAGNOSIS — Z92.29 HISTORY OF POSTMENOPAUSAL HRT: ICD-10-CM

## 2021-03-05 DIAGNOSIS — N39.41 URGE INCONTINENCE: ICD-10-CM

## 2021-03-05 DIAGNOSIS — R32 FEMALE INCONTINENCE: ICD-10-CM

## 2021-03-05 PROCEDURE — 99213 OFFICE O/P EST LOW 20 MIN: CPT | Performed by: OBSTETRICS & GYNECOLOGY

## 2021-03-05 RX ORDER — ESTRADIOL 0.5 MG/1
0.5 TABLET ORAL DAILY
Qty: 30 TABLET | Refills: 12 | Status: SHIPPED | OUTPATIENT
Start: 2021-03-05 | End: 2022-02-09

## 2021-03-05 RX ORDER — ESTRADIOL 1 MG/1
1 TABLET ORAL DAILY
Qty: 30 TABLET | Refills: 12 | Status: SHIPPED | OUTPATIENT
Start: 2021-03-05 | End: 2022-02-09

## 2021-03-05 ASSESSMENT — FIBROSIS 4 INDEX: FIB4 SCORE: 1.07

## 2021-03-05 NOTE — PROGRESS NOTES
"Chief Complaint   Patient presents with   • Gynecologic Exam     follow-up   Chief complaint: Follow-up visit      History of present illness: 50 y.o. presents to office for follow-up for female incontinence and hot flashes.    As far as incontinence concerned, patient still reports significant issues despite upping her oxybutynin to twice daily.  Frustrated by this, wonder if anything else can be done    Patient reports breast pain on the 2 mg of estradiol.  Wondering if the dose can be lowered at this time secondary to side effects.    Review of systems:  Pertinent positives documented in HPI and a comprehensive review of system is negative    All PMH, PSH, allergies, social history and FH reviewed and updated today:  Past Medical History:   Diagnosis Date   • Anesthesia 08/27/2019    \"hard to wake up\"  \"sleep apnea\"   • Arrhythmia     \" sinus Tach\"   • Arthritis     osteo   • Blood clotting disorder ([facility]) 2005    DVT   • Bowel habit changes     constipation   • Depression     \"BPD\"   • Diabetes ([facility]) 08/2019    insulin   • Diabetic neuropathy ([facility])    • Esophageal spasm    • GERD (gastroesophageal reflux disease)    • Hashimoto's disease    • Hiatal hernia    • High cholesterol    • Hyperlipidemia    • Hypothyroid    • Sleep apnea     uses cpap   • Snoring     sleep study done   • Tachycardia        Past Surgical History:   Procedure Laterality Date   • COLONOSCOPY N/A 8/30/2019    Procedure: COLONOSCOPY;  Surgeon: Ata Khalil M.D.;  Location: SURGERY SAME DAY [facility];  Service: Gastroenterology   • GASTROSCOPY N/A 8/30/2019    Procedure: GASTROSCOPY - W/DILATION biopsy;  Surgeon: Ata Khalil M.D.;  Location: SURGERY SAME DAY [facility];  Service: Gastroenterology   • VAGINAL HYSTERECTOMY SCOPE TOTAL N/A 4/24/2018    Procedure: VAGINAL HYSTERECTOMY SCOPE TOTAL;  Surgeon: Francisco Javier Lainez M.D.;  Location: SURGERY SAME DAY [facility];  Service: Gynecology   • SALPINGO OOPHORECTOMY Bilateral 4/24/2018 " "   Procedure: SALPINGO OOPHORECTOMY;  Surgeon: Francisco Javier Lainez M.D.;  Location: SURGERY SAME DAY [facility];  Service: Gynecology   • ANTERIOR AND POSTERIOR REPAIR  4/24/2018    Procedure: ANTERIOR AND POSTERIOR REPAIR;  Surgeon: Francisco Javier Lainez M.D.;  Location: SURGERY SAME DAY [facility];  Service: Gynecology   • ENTEROCELE REPAIR  4/24/2018    Procedure: ENTEROCELE REPAIR- PERINEOPLASTY;  Surgeon: Francisco Javier Lainez M.D.;  Location: SURGERY SAME DAY [facility];  Service: Gynecology   • BLADDER SLING FEMALE  4/24/2018    Procedure: BLADDER SLING FEMALE- TOT;  Surgeon: Francisco Javier Lainez M.D.;  Location: SURGERY SAME DAY [facility];  Service: Gynecology   • VAGINAL SUSPENSION N/A 4/24/2018    Procedure: VAGINAL SUSPENSION- SACROSPINOUS VAULT;  Surgeon: Francisco Javier Lainez M.D.;  Location: SURGERY SAME DAY [facility];  Service: Gynecology   • CYSTOSCOPY N/A 4/24/2018    Procedure: CYSTOSCOPY;  Surgeon: Francisco Javier Lainez M.D.;  Location: SURGERY SAME DAY [facility];  Service: Gynecology   • ENDOSCOPY  2016   • COLONOSCOPY  2016   • OTHER  2016    esophageal dilation   • OTHER ORTHOPEDIC SURGERY  2012    bone removed from toe after fracture   • TONSILLECTOMY AND ADENOIDECTOMY  1977   • OTHER  1971, 1978, 1985    left facial cheek and right upper thight reconstructive surgery       Allergies:   Allergies   Allergen Reactions   • Betadine Prepstick Plus [Povidone-Iodine] Rash     Rash swelling   • Hydromorphone Hcl Itching     Itching \"head to toe\"   • Morphine Itching     Itching \"head to toe\"   • Povidone Iodine Hives   • Tape Itching     Itching \"leave scars\"       Social History     Socioeconomic History   • Marital status: Single     Spouse name: Not on file   • Number of children: Not on file   • Years of education: Not on file   • Highest education level: Not on file   Occupational History   • Not on file   Tobacco Use   • Smoking status: Never Smoker   • Smokeless tobacco: Never Used   Substance and Sexual " Activity   • Alcohol use: Never   • Drug use: No     Comment: previous medical marijuana use for pain   • Sexual activity: Yes     Partners: Female   Other Topics Concern   • Not on file   Social History Narrative   • Not on file     Social Determinants of Health     Financial Resource Strain:    • Difficulty of Paying Living Expenses:    Food Insecurity:    • Worried About Running Out of Food in the Last Year:    • Ran Out of Food in the Last Year:    Transportation Needs:    • Lack of Transportation (Medical):    • Lack of Transportation (Non-Medical):    Physical Activity:    • Days of Exercise per Week:    • Minutes of Exercise per Session:    Stress:    • Feeling of Stress :    Social Connections:    • Frequency of Communication with Friends and Family:    • Frequency of Social Gatherings with Friends and Family:    • Attends Baptism Services:    • Active Member of Clubs or Organizations:    • Attends Club or Organization Meetings:    • Marital Status:    Intimate Partner Violence:    • Fear of Current or Ex-Partner:    • Emotionally Abused:    • Physically Abused:    • Sexually Abused:        Family History   Problem Relation Age of Onset   • Cancer Mother 70        pancreatic, breast in 30's   • Heart Disease Father    • Cancer Father         lung   • Diabetes Sister    • Diabetes Maternal Uncle    • Heart Disease Maternal Grandmother         MI   • Hyperlipidemia Maternal Grandmother    • Diabetes Maternal Grandfather    • Hyperlipidemia Maternal Grandfather    • Stroke Neg Hx        Physical exam:  BP (!) 86/57 (BP Location: Right arm, Patient Position: Sitting)   Wt 104 kg (230 lb)     GENERAL APPEARANCE: healthy, alert, no distress  NECK nontender, no masses, thyromegaly or nodules  NEURO Awake, alert and oriented x 3, Normal gait, no sensory deficits  SKIN No rashes, or ulcers or lesions seen  PSYCHIATRIC: Patient shows appropriate affect, is alert and oriented x3, intact judgment and  insight.      Assessment:  1. Female incontinence  REFERRAL TO UROLOGY       Plan:    We will decrease dosing from 2 mg of estradiol to 1.5 mg.  New prescription sent.  Follow-up as needed.  She will let us know if she continues to have breast pain    Referral to urology made given history of prior sling placement and continued incontinence issues.    Questions answered    Spent  15 minutes in face-to-face patient contact in which greater than 50% of that visit was spent in counseling/coordination of care including medical and surgical options of care.

## 2021-03-05 NOTE — NON-PROVIDER
Pt here to f/u for medications from previous visit 12/11/2020  Pt states  That she has questions about medication   Good#[phone]  Pharmacy verified

## 2021-03-09 ENCOUNTER — TELEPHONE (OUTPATIENT)
Dept: MEDICAL GROUP | Facility: MEDICAL CENTER | Age: 51
End: 2021-03-09

## 2021-03-09 NOTE — TELEPHONE ENCOUNTER
ESTABLISHED PATIENT PRE-VISIT PLANNING     Patient was NOT contacted to complete PVP.     Note: Patient will not be contacted if there is no indication to call.     1.  Reviewed notes from the last few office visits within the medical group: Yes    2.  If any orders were placed at last visit or intended to be done for this visit (i.e. 6 mos follow-up), do we have Results/Consult Notes?         •  Labs - Labs ordered, completed on 02/08/21 and results are in chart.  Note: If patient appointment is for lab review and patient did not complete labs, check with provider if OK to reschedule patient until labs completed.       •  Imaging - Imaging was not ordered at last office visit.       •  Referrals - Referral ordered, patient has NOT been seen.    3. Is this appointment scheduled as a Hospital Follow-Up? No    4.  Immunizations were updated in Epic using Reconcile Outside Information activity? Yes    5.  Patient is due for the following Health Maintenance Topics:   Health Maintenance Due   Topic Date Due   • DIABETES MONOFILAMENT / LE EXAM  Never done   • IMM PNEUMOCOCCAL VACCINE: 0-64 Years (1 of 1 - PPSV23) Never done   • IMM HEP B VACCINE (1 of 3 - Risk 3-dose series) Never done   • IMM DTaP/Tdap/Td Vaccine (1 - Tdap) Never done   • MAMMOGRAM  09/10/2019   • IMM ZOSTER VACCINES (1 of 2) Never done   • RETINAL SCREENING  04/30/2020

## 2021-03-19 ENCOUNTER — APPOINTMENT (OUTPATIENT)
Dept: MEDICAL GROUP | Facility: MEDICAL CENTER | Age: 51
End: 2021-03-19
Payer: COMMERCIAL

## 2021-04-02 ENCOUNTER — HOSPITAL ENCOUNTER (OUTPATIENT)
Dept: RADIOLOGY | Facility: MEDICAL CENTER | Age: 51
End: 2021-04-02
Attending: STUDENT IN AN ORGANIZED HEALTH CARE EDUCATION/TRAINING PROGRAM
Payer: COMMERCIAL

## 2021-04-02 DIAGNOSIS — R13.14 DYSPHAGIA, PHARYNGOESOPHAGEAL PHASE: ICD-10-CM

## 2021-04-02 PROCEDURE — 700117 HCHG RX CONTRAST REV CODE 255: Performed by: STUDENT IN AN ORGANIZED HEALTH CARE EDUCATION/TRAINING PROGRAM

## 2021-04-02 PROCEDURE — 74220 X-RAY XM ESOPHAGUS 1CNTRST: CPT

## 2021-04-02 RX ADMIN — BARIUM SULFATE 700 MG: 700 TABLET ORAL at 13:30

## 2021-04-20 ENCOUNTER — OFFICE VISIT (OUTPATIENT)
Dept: MEDICAL GROUP | Facility: MEDICAL CENTER | Age: 51
End: 2021-04-20
Payer: COMMERCIAL

## 2021-04-20 VITALS
TEMPERATURE: 96.8 F | HEIGHT: 63 IN | SYSTOLIC BLOOD PRESSURE: 90 MMHG | WEIGHT: 235 LBS | OXYGEN SATURATION: 94 % | DIASTOLIC BLOOD PRESSURE: 64 MMHG | HEART RATE: 98 BPM | BODY MASS INDEX: 41.64 KG/M2 | RESPIRATION RATE: 16 BRPM

## 2021-04-20 DIAGNOSIS — G89.29 CHRONIC PAIN OF LEFT KNEE: ICD-10-CM

## 2021-04-20 DIAGNOSIS — M26.629 CHRONIC TMJ PAIN: ICD-10-CM

## 2021-04-20 DIAGNOSIS — I10 ESSENTIAL HYPERTENSION: ICD-10-CM

## 2021-04-20 DIAGNOSIS — M25.562 CHRONIC PAIN OF LEFT KNEE: ICD-10-CM

## 2021-04-20 DIAGNOSIS — E03.8 HYPOTHYROIDISM DUE TO HASHIMOTO'S THYROIDITIS: ICD-10-CM

## 2021-04-20 DIAGNOSIS — G89.29 CHRONIC TMJ PAIN: ICD-10-CM

## 2021-04-20 DIAGNOSIS — E06.3 HYPOTHYROIDISM DUE TO HASHIMOTO'S THYROIDITIS: ICD-10-CM

## 2021-04-20 DIAGNOSIS — Z12.31 ENCOUNTER FOR SCREENING MAMMOGRAM FOR MALIGNANT NEOPLASM OF BREAST: ICD-10-CM

## 2021-04-20 DIAGNOSIS — G47.33 OSA (OBSTRUCTIVE SLEEP APNEA): ICD-10-CM

## 2021-04-20 DIAGNOSIS — E11.9 TYPE 2 DIABETES MELLITUS WITHOUT COMPLICATION, WITHOUT LONG-TERM CURRENT USE OF INSULIN (HCC): ICD-10-CM

## 2021-04-20 DIAGNOSIS — R16.0 HEPATOMEGALY: ICD-10-CM

## 2021-04-20 PROBLEM — H60.501 ACUTE OTITIS EXTERNA OF RIGHT EAR: Status: RESOLVED | Noted: 2020-11-13 | Resolved: 2021-04-20

## 2021-04-20 PROCEDURE — 99214 OFFICE O/P EST MOD 30 MIN: CPT | Performed by: INTERNAL MEDICINE

## 2021-04-20 RX ORDER — TERBINAFINE HYDROCHLORIDE 250 MG/1
TABLET ORAL
COMMUNITY
Start: 2021-04-01 | End: 2022-02-09

## 2021-04-20 ASSESSMENT — PAIN SCALES - GENERAL: PAINLEVEL: 8=MODERATE-SEVERE PAIN

## 2021-04-20 ASSESSMENT — FIBROSIS 4 INDEX: FIB4 SCORE: 1.07

## 2021-04-20 NOTE — PROGRESS NOTES
CC:  Diagnoses of Type 2 diabetes mellitus without complication, without long-term current use of insulin (HCC), Hypothyroidism due to Hashimoto's thyroiditis, Essential hypertension, Encounter for screening mammogram for malignant neoplasm of breast, Hepatomegaly, RADHA (obstructive sleep apnea), Chronic pain of left knee, and Chronic TMJ pain were pertinent to this visit.    HISTORY OF THE PRESENT ILLNESS: Patient is a 50 y.o. female. This pleasant patient is here today to establish care.      Very extensive medical history, I did my best reviewed with patient today.    Her main concern is left knee pain, she has says she has chronic pain over patellar region but new onset pain medial knee.  Pain is sharp and intermittent.  No new falls or injuries.  Does a lot of walking where she works [employer].  She would like to see orthopedic specialist.  She is aware that BMI is 41.6 and long-term weight loss is recommended to relieve pressure from the knees.  She says she is working on this is already lost 30 pounds.    Regarding diabetes she says she sees a podiatrist regularly with monofilament testing 4/1/2021 showing some intermittent decreased sensation.  She has no acute foot concerns today.  A1c was 6.7 and was trending up on labs 2/18/2021.  Blood pressure is potentially overcontrolled.  Has not had a retinal exam she says since 2016.  No current cardiopulmonary or strokelike symptoms.  Imaging done through her GI group showed hepatomegaly with signs of fatty infiltrate 2/18/2021 and patient was made aware that this can potentially progress to serious liver disease such as cirrhosis if weight loss is not accomplished with healthy diet and exercise.  Lipids from 2/18/2021 total cholesterol 153, triglycerides minimally elevated 156, HDL 45 LDL 77.  She is prescribed atorvastatin.    Hypothyroidism history, TSH within normal limits 2.390 labs 2/18/2021.  At that time also reasonable CBC and CMP.    Says she has not been  "on her CPAP for a long time, has been years since she saw Dr. Roblero.  She says she does not wish to see pulmonary at this time as she sleeps on her side due to chronic back pain and does not want to be treated for sleep apnea.  For her chronic back pain she is followed by [facility] and takes Flexeril and tramadol as needed.  Has done PT and OT extensively in the past per patient.    Blood pressure is perhaps overcontrolled.  She sees a cardiologist at [facility] and states her last visit was around 2019.  She was encouraged to follow-up with her cardiologist again but states she is unwilling to until she can get free samples at their office.  She denies history of cardiomyopathy.  She is on spironolactone and hydrochlorothiazide due to chronic bilateral leg swelling.  States not able to tolerate compression socks, advised could refer to vascular group for custom fitting she will let me know if she wants to go this route.  States she has chronic \"sinus tachycardia \"and is on metoprolol.  With her current blood pressure and heart rate this is stable, denies dizziness or symptoms of low blood pressure.    Did see ENT since her last visit in primary care regarding chronic TMJ pain, etc. this note was briefly reviewed from 4/15/2021 indicating chronic left ear pain is probably due to TMJ.  Patient does confirm that there were no abnormal significant findings at her ENT appointment.  I do her low that Dr. Olvera is a dentist in [facility] especially in TMJ and her prior PCP did refer her to TMJ pain clinic. ENT note indicates pt has not seen dentist in \"years.\"    Last mammogram was many years ago.  She says she does regular breast exams at home has no concerns.  She is willing to get mammogram done.    We will get second Covid vaccine 4/26/2021.    Allergies: Betadine prepstick plus [povidone-iodine], Hydromorphone hcl, Morphine, Povidone iodine, and Tape    Current Outpatient Medications Ordered in Epic "   Medication Sig Dispense Refill   • estradiol (ESTRACE) 1 MG Tab Take 1 tablet by mouth every day. 30 tablet 12   • estradiol (ESTRACE) 0.5 MG tablet Take 1 tablet by mouth every day. 30 tablet 12   • ibuprofen (MOTRIN) 800 MG Tab Take 1 Tab by mouth 2 Times a Day.     • Insulin Pen Needle 32 G x 4 mm Use one pen needle in pen device to inject insulin once daily. 100 Each 0   • insulin glargine (LANTUS SOLOSTAR) 100 UNIT/ML Solution Pen-injector injection Inject 39 Units under the skin every evening for 90 days. 36 mL 2   • spironolactone (ALDACTONE) 25 MG Tab Take 1 Tab by mouth every day. 90 Tab 3   • atorvastatin (LIPITOR) 20 MG Tab Take 1 Tab by mouth every day. 90 Tab 3   • metoprolol SR (TOPROL XL) 25 MG TABLET SR 24 HR Take 1 Tab by mouth every evening. 90 Tab 3   • traZODone (DESYREL) 150 MG Tab Take 1 Tab by mouth at bedtime as needed. TAKE 1 TABLET BY MOUTH AT BEDTIME 90 Tab 3   • hydroCHLOROthiazide (HYDRODIURIL) 12.5 MG tablet Take 1 Tab by mouth every day. 90 Tab 3   • Empagliflozin (JARDIANCE) 10 MG Tab Take 10 mg by mouth every day. 90 Tab 3   • levothyroxine (SYNTHROID) 137 MCG Tab TAKE 1 TABLET BY MOUTH EVERY SUNDAY AND THURSDAY 24 Tab 3   • levothyroxine (SYNTHROID) 125 MCG Tab Take 125 mcg every Mondays, Tuesdays, Wednesdays, Fridays and Saturdays 90 Tab 3   • metformin (GLUCOPHAGE) 1000 MG tablet TAKE 1 TABLET BY MOUTH TWICE DAILY WITH MEALS 180 Tab 3   • omeprazole (PRILOSEC) 40 MG delayed-release capsule Take 1 Cap by mouth 2 times a day. 180 Cap 2   • gabapentin (NEURONTIN) 300 MG Cap Take 3 Caps by mouth every evening. 270 Cap 2   • oxybutynin SR (DITROPAN-XL) 10 MG CR tablet Take 1 Tab by mouth 2 Times a Day. 60 Tab 12   • divalproex (DEPAKOTE) 500 MG Tablet Delayed Response TAKE 1 TABLET BY MOUTH TWICE DAILY AFTER MEALS     • risperiDONE (RISPERDAL) 2 MG Tab TAKE 1 TABLET BY MOUTH ONCE DAILY AT NIGHT AS DIRECTED     • sumatriptan (IMITREX) 100 MG tablet TAKE 1/2 TO 1 (ONE-HALF TO ONE)  "TABLET BY MOUTH AS NEEDED FOR  MIGRAINE/HEADACHE 9 Tab 5   • cyclobenzaprine (FLEXERIL) 10 MG Tab Take 1 Tab by mouth at bedtime as needed for Muscle Spasms. 30 Tab 5   • polyethylene glycol 3350 (MIRALAX) 17 GM/SCOOP Powder Take 17 g by mouth 1 time daily as needed (constipation). 510 g 5   • tramadol (ULTRAM) 50 MG Tab Take 50 mg by mouth every four hours as needed.     • BIOTIN PO Take  by mouth.     • Omega-3 Fatty Acids (OMEGA-3 FISH OIL PO) Take  by mouth.     • Alcohol Swabs (ALCOHOL PREP) 70 % Pads Use three times daily to clean finger before blood glucose testing 100 Each 11   • acetaminophen (TYLENOL) 500 MG Tab Take 500-1,000 mg by mouth every 6 hours as needed.     • Multiple Vitamins-Minerals (MULTIVITAMIN PO) Take 1 Tab by mouth every day.     • Black Cohosh 40 MG Cap Take 40 mg by mouth 2 Times a Day.     • Melatonin 10 MG Tab Take 20 mg by mouth every bedtime.     • terbinafine (LAMISIL) 250 MG Tab        No current Epic-ordered facility-administered medications on file.       Past Medical History:   Diagnosis Date   • Anesthesia 08/27/2019    \"hard to wake up\"  \"sleep apnea\"   • Arrhythmia     \" sinus Tach\"   • Arthritis     osteo   • Blood clotting disorder (HCC) 2005    DVT   • Bowel habit changes     constipation   • Depression     \"BPD\"   • Diabetes ([facility]) 08/2019    insulin   • Diabetic neuropathy ([facility])    • Esophageal spasm    • GERD (gastroesophageal reflux disease)    • Hashimoto's disease    • Hiatal hernia    • High cholesterol    • Hyperlipidemia    • Hypothyroid    • Sleep apnea     uses cpap   • Snoring     sleep study done   • Tachycardia        Past Surgical History:   Procedure Laterality Date   • COLONOSCOPY N/A 8/30/2019    Procedure: COLONOSCOPY;  Surgeon: Ata Khalil M.D.;  Location: SURGERY SAME DAY [facility];  Service: Gastroenterology   • GASTROSCOPY N/A 8/30/2019    Procedure: GASTROSCOPY - W/DILATION biopsy;  Surgeon: Ata Khalil M.D.;  Location: SURGERY SAME DAY " [facility];  Service: Gastroenterology   • VAGINAL HYSTERECTOMY SCOPE TOTAL N/A 4/24/2018    Procedure: VAGINAL HYSTERECTOMY SCOPE TOTAL;  Surgeon: Francisco Javier Lainez M.D.;  Location: SURGERY SAME DAY [facility];  Service: Gynecology   • SALPINGO OOPHORECTOMY Bilateral 4/24/2018    Procedure: SALPINGO OOPHORECTOMY;  Surgeon: Francisco Javier Lainez M.D.;  Location: SURGERY SAME DAY [facility];  Service: Gynecology   • ANTERIOR AND POSTERIOR REPAIR  4/24/2018    Procedure: ANTERIOR AND POSTERIOR REPAIR;  Surgeon: Francisco Javier Lainez M.D.;  Location: SURGERY SAME DAY [facility];  Service: Gynecology   • ENTEROCELE REPAIR  4/24/2018    Procedure: ENTEROCELE REPAIR- PERINEOPLASTY;  Surgeon: Francisco Javier Lainez M.D.;  Location: SURGERY SAME DAY [facility];  Service: Gynecology   • BLADDER SLING FEMALE  4/24/2018    Procedure: BLADDER SLING FEMALE- TOT;  Surgeon: Francisco Javier Lainez M.D.;  Location: SURGERY SAME DAY [facility];  Service: Gynecology   • VAGINAL SUSPENSION N/A 4/24/2018    Procedure: VAGINAL SUSPENSION- SACROSPINOUS VAULT;  Surgeon: Francisco Javier Lainez M.D.;  Location: SURGERY SAME DAY [facility];  Service: Gynecology   • CYSTOSCOPY N/A 4/24/2018    Procedure: CYSTOSCOPY;  Surgeon: Francisco Javier Lainez M.D.;  Location: SURGERY SAME DAY [facility];  Service: Gynecology   • ENDOSCOPY  2016   • COLONOSCOPY  2016   • OTHER  2016    esophageal dilation   • OTHER ORTHOPEDIC SURGERY  2012    bone removed from toe after fracture   • TONSILLECTOMY AND ADENOIDECTOMY  1977   • OTHER  1971, 1978, 1985    left facial cheek and right upper thight reconstructive surgery       Social History     Tobacco Use   • Smoking status: Never Smoker   • Smokeless tobacco: Never Used   Substance Use Topics   • Alcohol use: Never   • Drug use: No     Comment: previous medical marijuana use for pain       Social History     Social History Narrative   • Not on file       Family History   Problem Relation Age of Onset   • Cancer Mother 70         "pancreatic, breast in 30's   • Heart Disease Father    • Cancer Father         lung   • Diabetes Sister    • Diabetes Maternal Uncle    • Heart Disease Maternal Grandmother         MI   • Hyperlipidemia Maternal Grandmother    • Diabetes Maternal Grandfather    • Hyperlipidemia Maternal Grandfather    • Stroke Neg Hx        Exam: BP (!) 90/64 (BP Location: Left arm, Patient Position: Sitting)   Pulse 98   Temp 36 °C (96.8 °F) (Temporal)   Resp 16   Ht 1.6 m (5' 3\")   Wt 107 kg (235 lb)   SpO2 94%  Body mass index is 41.63 kg/m².    General: Normal appearing. No distress.  EYES: Conjunctiva clear lids without ptosis, pupils equal  EARS: Normal shape and contour   Pulmonary: Clear to ausculation.  Normal effort. No rales or wheezing.  Cardiovascular: Regular rate and rhythm without significant murmur appreciated, no gallop.   Abdomen: Soft, nontender, nondistended. Normal bowel sounds.  Neurologic: Cranial nerves grossly nonfocal  Skin: Warm and dry.  No obvious lesions.  Musculoskeletal: Normal gait. No extremity cyanosis, clubbing, or pitting edema.  Psych: Normal mood and affect. Alert and oriented x3. Judgment and insight is normal.      Assessment/Plan  1. Type 2 diabetes mellitus without complication, without long-term current use of insulin ([facility])  Plan to reassess at interval, for now continue current medications which include glargine, atorvastatin, Jardiance, Metformin.  She does have regular podiatry exams with known underlying neuropathy.  Will refer for retinal exam.  - HEMOGLOBIN A1C; Future  - MICROALBUMIN CREAT RATIO URINE; Future  - REFERRAL TO OPHTHALMOLOGY    2. Hypothyroidism due to Hashimoto's thyroiditis  Plan to reassess status, for now continue levothyroxine.    3. Gastroesophageal reflux disease, unspecified whether esophagitis present  Followed by GI, recent GI report indicates that her barium swallow did have some indentation thought to be due to muscle/blood vessel and out of a portion " "to symptoms and she was referred to subspecialty clinic to further evaluate.  For now no change in management, can followed by GI team.    4. Essential hypertension  Perhaps overcontrolled, strongly encouraged her to follow-up with her cardiologist.  Advised could try to stop 1 diuretic either hydrochlorothiazide or spironolactone and monitor for any change in edema.  Advised ankle swelling could be improved with compression socks (custom fitted), staying under 2 g salt daily and significant weight loss.  Advised that her diagnosis of \"sinus tachycardia \"is typically due to underlying condition and perhaps she is too dry with current blood pressure control.  Try to obtain cardiology records however at this time unclear what her cardiac structure appearance is, i.e. ensure no cardiomyopathy, valvulopathy, depressed EF, RV changes with long-term RADHA, etc.  - MICROALBUMIN CREAT RATIO URINE; Future    5. Encounter for screening mammogram for malignant neoplasm of breast  Overdue for screening and asymptomatic  - MA-SCREENING MAMMO BILAT W/TOMOSYNTHESIS W/CAD; Future    6. Hepatomegaly  Advised significant weight loss, healthy diet and exercise, etc.  If these conservative measures are not done advised risk to progress to cirrhosis from fatty liver disease.  Patient expressed understanding.  She is followed by GI team.    7. RADHA (obstructive sleep apnea)  Noncompliant with therapy and does not wish to be reevaluated by pulmonary team.    8. Chronic pain of left knee  Recommend significant weight loss and she is followed by [facility].  - REFERRAL TO ORTHOPEDICS      rtc 3m        Please note that this dictation was created using voice recognition software. I have made every reasonable attempt to correct obvious errors, but I expect that there are errors of grammar and possibly content that I did not discover before finalizing the note.    "

## 2021-05-13 DIAGNOSIS — E11.9 TYPE 2 DIABETES MELLITUS WITHOUT COMPLICATION, WITH LONG-TERM CURRENT USE OF INSULIN (HCC): ICD-10-CM

## 2021-05-13 DIAGNOSIS — Z79.4 TYPE 2 DIABETES MELLITUS WITHOUT COMPLICATION, WITH LONG-TERM CURRENT USE OF INSULIN (HCC): ICD-10-CM

## 2021-05-13 RX ORDER — PEN NEEDLE, DIABETIC 32GX 5/32"
NEEDLE, DISPOSABLE MISCELLANEOUS
Qty: 100 EACH | Refills: 0 | Status: SHIPPED | OUTPATIENT
Start: 2021-05-13 | End: 2021-08-11

## 2021-05-14 ENCOUNTER — PATIENT MESSAGE (OUTPATIENT)
Dept: MEDICAL GROUP | Facility: MEDICAL CENTER | Age: 51
End: 2021-05-14

## 2021-05-14 DIAGNOSIS — B00.9 HERPES: ICD-10-CM

## 2021-05-17 RX ORDER — ACYCLOVIR 400 MG/1
400 TABLET ORAL 3 TIMES DAILY
Qty: 21 TABLET | Refills: 4 | Status: SHIPPED | OUTPATIENT
Start: 2021-05-17 | End: 2021-05-24

## 2021-05-30 ENCOUNTER — HOSPITAL ENCOUNTER (OUTPATIENT)
Dept: RADIOLOGY | Facility: MEDICAL CENTER | Age: 51
End: 2021-05-30
Payer: COMMERCIAL

## 2021-06-25 ENCOUNTER — HOSPITAL ENCOUNTER (OUTPATIENT)
Dept: RADIOLOGY | Facility: MEDICAL CENTER | Age: 51
End: 2021-06-25
Attending: INTERNAL MEDICINE
Payer: COMMERCIAL

## 2021-06-25 DIAGNOSIS — Z12.31 ENCOUNTER FOR SCREENING MAMMOGRAM FOR MALIGNANT NEOPLASM OF BREAST: ICD-10-CM

## 2021-06-25 PROCEDURE — 77063 BREAST TOMOSYNTHESIS BI: CPT

## 2021-07-28 ENCOUNTER — APPOINTMENT (OUTPATIENT)
Dept: MEDICAL GROUP | Facility: MEDICAL CENTER | Age: 51
End: 2021-07-28
Payer: COMMERCIAL

## 2021-08-10 DIAGNOSIS — E11.9 TYPE 2 DIABETES MELLITUS WITHOUT COMPLICATION, WITH LONG-TERM CURRENT USE OF INSULIN (HCC): ICD-10-CM

## 2021-08-10 DIAGNOSIS — Z79.4 TYPE 2 DIABETES MELLITUS WITHOUT COMPLICATION, WITH LONG-TERM CURRENT USE OF INSULIN (HCC): ICD-10-CM

## 2021-08-11 RX ORDER — PEN NEEDLE, DIABETIC 32GX 5/32"
NEEDLE, DISPOSABLE MISCELLANEOUS
Qty: 100 EACH | Refills: 0 | Status: SHIPPED | OUTPATIENT
Start: 2021-08-11 | End: 2021-11-29

## 2021-09-17 DIAGNOSIS — Z79.4 TYPE 2 DIABETES MELLITUS WITHOUT COMPLICATION, WITH LONG-TERM CURRENT USE OF INSULIN (HCC): ICD-10-CM

## 2021-09-17 DIAGNOSIS — E11.9 TYPE 2 DIABETES MELLITUS WITHOUT COMPLICATION, WITH LONG-TERM CURRENT USE OF INSULIN (HCC): ICD-10-CM

## 2021-10-01 DIAGNOSIS — K44.9 HIATAL HERNIA: ICD-10-CM

## 2021-10-02 NOTE — TELEPHONE ENCOUNTER
Received request via: Patient    Was the patient seen in the last year in this department? Yes    Does the patient have an active prescription (recently filled or refills available) for medication(s) requested? No    Requested Prescriptions     Pending Prescriptions Disp Refills   • Alcohol Swabs (ALCOHOL PREP) 70 % Pads 100 Each 11     Sig: Use three times daily to clean finger before blood glucose testing

## 2021-10-04 RX ORDER — OMEPRAZOLE 40 MG/1
CAPSULE, DELAYED RELEASE ORAL
Qty: 180 CAPSULE | Refills: 2 | Status: SHIPPED | OUTPATIENT
Start: 2021-10-04 | End: 2022-07-25

## 2021-10-04 RX ORDER — UBIQUINOL 100 MG
CAPSULE ORAL
Qty: 100 EACH | Refills: 11 | Status: SHIPPED | OUTPATIENT
Start: 2021-10-04

## 2021-10-09 DIAGNOSIS — E11.9 TYPE 2 DIABETES MELLITUS WITHOUT COMPLICATION, WITH LONG-TERM CURRENT USE OF INSULIN (HCC): ICD-10-CM

## 2021-10-09 DIAGNOSIS — Z79.4 TYPE 2 DIABETES MELLITUS WITHOUT COMPLICATION, WITH LONG-TERM CURRENT USE OF INSULIN (HCC): ICD-10-CM

## 2021-10-11 RX ORDER — INSULIN GLARGINE 100 [IU]/ML
INJECTION, SOLUTION SUBCUTANEOUS
Qty: 36 ML | Refills: 0 | Status: SHIPPED | OUTPATIENT
Start: 2021-10-11 | End: 2022-01-25 | Stop reason: SDUPTHER

## 2021-10-21 ENCOUNTER — OFFICE VISIT (OUTPATIENT)
Dept: URGENT CARE | Facility: CLINIC | Age: 51
End: 2021-10-21
Payer: COMMERCIAL

## 2021-10-21 VITALS
HEART RATE: 94 BPM | HEIGHT: 63 IN | SYSTOLIC BLOOD PRESSURE: 104 MMHG | DIASTOLIC BLOOD PRESSURE: 70 MMHG | OXYGEN SATURATION: 94 % | BODY MASS INDEX: 43.41 KG/M2 | TEMPERATURE: 97.2 F | WEIGHT: 245 LBS | RESPIRATION RATE: 16 BRPM

## 2021-10-21 DIAGNOSIS — R11.2 NAUSEA AND VOMITING, INTRACTABILITY OF VOMITING NOT SPECIFIED, UNSPECIFIED VOMITING TYPE: ICD-10-CM

## 2021-10-21 PROCEDURE — 99213 OFFICE O/P EST LOW 20 MIN: CPT | Performed by: PHYSICIAN ASSISTANT

## 2021-10-21 RX ORDER — ONDANSETRON 2 MG/ML
8 INJECTION INTRAMUSCULAR; INTRAVENOUS ONCE
Status: COMPLETED | OUTPATIENT
Start: 2021-10-21 | End: 2021-10-21

## 2021-10-21 RX ORDER — PROMETHAZINE HYDROCHLORIDE 25 MG/1
25 TABLET ORAL EVERY 6 HOURS PRN
Qty: 30 TABLET | Refills: 0 | Status: SHIPPED | OUTPATIENT
Start: 2021-10-21 | End: 2024-02-15 | Stop reason: SDUPTHER

## 2021-10-21 RX ORDER — PROMETHAZINE HYDROCHLORIDE 25 MG/1
25 SUPPOSITORY RECTAL EVERY 6 HOURS PRN
Qty: 10 SUPPOSITORY | Refills: 0 | Status: SHIPPED | OUTPATIENT
Start: 2021-10-21 | End: 2022-04-11

## 2021-10-21 RX ADMIN — ONDANSETRON 8 MG: 2 INJECTION INTRAMUSCULAR; INTRAVENOUS at 15:45

## 2021-10-21 ASSESSMENT — ENCOUNTER SYMPTOMS
HEADACHES: 0
COUGH: 0
ANOREXIA: 1
VOMITING: 1
MYALGIAS: 0
SORE THROAT: 0
CHANGE IN BOWEL HABIT: 0
NUMBER OF EPISODES OF EMESIS TODAY: 1
FEVER: 0
NAUSEA: 1
ABDOMINAL PAIN: 1
FATIGUE: 1
CHILLS: 0

## 2021-10-21 ASSESSMENT — FIBROSIS 4 INDEX: FIB4 SCORE: 1.07

## 2021-10-21 NOTE — PROGRESS NOTES
Subjective     Sarah Friedman is a 50 y.o. female who presents with Emesis (x 1 days, vomiting, nausea)    Emesis  This is a new problem. The current episode started yesterday (Started just before midnight last night). The problem occurs constantly. The problem has been gradually improving (No episodes of emesis for the past 6 hours). Associated symptoms include abdominal pain (Very mild abdominal upset), anorexia, fatigue, nausea and vomiting. Pertinent negatives include no change in bowel habit, chest pain, chills, coughing, fever, headaches, myalgias, sore throat or urinary symptoms. The symptoms are aggravated by eating and drinking. She has tried nothing for the symptoms.   No suspect food intake.  Her significant other ate the same meal as her last night and he is not having any similar symptoms.    Review of Systems   Constitutional: Positive for fatigue. Negative for chills and fever.   HENT: Negative for sore throat.    Respiratory: Negative for cough.    Cardiovascular: Negative for chest pain.   Gastrointestinal: Positive for abdominal pain (Very mild abdominal upset), anorexia, nausea and vomiting. Negative for change in bowel habit.   Musculoskeletal: Negative for myalgias.   Neurological: Negative for headaches.       PMH:  has a past medical history of Anesthesia (08/27/2019), Arrhythmia, Arthritis, Blood clotting disorder ([facility]) (2005), Bowel habit changes, Depression, Diabetes ([facility]) (08/2019), Diabetic neuropathy ([facility]), Esophageal spasm, GERD (gastroesophageal reflux disease), Hashimoto's disease, Hiatal hernia, High cholesterol, Hyperlipidemia, Hypothyroid, Sleep apnea, Snoring, and Tachycardia. She also has no past medical history of Addisons disease ([facility]) or Adrenal disorder ([facility]).  MEDS:   Current Outpatient Medications:   •  promethazine (PHENERGAN) 25 MG Suppos, Insert 1 Suppository into the rectum every 6 hours as needed for Nausea/Vomiting., Disp: 10 Suppository, Rfl: 0  •  promethazine  (PHENERGAN) 25 MG Tab, Take 1 Tablet by mouth every 6 hours as needed for Nausea/Vomiting., Disp: 30 Tablet, Rfl: 0  •  LANTUS SOLOSTAR 100 UNIT/ML Solution Pen-injector injection, INJECT 39 UNITS SUBCUTANEOUSLY IN THE EVENING, Disp: 36 mL, Rfl: 0  •  omeprazole (PRILOSEC) 40 MG delayed-release capsule, Take 1 capsule by mouth twice daily, Disp: 180 Capsule, Rfl: 2  •  Alcohol Swabs (ALCOHOL PREP) 70 % Pads, Use three times daily to clean finger before blood glucose testing, Disp: 100 Each, Rfl: 11  •  metformin (GLUCOPHAGE) 1000 MG tablet, TAKE 1 TABLET BY MOUTH TWICE DAILY WITH MEALS, Disp: 60 Tablet, Rfl: 0  •  BD PEN NEEDLE JOSE J U/F, USE 1 PEN NEEDLE IN PEN DEVICE TO INJECT INSULIN ONCE DAILY, Disp: 100 Each, Rfl: 0  •  estradiol (ESTRACE) 1 MG Tab, Take 1 tablet by mouth every day., Disp: 30 tablet, Rfl: 12  •  estradiol (ESTRACE) 0.5 MG tablet, Take 1 tablet by mouth every day., Disp: 30 tablet, Rfl: 12  •  ibuprofen (MOTRIN) 800 MG Tab, Take 1 Tab by mouth 2 Times a Day., Disp: , Rfl:   •  spironolactone (ALDACTONE) 25 MG Tab, Take 1 Tab by mouth every day., Disp: 90 Tab, Rfl: 3  •  atorvastatin (LIPITOR) 20 MG Tab, Take 1 Tab by mouth every day., Disp: 90 Tab, Rfl: 3  •  metoprolol SR (TOPROL XL) 25 MG TABLET SR 24 HR, Take 1 Tab by mouth every evening., Disp: 90 Tab, Rfl: 3  •  hydroCHLOROthiazide (HYDRODIURIL) 12.5 MG tablet, Take 1 Tab by mouth every day., Disp: 90 Tab, Rfl: 3  •  Empagliflozin (JARDIANCE) 10 MG Tab, Take 10 mg by mouth every day., Disp: 90 Tab, Rfl: 3  •  levothyroxine (SYNTHROID) 137 MCG Tab, TAKE 1 TABLET BY MOUTH EVERY SUNDAY AND THURSDAY, Disp: 24 Tab, Rfl: 3  •  levothyroxine (SYNTHROID) 125 MCG Tab, Take 125 mcg every Mondays, Tuesdays, Wednesdays, Fridays and Saturdays, Disp: 90 Tab, Rfl: 3  •  gabapentin (NEURONTIN) 300 MG Cap, Take 3 Caps by mouth every evening., Disp: 270 Cap, Rfl: 2  •  oxybutynin SR (DITROPAN-XL) 10 MG CR tablet, Take 1 Tab by mouth 2 Times a Day., Disp: 60  "Tab, Rfl: 12  •  divalproex (DEPAKOTE) 500 MG Tablet Delayed Response, TAKE 1 TABLET BY MOUTH TWICE DAILY AFTER MEALS, Disp: , Rfl:   •  risperiDONE (RISPERDAL) 2 MG Tab, TAKE 1 TABLET BY MOUTH ONCE DAILY AT NIGHT AS DIRECTED, Disp: , Rfl:   •  cyclobenzaprine (FLEXERIL) 10 MG Tab, Take 1 Tab by mouth at bedtime as needed for Muscle Spasms., Disp: 30 Tab, Rfl: 5  •  polyethylene glycol 3350 (MIRALAX) 17 GM/SCOOP Powder, Take 17 g by mouth 1 time daily as needed (constipation)., Disp: 510 g, Rfl: 5  •  tramadol (ULTRAM) 50 MG Tab, Take 50 mg by mouth every four hours as needed., Disp: , Rfl:   •  Omega-3 Fatty Acids (OMEGA-3 FISH OIL PO), Take  by mouth., Disp: , Rfl:   •  acetaminophen (TYLENOL) 500 MG Tab, Take 500-1,000 mg by mouth every 6 hours as needed., Disp: , Rfl:   •  Multiple Vitamins-Minerals (MULTIVITAMIN PO), Take 1 Tab by mouth every day., Disp: , Rfl:   •  Black Cohosh 40 MG Cap, Take 40 mg by mouth 2 Times a Day., Disp: , Rfl:   •  terbinafine (LAMISIL) 250 MG Tab, , Disp: , Rfl:   •  traZODone (DESYREL) 150 MG Tab, Take 1 Tab by mouth at bedtime as needed. TAKE 1 TABLET BY MOUTH AT BEDTIME (Patient not taking: Reported on 10/21/2021), Disp: 90 Tab, Rfl: 3  •  sumatriptan (IMITREX) 100 MG tablet, TAKE 1/2 TO 1 (ONE-HALF TO ONE) TABLET BY MOUTH AS NEEDED FOR  MIGRAINE/HEADACHE (Patient not taking: Reported on 10/21/2021), Disp: 9 Tab, Rfl: 5  •  BIOTIN PO, Take  by mouth. (Patient not taking: Reported on 10/21/2021), Disp: , Rfl:   •  Melatonin 10 MG Tab, Take 20 mg by mouth every bedtime. (Patient not taking: Reported on 10/21/2021), Disp: , Rfl:   ALLERGIES:   Allergies   Allergen Reactions   • Betadine Prepstick Plus [Povidone-Iodine] Rash     Rash swelling   • Hydromorphone Hcl Itching     Itching \"head to toe\"   • Morphine Itching     Itching \"head to toe\"   • Povidone Iodine Hives   • Tape Itching     Itching \"leave scars\"     SURGHX:   Past Surgical History:   Procedure Laterality Date   • " COLONOSCOPY N/A 8/30/2019    Procedure: COLONOSCOPY;  Surgeon: Ata Khalil M.D.;  Location: SURGERY SAME DAY [facility];  Service: Gastroenterology   • GASTROSCOPY N/A 8/30/2019    Procedure: GASTROSCOPY - W/DILATION biopsy;  Surgeon: Ata Khalil M.D.;  Location: SURGERY SAME DAY [facility];  Service: Gastroenterology   • VAGINAL HYSTERECTOMY SCOPE TOTAL N/A 4/24/2018    Procedure: VAGINAL HYSTERECTOMY SCOPE TOTAL;  Surgeon: Francisco Javier Lainez M.D.;  Location: SURGERY SAME DAY [facility];  Service: Gynecology   • SALPINGO OOPHORECTOMY Bilateral 4/24/2018    Procedure: SALPINGO OOPHORECTOMY;  Surgeon: Francisco Javier Lainez M.D.;  Location: SURGERY SAME DAY [facility];  Service: Gynecology   • ANTERIOR AND POSTERIOR REPAIR  4/24/2018    Procedure: ANTERIOR AND POSTERIOR REPAIR;  Surgeon: Francisco Javier Lainez M.D.;  Location: SURGERY SAME DAY [facility];  Service: Gynecology   • ENTEROCELE REPAIR  4/24/2018    Procedure: ENTEROCELE REPAIR- PERINEOPLASTY;  Surgeon: Francisco Javier Lainez M.D.;  Location: SURGERY SAME DAY [facility];  Service: Gynecology   • BLADDER SLING FEMALE  4/24/2018    Procedure: BLADDER SLING FEMALE- TOT;  Surgeon: Francisco Javier Lainez M.D.;  Location: SURGERY SAME DAY [facility];  Service: Gynecology   • VAGINAL SUSPENSION N/A 4/24/2018    Procedure: VAGINAL SUSPENSION- SACROSPINOUS VAULT;  Surgeon: Francisco Javier Lainez M.D.;  Location: SURGERY SAME DAY [facility];  Service: Gynecology   • CYSTOSCOPY N/A 4/24/2018    Procedure: CYSTOSCOPY;  Surgeon: Francisco Javier Lainez M.D.;  Location: SURGERY SAME DAY [facility];  Service: Gynecology   • ENDOSCOPY  2016   • COLONOSCOPY  2016   • OTHER  2016    esophageal dilation   • OTHER ORTHOPEDIC SURGERY  2012    bone removed from toe after fracture   • TONSILLECTOMY AND ADENOIDECTOMY  1977   • OTHER  1971, 1978, 1985    left facial cheek and right upper thight reconstructive surgery     SOCHX:  reports that she has never smoked. She has never used smokeless  "tobacco. She reports that she does not drink alcohol and does not use drugs.  FH: Family history was reviewed, no pertinent findings to report      Objective     /70 (BP Location: Left arm, Patient Position: Sitting, BP Cuff Size: Large adult)   Pulse 94   Temp 36.2 °C (97.2 °F) (Temporal)   Resp 16   Ht 1.6 m (5' 3\")   Wt 111 kg (245 lb)   LMP 04/20/2018 (Approximate) Comment: HCG from 4/23 negative  SpO2 94%   BMI 43.40 kg/m²      Physical Exam  Constitutional:       Appearance: She is well-developed.   HENT:      Head: Normocephalic and atraumatic.      Right Ear: External ear normal.      Left Ear: External ear normal.      Mouth/Throat:      Lips: Pink.      Mouth: Mucous membranes are dry.      Pharynx: Oropharynx is clear.      Comments: Mucous membranes are starting to look dry  Eyes:      Conjunctiva/sclera: Conjunctivae normal.      Pupils: Pupils are equal, round, and reactive to light.   Cardiovascular:      Rate and Rhythm: Normal rate and regular rhythm.      Heart sounds: Normal heart sounds. No murmur heard.     Pulmonary:      Effort: Pulmonary effort is normal.      Breath sounds: Normal breath sounds. No wheezing.   Abdominal:      General: Bowel sounds are normal.      Palpations: Abdomen is soft.      Tenderness: There is generalized abdominal tenderness. There is no guarding or rebound. Negative signs include Crow's sign.   Musculoskeletal:      Cervical back: Normal range of motion.   Lymphadenopathy:      Cervical: No cervical adenopathy.   Skin:     General: Skin is warm and dry.      Capillary Refill: Capillary refill takes less than 2 seconds.   Neurological:      Mental Status: She is alert and oriented to person, place, and time.   Psychiatric:         Behavior: Behavior normal.         Judgment: Judgment normal.         Assessment & Plan     1. Nausea and vomiting, intractability of vomiting not specified, unspecified vomiting type  - ondansetron (ZOFRAN) syringe/vial " injection 8 mg  - promethazine (PHENERGAN) 25 MG Suppos; Insert 1 Suppository into the rectum every 6 hours as needed for Nausea/Vomiting.  Dispense: 10 Suppository; Refill: 0  - promethazine (PHENERGAN) 25 MG Tab; Take 1 Tablet by mouth every 6 hours as needed for Nausea/Vomiting.  Dispense: 30 Tablet; Refill: 0  Patient has had symptoms for less than 24 hours.  Do not recommend testing for COVID-19 virus or other viral syndromes at this time.  Recommend that she stay home for the next few days and drink plenty of fluids.  Pedialyte can also help.  She was prescribed promethazine tablets as well as some suppositories in case she is unable to keep the tablets down.  If her vomiting remains severe and she goes another 12 hours without being able to keep fluids down she will need to go to the emergency department.  Also discussed ER if she develops severe abdominal pain.  If her symptoms do not improve or start to develop new symptoms over the next 48 hours she will return to the urgent care on Saturday for reevaluation and we will do more of a work-up at that time.              Differential Diagnosis, natural history, and supportive care discussed. Return to the Urgent Care or follow up with your PCP if symptoms fail to resolve, or for any new or worsening symptoms. Emergency room precautions discussed. Patient and/or family appears understanding of information.

## 2021-10-21 NOTE — LETTER
October 21, 2021         Patient: Sarah Friedman   YOB: 1970   Date of Visit: 10/21/2021           To Whom it May Concern:    Sarah Friedman was seen in my clinic on 10/21/2021. She may return to work on 10/23/2021.    If you have any questions or concerns, please don't hesitate to call.        Sincerely,           Kasey Carr P.A.-C.  Electronically Signed

## 2021-10-29 DIAGNOSIS — Z79.4 TYPE 2 DIABETES MELLITUS WITHOUT COMPLICATION, WITH LONG-TERM CURRENT USE OF INSULIN (HCC): ICD-10-CM

## 2021-10-29 DIAGNOSIS — E11.9 TYPE 2 DIABETES MELLITUS WITHOUT COMPLICATION, WITH LONG-TERM CURRENT USE OF INSULIN (HCC): ICD-10-CM

## 2021-10-29 DIAGNOSIS — E11.42 DIABETIC POLYNEUROPATHY ASSOCIATED WITH TYPE 2 DIABETES MELLITUS (HCC): ICD-10-CM

## 2021-11-01 RX ORDER — GABAPENTIN 300 MG/1
CAPSULE ORAL
Qty: 270 CAPSULE | Refills: 0 | Status: SHIPPED | OUTPATIENT
Start: 2021-11-01 | End: 2022-02-07

## 2021-11-01 NOTE — TELEPHONE ENCOUNTER
Received request via: Pharmacy    Was the patient seen in the last year in this department? Yes    Does the patient have an active prescription (recently filled or refills available) for medication(s) requested? No    Requested Prescriptions     Pending Prescriptions Disp Refills   • gabapentin (NEURONTIN) 300 MG Cap [Pharmacy Med Name: Gabapentin 300 MG Oral Capsule] 270 Capsule 0     Sig: TAKE 3 CAPSULES BY MOUTH IN THE EVENING

## 2021-11-26 DIAGNOSIS — Z79.4 TYPE 2 DIABETES MELLITUS WITHOUT COMPLICATION, WITH LONG-TERM CURRENT USE OF INSULIN (HCC): ICD-10-CM

## 2021-11-26 DIAGNOSIS — E11.9 TYPE 2 DIABETES MELLITUS WITHOUT COMPLICATION, WITH LONG-TERM CURRENT USE OF INSULIN (HCC): ICD-10-CM

## 2021-11-29 NOTE — TELEPHONE ENCOUNTER
Received request via: Pharmacy    Was the patient seen in the last year in this department? Yes    Does the patient have an active prescription (recently filled or refills available) for medication(s) requested? No    Requested Prescriptions     Pending Prescriptions Disp Refills   • metformin (GLUCOPHAGE) 1000 MG tablet [Pharmacy Med Name: metFORMIN HCl 1000 MG Oral Tablet] 60 Tablet 0     Sig: TAKE 1 TABLET BY MOUTH TWICE DAILY WITH MEALS

## 2021-12-24 DIAGNOSIS — N39.41 URGE INCONTINENCE: ICD-10-CM

## 2021-12-28 ENCOUNTER — TELEPHONE (OUTPATIENT)
Dept: MEDICAL GROUP | Facility: MEDICAL CENTER | Age: 51
End: 2021-12-28

## 2021-12-28 ENCOUNTER — TELEPHONE (OUTPATIENT)
Dept: OBGYN | Facility: CLINIC | Age: 51
End: 2021-12-28

## 2021-12-28 DIAGNOSIS — N39.41 URGE INCONTINENCE: ICD-10-CM

## 2021-12-28 NOTE — TELEPHONE ENCOUNTER
Pt called requesting refill on Oxybutnin  Request sent to Dr. Pacheco  Informed pt a 30 day supply was sent to pharmacy and needs to follow-up with Dr. Maloney

## 2021-12-28 NOTE — TELEPHONE ENCOUNTER
"Phone Number Called: [phone] (home)       Call outcome: Spoke to patient regarding message below.    Message: Pt called for medication refill for Oxybutin and was very rude off the start. She was asking where her medication  refill was and why hasn't anyone responded to it? I let her know that I have not seen that fax and that [facility] is currently out of office and has been all of last week into this week. Pt then asked augustly, \"well whose responsibility is it to take over when he is gone?\" After speaking to [place] there is no one specifically assigned to take over when he is gone which I relayed to patient. She then proceeded to say how unprofessional and wanted to know yet again who is responsible. I let her know that I do not have an answer for her but after speaking to my manager again we were able to have a physician that agreed to send in a 30 day refill. Pt understood and again asked who is responsible for not taking care of 's work when he is gone. I told that pt I'm sorry but I do not have an answer to that question. I then asked patient if there is anything else I can help her with she said no and stated again about un professionalism and wanted to know who is responsible.  I told her as I have stated I don't have that information.  "

## 2021-12-29 DIAGNOSIS — N39.41 URGE INCONTINENCE: ICD-10-CM

## 2021-12-29 RX ORDER — OXYBUTYNIN CHLORIDE 10 MG/1
TABLET, EXTENDED RELEASE ORAL
Qty: 60 TABLET | Refills: 0 | Status: SHIPPED | OUTPATIENT
Start: 2021-12-29 | End: 2022-02-09

## 2021-12-29 RX ORDER — OXYBUTYNIN CHLORIDE 10 MG/1
10 TABLET, EXTENDED RELEASE ORAL 2 TIMES DAILY
Qty: 60 TABLET | Refills: 12 | Status: CANCELLED | OUTPATIENT
Start: 2021-12-29

## 2021-12-29 RX ORDER — OXYBUTYNIN CHLORIDE 10 MG/1
10 TABLET, EXTENDED RELEASE ORAL 2 TIMES DAILY
Qty: 60 TABLET | Refills: 12 | Status: SHIPPED | OUTPATIENT
Start: 2021-12-29 | End: 2023-01-23 | Stop reason: SDUPTHER

## 2022-01-07 DIAGNOSIS — E11.9 TYPE 2 DIABETES MELLITUS WITHOUT COMPLICATION, WITH LONG-TERM CURRENT USE OF INSULIN (HCC): ICD-10-CM

## 2022-01-07 DIAGNOSIS — Z79.4 TYPE 2 DIABETES MELLITUS WITHOUT COMPLICATION, WITH LONG-TERM CURRENT USE OF INSULIN (HCC): ICD-10-CM

## 2022-01-25 ENCOUNTER — PATIENT MESSAGE (OUTPATIENT)
Dept: MEDICAL GROUP | Facility: MEDICAL CENTER | Age: 52
End: 2022-01-25

## 2022-01-25 DIAGNOSIS — Z79.4 TYPE 2 DIABETES MELLITUS WITHOUT COMPLICATION, WITH LONG-TERM CURRENT USE OF INSULIN (HCC): ICD-10-CM

## 2022-01-25 DIAGNOSIS — E11.9 TYPE 2 DIABETES MELLITUS WITHOUT COMPLICATION, WITH LONG-TERM CURRENT USE OF INSULIN (HCC): ICD-10-CM

## 2022-01-25 RX ORDER — INSULIN GLARGINE 100 [IU]/ML
INJECTION, SOLUTION SUBCUTANEOUS
Qty: 36 ML | Refills: 3 | Status: SHIPPED | OUTPATIENT
Start: 2022-01-25 | End: 2022-06-06 | Stop reason: SDUPTHER

## 2022-01-25 NOTE — TELEPHONE ENCOUNTER
From: Sarah Friedman  To: Physician Sheila Kirby  Sent: 1/25/2022 12:07 PM PST  Subject: Insulin refill    I don't know who to address this issue to, but [employer] is faxing over a script request for my insulin to be refilled, and I would like it filled ASAP. I have a doc appt in Feb., and at that time things will (hopefully) switch over to her. But in the meantime, I need this script filled WITH REFILLS on it. Thanks! Sarah

## 2022-02-08 ENCOUNTER — GYNECOLOGY VISIT (OUTPATIENT)
Dept: OBGYN | Facility: CLINIC | Age: 52
End: 2022-02-08
Payer: COMMERCIAL

## 2022-02-08 VITALS — DIASTOLIC BLOOD PRESSURE: 70 MMHG | SYSTOLIC BLOOD PRESSURE: 113 MMHG | WEIGHT: 245 LBS | BODY MASS INDEX: 43.4 KG/M2

## 2022-02-08 DIAGNOSIS — Z92.29 HISTORY OF POSTMENOPAUSAL HRT: ICD-10-CM

## 2022-02-08 PROCEDURE — 99213 OFFICE O/P EST LOW 20 MIN: CPT | Performed by: OBSTETRICS & GYNECOLOGY

## 2022-02-08 RX ORDER — ESTRADIOL 2 MG/1
2 TABLET ORAL DAILY
Qty: 30 TABLET | Refills: 12 | Status: SHIPPED | OUTPATIENT
Start: 2022-02-08 | End: 2022-06-09

## 2022-02-08 ASSESSMENT — FIBROSIS 4 INDEX: FIB4 SCORE: 1.09

## 2022-02-09 ENCOUNTER — APPOINTMENT (OUTPATIENT)
Dept: RADIOLOGY | Facility: IMAGING CENTER | Age: 52
End: 2022-02-09
Attending: NURSE PRACTITIONER
Payer: COMMERCIAL

## 2022-02-09 ENCOUNTER — OFFICE VISIT (OUTPATIENT)
Dept: URGENT CARE | Facility: CLINIC | Age: 52
End: 2022-02-09
Payer: COMMERCIAL

## 2022-02-09 VITALS
RESPIRATION RATE: 16 BRPM | TEMPERATURE: 97.1 F | BODY MASS INDEX: 43.41 KG/M2 | WEIGHT: 245 LBS | HEIGHT: 63 IN | DIASTOLIC BLOOD PRESSURE: 78 MMHG | HEART RATE: 102 BPM | SYSTOLIC BLOOD PRESSURE: 122 MMHG | OXYGEN SATURATION: 94 %

## 2022-02-09 DIAGNOSIS — M25.512 ACUTE PAIN OF LEFT SHOULDER: ICD-10-CM

## 2022-02-09 PROCEDURE — 99214 OFFICE O/P EST MOD 30 MIN: CPT | Performed by: NURSE PRACTITIONER

## 2022-02-09 PROCEDURE — 73030 X-RAY EXAM OF SHOULDER: CPT | Mod: TC,LT | Performed by: RADIOLOGY

## 2022-02-09 RX ORDER — HYDROCODONE BITARTRATE AND ACETAMINOPHEN 5; 325 MG/1; MG/1
1 TABLET ORAL EVERY 4 HOURS PRN
Qty: 15 TABLET | Refills: 0 | Status: SHIPPED | OUTPATIENT
Start: 2022-02-09 | End: 2022-02-12

## 2022-02-09 ASSESSMENT — ENCOUNTER SYMPTOMS
NAUSEA: 0
EYE PAIN: 0
CHILLS: 0
FEVER: 0
VOMITING: 0
DIZZINESS: 0
MYALGIAS: 0
NUMBNESS: 0
SORE THROAT: 0
JOINT SWELLING: 1
WEAKNESS: 0
SHORTNESS OF BREATH: 0

## 2022-02-09 ASSESSMENT — FIBROSIS 4 INDEX: FIB4 SCORE: 1.09

## 2022-02-09 NOTE — LETTER
February 9, 2022         Patient: Sarah Friedman   YOB: 1970   Date of Visit: 2/9/2022           To Whom it May Concern:    Sarah Friedman was seen in my clinic on 2/9/2022. She may return to work on 2/12/22.    If you have any questions or concerns, please don't hesitate to call.        Sincerely,           DESIRE Penn  Electronically Signed

## 2022-02-09 NOTE — LETTER
February 9, 2022         Patient: Sarah Friedman   YOB: 1970   Date of Visit: 2/9/2022           To Whom it May Concern:    Sarah Friedman was seen in my clinic on 2/9/2022. She may return to work on 9/11/22.    If you have any questions or concerns, please don't hesitate to call.        Sincerely,           DESIRE Penn  Electronically Signed

## 2022-02-09 NOTE — PROGRESS NOTES
"Chief Complaint   Patient presents with   • Other     Talk About hormones   Chief complaint: Hot flashes    History of present illness:   51 y.o.  presents with above chief complaint.  Patient reports that her hot flashes are worsening over the last few months.  She is currently on 1-1/2 mg of estradiol.  She reports that originally this dosing was successful and did control her symptoms reports for the last few months her hot flashes have worsened significantly.  She also reports significant night sweats which are severely affecting her quality of life.    Patient did attempt 2 mg of estradiol a few months ago, but did report some breast pain at that time.  Her dose was then decreased to 1/2 mg    ROS: Pertinent positives documented in HPI and all other systems reviewed & are negative        All PMH, PSH, meds, allergies, social history and FH reviewed and updated today:  Past Medical History:   Diagnosis Date   • Anesthesia 2019    \"hard to wake up\"  \"sleep apnea\"   • Arrhythmia     \" sinus Tach\"   • Arthritis     osteo   • Blood clotting disorder ([facility])     DVT   • Bowel habit changes     constipation   • Depression     \"BPD\"   • Diabetes ([facility]) 2019    insulin   • Diabetic neuropathy ([facility])    • Esophageal spasm    • GERD (gastroesophageal reflux disease)    • Hashimoto's disease    • Hiatal hernia    • High cholesterol    • Hyperlipidemia    • Hypothyroid    • Sleep apnea     uses cpap   • Snoring     sleep study done   • Tachycardia        Past Surgical History:   Procedure Laterality Date   • COLONOSCOPY N/A 2019    Procedure: COLONOSCOPY;  Surgeon: Ata Khalil M.D.;  Location: SURGERY SAME DAY [facility];  Service: Gastroenterology   • GASTROSCOPY N/A 2019    Procedure: GASTROSCOPY - W/DILATION biopsy;  Surgeon: Ata Khalil M.D.;  Location: SURGERY SAME DAY [facility];  Service: Gastroenterology   • VAGINAL HYSTERECTOMY SCOPE TOTAL N/A 2018    Procedure: VAGINAL " "HYSTERECTOMY SCOPE TOTAL;  Surgeon: Francisco Javier Lainez M.D.;  Location: SURGERY SAME DAY [facility];  Service: Gynecology   • SALPINGO OOPHORECTOMY Bilateral 4/24/2018    Procedure: SALPINGO OOPHORECTOMY;  Surgeon: Francisco Javier Lainez M.D.;  Location: SURGERY SAME DAY [facility];  Service: Gynecology   • ANTERIOR AND POSTERIOR REPAIR  4/24/2018    Procedure: ANTERIOR AND POSTERIOR REPAIR;  Surgeon: Francisco Javier Lainez M.D.;  Location: SURGERY SAME DAY [facility];  Service: Gynecology   • ENTEROCELE REPAIR  4/24/2018    Procedure: ENTEROCELE REPAIR- PERINEOPLASTY;  Surgeon: Francisco Javier Lainez M.D.;  Location: SURGERY SAME DAY [facility];  Service: Gynecology   • BLADDER SLING FEMALE  4/24/2018    Procedure: BLADDER SLING FEMALE- TOT;  Surgeon: Francisco Javier Lainez M.D.;  Location: SURGERY SAME DAY [facility];  Service: Gynecology   • VAGINAL SUSPENSION N/A 4/24/2018    Procedure: VAGINAL SUSPENSION- SACROSPINOUS VAULT;  Surgeon: Francisco Javier Lainez M.D.;  Location: SURGERY SAME DAY [facility];  Service: Gynecology   • CYSTOSCOPY N/A 4/24/2018    Procedure: CYSTOSCOPY;  Surgeon: Francisco Javier Lainez M.D.;  Location: SURGERY SAME DAY [facility];  Service: Gynecology   • ENDOSCOPY  2016   • COLONOSCOPY  2016   • OTHER  2016    esophageal dilation   • OTHER ORTHOPEDIC SURGERY  2012    bone removed from toe after fracture   • TONSILLECTOMY AND ADENOIDECTOMY  1977   • OTHER  1971, 1978, 1985    left facial cheek and right upper thight reconstructive surgery       Allergies:   Allergies   Allergen Reactions   • Betadine Prepstick Plus [Povidone-Iodine] Rash     Rash swelling   • Hydromorphone Hcl Itching     Itching \"head to toe\"   • Morphine Itching     Itching \"head to toe\"   • Povidone Iodine Hives   • Tape Itching     Itching \"leave scars\"       Social History     Socioeconomic History   • Marital status: Single     Spouse name: Not on file   • Number of children: Not on file   • Years of education: Not on file   • Highest " education level: Not on file   Occupational History   • Not on file   Tobacco Use   • Smoking status: Never Smoker   • Smokeless tobacco: Never Used   Vaping Use   • Vaping Use: Never used   Substance and Sexual Activity   • Alcohol use: Never   • Drug use: No     Comment: previous medical marijuana use for pain   • Sexual activity: Yes     Partners: Female   Other Topics Concern   • Not on file   Social History Narrative   • Not on file     Social Determinants of Health     Financial Resource Strain:    • Difficulty of Paying Living Expenses: Not on file   Food Insecurity:    • Worried About Running Out of Food in the Last Year: Not on file   • Ran Out of Food in the Last Year: Not on file   Transportation Needs:    • Lack of Transportation (Medical): Not on file   • Lack of Transportation (Non-Medical): Not on file   Physical Activity:    • Days of Exercise per Week: Not on file   • Minutes of Exercise per Session: Not on file   Stress:    • Feeling of Stress : Not on file   Social Connections:    • Frequency of Communication with Friends and Family: Not on file   • Frequency of Social Gatherings with Friends and Family: Not on file   • Attends Yazidi Services: Not on file   • Active Member of Clubs or Organizations: Not on file   • Attends Club or Organization Meetings: Not on file   • Marital Status: Not on file   Intimate Partner Violence:    • Fear of Current or Ex-Partner: Not on file   • Emotionally Abused: Not on file   • Physically Abused: Not on file   • Sexually Abused: Not on file   Housing Stability:    • Unable to Pay for Housing in the Last Year: Not on file   • Number of Places Lived in the Last Year: Not on file   • Unstable Housing in the Last Year: Not on file       Family History   Problem Relation Age of Onset   • Cancer Mother 70        pancreatic, breast in 30's   • Heart Disease Father    • Cancer Father         lung   • Diabetes Sister    • Diabetes Maternal Uncle    • Heart Disease  Maternal Grandmother         MI   • Hyperlipidemia Maternal Grandmother    • Diabetes Maternal Grandfather    • Hyperlipidemia Maternal Grandfather    • Stroke Neg Hx        Physical exam:  /70   Wt 111 kg (245 lb)     GENERAL APPEARANCE: healthy, alert, no distress  NEURO Awake, alert and oriented x 3, Normal gait, no sensory deficits  SKIN No rashes, or ulcers or lesions seen  PSYCHIATRIC: Patient shows appropriate affect, is alert and oriented x3, intact judgment and insight.      Assessment:  1. History of postmenopausal HRT         Plan:    We will increase dose once again to 2 mg.  I am hoping that given the fact has been on 1-1/2 mg for a significant amount of time, that her body has adjusted to the exogenous estrogen and that slightly increase in the dose will not cause her to have any breast pain.  If she does have breast pain, may be switching to a transdermal patch may help her have more steady levels of hormone which may not cause her to have breast pain.  However, patient does report a significant reaction to adhesive tape.    Follow-up 2 weeks

## 2022-02-09 NOTE — LETTER
February 9, 2022         Patient: Sarah Friedman   YOB: 1970   Date of Visit: 2/9/2022           To Whom it May Concern:    Sarah Friedman was seen in my clinic on 2/9/2022. She may return to work on 02/11/2022.    If you have any questions or concerns, please don't hesitate to call.        Sincerely,           JITENDRA Penn.  Electronically Signed

## 2022-02-10 NOTE — PROGRESS NOTES
Subjective:   Sarah Friedman is a 51 y.o. female who presents for Shoulder Injury (left shoulder, front of the shoulder, can't move without pain, just woke up and started to hurt x 2days)      Shoulder Pain  This is a new problem. Episode onset: 2 days; no injury  The problem occurs constantly. The problem has been unchanged. Associated symptoms include joint swelling. Pertinent negatives include no chest pain, chills, fever, myalgias, nausea, numbness, rash, sore throat, vomiting or weakness. Nothing aggravates the symptoms. Treatments tried: Tramadol. The treatment provided no relief.       Review of Systems   Constitutional: Negative for chills and fever.   HENT: Negative for sore throat.    Eyes: Negative for pain.   Respiratory: Negative for shortness of breath.    Cardiovascular: Negative for chest pain.   Gastrointestinal: Negative for nausea and vomiting.   Genitourinary: Negative for hematuria.   Musculoskeletal: Positive for joint pain and joint swelling. Negative for myalgias.   Skin: Negative for rash.   Neurological: Negative for dizziness, weakness and numbness.       Medications:    • acetaminophen Tabs  • Alcohol Prep Pads  • atorvastatin Tabs  • BD Pen Needle Raisa U/F Misc  • BIOTIN PO  • Black Cohosh Caps  • cyclobenzaprine Tabs  • divalproex Tbec  • estradiol Tabs  • gabapentin Caps  • hydroCHLOROthiazide  • HYDROcodone-acetaminophen Tabs  • ibuprofen Tabs  • Jardiance Tabs  • Lantus SoloStar Sopn  • levothyroxine Tabs  • Melatonin Tabs  • metformin  • metoprolol SR Tb24  • MULTIVITAMIN PO  • OMEGA-3 FISH OIL PO  • omeprazole  • oxybutynin SR  • polyethylene glycol 3350 Powd  • promethazine Supp  • promethazine Tabs  • risperiDONE Tabs  • spironolactone Tabs  • sumatriptan  • terbinafine Tabs  • traMADol Tabs  • traZODone Tabs    Allergies: Betadine prepstick plus [povidone-iodine], Hydromorphone hcl, Morphine, Povidone iodine, and Tape    Problem List: Sarah Friedman does not have any  pertinent problems on file.    Surgical History:  Past Surgical History:   Procedure Laterality Date   • COLONOSCOPY N/A 8/30/2019    Procedure: COLONOSCOPY;  Surgeon: Ata Khalil M.D.;  Location: SURGERY SAME DAY [facility];  Service: Gastroenterology   • GASTROSCOPY N/A 8/30/2019    Procedure: GASTROSCOPY - W/DILATION biopsy;  Surgeon: Ata Khalil M.D.;  Location: SURGERY SAME DAY [facility];  Service: Gastroenterology   • VAGINAL HYSTERECTOMY SCOPE TOTAL N/A 4/24/2018    Procedure: VAGINAL HYSTERECTOMY SCOPE TOTAL;  Surgeon: Francisco Javier Lainez M.D.;  Location: SURGERY SAME DAY [facility];  Service: Gynecology   • SALPINGO OOPHORECTOMY Bilateral 4/24/2018    Procedure: SALPINGO OOPHORECTOMY;  Surgeon: Francisco Javier Lainez M.D.;  Location: SURGERY SAME DAY [facility];  Service: Gynecology   • ANTERIOR AND POSTERIOR REPAIR  4/24/2018    Procedure: ANTERIOR AND POSTERIOR REPAIR;  Surgeon: Francisco Javier Lainez M.D.;  Location: SURGERY SAME DAY [facility];  Service: Gynecology   • ENTEROCELE REPAIR  4/24/2018    Procedure: ENTEROCELE REPAIR- PERINEOPLASTY;  Surgeon: Francisco Javier Lainez M.D.;  Location: SURGERY SAME DAY [facility];  Service: Gynecology   • BLADDER SLING FEMALE  4/24/2018    Procedure: BLADDER SLING FEMALE- TOT;  Surgeon: Francisco Javier Lainez M.D.;  Location: SURGERY SAME DAY [facility];  Service: Gynecology   • VAGINAL SUSPENSION N/A 4/24/2018    Procedure: VAGINAL SUSPENSION- SACROSPINOUS VAULT;  Surgeon: Francisco Javier Lainez M.D.;  Location: SURGERY SAME DAY [facility];  Service: Gynecology   • CYSTOSCOPY N/A 4/24/2018    Procedure: CYSTOSCOPY;  Surgeon: Francisco Javier Lainez M.D.;  Location: SURGERY SAME DAY [facility];  Service: Gynecology   • ENDOSCOPY  2016   • COLONOSCOPY  2016   • OTHER  2016    esophageal dilation   • OTHER ORTHOPEDIC SURGERY  2012    bone removed from toe after fracture   • TONSILLECTOMY AND ADENOIDECTOMY  1977   • OTHER  1971, 1978, 1985    left facial cheek and right upper  "thight reconstructive surgery       Past Social Hx: Sarah Friedman  reports that she has never smoked. She has never used smokeless tobacco. She reports that she does not drink alcohol and does not use drugs.     Past Family Hx:  Sarah Friedman family history includes Cancer in her father; Cancer (age of onset: 70) in her mother; Diabetes in her maternal grandfather, maternal uncle, and sister; Heart Disease in her father and maternal grandmother; Hyperlipidemia in her maternal grandfather and maternal grandmother.     Problem list, medications, and allergies reviewed by myself today in Epic.     Objective:     /78 (BP Location: Right arm, Patient Position: Sitting, BP Cuff Size: Adult)   Pulse (!) 102   Temp 36.2 °C (97.1 °F) (Temporal)   Resp 16   Ht 1.6 m (5' 3\")   Wt 111 kg (245 lb)   LMP 04/20/2018 (Approximate) Comment: HCG from 4/23 negative  SpO2 94%   BMI 43.40 kg/m²     Physical Exam  Constitutional:       Appearance: Normal appearance. She is not ill-appearing or toxic-appearing.   HENT:      Head: Normocephalic.      Right Ear: External ear normal.      Left Ear: External ear normal.      Nose: Nose normal.      Mouth/Throat:      Lips: Pink.      Mouth: Mucous membranes are moist.   Eyes:      General: Lids are normal.         Right eye: No discharge.         Left eye: No discharge.   Pulmonary:      Effort: Pulmonary effort is normal. No accessory muscle usage or respiratory distress.   Musculoskeletal:      Left shoulder: Swelling and tenderness present. No effusion, bony tenderness or crepitus. Decreased range of motion. Normal strength. Normal pulse.      Cervical back: Full passive range of motion without pain.      Comments: Severe pain to palpation on the anterior aspect, limited range of motion with active/passive range of motion\   Skin:     Coloration: Skin is not pale.   Neurological:      Mental Status: She is alert and oriented to person, place, and time. "   Psychiatric:         Mood and Affect: Mood normal.         Thought Content: Thought content normal.         Assessment/Plan:     Diagnosis and associated orders:     1. Acute pain of left shoulder  DX-SHOULDER 2+ LEFT    HYDROcodone-acetaminophen (NORCO) 5-325 MG Tab per tablet    Referral to Orthopedics      Comments/MDM:     I independently reviewed the patient's imaging and agree with the interpretation of the radiologist.    1.  No fracture or dislocation of LEFT shoulder.  2.  Possible joint bodies adjacent the glenoid.  3.  Findings suggest calcific tendinopathy of the rotator cuff      Patient is a 51-year-old female present with the stated above, x-ray with concerns of calcification on the tendon, patient has limited range of motion, concerned of possible frozen shoulder.  Encouraged to rest, ice, did provide a sling for him support however did recommend gentle range of motion.  Will place urgent referral to orthopedics.  In prescribing controlled substances to this patient, I certify that I have obtained and reviewed the medical history of Sarah Friedman. I have also made a good flor effort to obtain applicable records from other providers who have treated the patient and records did not demonstrate any increased risk of substance abuse that would prevent me from prescribing controlled substances.     I have conducted a physical exam and documented it. I have reviewed Ms. Friedman’s prescription history as maintained by the [place] Prescription Monitoring Program.     I have assessed the patient’s risk for abuse, dependency, and addiction using the validated Opioid Risk Tool available at https://www.mdcalc.com/jdffyl-kknx-zvfa-ort-narcotic-abuse.     Given the above, I believe the benefits of controlled substance therapy outweigh the risks. The reasons for prescribing controlled substances include non-narcotic, oral analgesic alternatives have been inadequate for pain control. Accordingly, I have  discussed the risk and benefits, treatment plan, and alternative therapies with the patient.   Differential diagnosis, natural history, supportive care, and indications for immediate follow-up discussed.                My total time spent caring for the patient on the day of the encounter was 30 minutes.   This does not include time spent on separately billable procedures/tests.      Please note that this dictation was created using voice recognition software. I have made a reasonable attempt to correct obvious errors, but I expect that there are errors of grammar and possibly content that I did not discover before finalizing the note.    This note was electronically signed by John XIE.

## 2022-02-17 ENCOUNTER — OFFICE VISIT (OUTPATIENT)
Dept: MEDICAL GROUP | Facility: MEDICAL CENTER | Age: 52
End: 2022-02-17
Payer: COMMERCIAL

## 2022-02-17 VITALS
RESPIRATION RATE: 18 BRPM | WEIGHT: 253.75 LBS | HEIGHT: 63 IN | HEART RATE: 101 BPM | BODY MASS INDEX: 44.96 KG/M2 | OXYGEN SATURATION: 94 % | DIASTOLIC BLOOD PRESSURE: 72 MMHG | TEMPERATURE: 97.6 F | SYSTOLIC BLOOD PRESSURE: 104 MMHG

## 2022-02-17 DIAGNOSIS — I10 ESSENTIAL HYPERTENSION: ICD-10-CM

## 2022-02-17 DIAGNOSIS — M75.02 ADHESIVE CAPSULITIS OF LEFT SHOULDER: ICD-10-CM

## 2022-02-17 DIAGNOSIS — F33.42 RECURRENT MAJOR DEPRESSIVE DISORDER, IN FULL REMISSION (HCC): ICD-10-CM

## 2022-02-17 DIAGNOSIS — E11.9 TYPE 2 DIABETES MELLITUS WITHOUT COMPLICATION, WITHOUT LONG-TERM CURRENT USE OF INSULIN (HCC): ICD-10-CM

## 2022-02-17 DIAGNOSIS — E03.8 HYPOTHYROIDISM DUE TO HASHIMOTO'S THYROIDITIS: ICD-10-CM

## 2022-02-17 DIAGNOSIS — E11.42 DIABETIC POLYNEUROPATHY ASSOCIATED WITH TYPE 2 DIABETES MELLITUS (HCC): ICD-10-CM

## 2022-02-17 DIAGNOSIS — R00.0 SINUS TACHYCARDIA: ICD-10-CM

## 2022-02-17 DIAGNOSIS — E06.3 HYPOTHYROIDISM DUE TO HASHIMOTO'S THYROIDITIS: ICD-10-CM

## 2022-02-17 DIAGNOSIS — E55.9 VITAMIN D DEFICIENCY: ICD-10-CM

## 2022-02-17 PROBLEM — R10.11 RIGHT UPPER QUADRANT PAIN: Status: ACTIVE | Noted: 2019-03-26

## 2022-02-17 PROBLEM — K82.9 GALLBLADDER DISEASE: Status: ACTIVE | Noted: 2019-03-26

## 2022-02-17 PROBLEM — E28.2 POLYCYSTIC OVARIES: Status: ACTIVE | Noted: 2022-02-17

## 2022-02-17 PROBLEM — M19.90 OSTEOARTHROSIS: Status: ACTIVE | Noted: 2022-02-17

## 2022-02-17 PROBLEM — R11.0 NAUSEA: Status: ACTIVE | Noted: 2022-02-17

## 2022-02-17 PROBLEM — I49.9 ARRHYTHMIA: Status: ACTIVE | Noted: 2022-02-17

## 2022-02-17 PROCEDURE — 99214 OFFICE O/P EST MOD 30 MIN: CPT | Performed by: INTERNAL MEDICINE

## 2022-02-17 ASSESSMENT — LIFESTYLE VARIABLES: SUBSTANCE_ABUSE: 0

## 2022-02-17 ASSESSMENT — ENCOUNTER SYMPTOMS
HEADACHES: 0
CHILLS: 0
FEVER: 0
NAUSEA: 0
BLURRED VISION: 0
DEPRESSION: 0
SENSORY CHANGE: 1
VOMITING: 0
SORE THROAT: 0
COUGH: 0
POLYDIPSIA: 0

## 2022-02-17 ASSESSMENT — PATIENT HEALTH QUESTIONNAIRE - PHQ9
9. THOUGHTS THAT YOU WOULD BE BETTER OFF DEAD, OR OF HURTING YOURSELF: NOT AT ALL
6. FEELING BAD ABOUT YOURSELF - OR THAT YOU ARE A FAILURE OR HAVE LET YOURSELF OR YOUR FAMILY DOWN: NOT AL ALL
SUM OF ALL RESPONSES TO PHQ9 QUESTIONS 1 AND 2: 0
7. TROUBLE CONCENTRATING ON THINGS, SUCH AS READING THE NEWSPAPER OR WATCHING TELEVISION: NOT AT ALL
8. MOVING OR SPEAKING SO SLOWLY THAT OTHER PEOPLE COULD HAVE NOTICED. OR THE OPPOSITE, BEING SO FIGETY OR RESTLESS THAT YOU HAVE BEEN MOVING AROUND A LOT MORE THAN USUAL: NOT AT ALL
CLINICAL INTERPRETATION OF PHQ2 SCORE: 0
SUM OF ALL RESPONSES TO PHQ QUESTIONS 1-9: 0
1. LITTLE INTEREST OR PLEASURE IN DOING THINGS: NOT AT ALL
2. FEELING DOWN, DEPRESSED, IRRITABLE, OR HOPELESS: NOT AT ALL
3. TROUBLE FALLING OR STAYING ASLEEP OR SLEEPING TOO MUCH: NOT AT ALL
5. POOR APPETITE OR OVEREATING: NOT AT ALL
4. FEELING TIRED OR HAVING LITTLE ENERGY: NOT AT ALL

## 2022-02-17 ASSESSMENT — FIBROSIS 4 INDEX: FIB4 SCORE: 1.09

## 2022-02-17 NOTE — ASSESSMENT & PLAN NOTE
This is a chronic problem, well-controlled.  Continue current regimen, repeat hemoglobin A1c.  Referral for retinal screening provided.  Repeat hemoglobin A1c, follow-up in 3 months.

## 2022-02-17 NOTE — ASSESSMENT & PLAN NOTE
This is a chronic problem, well-controlled on current regimen.  PHQ-9 is 0.  Continue to follow with psychology and psychiatrist, continue Depakote and risperidone.

## 2022-02-17 NOTE — PROGRESS NOTES
Subjective:     Chief Complaint   Patient presents with   • Establish Care     Med mngmnt/ DM      Diagnoses of Essential hypertension, Type 2 diabetes mellitus without complication, without long-term current use of insulin (HCC), Sinus tachycardia, Recurrent major depressive disorder, in full remission (HCC), Vitamin D deficiency, Diabetic polyneuropathy associated with type 2 diabetes mellitus (HCC), Hypothyroidism due to Hashimoto's thyroiditis, and Adhesive capsulitis of left shoulder were pertinent to this visit.    HISTORY OF THE PRESENT ILLNESS: Patient is a 51 y.o. female. This pleasant patient is here today to establish care. Her prior PCP was Dr Meredith/Dr. Hackett.  Patient has extensive medical history and I did my best to review his many chronic problems as possible.  Most of the medical problems will be discussed at the next visit.    Adhesive Capsulitis of Left Shoulder    This is a new problem, patient was seen in the urgent care for left shoulder pain, diagnosed with frozen shoulder was referred to [facility].  Patient is currently wearing a sling and her pain is still not well controlled.  She will follow-up with her pain specialist to adjust her pain medication regimen.     Gallbladder Disease   Type 2 Diabetes Mellitus Without Complication (Hcc)    Chronic, controlled  Current regimen includes Jardiance, metformin, Insulin Glargine 39-41 units every evening per patient.   Last A1c:   Lab Results   Component Value Date/Time    HBA1C 6.7 (H) 02/18/2021 1100    AVGLUC 146 02/18/2021 1100     Last Microalb/Cr ratio:   Lab Results   Component Value Date/Time    URCREAT 123.52 09/18/2020 1103    MICROALBUR <1.2 09/18/2020 1103    MALBCRT see below 09/18/2020 1103     Fasting sugars: 80-120s, at 9 pm - 140-180  Last diabetic foot exam:   Last retinal eye exam: >1 year  ACEi/ARB: not  Statin: Atorvastatin 20 mg, Aspirin not  Concomitant HTN: controlled       Recurrent major depressive disorder, in full remission  "(HCC)    This is a chronic problem, patient follows with psychiatry and psychologist. she is on Depakote and risperidone regimen and her symptoms are well controlled.  She did have suicide attempt in her 20s and she tried to hang herself.  Patient denies suicidal thoughts or ideations she is aware where to seek care if those were to develop.     Hypothyroidism Due to Hashimoto's Thyroiditis    This is a chronic problem, well-controlled current regimen includes levothyroxine 125 mcg MTWFS, Sat and 137 mcg Thurs & Sun.   Last TSH:   Lab Results   Component Value Date/Time    TSHULTRASEN 2.390 02/18/2021 1100      Last T4:   Lab Results   Component Value Date/Time    FREET4 0.66 02/21/2020 1544       Sinus Tachycardia    On metoprolol XR  Following with Dr. Colin  Cardiology [facility]     Essential Hypertension    Patient denies having hypertension, however per previous notes she is on hydrochlorothiazide 12.5 mg daily and spironolactone for her hypertension.  At the time the visit blood pressure was 104/72.  She has no shortness of breath or chest pain on exertion.     Diabetic neuropathy ([facility])    On gabapentin 300mg x3 at night       Past Medical History:   Diagnosis Date   • Anesthesia 08/27/2019    \"hard to wake up\"  \"sleep apnea\"   • Arrhythmia     \" sinus Tach\"   • Arthritis     osteo   • Blood clotting disorder (HCC) 2005    DVT   • Bowel habit changes     constipation   • Depression     \"BPD\"   • Diabetes (HCC) 08/2019    insulin   • Diabetic neuropathy ([facility])    • Esophageal spasm    • GERD (gastroesophageal reflux disease)    • Hashimoto's disease    • Hiatal hernia    • High cholesterol    • Hyperlipidemia    • Hypothyroid    • Sleep apnea     uses cpap   • Snoring     sleep study done   • Tachycardia      Past Surgical History:   Procedure Laterality Date   • COLONOSCOPY N/A 8/30/2019    Procedure: COLONOSCOPY;  Surgeon: Ata Khalil M.D.;  Location: SURGERY SAME DAY [facility];  Service: " Gastroenterology   • GASTROSCOPY N/A 8/30/2019    Procedure: GASTROSCOPY - W/DILATION biopsy;  Surgeon: Ata Khalil M.D.;  Location: SURGERY SAME DAY [facility];  Service: Gastroenterology   • VAGINAL HYSTERECTOMY SCOPE TOTAL N/A 4/24/2018    Procedure: VAGINAL HYSTERECTOMY SCOPE TOTAL;  Surgeon: Francisco Javier Lainez M.D.;  Location: SURGERY SAME DAY [facility];  Service: Gynecology   • SALPINGO OOPHORECTOMY Bilateral 4/24/2018    Procedure: SALPINGO OOPHORECTOMY;  Surgeon: Francisco Javier Lainez M.D.;  Location: SURGERY SAME DAY [facility];  Service: Gynecology   • ANTERIOR AND POSTERIOR REPAIR  4/24/2018    Procedure: ANTERIOR AND POSTERIOR REPAIR;  Surgeon: Francisco Javier Lainez M.D.;  Location: SURGERY SAME DAY [facility];  Service: Gynecology   • ENTEROCELE REPAIR  4/24/2018    Procedure: ENTEROCELE REPAIR- PERINEOPLASTY;  Surgeon: Francisco Javier Lainez M.D.;  Location: SURGERY SAME DAY [facility];  Service: Gynecology   • BLADDER SLING FEMALE  4/24/2018    Procedure: BLADDER SLING FEMALE- TOT;  Surgeon: Francisco Javier Lainez M.D.;  Location: SURGERY SAME DAY [facility];  Service: Gynecology   • VAGINAL SUSPENSION N/A 4/24/2018    Procedure: VAGINAL SUSPENSION- SACROSPINOUS VAULT;  Surgeon: Francisco Javier Lainez M.D.;  Location: SURGERY SAME DAY [facility];  Service: Gynecology   • CYSTOSCOPY N/A 4/24/2018    Procedure: CYSTOSCOPY;  Surgeon: Francisco Javier Lainez M.D.;  Location: SURGERY SAME DAY [facility];  Service: Gynecology   • ENDOSCOPY  2016   • COLONOSCOPY  2016   • OTHER  2016    esophageal dilation   • OTHER ORTHOPEDIC SURGERY  2012    bone removed from toe after fracture   • TONSILLECTOMY AND ADENOIDECTOMY  1977   • OTHER  1971, 1978, 1985    left facial cheek and right upper thight reconstructive surgery     Family History   Problem Relation Age of Onset   • Cancer Mother 70        pancreatic, breast in 30's   • Heart Disease Father    • Cancer Father         lung   • Diabetes Sister    • Diabetes Maternal Uncle     • Heart Disease Maternal Grandmother         MI   • Hyperlipidemia Maternal Grandmother    • Diabetes Maternal Grandfather    • Hyperlipidemia Maternal Grandfather    • Stroke Neg Hx      Social History     Tobacco Use   • Smoking status: Never Smoker   • Smokeless tobacco: Never Used   Vaping Use   • Vaping Use: Never used   Substance Use Topics   • Alcohol use: Never   • Drug use: No     Comment: previous medical marijuana use for pain     Current Outpatient Medications Ordered in Epic   Medication Sig Dispense Refill   • estradiol (ESTRACE) 2 MG Tab Take 1 Tablet by mouth every day. 30 Tablet 12   • gabapentin (NEURONTIN) 300 MG Cap TAKE 3 CAPSULES BY MOUTH ONCE DAILY IN THE EVENING 270 Capsule 0   • metoprolol SR (TOPROL XL) 25 MG TABLET SR 24 HR TAKE 1 TABLET BY MOUTH ONCE DAILY IN THE EVENING 90 Tablet 0   • insulin glargine (LANTUS SOLOSTAR) 100 UNIT/ML Solution Pen-injector injection INJECT 39 UNITS SUBCUTANEOUSLY IN THE EVENING 36 mL 3   • metformin (GLUCOPHAGE) 1000 MG tablet TAKE 1 TABLET BY MOUTH TWICE DAILY WITH MEALS 60 Tablet 0   • oxybutynin SR (DITROPAN-XL) 10 MG CR tablet Take 1 Tablet by mouth 2 times a day. 60 Tablet 12   • BD PEN NEEDLE JOSE J U/F USE 1 PEN NEEDLE IN PEN DEVICE TO INJECT INSULIN ONCE DAILY 100 Each 0   • promethazine (PHENERGAN) 25 MG Suppos Insert 1 Suppository into the rectum every 6 hours as needed for Nausea/Vomiting. 10 Suppository 0   • promethazine (PHENERGAN) 25 MG Tab Take 1 Tablet by mouth every 6 hours as needed for Nausea/Vomiting. 30 Tablet 0   • omeprazole (PRILOSEC) 40 MG delayed-release capsule Take 1 capsule by mouth twice daily 180 Capsule 2   • Alcohol Swabs (ALCOHOL PREP) 70 % Pads Use three times daily to clean finger before blood glucose testing 100 Each 11   • ibuprofen (MOTRIN) 800 MG Tab Take 1 Tab by mouth 2 Times a Day.     • spironolactone (ALDACTONE) 25 MG Tab Take 1 Tab by mouth every day. 90 Tab 3   • atorvastatin (LIPITOR) 20 MG Tab Take 1 Tab by  mouth every day. 90 Tab 3   • hydroCHLOROthiazide (HYDRODIURIL) 12.5 MG tablet Take 1 Tab by mouth every day. 90 Tab 3   • Empagliflozin (JARDIANCE) 10 MG Tab Take 10 mg by mouth every day. 90 Tab 3   • levothyroxine (SYNTHROID) 137 MCG Tab TAKE 1 TABLET BY MOUTH EVERY SUNDAY AND THURSDAY 24 Tab 3   • levothyroxine (SYNTHROID) 125 MCG Tab Take 125 mcg every Mondays, Tuesdays, Wednesdays, Fridays and Saturdays 90 Tab 3   • divalproex (DEPAKOTE) 500 MG Tablet Delayed Response TAKE 1 TABLET BY MOUTH TWICE DAILY AFTER MEALS     • risperiDONE (RISPERDAL) 2 MG Tab TAKE 1 TABLET BY MOUTH ONCE DAILY AT NIGHT AS DIRECTED     • sumatriptan (IMITREX) 100 MG tablet TAKE 1/2 TO 1 (ONE-HALF TO ONE) TABLET BY MOUTH AS NEEDED FOR  MIGRAINE/HEADACHE 9 Tab 5   • cyclobenzaprine (FLEXERIL) 10 MG Tab Take 1 Tab by mouth at bedtime as needed for Muscle Spasms. 30 Tab 5   • polyethylene glycol 3350 (MIRALAX) 17 GM/SCOOP Powder Take 17 g by mouth 1 time daily as needed (constipation). 510 g 5   • tramadol (ULTRAM) 50 MG Tab Take 50 mg by mouth every four hours as needed.     • BIOTIN PO Take  by mouth.     • acetaminophen (TYLENOL) 500 MG Tab Take 500-1,000 mg by mouth every 6 hours as needed.     • Multiple Vitamins-Minerals (MULTIVITAMIN PO) Take 1 Tab by mouth every day.     • Black Cohosh 40 MG Cap Take 40 mg by mouth 2 Times a Day.       No current [facility]-ordered facility-administered medications on file.     Health Maintenance: Deferred to the next visit.    Review of Systems   Constitutional: Negative for chills and fever.   HENT: Negative for congestion and sore throat.    Eyes: Negative for blurred vision.   Respiratory: Negative for cough.    Cardiovascular: Negative for chest pain.   Gastrointestinal: Negative for nausea and vomiting.   Genitourinary: Negative for dysuria.   Musculoskeletal: Positive for joint pain (left shoulder).   Neurological: Positive for sensory change (decreased in both feet). Negative for headaches.  "  Endo/Heme/Allergies: Negative for polydipsia.   Psychiatric/Behavioral: Negative for depression, substance abuse and suicidal ideas.     Objective:     Exam: /72 (BP Location: Right arm, Patient Position: Sitting, BP Cuff Size: Adult)   Pulse (!) 101   Temp 36.4 °C (97.6 °F) (Temporal)   Resp 18   Ht 1.6 m (5' 3\")   Wt 115 kg (253 lb 12 oz)   SpO2 94%  Body mass index is 44.95 kg/m².    Physical Exam  Constitutional:       Appearance: Normal appearance. She is obese.   HENT:      Head: Normocephalic and atraumatic.      Nose: No congestion.      Mouth/Throat:      Mouth: Mucous membranes are moist.      Pharynx: Oropharynx is clear. No oropharyngeal exudate.   Eyes:      General: No scleral icterus.     Extraocular Movements: Extraocular movements intact.      Pupils: Pupils are equal, round, and reactive to light.   Cardiovascular:      Rate and Rhythm: Regular rhythm. Tachycardia present.      Pulses: Normal pulses.      Heart sounds: Normal heart sounds.   Pulmonary:      Effort: Pulmonary effort is normal. No respiratory distress.      Breath sounds: Normal breath sounds. No wheezing or rales.   Musculoskeletal:      Right lower leg: No edema.      Left lower leg: No edema.      Comments: Wearing a left shoulder sling, limited range of motion due to pain.   Skin:     General: Skin is warm and dry.   Neurological:      Mental Status: She is alert and oriented to person, place, and time.   Psychiatric:         Mood and Affect: Mood normal.         Behavior: Behavior normal.         Thought Content: Thought content normal.         Judgment: Judgment normal.       Monofilament testing with a 10 gram force: sensation intact: decreased bilaterally  Visual Inspection: Feet without maceration, ulcers, fissures.  Pedal pulses: intact bilaterally    Labs: I have reviewed CBC, CMP, lipid profile, hemoglobin A1c from February 18, 2021.    Assessment & Plan:   51 y.o. female with the following -    Problem List " Items Addressed This Visit             Essential hypertension     Well-controlled, continue hydrochlorothiazide and spironolactone.         Relevant Orders    CBC WITH DIFFERENTIAL    Comp Metabolic Panel    TSH WITH REFLEX TO FT4    Hypothyroidism due to Hashimoto's thyroiditis     This is a chronic condition, well-controlled on current regimen.  Repeat TSH.         Recurrent major depressive disorder, in full remission (HCC)     This is a chronic problem, well-controlled on current regimen.  PHQ-9 is 0.  Continue to follow with psychology and psychiatrist, continue Depakote and risperidone.         Sinus tachycardia     Stable, continue metoprolol.         Type 2 diabetes mellitus without complication (HCC)     This is a chronic problem, well-controlled.  Continue current regimen, repeat hemoglobin A1c.  Referral for retinal screening provided.  Repeat hemoglobin A1c, follow-up in 3 months.         Relevant Orders    Lipid Profile    HEMOGLOBIN A1C    Referral to Ophthalmology    Diabetic Monofilament LE Exam (Completed)    Vitamin D deficiency    Relevant Orders    VITAMIN D,25 HYDROXY          Return in about 3 months (around 5/17/2022) for diabetes fv, 3 mo follow up.    Please note that this dictation was created using voice recognition software. I have made every reasonable attempt to correct obvious errors, but I expect that there are errors of grammar and possibly content that I did not discover before finalizing the note.

## 2022-02-18 DIAGNOSIS — E11.9 TYPE 2 DIABETES MELLITUS WITHOUT COMPLICATION, WITH LONG-TERM CURRENT USE OF INSULIN (HCC): ICD-10-CM

## 2022-02-18 DIAGNOSIS — Z79.4 TYPE 2 DIABETES MELLITUS WITHOUT COMPLICATION, WITH LONG-TERM CURRENT USE OF INSULIN (HCC): ICD-10-CM

## 2022-02-23 DIAGNOSIS — E11.9 TYPE 2 DIABETES MELLITUS WITHOUT COMPLICATION, WITH LONG-TERM CURRENT USE OF INSULIN (HCC): ICD-10-CM

## 2022-02-23 DIAGNOSIS — Z79.4 TYPE 2 DIABETES MELLITUS WITHOUT COMPLICATION, WITH LONG-TERM CURRENT USE OF INSULIN (HCC): ICD-10-CM

## 2022-02-23 RX ORDER — PEN NEEDLE, DIABETIC 32GX 5/32"
NEEDLE, DISPOSABLE MISCELLANEOUS
Qty: 100 EACH | Refills: 0 | Status: SHIPPED | OUTPATIENT
Start: 2022-02-23 | End: 2022-06-06

## 2022-03-04 DIAGNOSIS — I10 ESSENTIAL HYPERTENSION: ICD-10-CM

## 2022-03-05 RX ORDER — OMEPRAZOLE 40 MG/1
1 CAPSULE, DELAYED RELEASE ORAL
COMMUNITY
Start: 2021-10-04 | End: 2022-04-11

## 2022-03-05 RX ORDER — TRAMADOL HYDROCHLORIDE 50 MG/1
50 TABLET ORAL
COMMUNITY
End: 2022-04-11

## 2022-03-05 RX ORDER — GABAPENTIN 300 MG/1
3 CAPSULE ORAL DAILY
COMMUNITY
Start: 2022-02-07 | End: 2022-04-11

## 2022-03-05 RX ORDER — PROMETHAZINE HYDROCHLORIDE 25 MG/1
25 SUPPOSITORY RECTAL
COMMUNITY
Start: 2021-10-21 | End: 2022-04-11

## 2022-03-05 RX ORDER — INSULIN GLARGINE 100 [IU]/ML
39 INJECTION, SOLUTION SUBCUTANEOUS
COMMUNITY
End: 2022-04-11

## 2022-03-07 RX ORDER — HYDROCHLOROTHIAZIDE 12.5 MG/1
TABLET ORAL
Qty: 90 TABLET | Refills: 0 | Status: SHIPPED | OUTPATIENT
Start: 2022-03-07 | End: 2022-06-27

## 2022-03-07 RX ORDER — SPIRONOLACTONE 25 MG/1
TABLET ORAL
Qty: 90 TABLET | Refills: 0 | Status: SHIPPED | OUTPATIENT
Start: 2022-03-07 | End: 2022-06-06

## 2022-03-08 DIAGNOSIS — I10 ESSENTIAL HYPERTENSION: ICD-10-CM

## 2022-03-08 DIAGNOSIS — R00.0 SINUS TACHYCARDIA: ICD-10-CM

## 2022-03-08 NOTE — PROGRESS NOTES
Patient's previous cardiologist is out of network now.  Patient is requesting referral to new cardiologist.  Referral placed.

## 2022-03-14 DIAGNOSIS — I10 ESSENTIAL HYPERTENSION: ICD-10-CM

## 2022-03-14 DIAGNOSIS — R00.0 SINUS TACHYCARDIA: ICD-10-CM

## 2022-03-18 DIAGNOSIS — E03.8 HYPOTHYROIDISM DUE TO HASHIMOTO'S THYROIDITIS: ICD-10-CM

## 2022-03-18 DIAGNOSIS — E06.3 HYPOTHYROIDISM DUE TO HASHIMOTO'S THYROIDITIS: ICD-10-CM

## 2022-03-21 RX ORDER — LEVOTHYROXINE SODIUM 0.12 MG/1
TABLET ORAL
Qty: 65 TABLET | Refills: 0 | Status: SHIPPED | OUTPATIENT
Start: 2022-03-21 | End: 2022-07-01 | Stop reason: SDUPTHER

## 2022-04-04 ENCOUNTER — TELEPHONE (OUTPATIENT)
Dept: CARDIOLOGY | Facility: MEDICAL CENTER | Age: 52
End: 2022-04-04
Payer: COMMERCIAL

## 2022-04-04 NOTE — TELEPHONE ENCOUNTER
Chart Prep  Records Requested from [facility].  All cardiac testing/imaging requested.  Confirmation fax received and scanned into [employer].

## 2022-04-04 NOTE — TELEPHONE ENCOUNTER
"Records received from [facility].  Records scanned into patient's chart under media tab labeled \"Outside Medical Records.\"  "

## 2022-04-10 NOTE — PROGRESS NOTES
Cardiology Initial Consultation Note    Date of note:    4/11/2022    Primary Care Provider: Kamille Lindsey M.D.  Referring Provider: Kamille Lindsey*     Patient Name: Sarah Friedman   YOB: 1970  MRN:              [id number]    Chief Complaint: Inappropriate sinus tachycardia    History of Present Illness: Ms. Sarah Friedman is a 51 y.o. female whose current medical problems include hypertension, dyslipidemia, DM, hypothyroidism, and GERD who is here for cardiac consultation for inappropriate sinus tachcyardia.    The patient previously followed at [facility] for cardiology (Dr. Marinelli). The patient was started on ivabradine with improvement in symptoms but insurance did not cover it. The patient was not able to tolerate high doses of metoprolol due to hypotension.      The patient presents today to establish care.  The patient is currently on metoprolol.  The patient reports that when she was on ivabradine, her symptoms were much better controlled.  The patient has changed insurance since then, and would like to go back on ivabradine.  The patient otherwise reports feeling well.  Denies any chest pain or shortness of breath on exertion.  No orthopnea, PND, or leg swelling.  No syncope or presyncopal episodes.      Cardiovascular Risk Factors:  1. Smoking status: Never smoker  2. Type II Diabetes Mellitus: On medication  Lab Results   Component Value Date/Time    HBA1C 6.7 (H) 02/18/2021 11:00 AM    HBA1C 6.3 (H) 09/18/2020 11:03 AM     3. Hypertension: On medication  4. Dyslipidemia: On statin  Cholesterol,Tot   Date Value Ref Range Status   02/18/2021 153 100 - 199 mg/dL Final     LDL   Date Value Ref Range Status   02/18/2021 77 <100 mg/dL Final     HDL   Date Value Ref Range Status   02/18/2021 45 >=40 mg/dL Final     Triglycerides   Date Value Ref Range Status   02/18/2021 156 (H) 0 - 149 mg/dL Final     5. Family history of early Coronary Artery Disease in a  first degree relative (Male less than 55 years of age; Female less than 65 years of age): Father with MI   6.  Obesity and/or Metabolic Syndrome: BMI 45.17  7. Sedentary lifestyle: Not sedentary    Review of Systems   Constitutional: Negative for fever, malaise/fatigue and night sweats.   Cardiovascular: Negative for chest pain, dyspnea on exertion, irregular heartbeat, leg swelling, near-syncope, orthopnea, palpitations, paroxysmal nocturnal dyspnea and syncope.   Respiratory: Negative for cough, shortness of breath and wheezing.    Gastrointestinal: Negative for abdominal pain, diarrhea, nausea and vomiting.   Neurological: Negative for dizziness, focal weakness and weakness.       All other systems reviewed and are negative.       Current Outpatient Medications   Medication Sig Dispense Refill   • HYDROcodone-acetaminophen (NORCO) 5-325 MG Tab per tablet      • ivabradine (CORLANOR) 5 MG Tab tablet Take 1 Tablet by mouth 2 times a day with meals. 60 Tablet 11   • levothyroxine (SYNTHROID) 125 MCG Tab TAKE 125 MCG EVERY MONDAYS, TUESDAYS WEDNESDAYS, FRIDAYS AND SATURDAYS 65 Tablet 0   • spironolactone (ALDACTONE) 25 MG Tab Take 1 tablet by mouth once daily 90 Tablet 0   • hydroCHLOROthiazide (HYDRODIURIL) 12.5 MG tablet Take 1 tablet by mouth once daily 90 Tablet 0   • Insulin Pen Needle 32 G x 4 mm (BD PEN NEEDLE JOSE J U/F) USE 1  ONCE DAILY NEEDS TO MAKE APPT WITH A NEW  Each 0   • metformin (GLUCOPHAGE) 1000 MG tablet TAKE 1 TABLET BY MOUTH TWICE DAILY WITH MEALS 180 Tablet 1   • estradiol (ESTRACE) 2 MG Tab Take 1 Tablet by mouth every day. 30 Tablet 12   • gabapentin (NEURONTIN) 300 MG Cap TAKE 3 CAPSULES BY MOUTH ONCE DAILY IN THE EVENING 270 Capsule 0   • insulin glargine (LANTUS SOLOSTAR) 100 UNIT/ML Solution Pen-injector injection INJECT 39 UNITS SUBCUTANEOUSLY IN THE EVENING 36 mL 3   • oxybutynin SR (DITROPAN-XL) 10 MG CR tablet Take 1 Tablet by mouth 2 times a day. 60 Tablet 12   • promethazine  "(PHENERGAN) 25 MG Tab Take 1 Tablet by mouth every 6 hours as needed for Nausea/Vomiting. 30 Tablet 0   • omeprazole (PRILOSEC) 40 MG delayed-release capsule Take 1 capsule by mouth twice daily 180 Capsule 2   • Alcohol Swabs (ALCOHOL PREP) 70 % Pads Use three times daily to clean finger before blood glucose testing 100 Each 11   • ibuprofen (MOTRIN) 800 MG Tab Take 1 Tab by mouth 2 Times a Day.     • atorvastatin (LIPITOR) 20 MG Tab Take 1 Tab by mouth every day. 90 Tab 3   • Empagliflozin (JARDIANCE) 10 MG Tab Take 10 mg by mouth every day. 90 Tab 3   • levothyroxine (SYNTHROID) 137 MCG Tab TAKE 1 TABLET BY MOUTH EVERY SUNDAY AND THURSDAY 24 Tab 3   • divalproex (DEPAKOTE) 500 MG Tablet Delayed Response Take 500 mg by mouth 2 times a day.     • risperiDONE (RISPERDAL) 2 MG Tab TAKE 1 TABLET BY MOUTH ONCE DAILY AT NIGHT AS DIRECTED     • sumatriptan (IMITREX) 100 MG tablet TAKE 1/2 TO 1 (ONE-HALF TO ONE) TABLET BY MOUTH AS NEEDED FOR  MIGRAINE/HEADACHE 9 Tab 5   • cyclobenzaprine (FLEXERIL) 10 MG Tab Take 1 Tab by mouth at bedtime as needed for Muscle Spasms. 30 Tab 5   • polyethylene glycol 3350 (MIRALAX) 17 GM/SCOOP Powder Take 17 g by mouth 1 time daily as needed (constipation). 510 g 5   • acetaminophen (TYLENOL) 500 MG Tab Take 500-1,000 mg by mouth every 6 hours as needed.     • Multiple Vitamins-Minerals (MULTIVITAMIN PO) Take 1 Tab by mouth every day.     • Black Cohosh 40 MG Cap Take 40 mg by mouth 2 Times a Day.       No current facility-administered medications for this visit.         Allergies   Allergen Reactions   • Betadine Prepstick Plus [Povidone-Iodine] Rash     Rash swelling   • Hydromorphone Hcl Itching     Itching \"head to toe\"   • Morphine Itching     Itching \"head to toe\"   • Povidone Iodine Hives   • Tape Itching     Itching \"leave scars\"       Physical Exam:  Ambulatory Vitals  /76 (BP Location: Left arm, Patient Position: Sitting, BP Cuff Size: Adult)   Pulse 99   Resp 20   Ht 1.6 " "m (5' 3\")   Wt 116 kg (255 lb)   SpO2 93%    Oxygen Therapy:  Pulse Oximetry: 93 %  BP Readings from Last 4 Encounters:   04/11/22 118/76   02/17/22 104/72   02/09/22 122/78   02/08/22 113/70       Weight/BMI: Body mass index is 45.17 kg/m².  Wt Readings from Last 4 Encounters:   04/11/22 116 kg (255 lb)   02/17/22 115 kg (253 lb 12 oz)   02/09/22 111 kg (245 lb)   02/08/22 111 kg (245 lb)       General: Well appearing and in no apparent distress  Eyes: nl conjunctiva, no icteric sclera  ENT: wearing a mask, normal external appearance of ears  Neck: no visible JVP,  no carotid bruits  Lungs: normal respiratory effort, CTAB  Heart: RRR, no murmurs, no rubs or gallops,  no edema bilateral lower extremities. No LV/RV heave on cardiac palpatation. + bilateral radial pulses.  + bilateral dp pulses.   Abdomen: soft, non tender, non distended, no masses, normal bowel sounds.  No HSM.  Extremities/MSK: no clubbing, no cyanosis  Neurological: No focal sensory deficits  Psychiatric: Appropriate affect, A/O x 3, intact judgement and insight  Skin: Warm extremities      Lab Data Review:  Lab Results   Component Value Date/Time    CHOLSTRLTOT 153 02/18/2021 11:00 AM    LDL 77 02/18/2021 11:00 AM    HDL 45 02/18/2021 11:00 AM    TRIGLYCERIDE 156 (H) 02/18/2021 11:00 AM       Lab Results   Component Value Date/Time    SODIUM 139 02/18/2021 11:00 AM    SODIUM 139 02/18/2021 11:00 AM    POTASSIUM 4.2 02/18/2021 11:00 AM    POTASSIUM 4.2 02/18/2021 11:00 AM    CHLORIDE 98 02/18/2021 11:00 AM    CHLORIDE 98 02/18/2021 11:00 AM    CO2 27 02/18/2021 11:00 AM    CO2 27 02/18/2021 11:00 AM    GLUCOSE 92 02/18/2021 11:00 AM    GLUCOSE 93 02/18/2021 11:00 AM    BUN 15 02/18/2021 11:00 AM    BUN 15 02/18/2021 11:00 AM    CREATININE 0.73 02/18/2021 11:00 AM    CREATININE 0.73 02/18/2021 11:00 AM     Lab Results   Component Value Date/Time    ALKPHOSPHAT 35 02/18/2021 11:00 AM    ALKPHOSPHAT 36 02/18/2021 11:00 AM    ALKPHOSPHAT 36 " 02/18/2021 11:00 AM    ASTSGOT 16 02/18/2021 11:00 AM    ASTSGOT 20 02/18/2021 11:00 AM    ASTSGOT 16 02/18/2021 11:00 AM    ALTSGPT 5 02/18/2021 11:00 AM    ALTSGPT 6 02/18/2021 11:00 AM    ALTSGPT 8 02/18/2021 11:00 AM    TBILIRUBIN 0.2 02/18/2021 11:00 AM    TBILIRUBIN 0.2 02/18/2021 11:00 AM    TBILIRUBIN 0.2 02/18/2021 11:00 AM      Lab Results   Component Value Date/Time    WBC 7.9 02/18/2021 11:00 AM    WBC 7.9 02/18/2021 11:00 AM     Lab Results   Component Value Date/Time    HBA1C 6.7 (H) 02/18/2021 11:00 AM    HBA1C 6.3 (H) 09/18/2020 11:03 AM       Cardiac Imaging and Procedures Review:    EKG dated 4/11/2022: My personal interpretation is sinus rhythm, rate 84    Echo 1/28/2019 - [place] [facility] [place] 2[place]s [place]  Normal LV function, ascending aorta 4.3, mild AI, trace PI    Stress test MPI 5/3/16 - from [facility] [place][note 2[place]s notes  No ischemia or infarction.     Assessment & Plan     1. Inappropriate sinus tachycardia  ivabradine (CORLANOR) 5 MG Tab tablet   2. Ascending aorta dilatation (HCC)  EC-ECHOCARDIOGRAM COMPLETE W/O CONT   3. Dyslipidemia     4. Essential hypertension  EC-ECHOCARDIOGRAM COMPLETE W/O CONT         Shared Medical Decision Making:    Inappropriate sinus tachcyardia  -Stop metoprolol, start ivabradine 5mg twice daily    Dilates ascending aorta  Last echo was in 2019, showing dilated ascending aorta measuring 4.3 cm  -Repeat echocardiogram prior to next visit    Dyslipidemia  -Continue atorvastatin 20mg daily    Hypertension  BP well controlled this visit  -Continue HCTZ and aldactone 25mg daily    All of the patient's excellent questions were answered to the best of my knowledge and to her satisfaction.  It was a pleasure seeing Ms. Sarah Friedman in my clinic today. Return in about 3 months (around 7/11/2022). Patient is aware to call the cardiology clinic with any questions or concerns.      Cristofer Josue MD  [facility]  [facility]  [address]  [place] [address]  [place] [address]  Phone: [phone]  Fax: [phone]

## 2022-04-11 ENCOUNTER — OFFICE VISIT (OUTPATIENT)
Dept: CARDIOLOGY | Facility: MEDICAL CENTER | Age: 52
End: 2022-04-11
Attending: INTERNAL MEDICINE
Payer: COMMERCIAL

## 2022-04-11 VITALS
WEIGHT: 255 LBS | HEIGHT: 63 IN | RESPIRATION RATE: 20 BRPM | DIASTOLIC BLOOD PRESSURE: 76 MMHG | BODY MASS INDEX: 45.18 KG/M2 | OXYGEN SATURATION: 93 % | SYSTOLIC BLOOD PRESSURE: 118 MMHG | HEART RATE: 99 BPM

## 2022-04-11 DIAGNOSIS — I77.810 ASCENDING AORTA DILATATION (HCC): ICD-10-CM

## 2022-04-11 DIAGNOSIS — I10 ESSENTIAL HYPERTENSION: ICD-10-CM

## 2022-04-11 DIAGNOSIS — E78.5 DYSLIPIDEMIA: ICD-10-CM

## 2022-04-11 DIAGNOSIS — I47.11 INAPPROPRIATE SINUS TACHYCARDIA (HCC): Primary | ICD-10-CM

## 2022-04-11 PROBLEM — E03.9 HYPOTHYROIDISM: Status: ACTIVE | Noted: 2022-03-01

## 2022-04-11 PROBLEM — G62.9 NEUROPATHY: Status: ACTIVE | Noted: 2022-03-01

## 2022-04-11 PROBLEM — R60.9 EDEMA, UNSPECIFIED: Status: ACTIVE | Noted: 2022-03-01

## 2022-04-11 LAB — EKG IMPRESSION: NORMAL

## 2022-04-11 PROCEDURE — 93000 ELECTROCARDIOGRAM COMPLETE: CPT | Performed by: STUDENT IN AN ORGANIZED HEALTH CARE EDUCATION/TRAINING PROGRAM

## 2022-04-11 PROCEDURE — 99214 OFFICE O/P EST MOD 30 MIN: CPT | Mod: 25 | Performed by: STUDENT IN AN ORGANIZED HEALTH CARE EDUCATION/TRAINING PROGRAM

## 2022-04-11 RX ORDER — HYDROCODONE BITARTRATE AND ACETAMINOPHEN 5; 325 MG/1; MG/1
TABLET ORAL
COMMUNITY
Start: 2022-04-06 | End: 2023-04-24 | Stop reason: SDUPTHER

## 2022-04-11 ASSESSMENT — ENCOUNTER SYMPTOMS
NEAR-SYNCOPE: 0
DYSPNEA ON EXERTION: 0
DIZZINESS: 0
FOCAL WEAKNESS: 0
PND: 0
DIARRHEA: 0
VOMITING: 0
ABDOMINAL PAIN: 0
NIGHT SWEATS: 0
ORTHOPNEA: 0
IRREGULAR HEARTBEAT: 0
SHORTNESS OF BREATH: 0
PALPITATIONS: 0
FEVER: 0
WEAKNESS: 0
SYNCOPE: 0
WHEEZING: 0
NAUSEA: 0
COUGH: 0

## 2022-04-11 ASSESSMENT — FIBROSIS 4 INDEX: FIB4 SCORE: 1.09

## 2022-04-15 ENCOUNTER — TELEPHONE (OUTPATIENT)
Dept: CARDIOLOGY | Facility: MEDICAL CENTER | Age: 52
End: 2022-04-15
Payer: COMMERCIAL

## 2022-04-15 DIAGNOSIS — I47.11 INAPPROPRIATE SINUS TACHYCARDIA (HCC): ICD-10-CM

## 2022-04-15 NOTE — TELEPHONE ENCOUNTER
HK    Patient called to advise they are trying to get a RX. They are wanting to know if there is an update on the Auth. They need  it to be refilled. They can be reached at [phone].    Thank you,  Ebonie SAN

## 2022-04-15 NOTE — TELEPHONE ENCOUNTER
Sarah Friedman Key: [id number] - PA Case ID: [id number] - Rx #: [id number] help? Call us at [phone]  Status  Sent to [employer]  Drug  Corlanor 5MG tablets  Form  [employer] Electronic PA Form (2017 NCPDP)  Original Claim Info  70,76,75,MR

## 2022-04-19 NOTE — TELEPHONE ENCOUNTER
Pt is calling back again for the authorization to the insurance Co.  Sarah - [phone]    Thank you,   Maureen FERREIRA

## 2022-04-20 NOTE — TELEPHONE ENCOUNTER
S/W pt, aware of standard flow of PA process, pt is frustrated as she saw HK over a week ago and has been without this medication. No samples available at [facility].     Advised she could try calling INS for an emergency supply request.     Will attempt to call tomorrow and provide update to patient. She is requesting a detailed message be left if she does not answer even if update - no update.

## 2022-04-20 NOTE — TELEPHONE ENCOUNTER
Called patients Rx payer [place], spoke to Stefanie. PA still pending. Turn around time Friday 4/22/2022 at the latest.    Called patient to let her know of above. Patient requesting CB when we receive a decision I assured her I will call her back ASAP when her insurance responds.

## 2022-04-20 NOTE — TELEPHONE ENCOUNTER
Sarah Friedman Key: [id number] - PA Case ID: [id number] help? Call us at [phone]  Outcome  Additional Information Required  There is an existing case within the [facility] environment that has the same patient, prescriber, and drug. This case must be finalized before proceeding with similar requests.  Drug  Corlanor 5MG tablets  Form  [employer] Electronic PA Form (2017 NCPDP)

## 2022-04-20 NOTE — TELEPHONE ENCOUNTER
HK    Pt is calling in asking for a call back in regards for the authorization to the insurance company.   She is very frustrated and the insurance will not give an authorization unless they get an explanation as to why she needs the medication.   PH:  [phone]    Thank You  Sujata POWERS

## 2022-04-26 NOTE — TELEPHONE ENCOUNTER
Called and LVM for patient letting her know of updates, not sure why her INS did not provide a decision as promised on Friday 4/20/2022 (see enc). Let pt know I am still working on this, We have submitted everything needed form our end, this is on her INS at this point. Encouraged CB if questions.    Will attempt to contact INS again.

## 2022-04-26 NOTE — TELEPHONE ENCOUNTER
Checked original PA status on cover my meds (PA'd 4/15/2022)  Still states it's pending    Called patients INS payer [place]- on hold for 20 mins, hung up on.

## 2022-04-27 NOTE — TELEPHONE ENCOUNTER
Still no response from plan. Called Cover my meds to verify that everything was processed successfully.   Phone: [phone]    Spoke to Ying who confirms everything was sent successfully from our office.      Called patients plan again to check for PA decision:   Spoke to Moraima who states PA is still pending, confirmed with Moraima that this was done via cover my meds (plans preferred method) also explained that I spoke to Stefanie on 4/20/22 was told PA decision would be returned 4/22/2022, still no response.     Transferred to Cheyenne with PA processing dept.   Cheyenne states there are clinical questions that need to be answered on her end, answered these over the phone.  Stated I would be receive a fax within 2 days with the outcome, asked for a call reference number. Cheyenne states there is not one.       Called patient, AVELINO with info above.

## 2022-04-29 DIAGNOSIS — E11.42 DIABETIC POLYNEUROPATHY ASSOCIATED WITH TYPE 2 DIABETES MELLITUS (HCC): ICD-10-CM

## 2022-04-29 RX ORDER — GABAPENTIN 300 MG/1
CAPSULE ORAL
Qty: 270 CAPSULE | Refills: 0 | Status: SHIPPED | OUTPATIENT
Start: 2022-04-29 | End: 2022-07-25

## 2022-04-29 NOTE — TELEPHONE ENCOUNTER
Received request via: Pharmacy    Was the patient seen in the last year in this department? Yes    Does the patient have an active prescription (recently filled or refills available) for medication(s) requested? No     Requested Prescriptions     Pending Prescriptions Disp Refills   • gabapentin (NEURONTIN) 300 MG Cap [Pharmacy Med Name: Gabapentin 300 MG Oral Capsule] 270 Capsule 0     Sig: TAKE 3 CAPSULES BY MOUTH ONCE DAILY IN THE EVENING

## 2022-05-02 NOTE — TELEPHONE ENCOUNTER
Received denial letter from patients plan. Prepped and submitted appeal letter, faxed to plan. Confirmation fax received.     Called patient to update her of above. Left CB number if any questions.

## 2022-05-05 NOTE — TELEPHONE ENCOUNTER
Rhonda De Leon, Med Ass't  You 23 minutes ago (9:39 AM)         Please send new Rx for Corlanor to patients pharmacy. Thank you!!      New RX sent

## 2022-05-05 NOTE — TELEPHONE ENCOUNTER
Received approval letter from patients plan for Corlanor appeal.     Called and spoke to patient letting her know of above.     Patient cancelled last Rx sent to her   [facility] - [place], [place] - [address]    Requesting new Rx to be sent for her to  on Saturday.

## 2022-05-09 ENCOUNTER — TELEPHONE (OUTPATIENT)
Dept: OBGYN | Facility: CLINIC | Age: 52
End: 2022-05-09
Payer: COMMERCIAL

## 2022-05-09 NOTE — TELEPHONE ENCOUNTER
Called pt and apologize she has not received a call back. Informed pt not sure where she is leaving a VM and provided updated phone number to our office. [facility]

## 2022-06-02 ENCOUNTER — PATIENT MESSAGE (OUTPATIENT)
Dept: MEDICAL GROUP | Facility: MEDICAL CENTER | Age: 52
End: 2022-06-02
Payer: COMMERCIAL

## 2022-06-02 DIAGNOSIS — K22.4 ESOPHAGEAL SPASM: ICD-10-CM

## 2022-06-02 DIAGNOSIS — K22.4 ESOPHAGEAL DYSMOTILITY: ICD-10-CM

## 2022-06-06 ENCOUNTER — PATIENT MESSAGE (OUTPATIENT)
Dept: MEDICAL GROUP | Facility: MEDICAL CENTER | Age: 52
End: 2022-06-06
Payer: COMMERCIAL

## 2022-06-06 DIAGNOSIS — E11.9 TYPE 2 DIABETES MELLITUS WITHOUT COMPLICATION, WITH LONG-TERM CURRENT USE OF INSULIN (HCC): ICD-10-CM

## 2022-06-06 DIAGNOSIS — I10 ESSENTIAL HYPERTENSION: ICD-10-CM

## 2022-06-06 DIAGNOSIS — Z79.4 TYPE 2 DIABETES MELLITUS WITHOUT COMPLICATION, WITH LONG-TERM CURRENT USE OF INSULIN (HCC): ICD-10-CM

## 2022-06-06 RX ORDER — SPIRONOLACTONE 25 MG/1
TABLET ORAL
Qty: 90 TABLET | Refills: 0 | Status: SHIPPED | OUTPATIENT
Start: 2022-06-06 | End: 2022-09-06

## 2022-06-06 RX ORDER — PEN NEEDLE, DIABETIC 32GX 5/32"
NEEDLE, DISPOSABLE MISCELLANEOUS
Qty: 100 EACH | Refills: 0 | Status: SHIPPED | OUTPATIENT
Start: 2022-06-06 | End: 2023-07-14

## 2022-06-06 RX ORDER — INSULIN GLARGINE 100 [IU]/ML
INJECTION, SOLUTION SUBCUTANEOUS
Qty: 36 ML | Refills: 0 | Status: SHIPPED | OUTPATIENT
Start: 2022-06-06 | End: 2022-07-21

## 2022-06-06 RX ORDER — SPIRONOLACTONE 25 MG/1
25 TABLET ORAL DAILY
Qty: 90 TABLET | Refills: 0 | Status: SHIPPED | OUTPATIENT
Start: 2022-06-06 | End: 2022-07-26

## 2022-06-09 ENCOUNTER — GYNECOLOGY VISIT (OUTPATIENT)
Dept: OBGYN | Facility: CLINIC | Age: 52
End: 2022-06-09
Payer: COMMERCIAL

## 2022-06-09 VITALS — SYSTOLIC BLOOD PRESSURE: 109 MMHG | DIASTOLIC BLOOD PRESSURE: 73 MMHG | BODY MASS INDEX: 45.17 KG/M2 | WEIGHT: 255 LBS

## 2022-06-09 DIAGNOSIS — Z79.890 HORMONE REPLACEMENT THERAPY: ICD-10-CM

## 2022-06-09 PROCEDURE — 99213 OFFICE O/P EST LOW 20 MIN: CPT | Performed by: OBSTETRICS & GYNECOLOGY

## 2022-06-09 RX ORDER — ESTRADIOL 1 MG/G
1 GEL TOPICAL DAILY
Qty: 30 G | Refills: 12 | Status: SHIPPED | OUTPATIENT
Start: 2022-06-09 | End: 2022-06-16

## 2022-06-09 ASSESSMENT — FIBROSIS 4 INDEX: FIB4 SCORE: 1.09

## 2022-06-09 NOTE — PROGRESS NOTES
"Chief Complaint   Patient presents with   • Follow-Up   Chief complaint: HRT discussion    History of present illness:   51 y.o.  presents with above chief complaint.  Patient reports breast pain on the 2 mg of estradiol.  Would like to know if there is another possibility for HRT does not recall choir pills.    ROS: Pertinent positives documented in HPI and all other systems reviewed & are negative    All PMH, PSH, meds, allergies, social history and FH reviewed and updated today:  Past Medical History:   Diagnosis Date   • Anesthesia 2019    \"hard to wake up\"  \"sleep apnea\"   • Arrhythmia     \" sinus Tach\"   • Arthritis     osteo   • Blood clotting disorder ([facility])     DVT   • Bowel habit changes     constipation   • Depression     \"BPD\"   • Diabetes ([facility]) 2019    insulin   • Diabetic neuropathy ([facility])    • Esophageal spasm    • GERD (gastroesophageal reflux disease)    • Hashimoto's disease    • Hiatal hernia    • High cholesterol    • Hyperlipidemia    • Hypothyroid    • Sleep apnea     uses cpap   • Snoring     sleep study done   • Tachycardia        Past Surgical History:   Procedure Laterality Date   • COLONOSCOPY N/A 2019    Procedure: COLONOSCOPY;  Surgeon: Ata Khalil M.D.;  Location: SURGERY SAME DAY [facility];  Service: Gastroenterology   • GASTROSCOPY N/A 2019    Procedure: GASTROSCOPY - W/DILATION biopsy;  Surgeon: Ata Khalil M.D.;  Location: SURGERY SAME DAY [facility];  Service: Gastroenterology   • VAGINAL HYSTERECTOMY SCOPE TOTAL N/A 2018    Procedure: VAGINAL HYSTERECTOMY SCOPE TOTAL;  Surgeon: Francisco Javier Lainez M.D.;  Location: SURGERY SAME DAY [facility];  Service: Gynecology   • SALPINGO OOPHORECTOMY Bilateral 2018    Procedure: SALPINGO OOPHORECTOMY;  Surgeon: Francisco Javier Lainez M.D.;  Location: SURGERY SAME DAY [facility];  Service: Gynecology   • ANTERIOR AND POSTERIOR REPAIR  2018    Procedure: ANTERIOR AND POSTERIOR REPAIR;  Surgeon: " "Francisco Javier Lainez M.D.;  Location: SURGERY SAME DAY [facility];  Service: Gynecology   • ENTEROCELE REPAIR  4/24/2018    Procedure: ENTEROCELE REPAIR- PERINEOPLASTY;  Surgeon: Francisco Javier Lainez M.D.;  Location: SURGERY SAME DAY [facility];  Service: Gynecology   • BLADDER SLING FEMALE  4/24/2018    Procedure: BLADDER SLING FEMALE- TOT;  Surgeon: Francisco Javier Lainez M.D.;  Location: SURGERY SAME DAY [facility];  Service: Gynecology   • VAGINAL SUSPENSION N/A 4/24/2018    Procedure: VAGINAL SUSPENSION- SACROSPINOUS VAULT;  Surgeon: Francisco Javier Lainez M.D.;  Location: SURGERY SAME DAY [facility];  Service: Gynecology   • CYSTOSCOPY N/A 4/24/2018    Procedure: CYSTOSCOPY;  Surgeon: Francisco Javier Lainez M.D.;  Location: SURGERY SAME DAY [facility];  Service: Gynecology   • ENDOSCOPY  2016   • COLONOSCOPY  2016   • OTHER  2016    esophageal dilation   • OTHER ORTHOPEDIC SURGERY  2012    bone removed from toe after fracture   • TONSILLECTOMY AND ADENOIDECTOMY  1977   • OTHER  1971, 1978, 1985    left facial cheek and right upper thight reconstructive surgery       Allergies:   Allergies   Allergen Reactions   • Betadine Prepstick Plus [Povidone-Iodine] Rash     Rash swelling   • Hydromorphone Hcl Itching     Itching \"head to toe\"   • Morphine Itching     Itching \"head to toe\"   • Povidone Iodine Hives   • Tape Itching     Itching \"leave scars\"       Social History     Socioeconomic History   • Marital status: Single     Spouse name: Not on file   • Number of children: Not on file   • Years of education: Not on file   • Highest education level: Not on file   Occupational History   • Not on file   Tobacco Use   • Smoking status: Never Smoker   • Smokeless tobacco: Never Used   Vaping Use   • Vaping Use: Never used   Substance and Sexual Activity   • Alcohol use: Never   • Drug use: No     Comment: previous medical marijuana use for pain   • Sexual activity: Yes     Partners: Female   Other Topics Concern   • Not on file "   Social History Narrative   • Not on file     Social Determinants of Health     Financial Resource Strain: Not on file   Food Insecurity: Not on file   Transportation Needs: Not on file   Physical Activity: Not on file   Stress: Not on file   Social Connections: Not on file   Intimate Partner Violence: Not on file   Housing Stability: Not on file       Family History   Problem Relation Age of Onset   • Cancer Mother 70        pancreatic, breast in 30's   • Heart Disease Father    • Cancer Father         lung   • Diabetes Sister    • Diabetes Maternal Uncle    • Heart Disease Maternal Grandmother         MI   • Hyperlipidemia Maternal Grandmother    • Diabetes Maternal Grandfather    • Hyperlipidemia Maternal Grandfather    • Stroke Neg Hx        Physical exam:  /73 (BP Location: Left arm, Patient Position: Sitting, BP Cuff Size: Adult)   Wt 116 kg (255 lb)     GENERAL APPEARANCE: healthy, alert, no distress  NEURO Awake, alert and oriented x 3, Normal gait, no sensory deficits  PSYCHIATRIC: Patient shows appropriate affect, is alert and oriented x3, intact judgment and insight.      Assessment:  1. Hormone replacement therapy         Plan:    Discussed options for management.  Discussed both transdermal patch versus transdermal gel.  Patient has a significant tape allergy, will begin transdermal gel treatment at this time.    All questions answered

## 2022-06-09 NOTE — NON-PROVIDER
Pt here for follow up on Estradiol 2MG Med  Pt states Medication makes her have very sensitive breasts- painful.  Joaquim# [phone] (home)   Pharmacy verified

## 2022-06-15 DIAGNOSIS — E11.9 TYPE 2 DIABETES MELLITUS WITHOUT COMPLICATION, WITHOUT LONG-TERM CURRENT USE OF INSULIN (HCC): ICD-10-CM

## 2022-06-16 RX ORDER — ESTRADIOL 1 MG/G
GEL TOPICAL
Qty: 30 G | Refills: 12 | Status: SHIPPED
Start: 2022-06-16 | End: 2022-07-12

## 2022-06-23 ENCOUNTER — HOSPITAL ENCOUNTER (OUTPATIENT)
Dept: LAB | Facility: MEDICAL CENTER | Age: 52
End: 2022-06-23
Attending: INTERNAL MEDICINE
Payer: COMMERCIAL

## 2022-06-23 DIAGNOSIS — E55.9 VITAMIN D DEFICIENCY: ICD-10-CM

## 2022-06-23 DIAGNOSIS — E11.9 TYPE 2 DIABETES MELLITUS WITHOUT COMPLICATION, WITHOUT LONG-TERM CURRENT USE OF INSULIN (HCC): ICD-10-CM

## 2022-06-23 DIAGNOSIS — I10 ESSENTIAL HYPERTENSION: ICD-10-CM

## 2022-06-23 LAB
25(OH)D3 SERPL-MCNC: 47 NG/ML (ref 30–100)
ALBUMIN SERPL BCP-MCNC: 4.4 G/DL (ref 3.2–4.9)
ALBUMIN/GLOB SERPL: 1.5 G/DL
ALP SERPL-CCNC: 35 U/L (ref 30–99)
ALT SERPL-CCNC: 16 U/L (ref 2–50)
ANION GAP SERPL CALC-SCNC: 14 MMOL/L (ref 7–16)
AST SERPL-CCNC: 36 U/L (ref 12–45)
BASOPHILS # BLD AUTO: 0.7 % (ref 0–1.8)
BASOPHILS # BLD: 0.06 K/UL (ref 0–0.12)
BILIRUB SERPL-MCNC: 0.3 MG/DL (ref 0.1–1.5)
BUN SERPL-MCNC: 18 MG/DL (ref 8–22)
CALCIUM SERPL-MCNC: 9.8 MG/DL (ref 8.5–10.5)
CHLORIDE SERPL-SCNC: 99 MMOL/L (ref 96–112)
CHOLEST SERPL-MCNC: 174 MG/DL (ref 100–199)
CO2 SERPL-SCNC: 26 MMOL/L (ref 20–33)
CREAT SERPL-MCNC: 0.72 MG/DL (ref 0.5–1.4)
CREAT UR-MCNC: 123.18 MG/DL
EOSINOPHIL # BLD AUTO: 0.16 K/UL (ref 0–0.51)
EOSINOPHIL NFR BLD: 2 % (ref 0–6.9)
ERYTHROCYTE [DISTWIDTH] IN BLOOD BY AUTOMATED COUNT: 47.1 FL (ref 35.9–50)
EST. AVERAGE GLUCOSE BLD GHB EST-MCNC: 151 MG/DL
GFR SERPLBLD CREATININE-BSD FMLA CKD-EPI: 100 ML/MIN/1.73 M 2
GLOBULIN SER CALC-MCNC: 2.9 G/DL (ref 1.9–3.5)
GLUCOSE SERPL-MCNC: 94 MG/DL (ref 65–99)
HBA1C MFR BLD: 6.9 % (ref 4–5.6)
HCT VFR BLD AUTO: 46.2 % (ref 37–47)
HDLC SERPL-MCNC: 35 MG/DL
HGB BLD-MCNC: 15.4 G/DL (ref 12–16)
IMM GRANULOCYTES # BLD AUTO: 0.03 K/UL (ref 0–0.11)
IMM GRANULOCYTES NFR BLD AUTO: 0.4 % (ref 0–0.9)
LDLC SERPL CALC-MCNC: 97 MG/DL
LYMPHOCYTES # BLD AUTO: 2.15 K/UL (ref 1–4.8)
LYMPHOCYTES NFR BLD: 26.7 % (ref 22–41)
MCH RBC QN AUTO: 31.4 PG (ref 27–33)
MCHC RBC AUTO-ENTMCNC: 33.3 G/DL (ref 33.6–35)
MCV RBC AUTO: 94.1 FL (ref 81.4–97.8)
MICROALBUMIN UR-MCNC: <1.2 MG/DL
MICROALBUMIN/CREAT UR: NORMAL MG/G (ref 0–30)
MONOCYTES # BLD AUTO: 0.69 K/UL (ref 0–0.85)
MONOCYTES NFR BLD AUTO: 8.6 % (ref 0–13.4)
NEUTROPHILS # BLD AUTO: 4.95 K/UL (ref 2–7.15)
NEUTROPHILS NFR BLD: 61.6 % (ref 44–72)
NRBC # BLD AUTO: 0 K/UL
NRBC BLD-RTO: 0 /100 WBC
PLATELET # BLD AUTO: 284 K/UL (ref 164–446)
PMV BLD AUTO: 9.1 FL (ref 9–12.9)
POTASSIUM SERPL-SCNC: 4.6 MMOL/L (ref 3.6–5.5)
PROT SERPL-MCNC: 7.3 G/DL (ref 6–8.2)
RBC # BLD AUTO: 4.91 M/UL (ref 4.2–5.4)
SODIUM SERPL-SCNC: 139 MMOL/L (ref 135–145)
TRIGL SERPL-MCNC: 211 MG/DL (ref 0–149)
TSH SERPL DL<=0.005 MIU/L-ACNC: 1.57 UIU/ML (ref 0.38–5.33)
WBC # BLD AUTO: 8 K/UL (ref 4.8–10.8)

## 2022-06-23 PROCEDURE — 84443 ASSAY THYROID STIM HORMONE: CPT

## 2022-06-23 PROCEDURE — 80053 COMPREHEN METABOLIC PANEL: CPT

## 2022-06-23 PROCEDURE — 82570 ASSAY OF URINE CREATININE: CPT

## 2022-06-23 PROCEDURE — 36415 COLL VENOUS BLD VENIPUNCTURE: CPT

## 2022-06-23 PROCEDURE — 80061 LIPID PANEL: CPT

## 2022-06-23 PROCEDURE — 82043 UR ALBUMIN QUANTITATIVE: CPT

## 2022-06-23 PROCEDURE — 85025 COMPLETE CBC W/AUTO DIFF WBC: CPT

## 2022-06-23 PROCEDURE — 82306 VITAMIN D 25 HYDROXY: CPT

## 2022-06-23 PROCEDURE — 83036 HEMOGLOBIN GLYCOSYLATED A1C: CPT

## 2022-07-01 DIAGNOSIS — E03.8 HYPOTHYROIDISM DUE TO HASHIMOTO'S THYROIDITIS: ICD-10-CM

## 2022-07-01 DIAGNOSIS — E06.3 HYPOTHYROIDISM DUE TO HASHIMOTO'S THYROIDITIS: ICD-10-CM

## 2022-07-01 RX ORDER — LEVOTHYROXINE SODIUM 0.12 MG/1
TABLET ORAL
Qty: 65 TABLET | Refills: 2 | Status: SHIPPED | OUTPATIENT
Start: 2022-07-01 | End: 2024-01-05 | Stop reason: SDUPTHER

## 2022-07-06 ENCOUNTER — PATIENT MESSAGE (OUTPATIENT)
Dept: MEDICAL GROUP | Facility: MEDICAL CENTER | Age: 52
End: 2022-07-06
Payer: COMMERCIAL

## 2022-07-06 DIAGNOSIS — E11.9 TYPE 2 DIABETES MELLITUS WITHOUT COMPLICATION, WITH LONG-TERM CURRENT USE OF INSULIN (HCC): ICD-10-CM

## 2022-07-06 DIAGNOSIS — Z79.4 TYPE 2 DIABETES MELLITUS WITHOUT COMPLICATION, WITH LONG-TERM CURRENT USE OF INSULIN (HCC): ICD-10-CM

## 2022-07-06 RX ORDER — ATORVASTATIN CALCIUM 20 MG/1
20 TABLET, FILM COATED ORAL DAILY
Qty: 90 TABLET | Refills: 3 | Status: SHIPPED | OUTPATIENT
Start: 2022-07-06 | End: 2023-12-21 | Stop reason: SDUPTHER

## 2022-07-06 NOTE — PATIENT COMMUNICATION
Received request via: Patient    Was the patient seen in the last year in this department? Yes    Does the patient have an active prescription (recently filled or refills available) for medication(s) requested? No     Requested Prescriptions     Pending Prescriptions Disp Refills   • atorvastatin (LIPITOR) 20 MG Tab 90 Tablet 3     Sig: Take 1 Tablet by mouth every day.

## 2022-07-07 ENCOUNTER — APPOINTMENT (OUTPATIENT)
Dept: CARDIOLOGY | Facility: MEDICAL CENTER | Age: 52
End: 2022-07-07
Attending: STUDENT IN AN ORGANIZED HEALTH CARE EDUCATION/TRAINING PROGRAM
Payer: COMMERCIAL

## 2022-07-12 RX ORDER — ESTRADIOL 1 MG/1
1.5 TABLET ORAL DAILY
Qty: 45 TABLET | Refills: 12 | Status: SHIPPED | OUTPATIENT
Start: 2022-07-12 | End: 2023-02-02

## 2022-07-20 ENCOUNTER — HOSPITAL ENCOUNTER (OUTPATIENT)
Dept: CARDIOLOGY | Facility: MEDICAL CENTER | Age: 52
End: 2022-07-20
Attending: STUDENT IN AN ORGANIZED HEALTH CARE EDUCATION/TRAINING PROGRAM
Payer: COMMERCIAL

## 2022-07-20 DIAGNOSIS — I77.810 ASCENDING AORTA DILATATION (HCC): ICD-10-CM

## 2022-07-20 DIAGNOSIS — I10 ESSENTIAL HYPERTENSION: ICD-10-CM

## 2022-07-20 PROCEDURE — 93306 TTE W/DOPPLER COMPLETE: CPT

## 2022-07-21 ENCOUNTER — GYNECOLOGY VISIT (OUTPATIENT)
Dept: OBGYN | Facility: CLINIC | Age: 52
End: 2022-07-21
Payer: COMMERCIAL

## 2022-07-21 VITALS — BODY MASS INDEX: 43.05 KG/M2 | DIASTOLIC BLOOD PRESSURE: 64 MMHG | WEIGHT: 243 LBS | SYSTOLIC BLOOD PRESSURE: 107 MMHG

## 2022-07-21 DIAGNOSIS — Z12.39 BREAST SCREENING: ICD-10-CM

## 2022-07-21 DIAGNOSIS — Z92.29 HISTORY OF POSTMENOPAUSAL HRT: ICD-10-CM

## 2022-07-21 LAB
LV EJECT FRACT MOD 2C 99903: 66.83
LV EJECT FRACT MOD 4C 99902: 67.43
LV EJECT FRACT MOD BP 99901: 66.99

## 2022-07-21 PROCEDURE — 99213 OFFICE O/P EST LOW 20 MIN: CPT | Performed by: OBSTETRICS & GYNECOLOGY

## 2022-07-21 PROCEDURE — 93306 TTE W/DOPPLER COMPLETE: CPT | Mod: 26 | Performed by: STUDENT IN AN ORGANIZED HEALTH CARE EDUCATION/TRAINING PROGRAM

## 2022-07-21 ASSESSMENT — FIBROSIS 4 INDEX: FIB4 SCORE: 1.65

## 2022-07-21 NOTE — NON-PROVIDER
Pt here for medication follow up: Estradiol    Pt states did not like cream was difficult to remember to use daily. New Estradiol tab working much better for her.   Good# [phone]   Pharmacy verified

## 2022-07-26 ENCOUNTER — TELEPHONE (OUTPATIENT)
Dept: CARDIOLOGY | Facility: MEDICAL CENTER | Age: 52
End: 2022-07-26
Payer: COMMERCIAL

## 2022-07-26 ENCOUNTER — OFFICE VISIT (OUTPATIENT)
Dept: CARDIOLOGY | Facility: MEDICAL CENTER | Age: 52
End: 2022-07-26
Payer: COMMERCIAL

## 2022-07-26 VITALS
WEIGHT: 240 LBS | SYSTOLIC BLOOD PRESSURE: 104 MMHG | DIASTOLIC BLOOD PRESSURE: 64 MMHG | BODY MASS INDEX: 42.52 KG/M2 | HEIGHT: 63 IN | RESPIRATION RATE: 16 BRPM | HEART RATE: 79 BPM | OXYGEN SATURATION: 94 %

## 2022-07-26 DIAGNOSIS — I47.11 INAPPROPRIATE SINUS TACHYCARDIA (HCC): ICD-10-CM

## 2022-07-26 DIAGNOSIS — I10 ESSENTIAL HYPERTENSION: ICD-10-CM

## 2022-07-26 DIAGNOSIS — E78.5 DYSLIPIDEMIA: ICD-10-CM

## 2022-07-26 DIAGNOSIS — I77.810 ASCENDING AORTA DILATATION (HCC): ICD-10-CM

## 2022-07-26 PROCEDURE — 99214 OFFICE O/P EST MOD 30 MIN: CPT | Performed by: STUDENT IN AN ORGANIZED HEALTH CARE EDUCATION/TRAINING PROGRAM

## 2022-07-26 ASSESSMENT — ENCOUNTER SYMPTOMS
DIARRHEA: 0
NEAR-SYNCOPE: 0
ABDOMINAL PAIN: 0
SHORTNESS OF BREATH: 0
DYSPNEA ON EXERTION: 0
VOMITING: 0
PALPITATIONS: 0
WEAKNESS: 0
FOCAL WEAKNESS: 0
DIZZINESS: 0
COUGH: 0
FEVER: 0
IRREGULAR HEARTBEAT: 0
NIGHT SWEATS: 0
PND: 0
NAUSEA: 0
ORTHOPNEA: 0
WHEEZING: 0
SYNCOPE: 0

## 2022-07-26 ASSESSMENT — FIBROSIS 4 INDEX: FIB4 SCORE: 1.65

## 2022-07-26 NOTE — PROGRESS NOTES
Cardiology Follow-up Consultation Note    Date of note:    07/26/22  Primary Care Provider: Kamille Lindsey M.D.  Referring Provider: Kamille Lindsey*     Patient Name: Sarah Friedman   YOB: 1970  MRN:              [id number]    Chief Complaint: Inappropriate sinus tachycardia    History of Present Illness: Ms. Sarah Friedman is a 51 y.o. female whose current medical problems include hypertension, dyslipidemia, DM, hypothyroidism, and GERD who is here for follow-up inappropriate sinus tachcyardia.    The patient was previously seen in my clinic 4/11/2022 (her initial visit with me).  The patient previously followed at [facility] for cardiology (Dr. Marinelli). The patient was started on ivabradine with improvement in symptoms but insurance did not cover it. The patient was not able to tolerate high doses of metoprolol due to hypotension.  During the last visit, the patient reports that she has insurance again, and was started on ivabradine.    The patient returns today for follow-up.  The patient reports feeling unwell today due to her 16-year-old cat passing away yesterday.  Otherwise, she reports feeling okay.  Ever since being started on ivabradine, she has felt much better and no longer having palpitations.  She denies any chest pain or shortness of breath on exertion.  No orthopnea, PND, or leg swelling.  No palpitations.  No syncope or presyncopal episodes.      Cardiovascular Risk Factors:  1. Smoking status: Never smoker  2. Type II Diabetes Mellitus: On medication  Lab Results   Component Value Date/Time    HBA1C 6.9 (H) 06/23/2022 01:39 PM    HBA1C 6.7 (H) 02/18/2021 11:00 AM     3. Hypertension: On medication  4. Dyslipidemia: On statin  Cholesterol,Tot   Date Value Ref Range Status   02/18/2021 153 100 - 199 mg/dL Final     LDL   Date Value Ref Range Status   02/18/2021 77 <100 mg/dL Final     HDL   Date Value Ref Range Status   02/18/2021 45 >=40 mg/dL Final      Triglycerides   Date Value Ref Range Status   02/18/2021 156 (H) 0 - 149 mg/dL Final     5. Family history of early Coronary Artery Disease in a first degree relative (Male less than 55 years of age; Female less than 65 years of age): Father with MI   6.  Obesity and/or Metabolic Syndrome: BMI 45.17  7. Sedentary lifestyle: Not sedentary    Review of Systems   Constitutional: Negative for fever, malaise/fatigue and night sweats.   Cardiovascular: Negative for chest pain, dyspnea on exertion, irregular heartbeat, leg swelling, near-syncope, orthopnea, palpitations, paroxysmal nocturnal dyspnea and syncope.   Respiratory: Negative for cough, shortness of breath and wheezing.    Gastrointestinal: Negative for abdominal pain, diarrhea, nausea and vomiting.   Neurological: Negative for dizziness, focal weakness and weakness.       All other systems reviewed and are negative.       Current Outpatient Medications   Medication Sig Dispense Refill   • omeprazole (PRILOSEC) 40 MG delayed-release capsule Take 1 capsule by mouth twice daily 180 Capsule 0   • gabapentin (NEURONTIN) 300 MG Cap TAKE 3 CAPSULES BY MOUTH ONCE DAILY IN THE EVENING 270 Capsule 2   • LANTUS SOLOSTAR 100 UNIT/ML Solution Pen-injector injection INJECT 39 UNITS SUBCUTANEOUSLY IN THE EVENING 36 mL 3   • JARDIANCE 10 MG Tab TAKE 1 TABLET BY MOUTH EVERY DAY. NEXT REFILL ONLY AFTER HBAC1 90 Tablet 1   • levothyroxine (SYNTHROID) 137 MCG Tab TAKE 1 TABLET BY MOUTH EVERY SUNDAY AND Thursday. 24 Tablet 1   • estradiol (ESTRACE) 1 MG Tab Take 1.5 Tablets by mouth every day. 45 Tablet 12   • atorvastatin (LIPITOR) 20 MG Tab Take 1 Tablet by mouth every day. 90 Tablet 3   • levothyroxine (SYNTHROID) 125 MCG Tab TAKE 125 MCG EVERY MONDAYS, TUESDAYS WEDNESDAYS, FRIDAYS AND SATURDAYS 65 Tablet 2   • hydroCHLOROthiazide (HYDRODIURIL) 12.5 MG tablet Take 1 tablet by mouth once daily 90 Tablet 2   • spironolactone (ALDACTONE) 25 MG Tab Take 1 tablet by mouth once  "daily 90 Tablet 0   • Insulin Pen Needle 32 G x 4 mm (BD PEN NEEDLE JOSE J U/F) USE 1  ONCE DAILY . APPOINTMENT REQUIRED FOR FUTURE REFILLS 100 Each 0   • Insulin Pen Needle 32 G x 4 mm (BD PEN NEEDLE JOSE J U/F) USE 1  ONCE DAILY 100 Each 0   • ivabradine (CORLANOR) 5 MG Tab tablet Take 1 Tablet by mouth 2 times a day with meals. 60 Tablet 11   • HYDROcodone-acetaminophen (NORCO) 5-325 MG Tab per tablet      • metformin (GLUCOPHAGE) 1000 MG tablet TAKE 1 TABLET BY MOUTH TWICE DAILY WITH MEALS 180 Tablet 1   • oxybutynin SR (DITROPAN-XL) 10 MG CR tablet Take 1 Tablet by mouth 2 times a day. 60 Tablet 12   • promethazine (PHENERGAN) 25 MG Tab Take 1 Tablet by mouth every 6 hours as needed for Nausea/Vomiting. 30 Tablet 0   • Alcohol Swabs (ALCOHOL PREP) 70 % Pads Use three times daily to clean finger before blood glucose testing 100 Each 11   • ibuprofen (MOTRIN) 800 MG Tab Take 1 Tab by mouth 2 Times a Day.     • divalproex (DEPAKOTE) 500 MG Tablet Delayed Response Take 500 mg by mouth 2 times a day.     • risperiDONE (RISPERDAL) 2 MG Tab TAKE 1 TABLET BY MOUTH ONCE DAILY AT NIGHT AS DIRECTED     • sumatriptan (IMITREX) 100 MG tablet TAKE 1/2 TO 1 (ONE-HALF TO ONE) TABLET BY MOUTH AS NEEDED FOR  MIGRAINE/HEADACHE 9 Tab 5   • cyclobenzaprine (FLEXERIL) 10 MG Tab Take 1 Tab by mouth at bedtime as needed for Muscle Spasms. 30 Tab 5   • polyethylene glycol 3350 (MIRALAX) 17 GM/SCOOP Powder Take 17 g by mouth 1 time daily as needed (constipation). 510 g 5   • acetaminophen (TYLENOL) 500 MG Tab Take 500-1,000 mg by mouth every 6 hours as needed.     • Multiple Vitamins-Minerals (MULTIVITAMIN PO) Take 1 Tab by mouth every day.       No current facility-administered medications for this visit.         Allergies   Allergen Reactions   • Betadine Prepstick Plus [Povidone-Iodine] Rash     Rash swelling   • Hydromorphone Hcl Itching     Itching \"head to toe\"   • Morphine Itching     Itching \"head to toe\"   • Povidone Iodine Hives " "  • Tape Itching     Itching \"leave scars\"       Physical Exam:  Ambulatory Vitals  /64 (BP Location: Left arm, Patient Position: Sitting, BP Cuff Size: Adult)   Pulse 79   Resp 16   Ht 1.6 m (5' 3\")   Wt 109 kg (240 lb)   SpO2 94%    Oxygen Therapy:  Pulse Oximetry: 94 %  BP Readings from Last 4 Encounters:   07/26/22 104/64   07/21/22 107/64   06/09/22 109/73   04/11/22 118/76       Weight/BMI: Body mass index is 42.51 kg/m².  Wt Readings from Last 4 Encounters:   07/26/22 109 kg (240 lb)   07/21/22 110 kg (243 lb)   06/09/22 116 kg (255 lb)   04/11/22 116 kg (255 lb)       General: Well appearing and in no apparent distress  Eyes: nl conjunctiva, no icteric sclera  ENT: wearing a mask, normal external appearance of ears  Neck: no visible JVP,  no carotid bruits  Lungs: normal respiratory effort, CTAB  Heart: RRR, no murmurs, no rubs or gallops,  no edema bilateral lower extremities. No LV/RV heave on cardiac palpatation. + bilateral radial pulses.  + bilateral dp pulses.   Abdomen: soft, non tender, non distended, no masses, normal bowel sounds.  No HSM.  Extremities/MSK: no clubbing, no cyanosis  Neurological: No focal sensory deficits  Psychiatric: Appropriate affect, A/O x 3, intact judgement and insight  Skin: Warm extremities      Lab Data Review:  Lab Results   Component Value Date/Time    CHOLSTRLTOT 174 06/23/2022 01:39 PM    LDL 97 06/23/2022 01:39 PM    HDL 35 (A) 06/23/2022 01:39 PM    TRIGLYCERIDE 211 (H) 06/23/2022 01:39 PM       Lab Results   Component Value Date/Time    SODIUM 139 06/23/2022 01:39 PM    POTASSIUM 4.6 06/23/2022 01:39 PM    CHLORIDE 99 06/23/2022 01:39 PM    CO2 26 06/23/2022 01:39 PM    GLUCOSE 94 06/23/2022 01:39 PM    BUN 18 06/23/2022 01:39 PM    CREATININE 0.72 06/23/2022 01:39 PM     Lab Results   Component Value Date/Time    ALKPHOSPHAT 35 06/23/2022 01:39 PM    ASTSGOT 36 06/23/2022 01:39 PM    ALTSGPT 16 06/23/2022 01:39 PM    TBILIRUBIN 0.3 06/23/2022 01:39 PM "      Lab Results   Component Value Date/Time    WBC 8.0 06/23/2022 01:39 PM     Lab Results   Component Value Date/Time    HBA1C 6.9 (H) 06/23/2022 01:39 PM    HBA1C 6.7 (H) 02/18/2021 11:00 AM       Cardiac Imaging and Procedures Review:    EKG dated 4/11/2022: My personal interpretation is sinus rhythm, rate 84    Echo 1/28/2019 - from [facility] notes  Normal LV function, ascending aorta 4.3, mild AI, trace PI    Stress test MPI 5/3/16 - from [facility] notes  No ischemia or infarction.     Assessment & Plan     1. Ascending aorta dilatation (HCC)  EC-ECHOCARDIOGRAM COMPLETE W/O CONT   2. Essential hypertension     3. Inappropriate sinus tachycardia     4. Dyslipidemia           Shared Medical Decision Making:    Inappropriate sinus tachcyardia  Doing well on ivabradine with heart rate within normal limits  -Continue ivabradine 5mg twice daily    Dilates ascending aorta  Echo 2019 showed dilated ascending aorta measuring 4.3 cm.  Repeat echocardiogram shows stable dilated ascending aorta measuring 4.2 cm  -Repeat echocardiogram in 1 year  -Manage BP as below    Dyslipidemia  -Continue atorvastatin 20mg daily    Hypertension  BP well controlled this visit/borderline low.  -Continue HCTZ and aldactone 25mg daily    All of the patient's excellent questions were answered to the best of my knowledge and to her satisfaction.  It was a pleasure seeing Ms. Sarah Friedman in my clinic today. Return in about 1 year (around 7/26/2023), or if symptoms worsen or fail to improve. Patient is aware to call the cardiology clinic with any questions or concerns.      Cristofer Josue MD  [facility]  [facility]  [address] [address]  Phone: [phone]  Fax: [phone]

## 2022-08-16 RX ORDER — ESTRADIOL 1 MG/1
1 TABLET ORAL DAILY
Qty: 90 TABLET | Refills: 4 | Status: SHIPPED | OUTPATIENT
Start: 2022-08-16 | End: 2023-09-07

## 2022-08-16 RX ORDER — ESTRADIOL 0.5 MG/1
0.5 TABLET ORAL DAILY
Qty: 90 TABLET | Refills: 4 | Status: SHIPPED | OUTPATIENT
Start: 2022-08-16 | End: 2023-02-06 | Stop reason: SDUPTHER

## 2022-09-03 DIAGNOSIS — I10 ESSENTIAL HYPERTENSION: ICD-10-CM

## 2022-09-06 RX ORDER — SPIRONOLACTONE 25 MG/1
TABLET ORAL
Qty: 90 TABLET | Refills: 0 | Status: SHIPPED | OUTPATIENT
Start: 2022-09-06 | End: 2022-11-28

## 2022-09-29 ENCOUNTER — PATIENT MESSAGE (OUTPATIENT)
Dept: CARDIOLOGY | Facility: MEDICAL CENTER | Age: 52
End: 2022-09-29
Payer: COMMERCIAL

## 2022-09-29 NOTE — PATIENT COMMUNICATION
Inappropriate sinus tachcyardia  Doing well on ivabradine with heart rate within normal limits  -Continue ivabradine 5mg twice daily    To [facility] as care team member

## 2022-09-30 ENCOUNTER — NON-PROVIDER VISIT (OUTPATIENT)
Dept: CARDIOLOGY | Facility: MEDICAL CENTER | Age: 52
End: 2022-09-30
Payer: COMMERCIAL

## 2022-09-30 DIAGNOSIS — R00.2 PALPITATIONS: ICD-10-CM

## 2022-09-30 DIAGNOSIS — I49.1 APC (ATRIAL PREMATURE CONTRACTIONS): ICD-10-CM

## 2022-09-30 DIAGNOSIS — I49.3 PVC'S (PREMATURE VENTRICULAR CONTRACTIONS): ICD-10-CM

## 2022-09-30 DIAGNOSIS — I47.11 INAPPROPRIATE SINUS TACHYCARDIA (HCC): ICD-10-CM

## 2022-09-30 DIAGNOSIS — I47.10 SVT (SUPRAVENTRICULAR TACHYCARDIA) (HCC): ICD-10-CM

## 2022-09-30 LAB — EKG IMPRESSION: NORMAL

## 2022-09-30 PROCEDURE — 99999 PR NO CHARGE: CPT | Performed by: INTERNAL MEDICINE

## 2022-09-30 PROCEDURE — 93000 ELECTROCARDIOGRAM COMPLETE: CPT | Performed by: INTERNAL MEDICINE

## 2022-09-30 NOTE — NON-PROVIDER
S/W KATELIN- EKG looks ok today.     Ordering Cardiac Event Monitor    D/W pt, agreeable to POC.

## 2022-09-30 NOTE — PROGRESS NOTES
Patient was here today for EKG per Dr. Josue. EKG performed and transferred into patients chart. Paper copy of EKG given to Skyler MORENO for Dr. Josue. Patient states her heart rate was jumping around yesterday but feels fine today. Patient has been informed that Skyler MORENO, will be in shortly to speak about EKG.

## 2022-09-30 NOTE — PROGRESS NOTES
Patient enrolled in the 14 day ePatch Holter monitoring program, per Ashley Renteria A.P.R.N.   >In clinic hook up, monitor S/N [id number].  >Pending EOS.

## 2022-10-31 ENCOUNTER — TELEPHONE (OUTPATIENT)
Dept: CARDIOLOGY | Facility: MEDICAL CENTER | Age: 52
End: 2022-10-31
Payer: COMMERCIAL

## 2022-10-31 PROCEDURE — 93228 REMOTE 30 DAY ECG REV/REPORT: CPT | Performed by: STUDENT IN AN ORGANIZED HEALTH CARE EDUCATION/TRAINING PROGRAM

## 2022-10-31 NOTE — TELEPHONE ENCOUNTER
[employer] EOS to [facility]'s nurse, Puneet, on 10/31/2022

## 2023-01-20 DIAGNOSIS — N39.41 URGE INCONTINENCE: ICD-10-CM

## 2023-01-23 ENCOUNTER — TELEPHONE (OUTPATIENT)
Dept: OBGYN | Facility: CLINIC | Age: 53
End: 2023-01-23
Payer: COMMERCIAL

## 2023-01-23 DIAGNOSIS — N39.41 URGE INCONTINENCE: ICD-10-CM

## 2023-01-23 RX ORDER — OXYBUTYNIN CHLORIDE 10 MG/1
10 TABLET, EXTENDED RELEASE ORAL 2 TIMES DAILY
Qty: 60 TABLET | Refills: 12 | Status: SHIPPED | OUTPATIENT
Start: 2023-01-23 | End: 2023-08-24

## 2023-01-23 NOTE — TELEPHONE ENCOUNTER
01/23/23  8:31 AM  Pt is frustrated because she called on Friday for a refill of oxybutynin and no one refilled it. Pt is completely out. She has a weak bladder and bladder spasms. She has been taking this medication for a long time and it works. She works at [employer] and bathroom access is not close. Pt is going to call and schedule follow-up with new provider on Wednesday. Informed her I would send this to a provider in the office and have refilled. Verified pharmacy.     9:38 AM  Informed pt rx was sent in. She was very thankful

## 2023-01-24 RX ORDER — OXYBUTYNIN CHLORIDE 10 MG/1
TABLET, EXTENDED RELEASE ORAL
Qty: 60 TABLET | Refills: 0 | Status: SHIPPED | OUTPATIENT
Start: 2023-01-24 | End: 2023-02-02

## 2023-02-02 ENCOUNTER — OFFICE VISIT (OUTPATIENT)
Dept: MEDICAL GROUP | Facility: MEDICAL CENTER | Age: 53
End: 2023-02-02
Payer: COMMERCIAL

## 2023-02-02 VITALS
HEIGHT: 63 IN | WEIGHT: 249.12 LBS | HEART RATE: 89 BPM | BODY MASS INDEX: 44.14 KG/M2 | SYSTOLIC BLOOD PRESSURE: 90 MMHG | DIASTOLIC BLOOD PRESSURE: 60 MMHG | TEMPERATURE: 98 F | RESPIRATION RATE: 17 BRPM | OXYGEN SATURATION: 95 %

## 2023-02-02 DIAGNOSIS — G43.009 MIGRAINE WITHOUT AURA AND WITHOUT STATUS MIGRAINOSUS, NOT INTRACTABLE: ICD-10-CM

## 2023-02-02 DIAGNOSIS — Z79.890 HORMONE REPLACEMENT THERAPY: ICD-10-CM

## 2023-02-02 DIAGNOSIS — G89.29 CHRONIC BILATERAL LOW BACK PAIN WITH LEFT-SIDED SCIATICA: ICD-10-CM

## 2023-02-02 DIAGNOSIS — E03.8 HYPOTHYROIDISM DUE TO HASHIMOTO'S THYROIDITIS: ICD-10-CM

## 2023-02-02 DIAGNOSIS — I10 ESSENTIAL HYPERTENSION: ICD-10-CM

## 2023-02-02 DIAGNOSIS — M54.42 CHRONIC BILATERAL LOW BACK PAIN WITH LEFT-SIDED SCIATICA: ICD-10-CM

## 2023-02-02 DIAGNOSIS — E11.42 DIABETIC POLYNEUROPATHY ASSOCIATED WITH TYPE 2 DIABETES MELLITUS (HCC): ICD-10-CM

## 2023-02-02 DIAGNOSIS — Z00.00 ENCOUNTER FOR MEDICAL EXAMINATION TO ESTABLISH CARE: ICD-10-CM

## 2023-02-02 DIAGNOSIS — N39.41 URGE INCONTINENCE: ICD-10-CM

## 2023-02-02 DIAGNOSIS — F33.42 RECURRENT MAJOR DEPRESSIVE DISORDER, IN FULL REMISSION (HCC): ICD-10-CM

## 2023-02-02 DIAGNOSIS — E78.2 MIXED HYPERLIPIDEMIA: ICD-10-CM

## 2023-02-02 DIAGNOSIS — Z11.59 NEED FOR HEPATITIS C SCREENING TEST: ICD-10-CM

## 2023-02-02 DIAGNOSIS — K21.9 GASTROESOPHAGEAL REFLUX DISEASE, UNSPECIFIED WHETHER ESOPHAGITIS PRESENT: ICD-10-CM

## 2023-02-02 DIAGNOSIS — E11.9 TYPE 2 DIABETES MELLITUS WITHOUT COMPLICATION, WITHOUT LONG-TERM CURRENT USE OF INSULIN (HCC): ICD-10-CM

## 2023-02-02 DIAGNOSIS — E06.3 HYPOTHYROIDISM DUE TO HASHIMOTO'S THYROIDITIS: ICD-10-CM

## 2023-02-02 PROBLEM — Z92.29 HISTORY OF POSTMENOPAUSAL HRT: Status: RESOLVED | Noted: 2021-03-05 | Resolved: 2023-02-02

## 2023-02-02 PROBLEM — E03.9 HYPOTHYROIDISM: Status: RESOLVED | Noted: 2022-03-01 | Resolved: 2023-02-02

## 2023-02-02 PROCEDURE — 99214 OFFICE O/P EST MOD 30 MIN: CPT | Performed by: STUDENT IN AN ORGANIZED HEALTH CARE EDUCATION/TRAINING PROGRAM

## 2023-02-02 RX ORDER — PIOGLITAZONEHYDROCHLORIDE 45 MG/1
45 TABLET ORAL DAILY
COMMUNITY
Start: 2022-12-19 | End: 2023-03-06 | Stop reason: SDUPTHER

## 2023-02-02 ASSESSMENT — FIBROSIS 4 INDEX: FIB4 SCORE: 1.68

## 2023-02-02 NOTE — PROGRESS NOTES
Subjective:     CC:  Diagnoses of Encounter for medical examination to establish care, Recurrent major depressive disorder, in full remission (HCC), Chronic bilateral low back pain with left-sided sciatica, Need for hepatitis C screening test, Essential hypertension, Type 2 diabetes mellitus without complication, without long-term current use of insulin (HCC), Hypothyroidism due to Hashimoto's thyroiditis, Gastroesophageal reflux disease, unspecified whether esophagitis present, Diabetic polyneuropathy associated with type 2 diabetes mellitus (HCC), Migraine without aura and without status migrainosus, not intractable, Urge incontinence, Hormone replacement therapy, and Mixed hyperlipidemia were pertinent to this visit.    HISTORY OF THE PRESENT ILLNESS: Patient is a 52 y.o. female. This pleasant patient is here today to establish care and discuss the following.    Problem   Hyperlipidemia    Chronic, stable, currently on Lipitor 20 mg daily     Hormone Replacement Therapy    S/p total hysterectomy.   On estradiol which helps with her severe hot flashes and menopausal symptoms   She understanding the risks of increase risk of DVT/PE while on estradiol  Current dose is 1.5 mg daily.     Urge Incontinence    Chronic, stable, status post hysterectomy and bladder sling, currently on oxybutynin 10 mg twice daily with good relief.     Migraine Without Aura and Without Status Migrainosus, Not Intractable    Chronic, stable on imitrex prn      Type 2 Diabetes Mellitus Without Complication (Hcc)    Chronic, controlled, currently on metformin 1000 mg twice daily, Actos 45 mg daily, and Jardiance 10 mg daily.  Previously on 40 units of Lantus nightly, this was discontinued.  Last A1c less than 7.  She is on a statin.  She is not on an ACE or ARB     Chronic Low Back Pain    Chronic, stable.  Closely followed with [facility] and was on gabapentin 300 mg 3 times daily as well as Norco 5-325 mg daily.  She also takes  "Flexeril 10 mg as needed.  She takes Tylenol as needed and daily ibuprofen.  She is requesting that I take over her medications for pain.  PDMP reviewed, no early refills, low-dose of Norco.     Recurrent major depressive disorder, in full remission (HCC)    Chronic, stable, follows with psychiatrist and therapist.  Currently on Depakote 500 mg twice daily and risperidone 2 mg daily.     Gerd (Gastroesophageal Reflux Disease)    Chronic, stable, currently on omeprazole 40 mg daily     Hypothyroidism Due to Hashimoto's Thyroiditis    This is a chronic problem, well-controlled current regimen includes levothyroxine 125 mcg MTWFS, Sat and 137 mcg Thurs & Sun.  No recent TSH.     Essential Hypertension    Chronic, stable, currently on hydrochlorothiazide 12.5 mg daily and spironolactone 25 mg daily.  Follows with cardiology.       Diabetic Neuropathy (Hcc)    Chronic, stable on gabapentin 300mg 3 times daily     Hypothyroidism (Resolved)   History of Postmenopausal Hrt (Resolved)       ROS:   ROS      Objective:     Exam: BP 90/60 (BP Location: Left arm, Patient Position: Sitting, BP Cuff Size: Large adult)   Pulse 89   Temp 36.7 °C (98 °F) (Temporal)   Resp 17   Ht 1.6 m (5' 3\")   Wt 113 kg (249 lb 1.9 oz)   SpO2 95%  Body mass index is 44.13 kg/m².    Physical Exam  Vitals reviewed.   Constitutional:       General: She is not in acute distress.     Appearance: She is not toxic-appearing.   HENT:      Head: Normocephalic and atraumatic.      Right Ear: External ear normal.      Left Ear: External ear normal.   Eyes:      General:         Right eye: No discharge.         Left eye: No discharge.      Extraocular Movements: Extraocular movements intact.      Conjunctiva/sclera: Conjunctivae normal.   Pulmonary:      Effort: Pulmonary effort is normal. No respiratory distress.   Skin:     General: Skin is warm and dry.   Neurological:      Mental Status: She is alert.   Psychiatric:         Mood and Affect: Mood " normal.         Behavior: Behavior normal.         Thought Content: Thought content normal.         Judgment: Judgment normal.         Assessment & Plan:   52 y.o. female with the following -    1. Encounter for medical examination to establish care  History, problem list, medications and allergies reviewed.  Records requested from previous provider if applicable.    2. Recurrent major depressive disorder, in full remission (HCC)  Chronic, stable, continue Depakote and Risperdal, continue to follow with psychiatry    3. Chronic bilateral low back pain with left-sided sciatica  I feel comfortable taking over the medications for her chronic back pain.  We did discuss that since this is a controlled substance when she is out of her current medication she will have to come in for visit to get a UDS and a controlled substance agreement.  At that time I will prescribe her Norco 5-325 mg daily as needed which is what she is on right now.  I will also refill her gabapentin, Flexeril, Tylenol and ibuprofen.    4. Need for hepatitis C screening test  - HEP C VIRUS ANTIBODY; Future    5. Essential hypertension  Very well controlled today, this is managed by cardiology, continue to take spironolactone and hydrochlorothiazide    6. Type 2 diabetes mellitus without complication, without long-term current use of insulin ([facility])  Labs pending, for now continue metformin, Jardiance and Actos at current dosing  - HEMOGLOBIN A1C; Future  - Lipid Profile; Future  - Comp Metabolic Panel; Future    7. Hypothyroidism due to Hashimoto's thyroiditis  Well-controlled with current dosing of levothyroxine, TSH pending  - TSH WITH REFLEX TO FT4; Future    8. Gastroesophageal reflux disease, unspecified whether esophagitis present  Continue omeprazole    9. Diabetic polyneuropathy associated with type 2 diabetes mellitus (HCC)  Chronic, stable, continue gabapentin    10. Migraine without aura and without status migrainosus, not  intractable  Chronic, stable, continue Imitrex as needed    11. Urge incontinence  OB/GYN has stopped doing gynecology and is only doing OB.  Since she does not need the services she no longer has an OB/GYN.  I am happy to refill her oxybutynin when needed    12. Hormone replacement therapy  Currently on Estrace 1.5 mg daily.  She was previously on 2 mg but had too much breast pain.  On 1 mg her symptoms were not controlled.  She does need a new provider so I am happy to take over her hormone replacement therapy.  She does not need any progesterone supplementation as she does not have a uterus.    13. Mixed hyperlipidemia  Continue statin at current dosing, lipid panel pending      No follow-ups on file.    Please note that this dictation was created using voice recognition software. I have made every reasonable attempt to correct obvious errors, but I expect that there are errors of grammar and possibly content that I did not discover before finalizing the note.

## 2023-02-02 NOTE — PROGRESS NOTES
Subjective:     CC: ***    HPI:   Sarah presents today with    Problem   Hyperlipidemia    Chronic, stable, currently on Lipitor 20 mg daily     Hormone Replacement Therapy    S/p total hysterectomy.   On estradiol which helps with her severe hot flashes and menopausal symptoms   She understanding the risks of increase risk of DVT/PE while on estradiol  Current dose is 1.5 mg daily.     Urge Incontinence    Chronic, stable, status post hysterectomy and bladder sling, currently on oxybutynin 10 mg twice daily with good relief.     Migraine Without Aura and Without Status Migrainosus, Not Intractable    Chronic, stable on imitrex prn      Type 2 Diabetes Mellitus Without Complication (Hcc)    Chronic, controlled, currently on metformin 1000 mg twice daily, Actos 45 mg daily, and Jardiance 10 mg daily.  Previously on 40 units of Lantus nightly, this was discontinued.  Last A1c less than 7.  She is on a statin.  She is not on an ACE or ARB     Chronic Low Back Pain    Chronic, stable.  Closely followed with [facility] and was on gabapentin 300 mg 3 times daily as well as Norco 5-325 mg daily.  She also takes Flexeril 10 mg as needed.  She takes Tylenol as needed and daily ibuprofen.  She is requesting that I take over her medications for pain.  PDMP reviewed, no early refills, low-dose of Norco.     Recurrent major depressive disorder, in full remission (HCC)    Chronic, stable, follows with psychiatrist and therapist.  Currently on Depakote 500 mg twice daily and risperidone 2 mg daily.     Gerd (Gastroesophageal Reflux Disease)    Chronic, stable, currently on omeprazole 40 mg daily     Hypothyroidism Due to Hashimoto's Thyroiditis    This is a chronic problem, well-controlled current regimen includes levothyroxine 125 mcg MTWFS, Sat and 137 mcg Thurs & Sun.  No recent TSH.     Essential Hypertension    Chronic, stable, currently on hydrochlorothiazide 12.5 mg daily and spironolactone 25 mg daily.  Follows with  "cardiology.       Diabetic Neuropathy (Hcc)    Chronic, stable on gabapentin 300mg 3 times daily     Hypothyroidism (Resolved)   History of Postmenopausal Hrt (Resolved)       Health Maintenance: {COMPLETED:849779}    ROS:  ROS    Objective:     Exam:  BP 90/60 (BP Location: Left arm, Patient Position: Sitting, BP Cuff Size: Large adult)   Pulse 89   Temp 36.7 °C (98 °F) (Temporal)   Resp 17   Ht 1.6 m (5' 3\")   Wt 113 kg (249 lb 1.9 oz)   LMP 04/20/2018 (Approximate) Comment: HCG from 4/23 negative  SpO2 95%   BMI 44.13 kg/m²  Body mass index is 44.13 kg/m².    Physical Exam    A chaperone was offered to the patient during today's exam. {CHAPERONE:26652}    Labs: ***    Assessment & Plan:     52 y.o. female with the following -     ***        Referral for genetic research was offered. Patient {declined/accepted}.    I spent a total of *** minutes with record review, exam, communication with the patient, communication with other providers, and documentation of this encounter.      No follow-ups on file.    Please note that this dictation was created using voice recognition software. I have made every reasonable attempt to correct obvious errors, but I expect that there are errors of grammar and possibly content that I did not discover before finalizing the note.        "

## 2023-02-03 ENCOUNTER — PATIENT MESSAGE (OUTPATIENT)
Dept: MEDICAL GROUP | Facility: MEDICAL CENTER | Age: 53
End: 2023-02-03
Payer: COMMERCIAL

## 2023-02-03 DIAGNOSIS — G89.29 CHRONIC BILATERAL LOW BACK PAIN WITH LEFT-SIDED SCIATICA: ICD-10-CM

## 2023-02-03 DIAGNOSIS — Z79.4 TYPE 2 DIABETES MELLITUS WITHOUT COMPLICATION, WITH LONG-TERM CURRENT USE OF INSULIN (HCC): ICD-10-CM

## 2023-02-03 DIAGNOSIS — E11.9 TYPE 2 DIABETES MELLITUS WITHOUT COMPLICATION, WITH LONG-TERM CURRENT USE OF INSULIN (HCC): ICD-10-CM

## 2023-02-03 DIAGNOSIS — M54.42 CHRONIC BILATERAL LOW BACK PAIN WITH LEFT-SIDED SCIATICA: ICD-10-CM

## 2023-02-03 DIAGNOSIS — Z79.890 HORMONE REPLACEMENT THERAPY: ICD-10-CM

## 2023-02-06 RX ORDER — IBUPROFEN 800 MG/1
800 TABLET ORAL 2 TIMES DAILY
Qty: 90 TABLET | Refills: 0 | Status: SHIPPED | OUTPATIENT
Start: 2023-02-06 | End: 2023-09-18 | Stop reason: SDUPTHER

## 2023-02-06 RX ORDER — ESTRADIOL 0.5 MG/1
0.5 TABLET ORAL DAILY
Qty: 90 TABLET | Refills: 3 | Status: SHIPPED | OUTPATIENT
Start: 2023-02-06 | End: 2024-02-15 | Stop reason: DRUGHIGH

## 2023-02-25 ENCOUNTER — PATIENT MESSAGE (OUTPATIENT)
Dept: MEDICAL GROUP | Facility: MEDICAL CENTER | Age: 53
End: 2023-02-25
Payer: COMMERCIAL

## 2023-03-04 ENCOUNTER — PATIENT MESSAGE (OUTPATIENT)
Dept: MEDICAL GROUP | Facility: MEDICAL CENTER | Age: 53
End: 2023-03-04
Payer: COMMERCIAL

## 2023-03-06 RX ORDER — PIOGLITAZONEHYDROCHLORIDE 45 MG/1
45 TABLET ORAL DAILY
Qty: 90 TABLET | Refills: 3 | Status: SHIPPED | OUTPATIENT
Start: 2023-03-06 | End: 2024-03-05 | Stop reason: SDUPTHER

## 2023-03-13 ENCOUNTER — OFFICE VISIT (OUTPATIENT)
Dept: URGENT CARE | Facility: CLINIC | Age: 53
End: 2023-03-13
Payer: COMMERCIAL

## 2023-03-13 VITALS
HEIGHT: 63 IN | RESPIRATION RATE: 16 BRPM | DIASTOLIC BLOOD PRESSURE: 64 MMHG | WEIGHT: 250 LBS | OXYGEN SATURATION: 94 % | TEMPERATURE: 97 F | BODY MASS INDEX: 44.3 KG/M2 | HEART RATE: 86 BPM | SYSTOLIC BLOOD PRESSURE: 102 MMHG

## 2023-03-13 DIAGNOSIS — H66.002 NON-RECURRENT ACUTE SUPPURATIVE OTITIS MEDIA OF LEFT EAR WITHOUT SPONTANEOUS RUPTURE OF TYMPANIC MEMBRANE: ICD-10-CM

## 2023-03-13 PROCEDURE — 99213 OFFICE O/P EST LOW 20 MIN: CPT | Performed by: NURSE PRACTITIONER

## 2023-03-13 RX ORDER — AMOXICILLIN AND CLAVULANATE POTASSIUM 875; 125 MG/1; MG/1
1 TABLET, FILM COATED ORAL 2 TIMES DAILY
Qty: 10 TABLET | Refills: 0 | Status: SHIPPED | OUTPATIENT
Start: 2023-03-13 | End: 2023-03-18

## 2023-03-13 ASSESSMENT — ENCOUNTER SYMPTOMS
DIZZINESS: 0
NAUSEA: 0
FEVER: 0
EYE PAIN: 0
MYALGIAS: 0
CHILLS: 0
VOMITING: 0
RHINORRHEA: 1
SHORTNESS OF BREATH: 0
COUGH: 0
SORE THROAT: 1
HEADACHES: 0

## 2023-03-13 ASSESSMENT — FIBROSIS 4 INDEX: FIB4 SCORE: 1.68

## 2023-03-13 NOTE — PROGRESS NOTES
Subjective:   Sarah Friedman is a 52 y.o. female who presents for Ear Pain and Sore Throat (Pt states pain on her left side )      URI   This is a new problem. Episode onset: 3 days. The problem has been unchanged. There has been no fever. Associated symptoms include congestion, ear pain, rhinorrhea and a sore throat. Pertinent negatives include no chest pain, coughing, headaches, nausea, rash or vomiting. Associated symptoms comments: Left ear pain  . She has tried acetaminophen for the symptoms. The treatment provided no relief.     Review of Systems   Constitutional:  Negative for chills and fever.   HENT:  Positive for congestion, ear pain, rhinorrhea and sore throat.    Eyes:  Negative for pain.   Respiratory:  Negative for cough and shortness of breath.    Cardiovascular:  Negative for chest pain.   Gastrointestinal:  Negative for nausea and vomiting.   Genitourinary:  Negative for hematuria.   Musculoskeletal:  Negative for myalgias.   Skin:  Negative for rash.   Neurological:  Negative for dizziness and headaches.     Medications:    acetaminophen Tabs  Alcohol Prep Pads  amoxicillin-clavulanate Tabs  atorvastatin Tabs  BD Pen Needle Raisa U/F Misc  cyclobenzaprine Tabs  divalproex Tbec  estradiol  estradiol Tabs  gabapentin  hydroCHLOROthiazide  HYDROcodone-acetaminophen Tabs  ibuprofen Tabs  ivabradine Tabs  Jardiance Tabs  levothyroxine Tabs  metformin  MULTIVITAMIN PO  omeprazole  oxybutynin SR  pioglitazone Tabs  polyethylene glycol 3350 Powd  promethazine Tabs  risperiDONE Tabs  spironolactone Tabs  sumatriptan    Allergies: Betadine prepstick plus [povidone-iodine], Hydromorphone hcl, Morphine, Povidone iodine, and Tape    Problem List: Sarah Friedman does not have any pertinent problems on file.    Surgical History:  Past Surgical History:   Procedure Laterality Date    COLONOSCOPY N/A 8/30/2019    Procedure: COLONOSCOPY;  Surgeon: Ata Khalil M.D.;  Location: SURGERY SAME DAY [facility]  ORS;  Service: Gastroenterology    GASTROSCOPY N/A 8/30/2019    Procedure: GASTROSCOPY - W/DILATION biopsy;  Surgeon: Ata Khalil M.D.;  Location: SURGERY SAME DAY [facility] ORS;  Service: Gastroenterology    VAGINAL HYSTERECTOMY SCOPE TOTAL N/A 4/24/2018    Procedure: VAGINAL HYSTERECTOMY SCOPE TOTAL;  Surgeon: Francisco Javier Lainez M.D.;  Location: SURGERY SAME DAY [facility] ORS;  Service: Gynecology    SALPINGO OOPHORECTOMY Bilateral 4/24/2018    Procedure: SALPINGO OOPHORECTOMY;  Surgeon: Francisco Javier Lainez M.D.;  Location: SURGERY SAME DAY [facility] ORS;  Service: Gynecology    ANTERIOR AND POSTERIOR REPAIR  4/24/2018    Procedure: ANTERIOR AND POSTERIOR REPAIR;  Surgeon: Francisco Javier Lainez M.D.;  Location: SURGERY SAME DAY [facility] ORS;  Service: Gynecology    ENTEROCELE REPAIR  4/24/2018    Procedure: ENTEROCELE REPAIR- PERINEOPLASTY;  Surgeon: Francisco Javier Lainez M.D.;  Location: SURGERY SAME DAY [facility] ORS;  Service: Gynecology    BLADDER SLING FEMALE  4/24/2018    Procedure: BLADDER SLING FEMALE- TOT;  Surgeon: Francisco Javier Lainez M.D.;  Location: SURGERY SAME DAY [facility] ORS;  Service: Gynecology    VAGINAL SUSPENSION N/A 4/24/2018    Procedure: VAGINAL SUSPENSION- SACROSPINOUS VAULT;  Surgeon: Francisco Javier Lainez M.D.;  Location: SURGERY SAME DAY [facility] ORS;  Service: Gynecology    CYSTOSCOPY N/A 4/24/2018    Procedure: CYSTOSCOPY;  Surgeon: Francisco Javier Lainez M.D.;  Location: SURGERY SAME DAY [facility];  Service: Gynecology    ENDOSCOPY  2016    COLONOSCOPY  2016    OTHER  2016    esophageal dilation    OTHER ORTHOPEDIC SURGERY  2012    bone removed from toe after fracture    TONSILLECTOMY AND ADENOIDECTOMY  1977    OTHER  1971, 1978, 1985    left facial cheek and right upper thight reconstructive surgery       Past Social Hx: Sarah Smithgiselajosé miguel Idalia  reports that she has never smoked. She has never used smokeless tobacco. She reports that she does not drink alcohol and does not use drugs.     Past Family Hx:  Sarah  "Castillo Friedman family history includes Breast Cancer in her mother; Cancer in her father; Cancer (age of onset: 70) in her mother; Colorectal Cancer in her mother; Diabetes in her maternal grandfather, maternal uncle, and sister; Heart Disease in her father and maternal grandmother; Hyperlipidemia in her maternal grandfather and maternal grandmother.     Problem list, medications, and allergies reviewed by myself today in Epic.     Objective:     /64   Pulse 86   Temp 36.1 °C (97 °F)   Resp 16   Ht 1.6 m (5' 3\")   Wt 113 kg (250 lb)   LMP 04/15/2018   SpO2 94%   BMI 44.29 kg/m²     Physical Exam  Vitals and nursing note reviewed.   Constitutional:       General: She is not in acute distress.     Appearance: She is well-developed.   HENT:      Head: Normocephalic and atraumatic.      Right Ear: Tympanic membrane and external ear normal.      Left Ear: External ear normal. Tympanic membrane is erythematous. Tympanic membrane is not bulging.      Nose: Nose normal.      Right Sinus: No maxillary sinus tenderness or frontal sinus tenderness.      Left Sinus: No maxillary sinus tenderness or frontal sinus tenderness.      Mouth/Throat:      Mouth: Mucous membranes are moist.      Pharynx: Uvula midline. No posterior oropharyngeal erythema.      Tonsils: No tonsillar exudate or tonsillar abscesses.   Eyes:      General:         Right eye: No discharge.         Left eye: No discharge.      Conjunctiva/sclera: Conjunctivae normal.   Cardiovascular:      Rate and Rhythm: Normal rate.   Pulmonary:      Effort: Pulmonary effort is normal. No respiratory distress.      Breath sounds: Normal breath sounds.   Abdominal:      General: There is no distension.   Musculoskeletal:         General: Normal range of motion.   Skin:     General: Skin is warm and dry.   Neurological:      General: No focal deficit present.      Mental Status: She is alert and oriented to person, place, and time. Mental status is at baseline.    "   Gait: Gait (gait at baseline) normal.   Psychiatric:         Judgment: Judgment normal.       Assessment/Plan:     Diagnosis and associated orders:     1. Non-recurrent acute suppurative otitis media of left ear without spontaneous rupture of tympanic membrane  amoxicillin-clavulanate (AUGMENTIN) 875-125 MG Tab         Comments/MDM:     Advised to continue supportive care with Tylenol and/or ibuprofen for fevers and discomfort. Increased fluids and electrolytes, Flonase and Sudafed.  Contingent antibiotic prescription given to patient to fill upon meeting criteria of guidelines discussed.  Differential diagnosis, natural history, supportive care, and indications for immediate follow-up discussed.              Differential diagnosis, natural history, supportive care, and indications for immediate   Please note that this dictation was created using voice recognition software. I have made a reasonable attempt to correct obvious errors, but I expect that there are errors of grammar and possibly content that I did not discover before finalizing the note.    This note was electronically signed by John XIE.

## 2023-03-18 ENCOUNTER — PATIENT MESSAGE (OUTPATIENT)
Dept: MEDICAL GROUP | Facility: MEDICAL CENTER | Age: 53
End: 2023-03-18
Payer: COMMERCIAL

## 2023-03-18 DIAGNOSIS — E06.3 HYPOTHYROIDISM DUE TO HASHIMOTO'S THYROIDITIS: ICD-10-CM

## 2023-03-18 DIAGNOSIS — E03.8 HYPOTHYROIDISM DUE TO HASHIMOTO'S THYROIDITIS: ICD-10-CM

## 2023-03-20 RX ORDER — LEVOTHYROXINE SODIUM 137 UG/1
TABLET ORAL
Qty: 24 TABLET | Refills: 3 | Status: SHIPPED | OUTPATIENT
Start: 2023-03-20 | End: 2023-04-03 | Stop reason: SDUPTHER

## 2023-04-01 ENCOUNTER — PATIENT MESSAGE (OUTPATIENT)
Dept: MEDICAL GROUP | Facility: MEDICAL CENTER | Age: 53
End: 2023-04-01
Payer: COMMERCIAL

## 2023-04-03 DIAGNOSIS — E06.3 HYPOTHYROIDISM DUE TO HASHIMOTO'S THYROIDITIS: ICD-10-CM

## 2023-04-03 DIAGNOSIS — E11.9 TYPE 2 DIABETES MELLITUS WITHOUT COMPLICATION, WITH LONG-TERM CURRENT USE OF INSULIN (HCC): ICD-10-CM

## 2023-04-03 DIAGNOSIS — Z79.4 TYPE 2 DIABETES MELLITUS WITHOUT COMPLICATION, WITH LONG-TERM CURRENT USE OF INSULIN (HCC): ICD-10-CM

## 2023-04-03 DIAGNOSIS — E03.8 HYPOTHYROIDISM DUE TO HASHIMOTO'S THYROIDITIS: ICD-10-CM

## 2023-04-03 RX ORDER — LEVOTHYROXINE SODIUM 137 UG/1
TABLET ORAL
Qty: 24 TABLET | Refills: 3 | Status: SHIPPED | OUTPATIENT
Start: 2023-04-03 | End: 2024-02-15

## 2023-04-03 RX ORDER — EMPAGLIFLOZIN 10 MG/1
TABLET, FILM COATED ORAL
Qty: 90 TABLET | Refills: 3 | Status: SHIPPED | OUTPATIENT
Start: 2023-04-03 | End: 2023-04-14 | Stop reason: SDUPTHER

## 2023-04-14 ENCOUNTER — PATIENT MESSAGE (OUTPATIENT)
Dept: MEDICAL GROUP | Facility: MEDICAL CENTER | Age: 53
End: 2023-04-14
Payer: COMMERCIAL

## 2023-04-14 DIAGNOSIS — Z79.4 TYPE 2 DIABETES MELLITUS WITHOUT COMPLICATION, WITH LONG-TERM CURRENT USE OF INSULIN (HCC): ICD-10-CM

## 2023-04-14 DIAGNOSIS — E11.9 TYPE 2 DIABETES MELLITUS WITHOUT COMPLICATION, WITH LONG-TERM CURRENT USE OF INSULIN (HCC): ICD-10-CM

## 2023-04-14 RX ORDER — EMPAGLIFLOZIN 10 MG/1
TABLET, FILM COATED ORAL
Qty: 90 TABLET | Refills: 3 | Status: SHIPPED | OUTPATIENT
Start: 2023-04-14 | End: 2023-10-12

## 2023-04-16 DIAGNOSIS — I47.11 INAPPROPRIATE SINUS TACHYCARDIA (HCC): ICD-10-CM

## 2023-04-17 RX ORDER — IVABRADINE 5 MG/1
TABLET, FILM COATED ORAL
Qty: 180 TABLET | Refills: 0 | Status: SHIPPED | OUTPATIENT
Start: 2023-04-17 | End: 2023-07-13

## 2023-04-17 NOTE — TELEPHONE ENCOUNTER
Is the patient due for a refill? Yes    Was the patient seen the past year? Yes    Date of last office visit: 07/26/2022    Does the patient have an upcoming appointment? NO       Provider to refill:HK    Does the patients insurance require a 100 day supply?  No

## 2023-04-20 ENCOUNTER — PATIENT MESSAGE (OUTPATIENT)
Dept: MEDICAL GROUP | Facility: MEDICAL CENTER | Age: 53
End: 2023-04-20
Payer: COMMERCIAL

## 2023-04-20 DIAGNOSIS — K44.9 HIATAL HERNIA: ICD-10-CM

## 2023-04-20 RX ORDER — OMEPRAZOLE 40 MG/1
40 CAPSULE, DELAYED RELEASE ORAL 2 TIMES DAILY
Qty: 180 CAPSULE | Refills: 3 | Status: SHIPPED | OUTPATIENT
Start: 2023-04-20

## 2023-04-20 RX ORDER — GABAPENTIN 800 MG/1
800 TABLET ORAL 3 TIMES DAILY
Qty: 270 TABLET | Refills: 3 | Status: SHIPPED | OUTPATIENT
Start: 2023-04-20

## 2023-04-20 NOTE — PATIENT COMMUNICATION
Received request via: Patient    Was the patient seen in the last year in this department? Yes    Does the patient have an active prescription (recently filled or refills available) for medication(s) requested? No    Does the patient have shelter Plus and need 100 day supply (blood pressure, diabetes and cholesterol meds only)? Patient does not have SCP    Pharmacy updated in chart

## 2023-04-23 ENCOUNTER — HOSPITAL ENCOUNTER (EMERGENCY)
Facility: MEDICAL CENTER | Age: 53
End: 2023-04-23
Attending: EMERGENCY MEDICINE
Payer: COMMERCIAL

## 2023-04-23 ENCOUNTER — APPOINTMENT (OUTPATIENT)
Dept: RADIOLOGY | Facility: MEDICAL CENTER | Age: 53
End: 2023-04-23
Attending: EMERGENCY MEDICINE
Payer: COMMERCIAL

## 2023-04-23 VITALS
DIASTOLIC BLOOD PRESSURE: 75 MMHG | TEMPERATURE: 97.8 F | BODY MASS INDEX: 44.3 KG/M2 | WEIGHT: 250 LBS | HEART RATE: 76 BPM | SYSTOLIC BLOOD PRESSURE: 121 MMHG | RESPIRATION RATE: 18 BRPM | HEIGHT: 63 IN | OXYGEN SATURATION: 96 %

## 2023-04-23 DIAGNOSIS — S30.0XXA LUMBAR CONTUSION, INITIAL ENCOUNTER: ICD-10-CM

## 2023-04-23 DIAGNOSIS — S09.90XA CLOSED HEAD INJURY, INITIAL ENCOUNTER: ICD-10-CM

## 2023-04-23 PROCEDURE — 99284 EMERGENCY DEPT VISIT MOD MDM: CPT

## 2023-04-23 PROCEDURE — A9270 NON-COVERED ITEM OR SERVICE: HCPCS | Performed by: EMERGENCY MEDICINE

## 2023-04-23 PROCEDURE — 700102 HCHG RX REV CODE 250 W/ 637 OVERRIDE(OP): Performed by: EMERGENCY MEDICINE

## 2023-04-23 PROCEDURE — 70450 CT HEAD/BRAIN W/O DYE: CPT

## 2023-04-23 PROCEDURE — 72131 CT LUMBAR SPINE W/O DYE: CPT

## 2023-04-23 RX ORDER — OXYCODONE HYDROCHLORIDE AND ACETAMINOPHEN 5; 325 MG/1; MG/1
2 TABLET ORAL ONCE
Status: COMPLETED | OUTPATIENT
Start: 2023-04-23 | End: 2023-04-23

## 2023-04-23 RX ORDER — ONDANSETRON 2 MG/ML
4 INJECTION INTRAMUSCULAR; INTRAVENOUS ONCE
Status: DISCONTINUED | OUTPATIENT
Start: 2023-04-23 | End: 2023-04-23

## 2023-04-23 RX ORDER — HYDROMORPHONE HYDROCHLORIDE 1 MG/ML
1 INJECTION, SOLUTION INTRAMUSCULAR; INTRAVENOUS; SUBCUTANEOUS ONCE
Status: DISCONTINUED | OUTPATIENT
Start: 2023-04-23 | End: 2023-04-23

## 2023-04-23 RX ADMIN — OXYCODONE AND ACETAMINOPHEN 2 TABLET: 5; 325 TABLET ORAL at 13:15

## 2023-04-23 ASSESSMENT — FIBROSIS 4 INDEX: FIB4 SCORE: 1.68

## 2023-04-23 NOTE — LETTER
"  FORM C-4:  EMPLOYEE’S CLAIM FOR COMPENSATION/ REPORT OF INITIAL TREATMENT  EMPLOYEE’S CLAIM - PROVIDE ALL INFORMATION REQUESTED   First Name Sarah Last Name Idalia Birthdate 1970  Sex female Claim Number   Home Address [address]   [place]             [place] [address]                                   Age  52 y.o. Height  1.6 m (5' 3\") Weight  113 kg (250 lb) [facility]     Mailing Address [address]  [place]              Zip [address] Telephone  [phone] (home)  Primary Language Spoken  English   Insurer   Third Party   [employer] Employee's Occupation (Job Title) When Injury or Occupational Disease Occurred     Employer's Name [employer] Telephone [phone]    Employer Address [address] [facility] [address]] Zip [address]   Date of Injury  4/23/2023       Hour of Injury  11:30 AM Date Employer Notified  4/23/2023 Last Day of Work after Injury or Occupational Disease  4/23/2023 Supervisor to Whom Injury Reported  Beny Dobbs   Address or Location of Accident (if applicable) Home [2]   What were you doing at the time of accident? (if applicable) working-walking backward    How did this injury or occupational disease occur? Be specific and answer in detail. Use additional sheet if necessary)  trying to scan a QR code - walked backward - left foot hit pallet behind me - I fell backwards, hitting legs, lower back then head on concrete   If you believe that you have an occupational disease, when did you first have knowledge of the disability and it relationship to your employment? N/A Witnesses to the Accident  Unknown-   Nature of Injury or Occupational Disease  Workers' Compensation Part(s) of Body Injured or Affected  Lower Back Area (Lumbar Area & Lumbo-Sacral), Skull, N/A    I CERTIFY THAT THE ABOVE IS TRUE AND CORRECT TO THE BEST OF MY KNOWLEDGE AND THAT I HAVE PROVIDED THIS INFORMATION IN ORDER TO " OBTAIN THE BENEFITS OF [place]  I HEREBY AUTHORIZE ANY PHYSICIAN, CHIROPRACTOR, SURGEON, PRACTITIONER, OR OTHER PERSON, ANY HOSPITAL, INCLUDING [facility], ANY MEDICAL SERVICE ORGANIZATION, ANY INSURANCE COMPANY, OR OTHER INSTITUTION OR ORGANIZATION TO RELEASE TO EACH OTHER, ANY MEDICAL OR OTHER INFORMATION, INCLUDING BENEFITS PAID OR PAYABLE, PERTINENT TO THIS INJURY OR DISEASE, EXCEPT INFORMATION RELATIVE TO DIAGNOSIS, TREATMENT AND/OR COUNSELING FOR AIDS, PSYCHOLOGICAL CONDITIONS, ALCOHOL OR CONTROLLED SUBSTANCES, FOR WHICH I MUST GIVE SPECIFIC AUTHORIZATION.  A PHOTOSTAT OF THIS AUTHORIZATION SHALL BE AS VALID AS THE ORIGINAL.  Date     04/23/2023          Place        [facility]          Employee’s Signature   THIS REPORT MUST BE COMPLETED AND MAILED WITHIN 3 WORKING DAYS OF TREATMENT   Place [facility], EMERGENCY DEPT                       Name of Facility [facility]   Date  4/23/2023 Diagnosis  (S09.90XA) Closed head injury, initial encounter  (S30.0XXA) Lumbar contusion, initial encounter Is there evidence the injured employee was under the influence of alcohol and/or another controlled substance at the time of accident?   Hour  1:56 PM Description of Injury or Disease  Closed head injury, initial encounter  Lumbar contusion, initial encounter No   Treatment  Physician evaluation and treatment of occupational head and back injury  Have you advised the patient to remain off work five days or more?         No   X-Ray Findings  Negative  Comments:CT scan of head and lumbosacral spine are negative for acute injury If Yes   From Date    To Date      From information given by the employee, together with medical evidence, can you directly connect this injury or occupational disease as job incurred? Yes If No, is employee capable of: Full  "Duty  Yes Modified Duty      Is additional medical care by a physician indicated? No If Modified Duty, Specify any Limitations / Restrictions       Do you know of any previous injury or disease contributing to this condition or occupational disease? No    Date 4/23/2023 Print Doctor’s Name Scott Franco I certify the employer’s copy of this form was mailed on:   Address [address] [place] [id number] [phone] INSURER’S USE ONLY   Provider’s Tax ID Number [id number] Telephone Dept: [phone]    Doctor’s Signature e-SCOTT Duran M.D. Degree  M.D.      Form C-4 (rev.10/07)                                                                         BRIEF DESCRIPTION OF RIGHTS AND BENEFITS  (Pursuant to [place] 616[place].050)    Notice of Injury or Occupational Disease (Incident Report Form C-1): If an injury or occupational disease (OD) arises out of and in the course of employment, you must provide written notice to your employer as soon as practicable, but no later than 7 days after the accident or OD. Your employer shall maintain a sufficient supply of the required forms.    Claim for Compensation (Form C-4): If medical treatment is sought, the form C-4 is available at the place of initial treatment. A completed \"Claim for Compensation\" (Form C-4) must be filed within 90 days after an accident or OD. The treating physician or chiropractor must, within 3 working days after treatment, complete and mail to the employer, the employer's insurer and third-party , the Claim for Compensation.    Medical Treatment: If you require medical treatment for your on-the-job injury or OD, you may be required to select a physician or chiropractor from a list provided by your workers’ compensation insurer, if it has contracted with an Organization for Managed Care (MCO) or Preferred Provider Organization (PPO) or providers of health care. If your employer has not entered into a contract with an MCO or PPO, you may " select a physician or chiropractor from the Panel of Physicians and Chiropractors. Any medical costs related to your industrial injury or OD will be paid by your insurer.    Temporary Total Disability (TTD): If your doctor has certified that you are unable to work for a period of at least 5 consecutive days, or 5 cumulative days in a 20-day period, or places restrictions on you that your employer does not accommodate, you may be entitled to TTD compensation.    Temporary Partial Disability (TPD): If the wage you receive upon reemployment is less than the compensation for TTD to which you are entitled, the insurer may be required to pay you TPD compensation to make up the difference. TPD can only be paid for a maximum of 24 months.    Permanent Partial Disability (PPD): When your medical condition is stable and there is an indication of a PPD as a result of your injury or OD, within 30 days, your insurer must arrange for an evaluation by a rating physician or chiropractor to determine the degree of your PPD. The amount of your PPD award depends on the date of injury, the results of the PPD evaluation, your age and wage.    Permanent Total Disability (PTD): If you are medically certified by a treating physician or chiropractor as permanently and totally disabled and have been granted a PTD status by your insurer, you are entitled to receive monthly benefits not to exceed 66 2/3% of your average monthly wage. The amount of your PTD payments is subject to reduction if you previously received a lump-sum PPD award.    Vocational Rehabilitation Services: You may be eligible for vocational rehabilitation services if you are unable to return to the job due to a permanent physical impairment or permanent restrictions as a result of your injury or occupational disease.    Transportation and Per Donal Reimbursement: You may be eligible for travel expenses and per donal associated with medical treatment.    Reopening: You may be  able to reopen your claim if your condition worsens after claim closure.     Appeal Process: If you disagree with a written determination issued by the insurer or the insurer does not respond to your request, you may appeal to the Department of Administration, , by following the instructions contained in your determination letter. You must appeal the determination within 70 days from the date of the determination letter at [address], [address], [place], [place] [address], or [address], [address], [place], [place] [address]. If you disagree with the  decision, you may appeal to the Department of Administration, . You must file your appeal within 30 days from the date of the  decision letter at [address], [address], [place], [place] [address], or [address], [place], [place] [address]. If you disagree with a decision of an , you may file a petition for judicial review with the District Court. You must do so within 30 days of the Appeal Officer’s decision. You may be represented by an  at your own expense or you may contact the [facility] for possible representation.    [place]  for Injured Workers (NAIW): If you disagree with a  decision, you may request that NAIW represent you without charge at an  Hearing. For information regarding denial of benefits, you may contact the [facility] at: [address], [address], [place] [address], [phone], or [address], [place] [address], [phone]    To File a Complaint with the Division: If you wish to file a complaint with the  of the Division of Industrial Relations (DIR),  please contact the Workers’ Compensation Section, [address], [facility] [address], [place], [place] [address], telephone ([phone]) [phone], or [address], [place], [place]  [address], telephone [phone].    For assistance with Workers’ Compensation Issues: You may contact the [facility] Office for Consumer Health Assistance, [facility] [facility], [facility] [address], [place] [id number], Toll Free 1-343.221.2753, Web site: http://Replaced by [facility].[place].gov/Programs/EULALIA E-mail: eulalia@[email or web address].[place].gov  D-2 (rev. 10/20)              __________________________________________________________________                                    _______04/23/2023__________            Employee Name / Signature                                                                                                                            Date

## 2023-04-23 NOTE — ED TRIAGE NOTES
Chief Complaint   Patient presents with    T-5000 GLF     Pt reports that she stepped backwards and her foot caught the edge of a pallet, pt fell backward landing on back then head on concrete. -LOC, -Blood thinners. GCS 15     BIB [facility]. Pt received a total of 200mcg fentanyl PTA. Per EMS pt is ambulatory with assist. Pt did C/O some numbness in route that resolved with repositioning. Denies NT. Denies loss of B or B.  BP (!) 119/94   Pulse 78   Temp 36.2 °C (97.1 °F) (Temporal)   Resp 20   Wt 113 kg (250 lb)   SpO2 92%       Pt informed of wait times. Educated on triage process.  Asked to return to triage RN for any new or worsening of symptoms. Thanked for patience.

## 2023-04-23 NOTE — ED PROVIDER NOTES
ED Provider Note    CHIEF COMPLAINT  Chief Complaint   Patient presents with    T-5000 GLF     Pt reports that she stepped backwards and her foot caught the edge of a pallet, pt fell backward landing on back then head on concrete. -LOC, -Blood thinners. GCS 15       EXTERNAL RECORDS REVIEWED  Reviewed outpatient clinic visits medication list    HPI/ROS  LIMITATION TO HISTORY   None    OUTSIDE HISTORIAN(S):  None    Sarah Friedman is a 52 y.o. female who presents for evaluation of an injury.  The patient reports that she was at work, she stepped backwards caught the edge of a palate and struck the back of her head and her low back.  This was about an hour ago.  She reports 10 out of 10 throbbing headache and low back pain.  She denies loss of consciousness.  She denies any focal numbness weakness tingling to the arms legs or face.  No reported pain or injury to the mid back upper or lower extremities chest abdomen or pelvis.  She does not take any blood thinners.  Paramedics arrived and administered IV fentanyl.  No other symptoms reported    PAST MEDICAL HISTORY   has a past medical history of Anesthesia (08/27/2019), Arrhythmia, Arthritis, Blood clotting disorder ([facility]) (2005), Bowel habit changes, Depression, Diabetes ([facility]) (08/2019), Diabetic neuropathy (HCC), Esophageal spasm, GERD (gastroesophageal reflux disease), Hashimoto's disease, Hiatal hernia, High cholesterol, Hyperlipidemia, Hypothyroid, Sleep apnea, Snoring, and Tachycardia.    SURGICAL HISTORY   has a past surgical history that includes tonsillectomy and adenoidectomy (1977); endoscopy (2016); colonoscopy (2016); other orthopedic surgery (2012); other (2016); other (1971, 1978, 1985); vaginal hysterectomy scope total (N/A, 4/24/2018); salpingo oophorectomy (Bilateral, 4/24/2018); anterior and posterior repair (4/24/2018); enterocele repair (4/24/2018); bladder sling female (4/24/2018); vaginal suspension (N/A, 4/24/2018); cystoscopy (N/A,  4/24/2018); colonoscopy (N/A, 8/30/2019); and gastroscopy (N/A, 8/30/2019).    FAMILY HISTORY  Family History   Problem Relation Age of Onset    Colorectal Cancer Mother     Breast Cancer Mother     Cancer Mother 70        pancreatic, breast in 30's    Heart Disease Father     Cancer Father         lung    Diabetes Sister     Diabetes Maternal Uncle     Heart Disease Maternal Grandmother         MI    Hyperlipidemia Maternal Grandmother     Diabetes Maternal Grandfather     Hyperlipidemia Maternal Grandfather     Stroke Neg Hx     Peritoneal Cancer Neg Hx     Tubal Cancer Neg Hx     Ovarian Cancer Neg Hx        SOCIAL HISTORY  Social History     Tobacco Use    Smoking status: Never    Smokeless tobacco: Never   Vaping Use    Vaping Use: Never used   Substance and Sexual Activity    Alcohol use: Never    Drug use: No     Comment: previous medical marijuana use for pain    Sexual activity: Yes     Partners: Female       CURRENT MEDICATIONS  Home Medications       Reviewed by Monica Berrios R.N. (Registered Nurse) on 04/23/23 at 1143  Med List Status: Partial     Medication Last Dose Status   acetaminophen (TYLENOL) 500 MG Tab  Active   Alcohol Swabs (ALCOHOL PREP) 70 % Pads  Active   atorvastatin (LIPITOR) 20 MG Tab  Active   CORLANOR 5 MG Tab tablet  Active   cyclobenzaprine (FLEXERIL) 10 MG Tab  Active   divalproex (DEPAKOTE) 500 MG Tablet Delayed Response  Active   Empagliflozin (JARDIANCE) 10 MG Tab tablet  Active   estradiol (ESTRACE) 0.5 MG tablet  Active   estradiol (ESTRACE) 1 MG Tab  Active   gabapentin (NEURONTIN) 800 MG tablet  Active   hydroCHLOROthiazide (HYDRODIURIL) 12.5 MG tablet  Active   HYDROcodone-acetaminophen (NORCO) 5-325 MG Tab per tablet  Active   ibuprofen (MOTRIN) 800 MG Tab  Active   Insulin Pen Needle 32 G x 4 mm (BD PEN NEEDLE JOSE J U/F)  Active   Insulin Pen Needle 32 G x 4 mm (BD PEN NEEDLE JOSE J U/F)  Active   levothyroxine (SYNTHROID) 125 MCG Tab  Active   levothyroxine  "(SYNTHROID) 137 MCG Tab  Active   metformin (GLUCOPHAGE) 1000 MG tablet  Active   Multiple Vitamins-Minerals (MULTIVITAMIN PO)  Active   omeprazole (PRILOSEC) 40 MG delayed-release capsule  Active   oxybutynin SR (DITROPAN-XL) 10 MG CR tablet  Active   pioglitazone (ACTOS) 45 MG Tab  Active   polyethylene glycol 3350 (MIRALAX) 17 GM/SCOOP Powder  Active   promethazine (PHENERGAN) 25 MG Tab  Active   risperiDONE (RISPERDAL) 2 MG Tab  Active   spironolactone (ALDACTONE) 25 MG Tab  Active   sumatriptan (IMITREX) 100 MG tablet  Active                    ALLERGIES  Allergies   Allergen Reactions    Betadine Prepstick Plus [Povidone-Iodine] Rash     Rash swelling    Hydromorphone Hcl Itching     Itching \"head to toe\"    Morphine Itching     Itching \"head to toe\"    Povidone Iodine Hives    Tape Itching     Itching \"leave scars\"       PHYSICAL EXAM  VITAL SIGNS: /66   Pulse 80   Temp 36.2 °C (97.1 °F) (Temporal)   Resp 18   Wt 113 kg (250 lb)   LMP 04/15/2018   SpO2 95%   BMI 44.29 kg/m²    Pulse ox interpretation: I interpret this pulse ox as normal.  Constitutional: Alert and oriented x 3, moderate distress  HEENT: No hemotympanum negative ohara sign, 2 x 2 centimeter small hematoma noted over the occiput without laceration.  c, pupils are equal round reactive to light extraocular movements are intact. The nares is clear, external ears are normal, mouth shows moist mucous membranes normal dentition for age  Neck: Supple, no JVD no tracheal deviation  Cardiovascular: Regular rate and rhythm no murmur rub or gallop 2+ pulses peripherally x4  Thorax & Lungs: No respiratory distress, no wheezes rales or rhonchi, No chest tenderness.   GI: Soft nontender nondistended positive bowel sounds, no peritoneal signs  Back: No thoracic midline bony tenderness, bony tenderness at L3-L4 without step-off.  Skin: Warm dry no acute rash or lesion  Musculoskeletal: Moving all extremities with full range and 5 of 5 strength no " acute  deformity  Neurologic: Cranial nerves III through XII are grossly intact no sensory deficit no cerebellar dysfunction normal sensorium GCS 15 NIH stroke scale score of 0  Psychiatric: Appropriate affect for situation at this time          DIAGNOSTIC STUDIES / PROCEDURES    RADIOLOGY  I have independently interpreted the diagnostic imaging associated with this visit and am waiting the final reading from the radiologist.   My preliminary interpretation is as follows: no acute injury, skull fracture, bleeding  Radiologist interpretation:     CT-LSPINE W/O PLUS RECONS   Final Result         1. No acute fracture or malalignment appreciated in the lumbar spine         CT-HEAD W/O   Final Result         1. No acute intracranial abnormality. No evidence of acute intracranial hemorrhage or mass lesion.                         COURSE & MEDICAL DECISION MAKING    ED Observation Status?  No    INITIAL ASSESSMENT, COURSE AND PLAN  Care Narrative:     This is a very pleasant 52-year-old female who had a fall while at work.  She struck her head and low back.  I was concerned about possible intercranial hemorrhage skull fracture or basilar skull fracture subarachnoid hemorrhage lumbar fracture neurological deficit.  The patient met criteria for CT scanning given her symptoms and mechanism of injury.  Subsequent imaging were both negative for any acute process.  I performed serial neurological exams and there is no evidence of deterioration.  I initially had ordered parenteral pain medication but she declined and preferred some oral Percocet which was administered I considered prescribing opioid therapy but with her operating heavy machinery I feel that Tylenol NSAIDs would be safer      ADDITIONAL PROBLEM LIST    DISPOSITION AND DISCUSSIONS  I have discussed management of the patient with the following physicians and MARLIN's: None    Discussion of management with other QHP or appropriate source(s): None    Escalation of care  considered, and ultimately not performed:IV fluids and blood analysis    Barriers to care at this time, including but not limited to: None    Decision tools and prescription drugs considered including, but not limited to: Considered prescribing opioids but will recommend NSAIDs and Tylenol instead    FINAL DIAGNOSIS  Closed head injury  Lumbar contusion       Electronically signed by: Scott Franco M.D., 4/23/2023 12:44 PM

## 2023-04-23 NOTE — ED NOTES
Patient back from lobby and changed into gown. 2 person assist to bed and used wheel chair back from lob.

## 2023-04-24 ENCOUNTER — HOSPITAL ENCOUNTER (OUTPATIENT)
Dept: RADIOLOGY | Facility: MEDICAL CENTER | Age: 53
End: 2023-04-24
Attending: PHYSICIAN ASSISTANT
Payer: COMMERCIAL

## 2023-04-24 ENCOUNTER — HOSPITAL ENCOUNTER (OUTPATIENT)
Facility: MEDICAL CENTER | Age: 53
End: 2023-04-24
Attending: PHYSICIAN ASSISTANT
Payer: COMMERCIAL

## 2023-04-24 ENCOUNTER — OFFICE VISIT (OUTPATIENT)
Dept: MEDICAL GROUP | Facility: MEDICAL CENTER | Age: 53
End: 2023-04-24
Payer: COMMERCIAL

## 2023-04-24 VITALS
BODY MASS INDEX: 43.41 KG/M2 | HEIGHT: 63 IN | HEART RATE: 101 BPM | OXYGEN SATURATION: 92 % | DIASTOLIC BLOOD PRESSURE: 64 MMHG | TEMPERATURE: 97.7 F | SYSTOLIC BLOOD PRESSURE: 110 MMHG | WEIGHT: 245 LBS

## 2023-04-24 DIAGNOSIS — Z79.891 CHRONIC USE OF OPIATE FOR THERAPEUTIC PURPOSE: ICD-10-CM

## 2023-04-24 DIAGNOSIS — M79.602 LEFT ARM PAIN: ICD-10-CM

## 2023-04-24 DIAGNOSIS — M79.645 PAIN OF LEFT THUMB: ICD-10-CM
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
## 2023-04-24 PROCEDURE — G0481 DRUG TEST DEF 8-14 CLASSES: HCPCS

## 2023-04-24 PROCEDURE — 99214 OFFICE O/P EST MOD 30 MIN: CPT | Performed by: PHYSICIAN ASSISTANT

## 2023-04-24 PROCEDURE — 73140 X-RAY EXAM OF FINGER(S): CPT | Mod: LT

## 2023-04-24 PROCEDURE — 73110 X-RAY EXAM OF WRIST: CPT | Mod: LT

## 2023-04-24 PROCEDURE — 73030 X-RAY EXAM OF SHOULDER: CPT | Mod: LT

## 2023-04-24 RX ORDER — HYDROCODONE BITARTRATE AND ACETAMINOPHEN 5; 325 MG/1; MG/1
1 TABLET ORAL EVERY 6 HOURS
Qty: 28 TABLET | Refills: 0 | Status: SHIPPED | OUTPATIENT
Start: 2023-04-24 | End: 2023-05-01

## 2023-04-24 ASSESSMENT — FIBROSIS 4 INDEX: FIB4 SCORE: 1.68

## 2023-04-24 ASSESSMENT — PATIENT HEALTH QUESTIONNAIRE - PHQ9: CLINICAL INTERPRETATION OF PHQ2 SCORE: 0

## 2023-04-24 NOTE — ASSESSMENT & PLAN NOTE
This is a pleasant 52-year-old female who yesterday at her work was backing up and tripped over a pallet.  She fell over onto her back and her head.  Was seen at the ER.  CT of her head and her lumbar spine were negative.  The attending went in and out of the room so fast she did not have time to discuss the pain of her left shoulder and her left thumb and wrist.  She states that her left arm was raised behind her head at the time.  She has a chronic history of taking Norco when needed.  Last prescription was filled in 1 month ago.  She was told recently by her PCP that she will not renew the medication.

## 2023-04-24 NOTE — PROGRESS NOTES
Subjective:   Sarah Friedman is a 52 y.o. female here today for work-related injury causing left thumb and wrist pain and left shoulder pain.    Work related injury  This is a pleasant 52-year-old female who yesterday at her work was backing up and tripped over a pallet.  She fell over onto her back and her head.  Was seen at the ER.  CT of her head and her lumbar spine were negative.  The attending went in and out of the room so fast she did not have time to discuss the pain of her left shoulder and her left thumb and wrist.  She states that her left arm was raised behind her head at the time.  She has a chronic history of taking Norco when needed.  Last prescription was filled in 1 month ago.  She was told recently by her PCP that she will not renew the medication.       Current medicines (including changes today)  Current Outpatient Medications   Medication Sig Dispense Refill    HYDROcodone-acetaminophen (NORCO) 5-325 MG Tab per tablet Take 1 Tablet by mouth every 6 hours for 7 days. 28 Tablet 0    gabapentin (NEURONTIN) 800 MG tablet Take 1 Tablet by mouth 3 times a day. 270 Tablet 3    omeprazole (PRILOSEC) 40 MG delayed-release capsule Take 1 Capsule by mouth 2 times a day. 180 Capsule 3    CORLANOR 5 MG Tab tablet TAKE 1 TABLET BY MOUTH TWICE DAILY WITH MEALS 180 Tablet 0    Empagliflozin (JARDIANCE) 10 MG Tab tablet Take one tablet daily 90 Tablet 3    levothyroxine (SYNTHROID) 137 MCG Tab TAKE 1 TABLET BY MOUTH EVERY SUNDAY AND Thursday. 24 Tablet 3    pioglitazone (ACTOS) 45 MG Tab Take 1 Tablet by mouth every day. 90 Tablet 3    estradiol (ESTRACE) 0.5 MG tablet Take 1 Tablet by mouth every day. 90 Tablet 3    metformin (GLUCOPHAGE) 1000 MG tablet Take 1 Tablet by mouth 2 times a day with meals. 180 Tablet 3    ibuprofen (MOTRIN) 800 MG Tab Take 1 Tablet by mouth 2 times a day. 90 Tablet 0    oxybutynin SR (DITROPAN-XL) 10 MG CR tablet Take 1 Tablet by mouth 2 times a day. 60 Tablet 12     spironolactone (ALDACTONE) 25 MG Tab Take 1 tablet by mouth once daily 90 Tablet 1    estradiol (ESTRACE) 1 MG Tab Take 1 Tablet by mouth every day. 90 Tablet 4    atorvastatin (LIPITOR) 20 MG Tab Take 1 Tablet by mouth every day. 90 Tablet 3    levothyroxine (SYNTHROID) 125 MCG Tab TAKE 125 MCG EVERY MONDAYS, TUESDAYS WEDNESDAYS, FRIDAYS AND SATURDAYS 65 Tablet 2    hydroCHLOROthiazide (HYDRODIURIL) 12.5 MG tablet Take 1 tablet by mouth once daily 90 Tablet 2    Insulin Pen Needle 32 G x 4 mm (BD PEN NEEDLE JOSEJ  U/F) USE 1  ONCE DAILY . APPOINTMENT REQUIRED FOR FUTURE REFILLS 100 Each 0    Insulin Pen Needle 32 G x 4 mm (BD PEN NEEDLE JOSE J U/F) USE 1  ONCE DAILY 100 Each 0    promethazine (PHENERGAN) 25 MG Tab Take 1 Tablet by mouth every 6 hours as needed for Nausea/Vomiting. 30 Tablet 0    Alcohol Swabs (ALCOHOL PREP) 70 % Pads Use three times daily to clean finger before blood glucose testing 100 Each 11    divalproex (DEPAKOTE) 500 MG Tablet Delayed Response Take 500 mg by mouth 2 times a day.      risperiDONE (RISPERDAL) 2 MG Tab TAKE 1 TABLET BY MOUTH ONCE DAILY AT NIGHT AS DIRECTED      sumatriptan (IMITREX) 100 MG tablet TAKE 1/2 TO 1 (ONE-HALF TO ONE) TABLET BY MOUTH AS NEEDED FOR  MIGRAINE/HEADACHE 9 Tab 5    cyclobenzaprine (FLEXERIL) 10 MG Tab Take 1 Tab by mouth at bedtime as needed for Muscle Spasms. 30 Tab 5    polyethylene glycol 3350 (MIRALAX) 17 GM/SCOOP Powder Take 17 g by mouth 1 time daily as needed (constipation). 510 g 5    acetaminophen (TYLENOL) 500 MG Tab Take 500-1,000 mg by mouth every 6 hours as needed.      Multiple Vitamins-Minerals (MULTIVITAMIN PO) Take 1 Tab by mouth every day.       No current facility-administered medications for this visit.     She  has a past medical history of Anesthesia (08/27/2019), Arrhythmia, Arthritis, Blood clotting disorder ([facility]) (2005), Bowel habit changes, Depression, Diabetes ([facility]) (08/2019), Diabetic neuropathy ([facility]), Esophageal spasm, GERD  "(gastroesophageal reflux disease), Hashimoto's disease, Hiatal hernia, High cholesterol, Hyperlipidemia, Hypothyroid, Sleep apnea, Snoring, and Tachycardia.    She has no past medical history of Addisons disease (HCC) or Adrenal disorder (HCC).    Social History and Family History were reviewed and updated.    ROS   No chest pain, no shortness of breath, no abdominal pain and all other systems were reviewed and are negative.       Objective:     /64 (BP Location: Right arm, Patient Position: Sitting, BP Cuff Size: Large adult)   Pulse (!) 101   Temp 36.5 °C (97.7 °F) (Temporal)   Ht 1.6 m (5' 3\")   Wt 111 kg (245 lb)   SpO2 92%  Body mass index is 43.4 kg/m².   Physical Exam:  Constitutional: Alert, no distress.  Skin: Warm, dry, good turgor, no rashes in visible areas.  Eye: Equal, round and reactive, conjunctiva clear, lids normal.  ENMT: Lips without lesions, good dentition, oropharynx clear.  Neck: Trachea midline, no masses.   Lymph: No cervical or supraclavicular lymphadenopathy  Respiratory: Unlabored respiratory effort, lungs appear clear.  Musculoskeletal: Snuffbox tenderness of the left wrist.  Medial shoulder tenderness to light palpation.  No ecchymosis noted.  No swelling of her wrist noted.  Psych: Alert and oriented x3, normal affect and mood.        Assessment and Plan:   The following treatment plan was discussed    1. Work related injury  Status post fall.  Discussed contacting her employer regarding Worker's Comp. case.    2. Left arm pain  Acute, new onset condition status post fall.  Ordered x-ray of her thumb and wrist as well as a shoulder x-ray.  Does not appear that anything might of been fractured but will rule out with imaging.  Also renew Norco.  Provided a 1 week supply.  Urine drug screen collected.  Agreement was signed.  - DX-FINGER(S) 2+ LEFT; Future  - DX-SHOULDER 2+ LEFT; Future  - HYDROcodone-acetaminophen (NORCO) 5-325 MG Tab per tablet; Take 1 Tablet by mouth every 6 " hours for 7 days.  Dispense: 28 Tablet; Refill: 0  - PAIN MANAGEMENT SCRN, UR; Future  - Controlled Substance Treatment Agreement    3. Pain of left thumb  , New onset condition status post fall.  X-rays ordered.  She will follow-up with imaging.  - DX-FINGER(S) 2+ LEFT; Future  - DX-SHOULDER 2+ LEFT; Future  - HYDROcodone-acetaminophen (NORCO) 5-325 MG Tab per tablet; Take 1 Tablet by mouth every 6 hours for 7 days.  Dispense: 28 Tablet; Refill: 0  - PAIN MANAGEMENT SCRN, UR; Future  - Controlled Substance Treatment Agreement  - DX-WRIST-COMPLETE 3+ LEFT; Future    4. Chronic use of opiate for therapeutic purpose  's of opiates.  Takes some intermittently for back pain.  I will provide her a 1 week supply which is 28 tablets.  Typically she received 30 tablets a month.  She will follow-up with her PCP for monthly prescriptions.  Did initiate urine drug screen as well as agreement signing.  - HYDROcodone-acetaminophen (NORCO) 5-325 MG Tab per tablet; Take 1 Tablet by mouth every 6 hours for 7 days.  Dispense: 28 Tablet; Refill: 0  - PAIN MANAGEMENT SCRN, UR; Future  - Controlled Substance Treatment Agreement         Followup: No follow-ups on file.    Please note that this dictation was created using voice recognition software. I have made every reasonable attempt to correct obvious errors, but I expect that there are errors of grammar and possibly content that I did not discover before finalizing the note.

## 2023-04-24 NOTE — ASSESSMENT & PLAN NOTE
Chronic, controlled     Ref. Range 9/18/2020 11:03   Glycohemoglobin Latest Ref Range: 0.0 - 5.6 % 6.3 (H)   Estim. Avg Glu Latest Units: mg/dL 134      Ref. Range 2/21/2020 15:06   Glycohemoglobin Latest Ref Range: 0.0 - 5.6 % 5.6     on glargine 60 unit every evening.   Jardiance, metformin     Was The Patient On Physician Recommended Anticoagulation Therapy?: Please Select the Appropriate Response

## 2023-04-29 LAB
1OH-MIDAZOLAM UR QL SCN: NOT DETECTED
6MAM UR QL: NOT DETECTED
7AMINOCLONAZEPAM UR QL: NOT DETECTED
A-OH ALPRAZ UR QL: NOT DETECTED
ALPRAZ UR QL: NOT DETECTED
AMPHET UR QL SCN: NOT DETECTED
ANNOTATION COMMENT IMP: NORMAL
ANNOTATION COMMENT IMP: NORMAL
BARBITURATES UR QL: NOT DETECTED
BUPRENORPHINE UR QL: NOT DETECTED
BZE UR QL: NOT DETECTED
CARBOXYTHC UR QL: NOT DETECTED
CARISOPRODOL UR QL: NOT DETECTED
CLONAZEPAM UR QL: NOT DETECTED
CODEINE UR QL: NOT DETECTED
DIAZEPAM UR QL: NOT DETECTED
ETHYL GLUCURONIDE UR QL: NOT DETECTED
FENTANYL UR QL: NOT DETECTED
GABAPENTIN UR QL: PRESENT
HYDROCODONE UR QL: PRESENT
HYDROMORPHONE UR QL: PRESENT
LORAZEPAM UR QL: NOT DETECTED
MDA UR QL: NOT DETECTED
MDEA UR QL: NOT DETECTED
MDMA UR QL: NOT DETECTED
MEPERIDINE UR QL: NOT DETECTED
METHADONE UR QL: NOT DETECTED
METHAMPHET UR QL: NOT DETECTED
MIDAZOLAM UR QL SCN: NOT DETECTED
MORPHINE UR QL: NOT DETECTED
NALOXONE UR QL SCN: NOT DETECTED
NORBUPRENORPHINE UR QL CFM: NOT DETECTED
NORDIAZEPAM UR QL: NOT DETECTED
NORFENTANYL UR QL: PRESENT
NORHYDROCODONE UR QL CFM: NOT DETECTED
NOROXYCODONE UR QL CFM: NOT DETECTED
NOROXYMORPH CO100 Q0458: NOT DETECTED
OXAZEPAM UR QL: NOT DETECTED
OXYCODONE UR QL: NOT DETECTED
OXYMORPHONE UR QL: PRESENT
PATHOLOGY STUDY: NORMAL
PCP UR QL: NOT DETECTED
PHENTERMINE UR QL: NOT DETECTED
PPAA UR QL: NOT DETECTED
PREGABALIN UR QL SCN: NOT DETECTED
SERVICE CMNT-IMP: NORMAL
TAPENADOL OSULF CO200 Q0473: NOT DETECTED
TAPENTADOL UR QL SCN: NOT DETECTED
TEMAZEPAM UR QL: NOT DETECTED
TRAMADOL UR QL: NOT DETECTED
ZOLPIDEM PHENYL-4-CARB UR QL SCN: NOT DETECTED
ZOLPIDEM UR QL: NOT DETECTED

## 2023-05-04 ENCOUNTER — OFFICE VISIT (OUTPATIENT)
Dept: MEDICAL GROUP | Facility: MEDICAL CENTER | Age: 53
End: 2023-05-04
Payer: COMMERCIAL

## 2023-05-04 VITALS
TEMPERATURE: 97 F | OXYGEN SATURATION: 94 % | SYSTOLIC BLOOD PRESSURE: 100 MMHG | BODY MASS INDEX: 43.46 KG/M2 | RESPIRATION RATE: 17 BRPM | WEIGHT: 245.3 LBS | HEIGHT: 63 IN | HEART RATE: 79 BPM | DIASTOLIC BLOOD PRESSURE: 60 MMHG

## 2023-05-04 DIAGNOSIS — Z23 IMMUNIZATION DUE: ICD-10-CM

## 2023-05-04 DIAGNOSIS — G89.29 CHRONIC BILATERAL LOW BACK PAIN WITH LEFT-SIDED SCIATICA: ICD-10-CM

## 2023-05-04 DIAGNOSIS — M25.532 LEFT WRIST PAIN: ICD-10-CM

## 2023-05-04 DIAGNOSIS — M54.42 CHRONIC BILATERAL LOW BACK PAIN WITH LEFT-SIDED SCIATICA: ICD-10-CM

## 2023-05-04 DIAGNOSIS — Z79.891 CHRONIC USE OF OPIATE FOR THERAPEUTIC PURPOSE: ICD-10-CM

## 2023-05-04 PROCEDURE — 90472 IMMUNIZATION ADMIN EACH ADD: CPT | Performed by: STUDENT IN AN ORGANIZED HEALTH CARE EDUCATION/TRAINING PROGRAM

## 2023-05-04 PROCEDURE — 99214 OFFICE O/P EST MOD 30 MIN: CPT | Mod: 25 | Performed by: STUDENT IN AN ORGANIZED HEALTH CARE EDUCATION/TRAINING PROGRAM

## 2023-05-04 PROCEDURE — 90471 IMMUNIZATION ADMIN: CPT | Performed by: STUDENT IN AN ORGANIZED HEALTH CARE EDUCATION/TRAINING PROGRAM

## 2023-05-04 PROCEDURE — 90677 PCV20 VACCINE IM: CPT | Performed by: STUDENT IN AN ORGANIZED HEALTH CARE EDUCATION/TRAINING PROGRAM

## 2023-05-04 PROCEDURE — 90715 TDAP VACCINE 7 YRS/> IM: CPT | Performed by: STUDENT IN AN ORGANIZED HEALTH CARE EDUCATION/TRAINING PROGRAM

## 2023-05-04 RX ORDER — HYDROCODONE BITARTRATE AND ACETAMINOPHEN 7.5; 325 MG/1; MG/1
1 TABLET ORAL
Qty: 30 TABLET | Refills: 0 | Status: SHIPPED | OUTPATIENT
Start: 2023-07-03 | End: 2023-08-02

## 2023-05-04 RX ORDER — HYDROCODONE BITARTRATE AND ACETAMINOPHEN 7.5; 325 MG/1; MG/1
1 TABLET ORAL
Qty: 30 TABLET | Refills: 0 | Status: SHIPPED | OUTPATIENT
Start: 2023-05-04 | End: 2023-06-03

## 2023-05-04 RX ORDER — HYDROCODONE BITARTRATE AND ACETAMINOPHEN 7.5; 325 MG/1; MG/1
1 TABLET ORAL
Qty: 30 TABLET | Refills: 0 | Status: SHIPPED | OUTPATIENT
Start: 2023-06-03 | End: 2023-07-03

## 2023-05-04 ASSESSMENT — FIBROSIS 4 INDEX: FIB4 SCORE: 1.68

## 2023-05-04 NOTE — PROGRESS NOTES
"Subjective:     CC: Back pain, wrist pain, chronic use of opiate    HPI:   Sarah presents today with    Problem   Left Wrist Pain    This is a new condition.  About a week ago she fell and hurt her wrist.  At that time she an x-ray which did not show any fracture.  Over the last week she has not noticed any improvement in her symptoms.  She still has tenderness on the left wrist and pain with movements.  She says the pain is about a 6 out of 10.     Chronic Use of Opiate for Therapeutic Purpose    She has been on Norco 5 mg daily for greater than 1 year.  She does notice some improvement in her pain, but is still having some difficulty sleeping through the night and is hoping she can increase.     Chronic Low Back Pain    Chronic, stable.  She has been following with [facility] for some time and is taking 1 Norco a day.  She is hoping to switch that over to me.  She also has Flexeril and gabapentin which she relies on and takes her Norco only when needed.         ROS:  ROS    Objective:     Exam:  /60 (BP Location: Left arm, Patient Position: Sitting, BP Cuff Size: Large adult)   Pulse 79   Temp 36.1 °C (97 °F) (Temporal)   Resp 17   Ht 1.6 m (5' 3\")   Wt 111 kg (245 lb 4.8 oz)   LMP 04/15/2018   SpO2 94%   BMI 43.45 kg/m²  Body mass index is 43.45 kg/m².    Physical Exam  Vitals reviewed.   Constitutional:       General: She is not in acute distress.     Appearance: She is not toxic-appearing.   HENT:      Head: Normocephalic and atraumatic.      Right Ear: External ear normal.      Left Ear: External ear normal.   Eyes:      General:         Right eye: No discharge.         Left eye: No discharge.      Extraocular Movements: Extraocular movements intact.      Conjunctiva/sclera: Conjunctivae normal.   Pulmonary:      Effort: Pulmonary effort is normal. No respiratory distress.   Musculoskeletal:      Comments: Tenderness to palpation of the lateral side of the wrist just below the thenar " eminence, slight swelling, no skin discoloration, no crepitus   Skin:     General: Skin is warm and dry.   Neurological:      Mental Status: She is alert.   Psychiatric:         Mood and Affect: Mood normal.         Behavior: Behavior normal.         Thought Content: Thought content normal.         Judgment: Judgment normal.         Assessment & Plan:     52 y.o. female with the following -     1. Chronic use of opiate for therapeutic purpose  Controlled substance agreement signed, recent UDS reviewed.  Norco 7.5 mg 1 tablet daily as needed sent.  Return in 3 months for refill  - Controlled Substance Treatment Agreement  - HYDROcodone-acetaminophen (NORCO) 7.5-325 MG tab; Take 1 Tablet by mouth 1 time a day as needed for Severe Pain for up to 30 days.  Dispense: 30 Tablet; Refill: 0  - HYDROcodone-acetaminophen (NORCO) 7.5-325 MG tab; Take 1 Tablet by mouth 1 time a day as needed for Severe Pain for up to 30 days.  Dispense: 30 Tablet; Refill: 0  - HYDROcodone-acetaminophen (NORCO) 7.5-325 MG tab; Take 1 Tablet by mouth 1 time a day as needed for Severe Pain for up to 30 days.  Dispense: 30 Tablet; Refill: 0    2. Immunization due  - Tdap =>8yo IM  - Pneumococcal Conjugate Vaccine 20-Valent (19 yrs+)    3. Chronic bilateral low back pain with left-sided sciatica  Pain is not controlled with Norco 5 mg daily, increased to 7.5  - HYDROcodone-acetaminophen (NORCO) 7.5-325 MG tab; Take 1 Tablet by mouth 1 time a day as needed for Severe Pain for up to 30 days.  Dispense: 30 Tablet; Refill: 0  - HYDROcodone-acetaminophen (NORCO) 7.5-325 MG tab; Take 1 Tablet by mouth 1 time a day as needed for Severe Pain for up to 30 days.  Dispense: 30 Tablet; Refill: 0  - HYDROcodone-acetaminophen (NORCO) 7.5-325 MG tab; Take 1 Tablet by mouth 1 time a day as needed for Severe Pain for up to 30 days.  Dispense: 30 Tablet; Refill: 0    4. Left wrist pain  Discussed that this is likely a muscle injury.  Reviewed the negative x-ray.  We  discussed seeing an orthopedic doctor versus watching and waiting for a week or so to see if it gets better.  She would like to watch and wait for now but will reach out to me if she changes her mind or if pain is not improving.    No follow-ups on file.    Please note that this dictation was created using voice recognition software. I have made every reasonable attempt to correct obvious errors, but I expect that there are errors of grammar and possibly content that I did not discover before finalizing the note.

## 2023-06-08 ENCOUNTER — APPOINTMENT (OUTPATIENT)
Dept: RADIOLOGY | Facility: IMAGING CENTER | Age: 53
End: 2023-06-08
Attending: NURSE PRACTITIONER
Payer: COMMERCIAL

## 2023-06-08 ENCOUNTER — APPOINTMENT (OUTPATIENT)
Dept: URGENT CARE | Facility: CLINIC | Age: 53
End: 2023-06-08
Payer: COMMERCIAL

## 2023-06-08 ENCOUNTER — OCCUPATIONAL MEDICINE (OUTPATIENT)
Dept: URGENT CARE | Facility: CLINIC | Age: 53
End: 2023-06-08
Payer: COMMERCIAL

## 2023-06-08 VITALS
HEIGHT: 63 IN | HEART RATE: 76 BPM | BODY MASS INDEX: 44.12 KG/M2 | RESPIRATION RATE: 14 BRPM | WEIGHT: 249 LBS | OXYGEN SATURATION: 95 % | TEMPERATURE: 97 F | SYSTOLIC BLOOD PRESSURE: 102 MMHG | DIASTOLIC BLOOD PRESSURE: 56 MMHG

## 2023-06-08 DIAGNOSIS — G89.29 CHRONIC PAIN OF LEFT WRIST: ICD-10-CM

## 2023-06-08 DIAGNOSIS — M25.532 ACUTE PAIN OF LEFT WRIST: ICD-10-CM

## 2023-06-08 DIAGNOSIS — M25.532 CHRONIC PAIN OF LEFT WRIST: ICD-10-CM
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
## 2023-06-08 PROCEDURE — 3078F DIAST BP <80 MM HG: CPT | Performed by: NURSE PRACTITIONER

## 2023-06-08 PROCEDURE — 99214 OFFICE O/P EST MOD 30 MIN: CPT | Performed by: NURSE PRACTITIONER

## 2023-06-08 PROCEDURE — 3074F SYST BP LT 130 MM HG: CPT | Performed by: NURSE PRACTITIONER

## 2023-06-08 ASSESSMENT — FIBROSIS 4 INDEX: FIB4 SCORE: 1.68

## 2023-06-08 ASSESSMENT — ENCOUNTER SYMPTOMS
JOINT SWELLING: 1
WEAKNESS: 1

## 2023-06-08 NOTE — LETTER
"EMPLOYEE’S CLAIM FOR COMPENSATION/ REPORT OF INITIAL TREATMENT  FORM C-4  PLEASE TYPE OR PRINT    EMPLOYEE’S CLAIM - PROVIDE ALL INFORMATION REQUESTED   First Name  Sarah Last Name  Idalia Birthdate                    1970                Sex  female Claim Number (Insurer’s Use Only)   Home Address  [address] Age  52 y.o. Height  1.6 m (5' 3\") Weight  113 kg (249 lb) [facility]     [facility] Zip  [address] Telephone  [phone] (home)    Mailing Address  [address] [facility] Zip  [address] Primary Language Spoken  English    INSURER   THIRD-PARTY     [employer]   Employee's Occupation (Job Title) When Injury or Occupational Disease Occurred      Employer's Name/Company Name  [employer]  Telephone  [phone]    Office Mail Address (Number and Street)  [address]        Date of Injury  4/23/2023               Hours Injury  10:30 AM Date Employer Notified  4/23/2023 Last Day of Work after Injury or Occupational Disease  6/8/2023 Supervisor to Whom Injury     Reported  Beny Dobbs   Address or Location of Accident (if applicable)  Work [1]   What were you doing at the time of accident? (if applicable)  working/ fell backwards    How did this injury or occupational disease occur? (Be specific and answer in detail. Use additional sheet if necessary)  I was walking backwards to scan a QR code, my left heel connected with a pallet which caused me to fall backwards. I hit my head, back and hurt my left wrist.   If you believe that you have an occupational disease, when did you first have knowledge of the disability and its relationship to your employment?  n/a Witnesses to the Accident (if applicable)  Kiritarmond Barrios      Nature of Injury or Occupational Disease  Workers' Compensation  Part(s) of Body Injured or Affected  Wrist (L) and Hand (L), N/A, N/A "    I CERTIFY THAT THE ABOVE IS TRUE AND CORRECT TO T HE BEST OF MY KNOWLEDGE AND THAT I HAVE PROVIDED THIS INFORMATION IN ORDER TO OBTAIN THE BENEFITS OF [place], OR CHAPTER 617 [place]).  I HEREBY AUTHORIZE ANY PHYSICIAN, CHIROPRACTOR, SURGEON, PRACTITIONER OR ANY OTHER PERSON, ANY HOSPITAL, INCLUDING [facility] OR [facility], ANY  MEDICAL SERVICE ORGANIZATION, ANY INSURANCE COMPANY, OR OTHER INSTITUTION OR ORGANIZATION TO RELEASE TO EACH OTHER, ANY MEDICAL OR OTHER INFORMATION, INCLUDING BENEFITS PAID OR PAYABLE, PERTINENT TO THIS INJURY OR DISEASE, EXCEPT INFORMATION RELATIVE TO DIAGNOSIS, TREATMENT AND/OR COUNSELING FOR AIDS, PSYCHOLOGICAL CONDITIONS, ALCOHOL OR CONTROLLED SUBSTANCES, FOR WHICH I MUST GIVE SPECIFIC AUTHORIZATION.  A PHOTOSTAT OF THIS AUTHORIZATION SHALL BE VALID AS THE ORIGINAL.       Date   Place Employee’s Original or  *Electronic Signature   THIS REPORT MUST BE COMPLETED AND MAILED WITHIN 3 WORKING DAYS OF TREATMENT   Place  [facility]  Name of Facility  [facility]   Date  6/8/2023 Diagnosis and Description of Injury or Occupational Disease  (M25.532) Acute pain of left wrist  (Y99.0) Work related injury  (M25.532,  G89.29) Chronic pain of left wrist Is there evidence that the injured employee was under the influence of alcohol and/or another controlled substance at the time of accident?  ? No ? Yes (if yes, please explain)   Hour  8:16 PM   Diagnoses of Acute pain of left wrist, Work related injury, and Chronic pain of left wrist were pertinent to this visit. No   Treatment  Work restrictions, referral to orthopedics and occupational medicine  Have you advised the patient to remain off work five days or     more?    X-Ray Findings  Negative   ? Yes Indicate dates:   From   To      From information given by the employee, together with medical evidence, can        you directly  "connect this injury or occupational disease as job incurred?  Yes ? No If no, is the injured employee capable of:  ? full duty  No ? modified duty  Yes   Is additional medical care by a physician indicated?  Yes If modified duty, specify any limitations / restrictions  No lifting greater than 10 pounds.   Do you know of any previous injury or disease contributing to this condition or occupational disease?  ? Yes ? No (Explain if yes)                          No   Date  6/8/2023 Print Health Care Provider's  Name  DESIRE Regalado I certify that the employer’s copy of  this form was delivered to the employer on:   Address  [address] [place] [address] Insurer’s Use Only     [facility] Zip  [address]    Provider’s Tax ID Number  [id number] Telephone  Dept: [phone]             Health Care Provider’s Original or Electronic Signature  e-IVAN Townsend Degree (MD,DO, DC,PA-C,APRN)  APRN      * Complete and attach Release of Information (Form C-4A) when injured employee signs C-4 Form electronically  ORIGINAL - TREATING HEALTHCARE PROVIDER PAGE 2 - INSURER/TPA PAGE 3 - EMPLOYER PAGE 4 - EMPLOYEE             Form C-4 (rev.08/21)           BRIEF DESCRIPTION OF RIGHTS AND BENEFITS  (Pursuant to [place] 616C.050)    Notice of Injury or Occupational Disease (Incident Report Form C-1): If an injury or occupational disease (OD) arises out of and in the course of employment, you must provide written notice to your employer as soon as practicable, but no later than 7 days after the accident or OD. Your employer shall maintain a sufficient supply of the required forms.    Claim for Compensation (Form C-4): If medical treatment is sought, the form C-4 is available at the place of initial treatment. A completed \"Claim for Compensation\" (Form C-4) must be filed within 90 days after an accident or OD. The treating physician or chiropractor must, within 3 working days after treatment, complete and mail to the " employer, the employer's insurer and third-party , the Claim for Compensation.    Medical Treatment: If you require medical treatment for your on-the-job injury or OD, you may be required to select a physician or chiropractor from a list provided by your workers’ compensation insurer, if it has contracted with an Organization for Managed Care (MCO) or Preferred Provider Organization (PPO) or providers of health care. If your employer has not entered into a contract with an MCO or PPO, you may select a physician or chiropractor from the Panel of Physicians and Chiropractors. Any medical costs related to your industrial injury or OD will be paid by your insurer.    Temporary Total Disability (TTD): If your doctor has certified that you are unable to work for a period of at least 5 consecutive days, or 5 cumulative days in a 20-day period, or places restrictions on you that your employer does not accommodate, you may be entitled to TTD compensation.    Temporary Partial Disability (TPD): If the wage you receive upon reemployment is less than the compensation for TTD to which you are entitled, the insurer may be required to pay you TPD compensation to make up the difference. TPD can only be paid for a maximum of 24 months.    Permanent Partial Disability (PPD): When your medical condition is stable and there is an indication of a PPD as a result of your injury or OD, within 30 days, your insurer must arrange for an evaluation by a rating physician or chiropractor to determine the degree of your PPD. The amount of your PPD award depends on the date of injury, the results of the PPD evaluation, your age and wage.    Permanent Total Disability (PTD): If you are medically certified by a treating physician or chiropractor as permanently and totally disabled and have been granted a PTD status by your insurer, you are entitled to receive monthly benefits not to exceed 66 2/3% of your average monthly wage. The  amount of your PTD payments is subject to reduction if you previously received a lump-sum PPD award.    Vocational Rehabilitation Services: You may be eligible for vocational rehabilitation services if you are unable to return to the job due to a permanent physical impairment or permanent restrictions as a result of your injury or occupational disease.    Transportation and Per Donal Reimbursement: You may be eligible for travel expenses and per donal associated with medical treatment.    Reopening: You may be able to reopen your claim if your condition worsens after claim closure.     Appeal Process: If you disagree with a written determination issued by the insurer or the insurer does not respond to your request, you may appeal to the Department of Administration, , by following the instructions contained in your determination letter. You must appeal the determination within 70 days from the date of the determination letter at [address], [address], [place], [place] [address], or [address], [address], [place] [address]. If you disagree with the  decision, you may appeal to the Department of Administration, . You must file your appeal within 30 days from the date of the  decision letter at [address], [address], [place], [place] [address], or [address], [address], [place] [address]. If you disagree with a decision of an , you may file a petition for judicial review with the District Court. You must do so within 30 days of the Appeal Officer’s decision. You may be represented by an  at your own expense or you may contact the [facility] for possible representation.    [place]  for Injured Workers (NAIW): If you disagree with a  decision, you may request that NAIW represent you without charge at an  Hearing. For information regarding denial of benefits,  you may contact the [facility] at: [address], [address], [place] [address], [phone], or [address] [facility] [address], [address], [place] [address], [phone]    To File a Complaint with the Division: If you wish to file a complaint with the  of the Division of Industrial Relations (DIR),  please contact the Workers’ Compensation Section, [address], [address], [place], [place] [address], telephone [phone], or [address], [address], [place], [place] [address], telephone [phone].    For assistance with Workers’ Compensation Issues: You may contact the [facility] Office for Consumer Health Assistance, [address], [facility] [address], [place] [address], Toll Free 1-368.558.3497, Web site: http://[facility].[place].gov/Programs/MADELIN E-mail: [email or web address].[email or web address].[facility]              __________________________________________________________________                                    _________________            Employee Name / Signature                                                                                                                            Date                                                                                                                                                                                                                              D-2 (rev. 10/20)

## 2023-06-08 NOTE — LETTER
[facility] [place] [address] [facility] [address] [address]  Phone:  [phone] - Fax:  [phone]   [facility] Progress Report and Disability Certification  Date of Service: 6/8/2023   No Show:  No  Date / Time of Next Visit: 7/8/2023   Claim Information   Patient Name: Sarah Friedman  Claim Number:     Employer: [employer]  Date of Injury: 4/23/2023     Insurer / TPA: [employer]  ID / SSN:     Occupation:   Diagnosis: Diagnoses of Acute pain of left wrist, Work related injury, and Chronic pain of left wrist were pertinent to this visit.    Medical Information   Related to Industrial Injury? Yes    Subjective Complaints:  Pt presents for evaluation of a new work comp injury to urgent care.  DOI 4/23/2023.  Mine is a very pleasant 52-year-old female presents to urgent care today with ongoing left-sided wrist pain after suffering a fall at work.  She states that she fell back and did hit her right hip, back and head.  She was evaluated in the ER on the day of her injury and was sent home with no follow-up.  She was then evaluated by her PCP the following day where an x-ray was performed and found to be negative of her wrist.  She has been wearing a wrist brace since her injury and does continue to have pain of her radial wrist.  She has been using rest, ice, elevation, ibuprofen and Tylenol.  She does lift very heavy boxes at work.  No work restrictions were provided for her.  No previous injury at left wrist.     Objective Findings:  Left wrist: Tenderness and bony tenderness present. No swelling, deformity, effusion or lacerations. Decreased range of motion.      Cervical back: Normal range of motion and neck supple.      Comments: Prominent veins of left volar wrist.  Positive for decree strength of left hand.       Pre-Existing Condition(s):     Assessment:   Condition Same    Status: Additional Care Required  Permanent Disability:No     Plan: Transfer Care    Diagnostics:      Comments:       Disability Information   Status: Released to Restricted Duty    From:  6/8/2023  Through: 7/8/2023 Restrictions are:     Physical Restrictions   Sitting:    Standing:    Stooping:    Bending:      Squatting:    Walking:    Climbing:    Pushing:      Pulling:    Other:    Reaching Above Shoulder (L):   Reaching Above Shoulder (R):       Reaching Below Shoulder (L):    Reaching Below Shoulder (R):      Not to exceed Weight Limits   Carrying(hrs):   Weight Limit(lb): < or = to 10 pounds Lifting(hrs):   Weight  Limit(lb): < or = to 10 pounds   Comments:      Repetitive Actions   Hands: i.e. Fine Manipulations from Grasping:     Feet: i.e. Operating Foot Controls:     Driving / Operate Machinery:     Health Care Provider’s Original or Electronic Signature  DESIRE Regalado Health Care Provider’s Original or Electronic Signature    Edgardo Lambert DO MPH     Clinic Name / Location: [address] [place] [address] Clinic Phone Number: Dept: [phone]   Appointment Time: 7:00 Pm Visit Start Time: 8:16 PM   Check-In Time:  7:36 Pm Visit Discharge Time:     Original-Treating Physician or Chiropractor    Page 2-Insurer/TPA    Page 3-Employer    Page 4-Employee

## 2023-06-09 NOTE — PROGRESS NOTES
"Subjective:     Sarah Friedman is a 52 y.o. female who presents for Other (NEW WC DOI: 4/23/23 (L) wrist pain )      Other  This is a new problem. The current episode started more than 1 month ago. The problem occurs constantly. The problem has been unchanged. Associated symptoms include joint swelling and weakness.     Pt presents for evaluation of a new work comp injury to urgent care.  DOI 4/23/2023.  Mine is a very pleasant 52-year-old female presents to urgent care today with ongoing left-sided wrist pain after suffering a fall at work.  She states that she fell back and did hit her right hip, back and head.  She was evaluated in the ER on the day of her injury and was sent home with no follow-up.  She was then evaluated by her PCP the following day where an x-ray was performed and found to be negative of her wrist.  She has been wearing a wrist brace since her injury and does continue to have pain of her radial wrist.  She has been using rest, ice, elevation, ibuprofen and Tylenol.  She does lift very heavy boxes at work.  No work restrictions were provided for her.  No previous injury at left wrist.    Review of Systems   Musculoskeletal:  Positive for joint swelling.   Neurological:  Positive for weakness.       PMH:   Past Medical History:   Diagnosis Date    Anesthesia 08/27/2019    \"hard to wake up\"  \"sleep apnea\"    Arrhythmia     \" sinus Tach\"    Arthritis     osteo    Blood clotting disorder ([facility]) 2005    DVT    Bowel habit changes     constipation    Depression     \"BPD\"    Diabetes ([facility]) 08/2019    insulin    Diabetic neuropathy ([facility])     Esophageal spasm     GERD (gastroesophageal reflux disease)     Hashimoto's disease     Hiatal hernia     High cholesterol     Hyperlipidemia     Hypothyroid     Sleep apnea     uses cpap    Snoring     sleep study done    Tachycardia      ALLERGIES:   Allergies   Allergen Reactions    Hydromorphone Hcl Itching     Itching \"head to toe\"    Betadine Prepstick " "Plus [Povidone-Iodine] Rash     Rash swelling    Morphine Itching     Itching \"head to toe\"    Povidone Iodine Hives    Tape Itching     Itching \"leave scars\"     SURGHX:   Past Surgical History:   Procedure Laterality Date    COLONOSCOPY N/A 8/30/2019    Procedure: COLONOSCOPY;  Surgeon: Ata Khalil M.D.;  Location: SURGERY SAME DAY [facility];  Service: Gastroenterology    GASTROSCOPY N/A 8/30/2019    Procedure: GASTROSCOPY - W/DILATION biopsy;  Surgeon: Ata Khalil M.D.;  Location: SURGERY SAME DAY [facility];  Service: Gastroenterology    VAGINAL HYSTERECTOMY SCOPE TOTAL N/A 4/24/2018    Procedure: VAGINAL HYSTERECTOMY SCOPE TOTAL;  Surgeon: Francisco Javier Lainez M.D.;  Location: SURGERY SAME DAY [facility];  Service: Gynecology    SALPINGO OOPHORECTOMY Bilateral 4/24/2018    Procedure: SALPINGO OOPHORECTOMY;  Surgeon: Francisco Javier Lainez M.D.;  Location: SURGERY SAME DAY [facility];  Service: Gynecology    ANTERIOR AND POSTERIOR REPAIR  4/24/2018    Procedure: ANTERIOR AND POSTERIOR REPAIR;  Surgeon: Francisco Javier Lainez M.D.;  Location: SURGERY SAME DAY [facility];  Service: Gynecology    ENTEROCELE REPAIR  4/24/2018    Procedure: ENTEROCELE REPAIR- PERINEOPLASTY;  Surgeon: Francisco Javier Lainez M.D.;  Location: SURGERY SAME DAY [facility];  Service: Gynecology    BLADDER SLING FEMALE  4/24/2018    Procedure: BLADDER SLING FEMALE- TOT;  Surgeon: Francisco Javier Lainez M.D.;  Location: SURGERY SAME DAY [facility];  Service: Gynecology    VAGINAL SUSPENSION N/A 4/24/2018    Procedure: VAGINAL SUSPENSION- SACROSPINOUS VAULT;  Surgeon: Francisco Javier Lainez M.D.;  Location: SURGERY SAME DAY [facility];  Service: Gynecology    CYSTOSCOPY N/A 4/24/2018    Procedure: CYSTOSCOPY;  Surgeon: Francisco Javier Lainez M.D.;  Location: SURGERY SAME DAY [facility];  Service: Gynecology    ENDOSCOPY  2016    COLONOSCOPY  2016    OTHER  2016    esophageal dilation    OTHER ORTHOPEDIC SURGERY  2012    bone removed from toe after fracture " "   TONSILLECTOMY AND ADENOIDECTOMY  1977    OTHER  1971, 1978, 1985    left facial cheek and right upper thight reconstructive surgery     SOCHX:   Social History     Socioeconomic History    Marital status: Single   Tobacco Use    Smoking status: Never    Smokeless tobacco: Never   Vaping Use    Vaping Use: Never used   Substance and Sexual Activity    Alcohol use: Never    Drug use: No     Comment: previous medical marijuana use for pain    Sexual activity: Yes     Partners: Female     FH:   Family History   Problem Relation Age of Onset    Colorectal Cancer Mother     Breast Cancer Mother     Cancer Mother 70        pancreatic, breast in 30's    Heart Disease Father     Cancer Father         lung    Diabetes Sister     Diabetes Maternal Uncle     Heart Disease Maternal Grandmother         MI    Hyperlipidemia Maternal Grandmother     Diabetes Maternal Grandfather     Hyperlipidemia Maternal Grandfather     Stroke Neg Hx     Peritoneal Cancer Neg Hx     Tubal Cancer Neg Hx     Ovarian Cancer Neg Hx          Objective:   /56 (BP Location: Right arm, Patient Position: Sitting, BP Cuff Size: Large adult)   Pulse 76   Temp 36.1 °C (97 °F) (Temporal)   Resp 14   Ht 1.6 m (5' 3\")   Wt 113 kg (249 lb)   LMP 04/15/2018   SpO2 95%   BMI 44.11 kg/m²     Physical Exam  Vitals and nursing note reviewed.   Constitutional:       General: She is not in acute distress.     Appearance: Normal appearance. She is normal weight. She is not ill-appearing or toxic-appearing.   HENT:      Head: Normocephalic.      Right Ear: External ear normal.      Left Ear: External ear normal.      Nose: No congestion or rhinorrhea.      Mouth/Throat:      Pharynx: No oropharyngeal exudate or posterior oropharyngeal erythema.   Eyes:      General:         Right eye: No discharge.         Left eye: No discharge.      Pupils: Pupils are equal, round, and reactive to light.   Pulmonary:      Effort: Pulmonary effort is normal. "   Abdominal:      General: Abdomen is flat.   Musculoskeletal:      Left wrist: Tenderness and bony tenderness present. No swelling, deformity, effusion or lacerations. Decreased range of motion.      Cervical back: Normal range of motion and neck supple.      Comments: Prominent veins of left volar wrist.  Positive for decree strength of left hand.    Skin:     General: Skin is dry.   Neurological:      General: No focal deficit present.      Mental Status: She is alert and oriented to person, place, and time. Mental status is at baseline.   Psychiatric:         Mood and Affect: Mood normal.         Behavior: Behavior normal.         Thought Content: Thought content normal.         Judgment: Judgment normal.         Assessment/Plan:   Assessment    1. Acute pain of left wrist  CANCELED: DX-WRIST-COMPLETE 3+ LEFT      2. Work related injury  Referral to Occupational Medicine    Referral to Hand Surgery      3. Chronic pain of left wrist  Referral to Occupational Medicine    Referral to Hand Surgery        Patient's pain has now been ongoing for the past 2 months she was referred to follow-up with occupational medicine as well as hand surgery.  Patient to continue wearing wrist brace.  Work restrictions provided today.  AVS handout given and reviewed with patient. Pt educated on red flags and when to seek treatment back in ER or UC.

## 2023-07-06 ENCOUNTER — OCCUPATIONAL MEDICINE (OUTPATIENT)
Dept: OCCUPATIONAL MEDICINE | Facility: CLINIC | Age: 53
End: 2023-07-06
Payer: COMMERCIAL

## 2023-07-06 VITALS
HEART RATE: 87 BPM | DIASTOLIC BLOOD PRESSURE: 74 MMHG | RESPIRATION RATE: 16 BRPM | OXYGEN SATURATION: 95 % | SYSTOLIC BLOOD PRESSURE: 106 MMHG | HEIGHT: 63 IN | BODY MASS INDEX: 44.3 KG/M2 | WEIGHT: 250 LBS

## 2023-07-06 DIAGNOSIS — G89.29 CHRONIC PAIN OF LEFT WRIST: ICD-10-CM

## 2023-07-06 DIAGNOSIS — M25.532 CHRONIC PAIN OF LEFT WRIST: ICD-10-CM

## 2023-07-06 DIAGNOSIS — S66.912D STRAIN OF LEFT WRIST, SUBSEQUENT ENCOUNTER: ICD-10-CM

## 2023-07-06 PROCEDURE — 99213 OFFICE O/P EST LOW 20 MIN: CPT | Performed by: NURSE PRACTITIONER

## 2023-07-06 PROCEDURE — 3074F SYST BP LT 130 MM HG: CPT | Performed by: NURSE PRACTITIONER

## 2023-07-06 PROCEDURE — 3078F DIAST BP <80 MM HG: CPT | Performed by: NURSE PRACTITIONER

## 2023-07-06 RX ORDER — ALPRAZOLAM 0.5 MG/1
0.5 TABLET ORAL
Qty: 1 TABLET | Refills: 0 | Status: SHIPPED | OUTPATIENT
Start: 2023-07-06 | End: 2023-07-07

## 2023-07-06 ASSESSMENT — FIBROSIS 4 INDEX: FIB4 SCORE: 1.68

## 2023-07-06 NOTE — PROGRESS NOTES
"Subjective:     Sarah Friedman is a 52 y.o. female who presents for Follow-Up ([facility] DOI 4/23/23 Lt wrist, rm 16 worse)      (Copied from previous visit) DOI 4/23/23: Patient was backing up and tripped over a pallet.  She fell over onto her back and her head.  Was seen at the ER.  CT of her head and her lumbar spine were negative.  The attending went in and out of the room so fast she did not have time to discuss the pain of her left shoulder and her left thumb and wrist.  She states that her left arm was raised behind her head at the time.  She has a chronic history of taking Norco when needed.  Last prescription was filled in 1 month ago.  She was told recently by her PCP that she will not renew the medication.     Today patient states symptoms remain unchanged.  She states that there is intermittent tingling and numbness within the hand and the wrist.  She has noticed a bulging vein and swelling at the base of the thumb.  She noticed that doing daily tasks such as putting deodorant on causes significant pain and that her  is weak.  She states that certain movements aggravate her wrist as well.  She has been doing some exercises from a previous visits to occupational therapy.  She is wearing a wrist splint with minimal improvement.  She has been able to tolerate light duty with out difficulty.  She states that taking OTC medications and even her Norco do not touch her pain.  Hand surgery referral is currently pending.  MRI ordered at this visit due to continued symptoms.  Plan of care discussed with patient.    ROS: All systems were reviewed on intake form, form was reviewed and signed. See scanned documents in media. Pertinent positives and negatives included in HPI.    PMH: No pertinent past medical history to this problem  MEDS: Medications were reviewed in Epic  ALLERGIES:   Allergies   Allergen Reactions    Hydromorphone Hcl Itching     Itching \"head to toe\"    Betadine Prepstick Plus " "[Povidone-Iodine] Rash     Rash swelling    Morphine Itching     Itching \"head to toe\"    Povidone Iodine Hives    Tape Itching     Itching \"leave scars\"     SOCHX: Works as  at [employer]  FH: No pertinent family history to this problem       Objective:     /74   Pulse 87   Resp 16   Ht 1.6 m (5' 3\")   Wt 113 kg (250 lb)   LMP 04/15/2018   SpO2 95%   BMI 44.29 kg/m²     [unfilled]    Left wrist: Mild tenderness and bony tenderness present.  Mild soft tissue no swelling.  No deformity, effusion or lacerations. Decreased range of motion. Prominent veins of left volar wrist.  Positive for decreased strength of left hand 3/5.  Pulses intact.    Assessment/Plan:       1. Strain of left wrist, subsequent encounter  - MR-WRIST W/O LEFT; Future  - Referral to Radiology  - ALPRAZolam (XANAX) 0.5 MG Tab; Take 1 Tablet by mouth 1 time a day as needed for Anxiety (MRI exam) for up to 1 day.  Dispense: 1 Tablet; Refill: 0    2. Chronic pain of left wrist  - MR-WRIST W/O LEFT; Future  - Referral to Radiology  - ALPRAZolam (XANAX) 0.5 MG Tab; Take 1 Tablet by mouth 1 time a day as needed for Anxiety (MRI exam) for up to 1 day.  Dispense: 1 Tablet; Refill: 0    Released to Restricted Duty FROM 7/6/2023 TO 7/20/2023  Restrictions are for left upper extremity only  Follow-up in 2 weeks, unless seen by hand surgery  Restricted duty, per hand surgery  Hand surgery referral placed, transfer care  MRI ordered, 1 dose of Xanax provided for MRI exam  Recommend continue with OTC NSAIDs, ice application, elevation, and recommend OTC topical ointment of your choosing  Recommend continue with wrist brace while at work otherwise wean at home as tolerated  Recommend continue with gentle range of motion and stretching exercises as tolerated      Differential diagnosis, natural history, supportive care, and indications for immediate follow-up discussed.    Approximately 25 minutes were spent in reviewing notes, preparing " for visit, obtaining history, exam and evaluation, patient counseling/education and post visit documentation/orders.

## 2023-07-06 NOTE — LETTER
[address],   [address] [place] [address]  Phone:  [phone] - Fax:  [phone]   Occupational Health [facility] Progress Report and Disability Certification  Date of Service: 7/6/2023   No Show:  No  Date / Time of Next Visit: 7/20/2023@11:30 AM   Claim Information   Patient Name: Sarah Friedman  Claim Number:     Employer: [employer]  Date of Injury: 4/23/2023     Insurer / TPA: [employer]  ID / SSN:     Occupation:   Diagnosis: Diagnoses of Strain of left wrist, subsequent encounter and Chronic pain of left wrist were pertinent to this visit.    Medical Information   Related to Industrial Injury? Yes    Subjective Complaints:  (Copied from previous visit) DOI 4/23/23: Patient was backing up and tripped over a pallet.  She fell over onto her back and her head.  Was seen at the ER.  CT of her head and her lumbar spine were negative.  The attending went in and out of the room so fast she did not have time to discuss the pain of her left shoulder and her left thumb and wrist.  She states that her left arm was raised behind her head at the time.  She has a chronic history of taking Norco when needed.  Last prescription was filled in 1 month ago.  She was told recently by her PCP that she will not renew the medication.     Today patient states symptoms remain unchanged.  She states that there is intermittent tingling and numbness within the hand and the wrist.  She has noticed a bulging vein and swelling at the base of the thumb.  She noticed that doing daily tasks such as putting deodorant on causes significant pain and that her  is weak.  She states that certain movements aggravate her wrist as well.  She has been doing some exercises from a previous visits to occupational therapy.  She is wearing a wrist splint with minimal improvement.  She has been able to tolerate light duty with out difficulty.  She states that taking OTC  medications and even her Norco do not touch her pain.  Hand surgery referral is currently pending.  MRI ordered at this visit due to continued symptoms.  Plan of care discussed with patient.   Objective Findings: Left wrist: Mild tenderness and bony tenderness present.  Mild soft tissue no swelling.  No deformity, effusion or lacerations. Decreased range of motion. Prominent veins of left volar wrist.  Positive for decreased strength of left hand 3/5.  Pulses intact.   Pre-Existing Condition(s):     Assessment:   Condition Same    Status: Discharged / Care Transfer  Permanent Disability:No    Plan: Transfer CareDiagnosticsMedication (NOT at Work)    Diagnostics: MRI    Comments:  Follow-up in 2 weeks, unless seen by hand surgery  Restricted duty, per hand surgery  Hand surgery referral placed, transfer care  MRI ordered, 1 dose of Xanax provided for MRI exam  Recommend continue with OTC NSAIDs, ice application, elevation, and recommend OTC topical ointment of your choosing  Recommend continue with wrist brace while at work otherwise wean at home as tolerated  Recommend continue with gentle range of motion and stretching exercises as tolerated      Disability Information   Status: Released to Restricted Duty    From:  7/6/2023  Through: 7/20/2023 Restrictions are: Temporary   Physical Restrictions   Sitting:    Standing:    Stooping:    Bending:      Squatting:    Walking:    Climbing:    Pushing:      Pulling:    Other:    Reaching Above Shoulder (L):   Reaching Above Shoulder (R):       Reaching Below Shoulder (L):    Reaching Below Shoulder (R):      Not to exceed Weight Limits   Carrying(hrs):   Weight Limit(lb): < or = to 10 pounds Lifting(hrs):   Weight  Limit(lb): < or = to 10 pounds   Comments: Restrictions are for left upper extremity only    Repetitive Actions   Hands: i.e. Fine Manipulations from Grasping:     Feet: i.e. Operating Foot Controls:     Driving / Operate Machinery:     Health Care Provider’s  Original or Electronic Signature  [facility] Provider’s Original or Electronic Signature    Edgardo Lambert DO MPH     Clinic Name / Location: [address],   [address]  [place] [address] Clinic Phone Number: Dept: [phone]   Appointment Time: 1:15 Pm Visit Start Time: 1:23 PM   Check-In Time:  1:14 Pm Visit Discharge Time:  2:10 PM   Original-Treating Physician or Chiropractor    Page 2-Insurer/TPA    Page 3-Employer    Page 4-Employee

## 2023-07-09 DIAGNOSIS — I47.11 INAPPROPRIATE SINUS TACHYCARDIA (HCC): ICD-10-CM

## 2023-07-10 NOTE — TELEPHONE ENCOUNTER
Is the patient due for a refill? Yes    Was the patient seen the past year? Yes    Date of last office visit: 7/26/22    Does the patient have an upcoming appointment?  No    Provider to refill:HK    Does the patients insurance require a 100 day supply?  No

## 2023-07-13 RX ORDER — IVABRADINE 5 MG/1
TABLET, FILM COATED ORAL
Qty: 180 TABLET | Refills: 0 | Status: SHIPPED | OUTPATIENT
Start: 2023-07-13 | End: 2023-10-17

## 2023-07-14 ENCOUNTER — OCCUPATIONAL MEDICINE (OUTPATIENT)
Dept: URGENT CARE | Facility: CLINIC | Age: 53
End: 2023-07-14
Payer: COMMERCIAL

## 2023-07-14 VITALS
DIASTOLIC BLOOD PRESSURE: 70 MMHG | HEIGHT: 63 IN | RESPIRATION RATE: 16 BRPM | OXYGEN SATURATION: 95 % | HEART RATE: 75 BPM | TEMPERATURE: 97.8 F | SYSTOLIC BLOOD PRESSURE: 108 MMHG | WEIGHT: 250 LBS | BODY MASS INDEX: 44.3 KG/M2

## 2023-07-14 DIAGNOSIS — M25.532 ACUTE PAIN OF LEFT WRIST: ICD-10-CM
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
## 2023-07-14 PROCEDURE — 99213 OFFICE O/P EST LOW 20 MIN: CPT

## 2023-07-14 PROCEDURE — 3074F SYST BP LT 130 MM HG: CPT

## 2023-07-14 PROCEDURE — 3078F DIAST BP <80 MM HG: CPT

## 2023-07-14 RX ORDER — LAMOTRIGINE 25 MG/1
TABLET ORAL
COMMUNITY
Start: 2023-06-12 | End: 2023-07-14

## 2023-07-14 ASSESSMENT — FIBROSIS 4 INDEX: FIB4 SCORE: 1.68

## 2023-07-14 ASSESSMENT — ENCOUNTER SYMPTOMS: FOCAL WEAKNESS: 1

## 2023-07-14 NOTE — LETTER
[facility] [place] [id number] [facility] [address] [place] [address]  Phone:  [phone] - Fax:  [phone]   [facility] Progress Report and Disability Certification  Date of Service: 7/14/2023   No Show:  No  Date / Time of Next Visit: 7/20/2023 @ 11:30am @ [facility]   Claim Information   Patient Name: Sarah Friedman  Claim Number:     Employer: [employer]  Date of Injury: 4/23/2023     Insurer / TPA: [employer]  ID / SSN:     Occupation:   Diagnosis: Diagnoses of Work related injury and Acute pain of left wrist were pertinent to this visit.    Medical Information   Related to Industrial Injury? Yes    Subjective Complaints:   (Copied from previous visit) DOI 4/23/23: Patient was backing up and tripped over a pallet.  She fell over onto her back and her head.  Was seen at the ER.  CT of her head and her lumbar spine were negative.  The attending went in and out of the room so fast she did not have time to discuss the pain of her left shoulder and her left thumb and wrist.  She states that her left arm was raised behind her head at the time.  She has a chronic history of taking Norco when needed.  Last prescription was filled in 1 month ago.  She was told recently by her PCP that she will not renew the medication.      [facility] Med visit: Today patient states symptoms remain unchanged.  She states that there is intermittent tingling and numbness within the hand and the wrist.  She has noticed a bulging vein and swelling at the base of the thumb.  She noticed that doing daily tasks such as putting deodorant on causes significant pain and that her  is weak.  She states that certain movements aggravate her wrist as well.  She has been doing some exercises from a previous visits to occupational therapy.  She is wearing a wrist splint with minimal improvement.  She has been able to tolerate light duty with out difficulty.  She states that taking OTC  medications and even her Norco do not touch her pain.  Hand surgery referral is currently pending.  MRI ordered at this visit due to continued symptoms.  Plan of care discussed with patient.    [facility]: This is the patient's third visit. She reports this appointment was previously scheduled before she had care transferred to Occupational Medicine. Next appointment with occupational medicine is scheduled 7/20/23. Her left wrist is progressively worsening. She has an MRI that has been ordered but has not yet been approved. She reports pain radiating up into her forearm and has noticed increased vascularity in her L wrist. She has tried taking Norco and NSAIDs without improvement in pain. She has been tolerating current work restrictions and has been wearing her wrist brace as instructed.    Objective Findings: General: Patient is alert and oriented and is in no acute distress.  L wrist: Increased vascularity on ventral surface of L wrist. Tender to palpation over ventral radial surface of wrist. Reduced flexion of wrist. Capillary refill in less than 2 seconds.    Pre-Existing Condition(s): None pertinent.   Assessment:   Condition Worsened    Status: Discharged / Care Transfer  Comments:Occ Med  Permanent Disability:No    Plan: Medication  Comments:Tylenol/Ibuprofen OTC as needed for pain    Diagnostics:   Comments:MRI previously ordered.  Pending.    Comments:  Follow-up with hand surgeon or with occupational medicine.    Disability Information   Status: Released to Restricted Duty    From:  7/14/2023  Through: 7/20/2023 Restrictions are: Temporary  Comments:Appointment already scheduled with [facility]   Physical Restrictions   Sitting:    Standing:    Stooping:    Bending:      Squatting:    Walking:    Climbing:    Pushing:      Pulling:    Other:    Reaching Above Shoulder (L):   Reaching Above Shoulder (R):       Reaching Below Shoulder (L):    Reaching Below Shoulder (R):      Not to exceed Weight Limits    Carrying(hrs):   Weight Limit(lb): < or = to 10 pounds  Comments:Left hand Lifting(hrs):   Weight  Limit(lb): < or = to 10 pounds  Comments:Left hand   Comments: Wear brace while at work. Follow up with Occupational Medicine.     Repetitive Actions   Hands: i.e. Fine Manipulations from Grasping:     Feet: i.e. Operating Foot Controls:     Driving / Operate Machinery:     Health Care Provider’s Original or Electronic Signature  Bozena Elizabeth P.A.-C. Health Care Provider’s Original or Electronic Signature    Edgardo Lambert DO MPH     Clinic Name / Location: [address]  [place] [address] Clinic Phone Number: Dept: [phone]   Appointment Time: 2:00 Pm Visit Start Time: 2:27 PM   Check-In Time:  1:51 Pm Visit Discharge Time:     Original-Treating Physician or Chiropractor    Page 2-Insurer/TPA    Page 3-Employer    Page 4-Employee

## 2023-07-14 NOTE — PROGRESS NOTES
Subjective:     Sarah Friedman is a 52 y.o. female who presents for Wrist Injury (Left ( getting worse ) )       (Copied from previous visit) DOI 4/23/23: Patient was backing up and tripped over a pallet.  She fell over onto her back and her head.  Was seen at the ER.  CT of her head and her lumbar spine were negative.  The attending went in and out of the room so fast she did not have time to discuss the pain of her left shoulder and her left thumb and wrist.  She states that her left arm was raised behind her head at the time.  She has a chronic history of taking Norco when needed.  Last prescription was filled in 1 month ago.  She was told recently by her PCP that she will not renew the medication.      Occ Med visit: Today patient states symptoms remain unchanged.  She states that there is intermittent tingling and numbness within the hand and the wrist.  She has noticed a bulging vein and swelling at the base of the thumb.  She noticed that doing daily tasks such as putting deodorant on causes significant pain and that her  is weak.  She states that certain movements aggravate her wrist as well.  She has been doing some exercises from a previous visits to occupational therapy.  She is wearing a wrist splint with minimal improvement.  She has been able to tolerate light duty with out difficulty.  She states that taking OTC medications and even her Norco do not touch her pain.  Hand surgery referral is currently pending.  MRI ordered at this visit due to continued symptoms.  Plan of care discussed with patient.    [facility]: This is the patient's third visit. She reports this appointment was previously scheduled before she had care transferred to Occupational Medicine. Next appointment with occupational medicine is scheduled 7/20/23. Her left wrist is progressively worsening. She has an MRI that has been ordered but has not yet been approved. She reports pain radiating up into her forearm and has  noticed increased vascularity in her L wrist. She has tried taking Norco and NSAIDs without improvement in pain. She has been tolerating current work restrictions and has been wearing her wrist brace as instructed.     Review of Systems   Neurological:  Positive for focal weakness (L wrist/hand).       PMH:  has a past medical history of Anesthesia (08/27/2019), Arrhythmia, Arthritis, Blood clotting disorder ([facility]) (2005), Bowel habit changes, Depression, Diabetes ([facility]) (08/2019), Diabetic neuropathy ([facility]), Esophageal spasm, GERD (gastroesophageal reflux disease), Hashimoto's disease, Hiatal hernia, High cholesterol, Hyperlipidemia, Hypothyroid, Sleep apnea, Snoring, and Tachycardia.    She has no past medical history of Addisons disease ([facility]) or Adrenal disorder ([facility]).  MEDS:   Current Outpatient Medications:   •  omeprazole (PRILOSEC) 40 MG delayed-release capsule, Take 1 Capsule by mouth 2 times a day., Disp: 180 Capsule, Rfl: 3  •  levothyroxine (SYNTHROID) 137 MCG Tab, TAKE 1 TABLET BY MOUTH EVERY SUNDAY AND Thursday., Disp: 24 Tablet, Rfl: 3  •  pioglitazone (ACTOS) 45 MG Tab, Take 1 Tablet by mouth every day., Disp: 90 Tablet, Rfl: 3  •  metformin (GLUCOPHAGE) 1000 MG tablet, Take 1 Tablet by mouth 2 times a day with meals., Disp: 180 Tablet, Rfl: 3  •  oxybutynin SR (DITROPAN-XL) 10 MG CR tablet, Take 1 Tablet by mouth 2 times a day., Disp: 60 Tablet, Rfl: 12  •  spironolactone (ALDACTONE) 25 MG Tab, Take 1 tablet by mouth once daily, Disp: 90 Tablet, Rfl: 1  •  levothyroxine (SYNTHROID) 125 MCG Tab, TAKE 125 MCG EVERY MONDAYS, TUESDAYS WEDNESDAYS, FRIDAYS AND SATURDAYS, Disp: 65 Tablet, Rfl: 2  •  promethazine (PHENERGAN) 25 MG Tab, Take 1 Tablet by mouth every 6 hours as needed for Nausea/Vomiting., Disp: 30 Tablet, Rfl: 0  •  risperiDONE (RISPERDAL) 2 MG Tab, TAKE 1 TABLET BY MOUTH ONCE DAILY AT NIGHT AS DIRECTED, Disp: , Rfl:   •  sumatriptan (IMITREX) 100 MG tablet, TAKE 1/2 TO 1 (ONE-HALF TO ONE) TABLET  "BY MOUTH AS NEEDED FOR  MIGRAINE/HEADACHE, Disp: 9 Tab, Rfl: 5  •  polyethylene glycol 3350 (MIRALAX) 17 GM/SCOOP Powder, Take 17 g by mouth 1 time daily as needed (constipation)., Disp: 510 g, Rfl: 5  •  Multiple Vitamins-Minerals (MULTIVITAMIN PO), Take 1 Tab by mouth every day., Disp: , Rfl:   •  CORLANOR 5 MG Tab tablet, TAKE 1 TABLET BY MOUTH TWICE DAILY WITH MEALS, Disp: 180 Tablet, Rfl: 0  •  HYDROcodone-acetaminophen (NORCO) 7.5-325 MG tab, Take 1 Tablet by mouth 1 time a day as needed for Severe Pain for up to 30 days., Disp: 30 Tablet, Rfl: 0  •  gabapentin (NEURONTIN) 800 MG tablet, Take 1 Tablet by mouth 3 times a day., Disp: 270 Tablet, Rfl: 3  •  Empagliflozin (JARDIANCE) 10 MG Tab tablet, Take one tablet daily, Disp: 90 Tablet, Rfl: 3  •  estradiol (ESTRACE) 0.5 MG tablet, Take 1 Tablet by mouth every day., Disp: 90 Tablet, Rfl: 3  •  ibuprofen (MOTRIN) 800 MG Tab, Take 1 Tablet by mouth 2 times a day., Disp: 90 Tablet, Rfl: 0  •  estradiol (ESTRACE) 1 MG Tab, Take 1 Tablet by mouth every day., Disp: 90 Tablet, Rfl: 4  •  atorvastatin (LIPITOR) 20 MG Tab, Take 1 Tablet by mouth every day., Disp: 90 Tablet, Rfl: 3  •  hydroCHLOROthiazide (HYDRODIURIL) 12.5 MG tablet, Take 1 tablet by mouth once daily, Disp: 90 Tablet, Rfl: 2  •  Alcohol Swabs (ALCOHOL PREP) 70 % Pads, Use three times daily to clean finger before blood glucose testing, Disp: 100 Each, Rfl: 11  •  divalproex (DEPAKOTE) 500 MG Tablet Delayed Response, Take 500 mg by mouth 2 times a day., Disp: , Rfl:   •  cyclobenzaprine (FLEXERIL) 10 MG Tab, Take 1 Tab by mouth at bedtime as needed for Muscle Spasms., Disp: 30 Tab, Rfl: 5  •  acetaminophen (TYLENOL) 500 MG Tab, Take 500-1,000 mg by mouth every 6 hours as needed., Disp: , Rfl:   ALLERGIES:   Allergies   Allergen Reactions   • Hydromorphone Hcl Itching     Itching \"head to toe\"   • Betadine Prepstick Plus [Povidone-Iodine] Rash     Rash swelling   • Morphine Itching     Itching \"head to " "toe\"   • Povidone Iodine Hives   • Tape Itching     Itching \"leave scars\"     SURGHX:   Past Surgical History:   Procedure Laterality Date   • COLONOSCOPY N/A 8/30/2019    Procedure: COLONOSCOPY;  Surgeon: Ata Khalil M.D.;  Location: SURGERY SAME DAY [facility] ORS;  Service: Gastroenterology   • GASTROSCOPY N/A 8/30/2019    Procedure: GASTROSCOPY - W/DILATION biopsy;  Surgeon: Ata Khalil M.D.;  Location: SURGERY SAME DAY [facility] ORS;  Service: Gastroenterology   • VAGINAL HYSTERECTOMY SCOPE TOTAL N/A 4/24/2018    Procedure: VAGINAL HYSTERECTOMY SCOPE TOTAL;  Surgeon: Francisco Javier Lainez M.D.;  Location: SURGERY SAME DAY [facility] ORS;  Service: Gynecology   • SALPINGO OOPHORECTOMY Bilateral 4/24/2018    Procedure: SALPINGO OOPHORECTOMY;  Surgeon: Francisco Javier Lainez M.D.;  Location: SURGERY SAME DAY [facility] ORS;  Service: Gynecology   • ANTERIOR AND POSTERIOR REPAIR  4/24/2018    Procedure: ANTERIOR AND POSTERIOR REPAIR;  Surgeon: Francisco Javier Lainez M.D.;  Location: SURGERY SAME DAY [facility] ORS;  Service: Gynecology   • ENTEROCELE REPAIR  4/24/2018    Procedure: ENTEROCELE REPAIR- PERINEOPLASTY;  Surgeon: Francisco Javier Lainez M.D.;  Location: SURGERY SAME DAY [facility] ORS;  Service: Gynecology   • BLADDER SLING FEMALE  4/24/2018    Procedure: BLADDER SLING FEMALE- TOT;  Surgeon: Francisco Javier Lainez M.D.;  Location: SURGERY SAME DAY [facility] ORS;  Service: Gynecology   • VAGINAL SUSPENSION N/A 4/24/2018    Procedure: VAGINAL SUSPENSION- SACROSPINOUS VAULT;  Surgeon: FranciscoJ avier Lainez M.D.;  Location: SURGERY SAME DAY [facility] ORS;  Service: Gynecology   • CYSTOSCOPY N/A 4/24/2018    Procedure: CYSTOSCOPY;  Surgeon: Francisco Javier Lainez M.D.;  Location: SURGERY SAME DAY [facility] ORS;  Service: Gynecology   • ENDOSCOPY  2016   • COLONOSCOPY  2016   • OTHER  2016    esophageal dilation   • OTHER ORTHOPEDIC SURGERY  2012    bone removed from toe after fracture   • TONSILLECTOMY AND ADENOIDECTOMY  1977   • OTHER  1971, 1978, 1985    " "left facial cheek and right upper thight reconstructive surgery     SOCHX:  reports that she has never smoked. She has never used smokeless tobacco. She reports that she does not drink alcohol and does not use drugs.  FH: Family history was reviewed, no pertinent findings to report     Objective:     /70 (BP Location: Right arm, Patient Position: Sitting, BP Cuff Size: Adult long)   Pulse 75   Temp 36.6 °C (97.8 °F) (Temporal)   Resp 16   Ht 1.6 m (5' 3\")   Wt 113 kg (250 lb)   LMP 04/15/2018   SpO2 95%   BMI 44.29 kg/m²     Constitutional: Patient is in no acute distress. Appears well-developed and well-nourished.   Cardiovascular: Normal rate.    Pulmonary/Chest: Effort normal. No respiratory distress.   Neurological: Patient is alert and oriented to person, place, and time.   Skin: Skin is warm and dry.   Psychiatric: Normal mood and affect. Behavior is normal.     General: Patient is alert and oriented and is in no acute distress.  L wrist: Increased vascularity on ventral surface of L wrist. Tender to palpation over ventral radial surface of wrist. Reduced flexion of wrist. Capillary refill in less than 2 seconds.     Assessment/Plan:       1. Work related injury    2. Acute pain of left wrist    Released to Restricted Duty FROM 7/14/2023 TO 7/20/2023  Wear brace while at work. Follow up with Occupational Medicine.     Follow-up with hand surgeon or with occupational medicine.    Differential diagnosis, natural history, supportive care, and indications for immediate follow-up discussed.    Approximately 25 minutes was spent in preparing for visit, obtaining history, exam and evaluation, patient counseling/education and post visit documentation/orders.    "

## 2023-07-28 ENCOUNTER — APPOINTMENT (OUTPATIENT)
Dept: MEDICAL GROUP | Facility: MEDICAL CENTER | Age: 53
End: 2023-07-28
Payer: COMMERCIAL

## 2023-08-04 ENCOUNTER — HOSPITAL ENCOUNTER (OUTPATIENT)
Facility: MEDICAL CENTER | Age: 53
End: 2023-08-04
Attending: STUDENT IN AN ORGANIZED HEALTH CARE EDUCATION/TRAINING PROGRAM
Payer: COMMERCIAL

## 2023-08-04 ENCOUNTER — OFFICE VISIT (OUTPATIENT)
Dept: MEDICAL GROUP | Facility: MEDICAL CENTER | Age: 53
End: 2023-08-04
Payer: COMMERCIAL

## 2023-08-04 VITALS
DIASTOLIC BLOOD PRESSURE: 66 MMHG | HEIGHT: 63 IN | OXYGEN SATURATION: 95 % | TEMPERATURE: 98 F | BODY MASS INDEX: 45.18 KG/M2 | HEART RATE: 84 BPM | SYSTOLIC BLOOD PRESSURE: 114 MMHG | RESPIRATION RATE: 16 BRPM | WEIGHT: 255 LBS

## 2023-08-04 DIAGNOSIS — R10.11 RUQ PAIN: ICD-10-CM

## 2023-08-04 DIAGNOSIS — E11.9 TYPE 2 DIABETES MELLITUS WITHOUT COMPLICATION, WITHOUT LONG-TERM CURRENT USE OF INSULIN (HCC): ICD-10-CM

## 2023-08-04 DIAGNOSIS — G89.29 CHRONIC WRIST PAIN, LEFT: ICD-10-CM

## 2023-08-04 DIAGNOSIS — M25.532 CHRONIC WRIST PAIN, LEFT: ICD-10-CM

## 2023-08-04 DIAGNOSIS — Z79.891 CHRONIC USE OF OPIATE FOR THERAPEUTIC PURPOSE: ICD-10-CM

## 2023-08-04 DIAGNOSIS — Z12.31 ENCOUNTER FOR SCREENING MAMMOGRAM FOR MALIGNANT NEOPLASM OF BREAST: ICD-10-CM

## 2023-08-04 LAB
CREAT UR-MCNC: 88.69 MG/DL
HBA1C MFR BLD: 6.7 % (ref ?–5.8)
MICROALBUMIN UR-MCNC: <1.2 MG/DL
MICROALBUMIN/CREAT UR: NORMAL MG/G (ref 0–30)
POCT INT CON NEG: NEGATIVE
POCT INT CON POS: POSITIVE

## 2023-08-04 PROCEDURE — 3074F SYST BP LT 130 MM HG: CPT | Performed by: STUDENT IN AN ORGANIZED HEALTH CARE EDUCATION/TRAINING PROGRAM

## 2023-08-04 PROCEDURE — 99214 OFFICE O/P EST MOD 30 MIN: CPT | Performed by: STUDENT IN AN ORGANIZED HEALTH CARE EDUCATION/TRAINING PROGRAM

## 2023-08-04 PROCEDURE — 82570 ASSAY OF URINE CREATININE: CPT

## 2023-08-04 PROCEDURE — 3078F DIAST BP <80 MM HG: CPT | Performed by: STUDENT IN AN ORGANIZED HEALTH CARE EDUCATION/TRAINING PROGRAM

## 2023-08-04 PROCEDURE — 83036 HEMOGLOBIN GLYCOSYLATED A1C: CPT | Performed by: STUDENT IN AN ORGANIZED HEALTH CARE EDUCATION/TRAINING PROGRAM

## 2023-08-04 PROCEDURE — 82043 UR ALBUMIN QUANTITATIVE: CPT

## 2023-08-04 RX ORDER — HYDROCODONE BITARTRATE AND ACETAMINOPHEN 7.5; 325 MG/1; MG/1
1 TABLET ORAL DAILY
Qty: 30 TABLET | Refills: 0 | Status: SHIPPED | OUTPATIENT
Start: 2023-08-04 | End: 2023-09-03

## 2023-08-04 RX ORDER — HYDROCODONE BITARTRATE AND ACETAMINOPHEN 7.5; 325 MG/1; MG/1
1 TABLET ORAL DAILY
Qty: 30 TABLET | Refills: 0 | Status: SHIPPED | OUTPATIENT
Start: 2023-09-03 | End: 2023-10-03

## 2023-08-04 RX ORDER — HYDROCODONE BITARTRATE AND ACETAMINOPHEN 7.5; 325 MG/1; MG/1
1 TABLET ORAL DAILY
Qty: 30 TABLET | Refills: 0 | Status: SHIPPED | OUTPATIENT
Start: 2023-10-03 | End: 2023-10-10

## 2023-08-04 ASSESSMENT — FIBROSIS 4 INDEX: FIB4 SCORE: 1.68

## 2023-08-04 NOTE — PROGRESS NOTES
"Subjective:     CC: RUQ pain, wrist pain, chronic pain, DM2    HPI:   Sarah presents today with    Problem   Ruq Pain    This is a chronic condition.  She has been having worsening right upper quadrant pain that is intermittent.  She did have imaging in 2021 that did not show any gallstones but this pain is new and more consistent than previously.     Chronic Wrist Pain, Left    This is a chronic condition condition.  About 3 months ago she fell and hurt her wrist.  At that time she an x-ray which did not show any fracture.  She has not had any improvement in her pain.  She has been trying conservative measures including bracing without any relief.  Hoping to get an MRI for further evaluation.     Chronic Use of Opiate for Therapeutic Purpose    This is a chronic condition.  Controlled substance agreement and urine drug screen up-to-date.  PDMP reviewed.  She takes Norco 7.5-325 mg as needed daily.     Type 2 Diabetes Mellitus Without Complication (Hcc)    Chronic, controlled, currently on metformin 1000 mg twice daily, Actos 45 mg daily, and Jardiance 10 mg daily.  Previously on 40 units of Lantus nightly, this was discontinued.  Last A1c less than 7.  She is on a statin.  She is not on an ACE or ARB        ROS:  ROS    Objective:     Exam:  /66   Pulse 84   Temp 36.7 °C (98 °F) (Temporal)   Resp 16   Ht 1.6 m (5' 3\")   Wt 116 kg (255 lb)   LMP 04/15/2018   SpO2 95%   BMI 45.17 kg/m²  Body mass index is 45.17 kg/m².    Physical Exam  Vitals reviewed.   Constitutional:       General: She is not in acute distress.     Appearance: She is not toxic-appearing.   HENT:      Head: Normocephalic and atraumatic.      Right Ear: External ear normal.      Left Ear: External ear normal.   Eyes:      General:         Right eye: No discharge.         Left eye: No discharge.      Extraocular Movements: Extraocular movements intact.      Conjunctiva/sclera: Conjunctivae normal.   Pulmonary:      Effort: Pulmonary " effort is normal. No respiratory distress.   Feet:      Right foot:      Protective Sensation: 4 sites tested.  4 sites sensed.      Left foot:      Protective Sensation: 4 sites tested.  3 sites sensed.   Skin:     General: Skin is warm and dry.   Neurological:      Mental Status: She is alert.   Psychiatric:         Mood and Affect: Mood normal.         Behavior: Behavior normal.         Thought Content: Thought content normal.         Judgment: Judgment normal.           Assessment & Plan:     52 y.o. female with the following -     1. Type 2 diabetes mellitus without complication, without long-term current use of insulin (HCC)  Chronic, A1c less than 7, continue Actos, metformin and Jardiance at the current dose.  She has an eye exam coming up and will send records.  Microalbumin diabetic foot exam completed today.  - MICROALBUMIN CREAT RATIO URINE; Future  - Diabetic Monofilament LE Exam  - POCT  A1C    2. RUQ pain  Recheck right upper quadrant ultrasound as her symptoms do sound consistent with cholelithiasis   - US-RUQ; Future    3. Chronic use of opiate for therapeutic purpose  PDMP reviewed, urine drug screen on file, controlled substance agreement on file, Continue Norco at current dose  - HYDROcodone-acetaminophen (NORCO) 7.5-325 MG tab; Take 1 Tablet by mouth every day for 30 days.  Dispense: 30 Tablet; Refill: 0  - HYDROcodone-acetaminophen (NORCO) 7.5-325 MG tab; Take 1 Tablet by mouth every day for 30 days.  Dispense: 30 Tablet; Refill: 0  - HYDROcodone-acetaminophen (NORCO) 7.5-325 MG tab; Take 1 Tablet by mouth every day for 30 days.  Dispense: 30 Tablet; Refill: 0    4. Chronic wrist pain, left  Chronic wrist pain, 3 months of conservative therapy without improvement, x-ray negative, MRI ordered  - MR-WRIST W/O LEFT; Future    5. Encounter for screening mammogram for malignant neoplasm of breast  - MA-SCREENING MAMMO BILAT W/TOMOSYNTHESIS W/CAD; Future      No follow-ups on file.    Please note  that this dictation was created using voice recognition software. I have made every reasonable attempt to correct obvious errors, but I expect that there are errors of grammar and possibly content that I did not discover before finalizing the note.

## 2023-08-04 NOTE — LETTER
[facility]  [id number]  [facility] [address]     August 4, 2023    Patient: Sarah Friedman   YOB: 1970   Date of Visit: 8/4/2023       To Whom It May Concern:    Sarah Friedman was seen and treated in our department on 8/4/2023.  Due to to left wrist pain please keep her lifting restrictions at 10 pounds.    Sincerely,     Vida Vick M.D.

## 2023-08-18 ENCOUNTER — HOSPITAL ENCOUNTER (OUTPATIENT)
Dept: RADIOLOGY | Facility: MEDICAL CENTER | Age: 53
End: 2023-08-18
Attending: STUDENT IN AN ORGANIZED HEALTH CARE EDUCATION/TRAINING PROGRAM
Payer: COMMERCIAL

## 2023-08-18 DIAGNOSIS — G89.29 CHRONIC WRIST PAIN, LEFT: ICD-10-CM

## 2023-08-18 DIAGNOSIS — M25.532 CHRONIC WRIST PAIN, LEFT: ICD-10-CM

## 2023-08-18 PROCEDURE — 73221 MRI JOINT UPR EXTREM W/O DYE: CPT | Mod: LT

## 2023-08-22 ENCOUNTER — PATIENT MESSAGE (OUTPATIENT)
Dept: MEDICAL GROUP | Facility: MEDICAL CENTER | Age: 53
End: 2023-08-22
Payer: COMMERCIAL

## 2023-08-22 DIAGNOSIS — I10 ESSENTIAL HYPERTENSION: ICD-10-CM

## 2023-08-23 RX ORDER — SPIRONOLACTONE 25 MG/1
25 TABLET ORAL DAILY
Qty: 90 TABLET | Refills: 1 | Status: SHIPPED | OUTPATIENT
Start: 2023-08-23 | End: 2024-01-05 | Stop reason: SDUPTHER

## 2023-08-23 NOTE — PATIENT COMMUNICATION
Received request via: Patient    Was the patient seen in the last year in this department? Yes    Does the patient have an active prescription (recently filled or refills available) for medication(s) requested? No    Does the patient have MCC Plus and need 100 day supply (blood pressure, diabetes and cholesterol meds only)? Patient does not have SCP

## 2023-08-24 ENCOUNTER — PATIENT MESSAGE (OUTPATIENT)
Dept: HEALTH INFORMATION MANAGEMENT | Facility: OTHER | Age: 53
End: 2023-08-24

## 2023-08-24 ENCOUNTER — OFFICE VISIT (OUTPATIENT)
Dept: MEDICAL GROUP | Facility: MEDICAL CENTER | Age: 53
End: 2023-08-24
Payer: COMMERCIAL

## 2023-08-24 VITALS
OXYGEN SATURATION: 96 % | TEMPERATURE: 97.8 F | BODY MASS INDEX: 44.93 KG/M2 | SYSTOLIC BLOOD PRESSURE: 104 MMHG | WEIGHT: 253.6 LBS | HEART RATE: 76 BPM | DIASTOLIC BLOOD PRESSURE: 62 MMHG | RESPIRATION RATE: 16 BRPM | HEIGHT: 63 IN

## 2023-08-24 DIAGNOSIS — G89.29 CHRONIC WRIST PAIN, LEFT: ICD-10-CM

## 2023-08-24 DIAGNOSIS — M25.532 CHRONIC WRIST PAIN, LEFT: ICD-10-CM

## 2023-08-24 DIAGNOSIS — E66.01 CLASS 3 SEVERE OBESITY DUE TO EXCESS CALORIES WITH SERIOUS COMORBIDITY AND BODY MASS INDEX (BMI) OF 40.0 TO 44.9 IN ADULT (HCC): ICD-10-CM

## 2023-08-24 DIAGNOSIS — N39.41 URGE INCONTINENCE: ICD-10-CM

## 2023-08-24 PROBLEM — E66.813 CLASS 3 SEVERE OBESITY DUE TO EXCESS CALORIES WITH SERIOUS COMORBIDITY AND BODY MASS INDEX (BMI) OF 40.0 TO 44.9 IN ADULT (HCC): Status: ACTIVE | Noted: 2017-12-12

## 2023-08-24 PROCEDURE — 99214 OFFICE O/P EST MOD 30 MIN: CPT | Performed by: STUDENT IN AN ORGANIZED HEALTH CARE EDUCATION/TRAINING PROGRAM

## 2023-08-24 PROCEDURE — 3078F DIAST BP <80 MM HG: CPT | Performed by: STUDENT IN AN ORGANIZED HEALTH CARE EDUCATION/TRAINING PROGRAM

## 2023-08-24 PROCEDURE — 3074F SYST BP LT 130 MM HG: CPT | Performed by: STUDENT IN AN ORGANIZED HEALTH CARE EDUCATION/TRAINING PROGRAM

## 2023-08-24 RX ORDER — OXYBUTYNIN CHLORIDE 15 MG/1
15 TABLET, EXTENDED RELEASE ORAL DAILY
Qty: 30 TABLET | Refills: 0 | Status: SHIPPED | OUTPATIENT
Start: 2023-08-24 | End: 2023-09-18 | Stop reason: SDUPTHER

## 2023-08-24 ASSESSMENT — FIBROSIS 4 INDEX: FIB4 SCORE: 1.68

## 2023-08-24 NOTE — PROGRESS NOTES
"Subjective:     CC: Wrist pain, urge incontinence, obesity    HPI:   Sarah presents today with    Problem   Chronic Wrist Pain, Left    This is a chronic condition condition.  About 3 months ago she fell and hurt her wrist.  At that time she an x-ray which did not show any fracture.  She has not had any improvement in her pain.  She has been trying conservative measures including bracing without any relief.  MRI showed possible sprain but no ligament or tendon injury.     Urge Incontinence    Chronic, stable, status post hysterectomy and bladder sling, currently on oxybutynin 10 mg twice daily with good relief. Would like to increase the dose.     Class 3 Severe Obesity Due to Excess Calories With Serious Comorbidity and Body Mass Index (Bmi) of 40.0 to 44.9 in Adult (Hcc)    Chronic condition, worsening since she started menopause.  She is hoping for medications to help with this.         ROS:  ROS    Objective:     Exam:  /62   Pulse 76   Temp 36.6 °C (97.8 °F) (Temporal)   Resp 16   Ht 1.6 m (5' 3\")   Wt 115 kg (253 lb 9.6 oz) Comment: pt stated  LMP 04/15/2018   SpO2 96%   BMI 44.92 kg/m²  Body mass index is 44.92 kg/m².    Physical Exam  Vitals reviewed.   Constitutional:       General: She is not in acute distress.     Appearance: She is not toxic-appearing.      Comments: Exam through observation only   HENT:      Head: Normocephalic and atraumatic.      Right Ear: External ear normal.      Left Ear: External ear normal.   Eyes:      General:         Right eye: No discharge.         Left eye: No discharge.      Extraocular Movements: Extraocular movements intact.      Conjunctiva/sclera: Conjunctivae normal.   Pulmonary:      Effort: Pulmonary effort is normal. No respiratory distress.   Skin:     General: Skin is warm and dry.   Neurological:      Mental Status: She is alert.   Psychiatric:         Mood and Affect: Mood normal.         Behavior: Behavior normal.         Thought Content: " Thought content normal.         Judgment: Judgment normal.           Assessment & Plan:     52 y.o. female with the following -     1. Chronic wrist pain, left  Chronic condition, MRI did not really show any cause of her pain.  Referral to orthopedics sent  - Referral to Orthopedics    2. Urge incontinence  Increase to 50 mg XL daily  - oxybutynin (DITROPAN-XL) 15 MG CR tablet; Take 1 Tablet by mouth every day.  Dispense: 30 Tablet; Refill: 0    3. Class 3 severe obesity due to excess calories with serious comorbidity and body mass index (BMI) of 40.0 to 44.9 in adult (HCC)  Chronic condition, discussed referral to [facility].  We have completed this form today    No follow-ups on file.    Please note that this dictation was created using voice recognition software. I have made every reasonable attempt to correct obvious errors, but I expect that there are errors of grammar and possibly content that I did not discover before finalizing the note.

## 2023-09-01 ENCOUNTER — HOSPITAL ENCOUNTER (OUTPATIENT)
Dept: RADIOLOGY | Facility: MEDICAL CENTER | Age: 53
End: 2023-09-01
Attending: STUDENT IN AN ORGANIZED HEALTH CARE EDUCATION/TRAINING PROGRAM
Payer: COMMERCIAL

## 2023-09-01 DIAGNOSIS — R10.11 RUQ PAIN: ICD-10-CM

## 2023-09-01 PROCEDURE — 76705 ECHO EXAM OF ABDOMEN: CPT

## 2023-09-07 RX ORDER — ESTRADIOL 1 MG/1
1 TABLET ORAL DAILY
Qty: 90 TABLET | Refills: 0 | Status: SHIPPED | OUTPATIENT
Start: 2023-09-07 | End: 2023-12-06

## 2023-10-04 ENCOUNTER — TELEPHONE (OUTPATIENT)
Dept: CARDIOLOGY | Facility: MEDICAL CENTER | Age: 53
End: 2023-10-04
Payer: COMMERCIAL

## 2023-10-04 PROBLEM — M77.8 LEFT WRIST TENDINITIS: Status: ACTIVE | Noted: 2023-10-04

## 2023-10-04 NOTE — TELEPHONE ENCOUNTER
MARÍA    Caller:  Andie Smith    Medication Name and Dosage:    CORLANOR 5 MG Tab tablet [[id number]]    Medication amount left: 0    Preferred Pharmacy:     Other questions (Topic):   [facility]  [place]    Callback Number (Will only call for issues): [phone]    Thank you,   Maureen FERREIRA

## 2023-10-07 NOTE — LETTER
March 13, 2023         Patient: Sarah Friedman   YOB: 1970   Date of Visit: 3/13/2023           To Whom it May Concern:    Sarah Friedman was seen in my clinic on 3/13/2023. She may return to work on 3/14/23. Please excuse her absence for 3/12/2023.    If you have any questions or concerns, please don't hesitate to call.        Sincerely,           JITENDRA Penn.  Electronically Signed      Patient : Nathan Perez Age: 15 year old Sex: female   MRN: [id number] Encounter Date: 10/5/2023    History     Chief Complaint   Patient presents with   • Vaginal Bleeding       HPI    Nathan Perez is a 15 year old presenting to the emergency department for evaluation of vaginal bleeding that started this morning.  Pt is , previous miscarriage.  Pt states she had blood on the tissue when she wiped this morning.  No blood in the toilet or her underwear.  No abd cramping, no discharge or fluid leaking.  Pt denies likelihood of STD.     Pt also states she feels sad.  She denies HI with intention, but feels like she just may not want to be here anymore.   She would like to speak with our .  Pt states she is safe at home.       16w 1d per previous US/OB note.  Per previous labs, pt is RH positive.       No Known Allergies    No current facility-administered medications for this encounter.     Current Outpatient Medications   Medication Sig   • Prenatal Vit-DSS-Fe Fum-FA (Se- 19) 29-1 MG tablet Take 1 tablet by mouth daily.       No past medical history on file.    No past surgical history on file.    No family history on file.    Social History     Tobacco Use   • Smoking status: Never   • Smokeless tobacco: Never   Vaping Use   • Vaping Use: never used   Substance Use Topics   • Alcohol use: No   • Drug use: No       Review of Systems     Review of Systems   Constitutional: Negative for appetite change and fever.   HENT: Negative for congestion, ear pain and sore throat.    Eyes: Negative for pain and redness.   Respiratory: Negative for cough and shortness of breath.    Cardiovascular: Negative for chest pain.   Gastrointestinal: Negative for abdominal pain, constipation, diarrhea, nausea and vomiting.   Genitourinary: Positive for vaginal bleeding (blood tinge on toilet tissue). Negative for difficulty urinating, dysuria, flank pain, frequency and urgency.   Musculoskeletal: Negative for back  pain and myalgias.   Skin: Negative for rash.   Allergic/Immunologic: Negative for immunocompromised state.   Neurological: Negative for dizziness and light-headedness.   Psychiatric/Behavioral: Positive for dysphoric mood. The patient is not nervous/anxious.         Depressive feelings       Physical Exam     ED Triage Vitals [10/05/23 0851]   ED Triage Vitals Group      Temp 98.3 °F (36.8 °C)      Heart Rate 72      Resp 16      /64      SpO2 100 %      EtCO2 mmHg       Height       Weight       Weight Scale Used       BMI (Calculated)       IBW/kg (Calculated)        Physical Exam      Procedures     Procedures    Lab Results     No results found for this visit on 10/05/23.    EKG     {EKG Documentation (Optional):236710}    Radiology Results     Imaging Results    None         ED Medications     Medications - No data to display      ED Course     Vitals:    10/05/23 0851   BP: 106/64   BP Location: LUE - Left upper extremity   Patient Position: Sitting/High-Melendrez's   Pulse: 72   Resp: 16   Temp: 98.3 °F (36.8 °C)   TempSrc: Oral   SpO2: 100%       ED Course as of 10/07/23 1829   Thu Oct 05, 2023   1233 Patient was recheck, discussed her urinalysis and wet mount are normal.  She has good fetal heart tones.  She has no abdominal pain or tenderness, there was no bleeding on her pelvic exam.  She had significant depression symptoms.   saw the patient and was set up with [facility].  Questions were answered, patient feels safe for discharge home. [SY]      ED Course User Index  [SY] Ayesha Osborn, MARY KAY       {Data Independently Reviewed:741740}    {ED Scoring Tool (Optional):359492}    Consults                {Consult Documentation (Optional):038915}    MDM                 {(TIP) Please include a comprehensive MDM explaining the patients risks and why or why not they needed to be admitted.  This message will delete upon signing.  (Optional):3}    {(Tip) For patients with social needs use dot  phrase ermedsocialdoh in your MDM and dot phrase ermeddcsocialdoh in the discharge instruction box:3}       {MDM:677566}        {Does the Patient have sepsis:241349}     Critical Care       {Critical Care:835868}        Disposition     {ED Disposition:668946}          There is no disposition no dispo time  There is no comment    {(TIP) 2023 Billing Criteria (this will vanish upon signing)    Level 4   Must meet requirements of at least   1 of the 3 Categories    Level 5  Must meet requirements of at least   2 of the 3 Categories      Category 1  Test Documents or independent historians  (need 3) -Review of prior external notes  -Review of results of a unique test  -Ordering of Each unique test  -Assessment Requiring an independent historian     Category 2  Independent interpretation of a test preformed by another physician/other qualified health care professional  (Need 1) -Cardiac Monitor Review  -Radiology interpretation  -EKG (unless billed separately)   Category 3  Discussion of Management or test interpretation with external physician/other qualified health care professional/appropriate source  (Need 1)       -Consultation that specifies who you discussed the patient management with and what the plan is.   (Optional):3}

## 2023-10-10 ENCOUNTER — OFFICE VISIT (OUTPATIENT)
Dept: URGENT CARE | Facility: CLINIC | Age: 53
End: 2023-10-10
Payer: COMMERCIAL

## 2023-10-10 ENCOUNTER — APPOINTMENT (OUTPATIENT)
Dept: RADIOLOGY | Facility: IMAGING CENTER | Age: 53
End: 2023-10-10
Attending: NURSE PRACTITIONER
Payer: COMMERCIAL

## 2023-10-10 ENCOUNTER — HOSPITAL ENCOUNTER (EMERGENCY)
Facility: MEDICAL CENTER | Age: 53
End: 2023-10-10
Attending: EMERGENCY MEDICINE
Payer: COMMERCIAL

## 2023-10-10 ENCOUNTER — APPOINTMENT (OUTPATIENT)
Dept: RADIOLOGY | Facility: MEDICAL CENTER | Age: 53
End: 2023-10-10
Attending: EMERGENCY MEDICINE
Payer: COMMERCIAL

## 2023-10-10 VITALS
OXYGEN SATURATION: 97 % | DIASTOLIC BLOOD PRESSURE: 60 MMHG | HEART RATE: 78 BPM | WEIGHT: 255 LBS | HEIGHT: 63 IN | TEMPERATURE: 96.8 F | RESPIRATION RATE: 14 BRPM | SYSTOLIC BLOOD PRESSURE: 98 MMHG | BODY MASS INDEX: 45.18 KG/M2

## 2023-10-10 VITALS
TEMPERATURE: 98 F | WEIGHT: 255.51 LBS | OXYGEN SATURATION: 92 % | RESPIRATION RATE: 16 BRPM | HEART RATE: 61 BPM | SYSTOLIC BLOOD PRESSURE: 120 MMHG | BODY MASS INDEX: 45.27 KG/M2 | DIASTOLIC BLOOD PRESSURE: 80 MMHG | HEIGHT: 63 IN

## 2023-10-10 DIAGNOSIS — S89.92XA INJURY OF LEFT LOWER EXTREMITY, INITIAL ENCOUNTER: ICD-10-CM

## 2023-10-10 DIAGNOSIS — S99.912A INJURY OF LEFT ANKLE, INITIAL ENCOUNTER: ICD-10-CM

## 2023-10-10 DIAGNOSIS — T14.8XXA HEMATOMA: ICD-10-CM

## 2023-10-10 LAB
ANION GAP SERPL CALC-SCNC: 15 MMOL/L (ref 7–16)
BASOPHILS # BLD AUTO: 0.5 % (ref 0–1.8)
BASOPHILS # BLD: 0.03 K/UL (ref 0–0.12)
BUN SERPL-MCNC: 23 MG/DL (ref 8–22)
CALCIUM SERPL-MCNC: 9.7 MG/DL (ref 8.5–10.5)
CHLORIDE SERPL-SCNC: 100 MMOL/L (ref 96–112)
CO2 SERPL-SCNC: 25 MMOL/L (ref 20–33)
CREAT SERPL-MCNC: 0.59 MG/DL (ref 0.5–1.4)
EOSINOPHIL # BLD AUTO: 0.11 K/UL (ref 0–0.51)
EOSINOPHIL NFR BLD: 1.7 % (ref 0–6.9)
ERYTHROCYTE [DISTWIDTH] IN BLOOD BY AUTOMATED COUNT: 51.7 FL (ref 35.9–50)
GFR SERPLBLD CREATININE-BSD FMLA CKD-EPI: 107 ML/MIN/1.73 M 2
GLUCOSE SERPL-MCNC: 90 MG/DL (ref 65–99)
HCT VFR BLD AUTO: 46 % (ref 37–47)
HGB BLD-MCNC: 14.9 G/DL (ref 12–16)
IMM GRANULOCYTES # BLD AUTO: 0.06 K/UL (ref 0–0.11)
IMM GRANULOCYTES NFR BLD AUTO: 0.9 % (ref 0–0.9)
LYMPHOCYTES # BLD AUTO: 2.18 K/UL (ref 1–4.8)
LYMPHOCYTES NFR BLD: 33.3 % (ref 22–41)
MCH RBC QN AUTO: 30.7 PG (ref 27–33)
MCHC RBC AUTO-ENTMCNC: 32.4 G/DL (ref 32.2–35.5)
MCV RBC AUTO: 94.8 FL (ref 81.4–97.8)
MONOCYTES # BLD AUTO: 0.6 K/UL (ref 0–0.85)
MONOCYTES NFR BLD AUTO: 9.2 % (ref 0–13.4)
NEUTROPHILS # BLD AUTO: 3.57 K/UL (ref 1.82–7.42)
NEUTROPHILS NFR BLD: 54.4 % (ref 44–72)
NRBC # BLD AUTO: 0 K/UL
NRBC BLD-RTO: 0 /100 WBC (ref 0–0.2)
PLATELET # BLD AUTO: 260 K/UL (ref 164–446)
PMV BLD AUTO: 8.5 FL (ref 9–12.9)
POTASSIUM SERPL-SCNC: 4.2 MMOL/L (ref 3.6–5.5)
RBC # BLD AUTO: 4.85 M/UL (ref 4.2–5.4)
SODIUM SERPL-SCNC: 140 MMOL/L (ref 135–145)
WBC # BLD AUTO: 6.6 K/UL (ref 4.8–10.8)

## 2023-10-10 PROCEDURE — 73610 X-RAY EXAM OF ANKLE: CPT | Mod: TC,LT | Performed by: RADIOLOGY

## 2023-10-10 PROCEDURE — 36415 COLL VENOUS BLD VENIPUNCTURE: CPT

## 2023-10-10 PROCEDURE — 3078F DIAST BP <80 MM HG: CPT | Performed by: NURSE PRACTITIONER

## 2023-10-10 PROCEDURE — 85025 COMPLETE CBC W/AUTO DIFF WBC: CPT

## 2023-10-10 PROCEDURE — 3074F SYST BP LT 130 MM HG: CPT | Performed by: NURSE PRACTITIONER

## 2023-10-10 PROCEDURE — 73590 X-RAY EXAM OF LOWER LEG: CPT | Mod: TC,LT | Performed by: RADIOLOGY

## 2023-10-10 PROCEDURE — 80048 BASIC METABOLIC PNL TOTAL CA: CPT

## 2023-10-10 PROCEDURE — 99283 EMERGENCY DEPT VISIT LOW MDM: CPT

## 2023-10-10 PROCEDURE — 700102 HCHG RX REV CODE 250 W/ 637 OVERRIDE(OP): Performed by: EMERGENCY MEDICINE

## 2023-10-10 PROCEDURE — 93971 EXTREMITY STUDY: CPT | Mod: LT

## 2023-10-10 PROCEDURE — A9270 NON-COVERED ITEM OR SERVICE: HCPCS | Performed by: EMERGENCY MEDICINE

## 2023-10-10 PROCEDURE — 99214 OFFICE O/P EST MOD 30 MIN: CPT | Performed by: NURSE PRACTITIONER

## 2023-10-10 RX ORDER — HYDROCODONE BITARTRATE AND ACETAMINOPHEN 5; 325 MG/1; MG/1
1-2 TABLET ORAL EVERY 6 HOURS PRN
Qty: 10 TABLET | Refills: 0 | Status: SHIPPED | OUTPATIENT
Start: 2023-10-10 | End: 2023-10-12

## 2023-10-10 RX ORDER — HYDROCODONE BITARTRATE AND ACETAMINOPHEN 5; 325 MG/1; MG/1
1 TABLET ORAL ONCE
Status: COMPLETED | OUTPATIENT
Start: 2023-10-10 | End: 2023-10-10

## 2023-10-10 RX ADMIN — HYDROCODONE BITARTRATE AND ACETAMINOPHEN 1 TABLET: 5; 325 TABLET ORAL at 15:17

## 2023-10-10 ASSESSMENT — FIBROSIS 4 INDEX
FIB4 SCORE: 1.68
FIB4 SCORE: 1.68

## 2023-10-10 NOTE — Clinical Note
Sarah Friedman was seen and treated in our emergency department on 10/10/2023.  She may return to work on 10/12/2023.       If you have any questions or concerns, please don't hesitate to call.      Brayden Michelle M.D.

## 2023-10-10 NOTE — PROGRESS NOTES
Chief Complaint   Patient presents with    Leg Injury     10/1 S/F injured L lower leg       HISTORY OF PRESENT ILLNESS: Patient is a pleasant 52 y.o. female who presents to urgent care today with concerns of an injury to left lower leg.  Patient notes that she accidentally slipped down 1 stair 10 days ago, causing injury to her left lower leg and ankle.  She has been ambulating on the extremity since the incident.  She reports worsening over the last few days including increased pain and erythema.  Denies any chest pain, shortness of breath.  She is taking leftover Norco for pain relief.    Patient Active Problem List    Diagnosis Date Noted    Left wrist tendinitis 10/04/2023    RUQ pain 08/04/2023    Work related injury 04/24/2023    Chronic wrist pain, left 04/24/2023    Pain of left thumb 04/24/2023    Chronic use of opiate for therapeutic purpose 04/24/2023    Edema, unspecified 03/01/2022    Hyperlipidemia 03/01/2022    Neuropathy 03/01/2022    Nausea 02/17/2022    Osteoarthrosis 02/17/2022    Polycystic ovaries 02/17/2022    Vitamin D deficiency 02/17/2022    Adhesive capsulitis of left shoulder 02/17/2022    Chronic TMJ pain 02/05/2021    Deviated nasal septum 02/05/2021    Nosebleed 02/05/2021    Congestion of nasal sinus 02/05/2021    Family history of breast cancer 11/04/2020    Deformity of toenail 10/06/2020    Hormone replacement therapy 09/17/2020    Constipation 09/17/2020    Tremor 02/21/2020    Gallbladder disease 03/26/2019    Degenerative joint disease (DJD) of lumbar spine 02/13/2019    Neuroforaminal stenosis of lumbar spine 02/13/2019    Urge incontinence 02/13/2019    Chronic pain of left knee 02/13/2019    S/P total hysterectomy 06/27/2018    Esophageal dysmotility 03/01/2018    Hiatal hernia 01/11/2018    Oral herpes 01/11/2018    Class 3 severe obesity due to excess calories with serious comorbidity and body mass index (BMI) of 40.0 to 44.9 in adult (HCC) 12/12/2017    Migraine without  aura and without status migrainosus, not intractable 12/12/2017    Type 2 diabetes mellitus without complication (HCC) 11/09/2017    Chronic low back pain 11/09/2017    Recurrent major depressive disorder, in full remission (HCC) 11/09/2017    GERD (gastroesophageal reflux disease) 11/09/2017    Hypothyroidism due to Hashimoto's thyroiditis 11/09/2017    RADHA (obstructive sleep apnea) 11/09/2017    Primary insomnia 11/09/2017    Sinus tachycardia 11/09/2017    Essential hypertension 11/09/2017    Esophageal spasm 11/09/2017    Diabetic neuropathy (HCC) 11/09/2017       Allergies:Hydromorphone hcl, Betadine prepstick plus [povidone-iodine], Morphine, Povidone iodine, and Tape    Current Outpatient Medications Ordered in Epic   Medication Sig Dispense Refill    liraglutide (VICTOZA) 18 MG/3ML Solution Pen-injector Inject 0.2 mL every day by subcutaneous route.      acyclovir (ZOVIRAX) 400 MG tablet TAKE 1 TABLET BY MOUTH EVERY 8 HOURS FOR 5 DAYS AS NEEDED COLD SORE      Omeprazole Magnesium (PRILOSEC PO)       risperiDONE (RISPERDAL) 3 MG Tab Take 3 mg by mouth every evening.      celecoxib (CELEBREX) 200 MG Cap Take 1 Capsule by mouth every day. 30 Capsule 1    oxybutynin (DITROPAN-XL) 15 MG CR tablet Take 1 Tablet by mouth every day. 90 Tablet 1    ibuprofen (MOTRIN) 800 MG Tab Take 1 Tablet by mouth 2 times a day. 90 Tablet 0    estradiol (ESTRACE) 1 MG Tab Take 1 tablet by mouth once daily 90 Tablet 0    spironolactone (ALDACTONE) 25 MG Tab Take 1 Tablet by mouth every day. 90 Tablet 1    HYDROcodone-acetaminophen (NORCO) 7.5-325 MG tab Take 1 Tablet by mouth every day for 30 days. 30 Tablet 0    CORLANOR 5 MG Tab tablet TAKE 1 TABLET BY MOUTH TWICE DAILY WITH MEALS 180 Tablet 0    gabapentin (NEURONTIN) 800 MG tablet Take 1 Tablet by mouth 3 times a day. 270 Tablet 3    omeprazole (PRILOSEC) 40 MG delayed-release capsule Take 1 Capsule by mouth 2 times a day. 180 Capsule 3    Empagliflozin (JARDIANCE) 10 MG Tab  "tablet Take one tablet daily 90 Tablet 3    levothyroxine (SYNTHROID) 137 MCG Tab TAKE 1 TABLET BY MOUTH EVERY SUNDAY AND Thursday. 24 Tablet 3    pioglitazone (ACTOS) 45 MG Tab Take 1 Tablet by mouth every day. 90 Tablet 3    estradiol (ESTRACE) 0.5 MG tablet Take 1 Tablet by mouth every day. 90 Tablet 3    metformin (GLUCOPHAGE) 1000 MG tablet Take 1 Tablet by mouth 2 times a day with meals. 180 Tablet 3    atorvastatin (LIPITOR) 20 MG Tab Take 1 Tablet by mouth every day. 90 Tablet 3    levothyroxine (SYNTHROID) 125 MCG Tab TAKE 125 MCG EVERY MONDAYS, TUESDAYS WEDNESDAYS, FRIDAYS AND SATURDAYS 65 Tablet 2    hydroCHLOROthiazide (HYDRODIURIL) 12.5 MG tablet Take 1 tablet by mouth once daily 90 Tablet 2    promethazine (PHENERGAN) 25 MG Tab Take 1 Tablet by mouth every 6 hours as needed for Nausea/Vomiting. 30 Tablet 0    Alcohol Swabs (ALCOHOL PREP) 70 % Pads Use three times daily to clean finger before blood glucose testing 100 Each 11    divalproex (DEPAKOTE) 500 MG Tablet Delayed Response Take 500 mg by mouth 2 times a day.      risperiDONE (RISPERDAL) 2 MG Tab TAKE 1 TABLET BY MOUTH ONCE DAILY AT NIGHT AS DIRECTED      sumatriptan (IMITREX) 100 MG tablet TAKE 1/2 TO 1 (ONE-HALF TO ONE) TABLET BY MOUTH AS NEEDED FOR  MIGRAINE/HEADACHE 9 Tab 5    cyclobenzaprine (FLEXERIL) 10 MG Tab Take 1 Tab by mouth at bedtime as needed for Muscle Spasms. 30 Tab 5    polyethylene glycol 3350 (MIRALAX) 17 GM/SCOOP Powder Take 17 g by mouth 1 time daily as needed (constipation). 510 g 5    acetaminophen (TYLENOL) 500 MG Tab Take 500-1,000 mg by mouth every 6 hours as needed.      Multiple Vitamins-Minerals (MULTIVITAMIN PO) Take 1 Tab by mouth every day.       No current [facility]-ordered facility-administered medications on file.       Past Medical History:   Diagnosis Date    Anesthesia 08/27/2019    \"hard to wake up\"  \"sleep apnea\"    Arrhythmia     \" sinus Tach\"    Arthritis     osteo    Blood clotting disorder (HCC) 2005    DVT " "   Bowel habit changes     constipation    Depression     \"BPD\"    Diabetes ([facility]) 2019    insulin    Diabetic neuropathy ([facility])     Esophageal spasm     GERD (gastroesophageal reflux disease)     Hashimoto's disease     Hiatal hernia     High cholesterol     Hyperlipidemia     Hypothyroid     Sleep apnea     uses cpap    Snoring     sleep study done    Tachycardia        Social History     Tobacco Use    Smoking status: Never    Smokeless tobacco: Never   Vaping Use    Vaping Use: Never used   Substance Use Topics    Alcohol use: Never    Drug use: No     Comment: previous medical marijuana use for pain       Family Status   Relation Name Status    Mo      Fa      Sis  Alive    MUnc  (Not Specified)    MGMo  (Not Specified)    MGFa  (Not Specified)    Neg Hx  (Not Specified)     Family History   Problem Relation Age of Onset    Colorectal Cancer Mother     Breast Cancer Mother     Cancer Mother 70        pancreatic, breast in 30's    Heart Disease Father     Cancer Father         lung    Diabetes Sister     Diabetes Maternal Uncle     Heart Disease Maternal Grandmother         MI    Hyperlipidemia Maternal Grandmother     Diabetes Maternal Grandfather     Hyperlipidemia Maternal Grandfather     Stroke Neg Hx     Peritoneal Cancer Neg Hx     Tubal Cancer Neg Hx     Ovarian Cancer Neg Hx        ROS:  Review of Systems   Constitutional: Negative for fever, chills, weight loss, malaise, and fatigue.   HENT: Negative for ear pain, nosebleeds, congestion, sore throat and neck pain.    Eyes: Negative for vision changes.   Neuro: Negative for headache, sensory changes, weakness, seizure, LOC.   Cardiovascular: Negative for chest pain, palpitations, orthopnea and leg swelling.   Respiratory: Negative for cough, sputum production, shortness of breath and wheezing.   Gastrointestinal: Negative for abdominal pain, nausea, vomiting or diarrhea.   Genitourinary: Negative for dysuria, urgency and " "frequency.  Musculoskeletal: Positive for left lower extremity injury.  Negative for falls, neck pain, back pain, joint pain, myalgias.   Skin: Positive for ecchymosis and erythema.  Negative for rash, diaphoresis.     Exam:  BP 98/60   Pulse 78   Temp 36 °C (96.8 °F) (Temporal)   Resp 14   Ht 1.6 m (5' 3\")   Wt 116 kg (255 lb)   SpO2 97%   General: well-nourished, well-developed female in NAD  Head: normocephalic, atraumatic  Eyes: PERRLA, no conjunctival injection, acuity grossly intact, lids normal.  Ears: normal shape and symmetry, no tenderness, no discharge. External canals are without any significant edema or erythema. Tympanic membranes are without any inflammation, no effusion. Gross auditory acuity is intact.  Nose: symmetrical without tenderness, no discharge.  Mouth/Throat: reasonable hygiene, no erythema, exudates or tonsillar enlargement.  Neck: no masses, range of motion within normal limits, no tracheal deviation. No obvious thyroid enlargement.   Lymph: no cervical adenopathy. No supraclavicular adenopathy.   Neuro: alert and oriented. Cranial nerves 1-12 grossly intact. No sensory deficit.   Cardiovascular: regular rate and rhythm. No edema.  Pulmonary: no distress. Chest is symmetrical with respiration, no wheezes, crackles, or rhonchi.   Musculoskeletal: no clubbing, appropriate muscle tone, gait is antalgic.  Left lower extremity: Diffuse swelling, ecchymosis over her shin and calf with erythema.  Tenderness over anterior shin and posterior calf.  Left ankle: Swelling noted with tenderness over bilateral aspects of ankle, limited range of motion of ankle.  Left foot: Normal appearance, nontender, distal neurovascular intact.  Skin: warm, dry, intact, no clubbing, no cyanosis, no rashes.   Psych: appropriate mood, affect, judgement.         Assessment/Plan:  1. Injury of left ankle, initial encounter  DX-ANKLE 3+ VIEWS LEFT      2. Injury of left lower extremity, initial encounter  DX-TIBIA " AND FIBULA LEFT            Patient is a 52-year-old female who presents with an injury to left lower extremity which occurred 10 days ago.  X-ray of ankle and tib-fib are negative for any acute fracture.  Upon presentation there is diffuse ecchymosis and erythema to lower extremity with tenderness to calf.  Differential diagnoses include but are not limited to: Cellulitis, DVT.  At this time, I feel the patient requires a higher level of care in the ED for closer monitoring, stat lab work and/or imaging for further evaluation. This has been discussed with the patient and she states agreement and understanding. The patient is stable to leave POV at this time and will go directly to ED without delay.           Please note that this dictation was created using voice recognition software. I have made every reasonable attempt to correct obvious errors, but I expect that there are errors of grammar and possibly content that I did not discover before finalizing the note.      JITENDRA Mendoza.

## 2023-10-10 NOTE — LETTER
October 10, 2023         Patient: Sarah Friedman   YOB: 1970   Date of Visit: 10/10/2023           To Whom it May Concern:    Sarah Friedman was seen in my clinic on 10/10/2023. She may be excused from work today and tomorrow.    If you have any questions or concerns, please don't hesitate to call.        Sincerely,           JITENDRA Mendoza.  Electronically Signed

## 2023-10-10 NOTE — ED TRIAGE NOTES
Sarah Friedman  52 y.o.  Chief Complaint   Patient presents with    T-5000 FALL     Date of injury 10/1/2023  Fall down 1-2 concrete steps onto ground  - head strike  - LOC  - ASA/thinners    Leg Injury     LEFT LOWER  Bruising/redness from from foot to mid shin  Describes pain as 8/10 shooting from knee to ankle     Ambulatory to triage with limping gait for above. A & O x 4, GCS 15.    Denies numbness/tingling.    Has been using Tylenol/Ibuprofen at home with no relief of symptoms.    Triage process explained to patient, apologized for wait time, and returned to lobby.

## 2023-10-10 NOTE — ED PROVIDER NOTES
"ED PHYSICIAN NOTE    CHIEF COMPLAINT  Chief Complaint   Patient presents with    T-5000 FALL     Date of injury 10/1/2023  Fall down 1-2 concrete steps onto ground  - head strike  - LOC  - ASA/thinners    Leg Injury     LEFT LOWER  Bruising/redness from from foot to mid shin  Describes pain as 8/10 shooting from knee to ankle       EXTERNAL RECORDS REVIEWED  Outpatient notes-patient referred from urgent care today because of swelling of the left leg.  There was concern for DVT and or infection    HPI/ROS    Sarahbekah Friedman is a 52 y.o. female who presents with left leg pain and swelling.  Patient fell down concrete.  She twisted her left ankle.  She hit her leg on something.  She has had persistent pain and swelling of the left anterior lower leg.  She says it is not getting better.  Pain is mostly localized over the anterior medial aspect of the left leg.  She also has pain around her ankle.  There is some red discoloration of her leg.  She reports she has been icing.  She puts an ice pack on her leg until the ice turns to water.  She has done this over the last couple days more frequently.  Patient has not had a fever but she has felt tired.  She has diabetes her blood sugars are controlled.    PAST MEDICAL HISTORY  Past Medical History:   Diagnosis Date    Anesthesia 08/27/2019    \"hard to wake up\"  \"sleep apnea\"    Arrhythmia     \" sinus Tach\"    Arthritis     osteo    Blood clotting disorder ([facility]) 2005    DVT    Bowel habit changes     constipation    Depression     \"BPD\"    Diabetes ([facility]) 08/2019    insulin    Diabetic neuropathy ([facility])     Esophageal spasm     GERD (gastroesophageal reflux disease)     Hashimoto's disease     Hiatal hernia     High cholesterol     Hyperlipidemia     Hypothyroid     Sleep apnea     uses cpap    Snoring     sleep study done    Tachycardia        SOCIAL HISTORY  Social History     Tobacco Use    Smoking status: Never    Smokeless tobacco: Never   Vaping Use    Vaping Use: " "Never used   Substance Use Topics    Alcohol use: Never    Drug use: No     Comment: previous medical marijuana use for pain       CURRENT MEDICATIONS  Home Medications       Reviewed by Myranda Sotelo R.N. (Registered Nurse) on 10/10/23 at 1422  Med List Status: Partial     Medication Last Dose Status   acetaminophen (TYLENOL) 500 MG Tab  Active   acyclovir (ZOVIRAX) 400 MG tablet  Active   Alcohol Swabs (ALCOHOL PREP) 70 % Pads  Active   atorvastatin (LIPITOR) 20 MG Tab  Active   celecoxib (CELEBREX) 200 MG Cap  Flagged for Removal   CORLANOR 5 MG Tab tablet  Active   cyclobenzaprine (FLEXERIL) 10 MG Tab  Active   divalproex (DEPAKOTE) 500 MG Tablet Delayed Response  Active   Empagliflozin (JARDIANCE) 10 MG Tab tablet  Active   estradiol (ESTRACE) 0.5 MG tablet  Active   estradiol (ESTRACE) 1 MG Tab  Active   gabapentin (NEURONTIN) 800 MG tablet  Active   hydroCHLOROthiazide (HYDRODIURIL) 12.5 MG tablet  Active   HYDROcodone-acetaminophen (NORCO) 7.5-325 MG tab  Active   ibuprofen (MOTRIN) 800 MG Tab  Active   levothyroxine (SYNTHROID) 125 MCG Tab  Active   levothyroxine (SYNTHROID) 137 MCG Tab  Active   liraglutide (VICTOZA) 18 MG/3ML Solution Pen-injector  Active   metformin (GLUCOPHAGE) 1000 MG tablet  Active   Multiple Vitamins-Minerals (MULTIVITAMIN PO)  Active   omeprazole (PRILOSEC) 40 MG delayed-release capsule  Active   Omeprazole Magnesium (PRILOSEC PO)  Active   oxybutynin (DITROPAN-XL) 15 MG CR tablet  Active   pioglitazone (ACTOS) 45 MG Tab  Active   polyethylene glycol 3350 (MIRALAX) 17 GM/SCOOP Powder  Active   promethazine (PHENERGAN) 25 MG Tab  Active   risperiDONE (RISPERDAL) 2 MG Tab  Active   risperiDONE (RISPERDAL) 3 MG Tab  Active   spironolactone (ALDACTONE) 25 MG Tab  Active   sumatriptan (IMITREX) 100 MG tablet  Active                    ALLERGIES  Allergies   Allergen Reactions    Hydromorphone Hcl Itching     Itching \"head to toe\"    Betadine Prepstick Plus [Povidone-Iodine] Rash     " "Rash swelling    Morphine Itching     Itching \"head to toe\"    Povidone Iodine Hives    Tape Itching     Itching \"leave scars\"       PHYSICAL EXAM  VITAL SIGNS: /75   Pulse 78   Temp 36.7 °C (98 °F) (Temporal)   Resp 16   Ht 1.6 m (5' 3\")   Wt 116 kg (255 lb 8.2 oz)   LMP 04/15/2018   SpO2 96%   BMI 45.26 kg/m²    Constitutional: Awake and alert  HENT: Normal inspection  Eyes: Normal inspection  Neck: Grossly normal range of motion.  Cardiovascular: Normal heart rate  Thorax & Lungs: No respiratory distress  Skin: As pictured below  Extremities: There is a hematoma of the mid left medial lower extremity.  There is some redness distally to this that is well demarcated at the sock line.  There is no excessive warmth.  She does not have any pain in the popliteal fossa.  She has a strong posterior tibial and dorsalis pedis pulse.  She has only minimal discomfort with passive range of motion of the left ankle and foot.  There is no pallor of the distal extremity.      Neurologic: Grossly normal   Psychiatric: Normal for situation     DIAGNOSTIC STUDIES / PROCEDURES  LABS/EKG  Results for orders placed or performed during the hospital encounter of 10/10/23   CBC WITH DIFFERENTIAL   Result Value Ref Range    WBC 6.6 4.8 - 10.8 K/uL    RBC 4.85 4.20 - 5.40 M/uL    Hemoglobin 14.9 12.0 - 16.0 g/dL    Hematocrit 46.0 37.0 - 47.0 %    MCV 94.8 81.4 - 97.8 fL    MCH 30.7 27.0 - 33.0 pg    MCHC 32.4 32.2 - 35.5 g/dL    RDW 51.7 (H) 35.9 - 50.0 fL    Platelet Count 260 164 - 446 K/uL    MPV 8.5 (L) 9.0 - 12.9 fL    Neutrophils-Polys 54.40 44.00 - 72.00 %    Lymphocytes 33.30 22.00 - 41.00 %    Monocytes 9.20 0.00 - 13.40 %    Eosinophils 1.70 0.00 - 6.90 %    Basophils 0.50 0.00 - 1.80 %    Immature Granulocytes 0.90 0.00 - 0.90 %    Nucleated RBC 0.00 0.00 - 0.20 /100 WBC    Neutrophils (Absolute) 3.57 1.82 - 7.42 K/uL    Lymphs (Absolute) 2.18 1.00 - 4.80 K/uL    Monos (Absolute) 0.60 0.00 - 0.85 K/uL    Eos " (Absolute) 0.11 0.00 - 0.51 K/uL    Baso (Absolute) 0.03 0.00 - 0.12 K/uL    Immature Granulocytes (abs) 0.06 0.00 - 0.11 K/uL    NRBC (Absolute) 0.00 K/uL   BASIC METABOLIC PANEL   Result Value Ref Range    Sodium 140 135 - 145 mmol/L    Potassium 4.2 3.6 - 5.5 mmol/L    Chloride 100 96 - 112 mmol/L    Co2 25 20 - 33 mmol/L    Glucose 90 65 - 99 mg/dL    Bun 23 (H) 8 - 22 mg/dL    Creatinine 0.59 0.50 - 1.40 mg/dL    Calcium 9.7 8.5 - 10.5 mg/dL    Anion Gap 15.0 7.0 - 16.0   ESTIMATED GFR   Result Value Ref Range    GFR (CKD-EPI) 107 >60 mL/min/1.73 m 2       RADIOLOGY  I have independently interpreted the diagnostic imaging associated with this visit and am waiting the final reading from the radiologist.   My preliminary interpretation is as follows: Venous duplex negative for DVT    Radiologist interpretation:   US-EXTREMITY VENOUS LOWER UNILAT LEFT    (Results Pending)         COURSE & MEDICAL DECISION MAKING    INITIAL ASSESSMENT, COURSE AND PLAN  Care Narrative: Patient presents with left lower extremity swelling and pain.  She appears to have a hematoma.  There is erythema of the skin, but it is well demarcated at the sock line.  Its not excessively warm.  I suspect this erythema is secondary to excessive icing.  Unlikely cellulitis.  Nevertheless we will obtain laboratory data to assist with decision-making.  There was concern for DVT at urgent care.  I ordered venous duplex.  I do not see any evidence of compartment syndrome.  Patient was given Norco.    Data returned reassuring.  There is no leukocytosis to suggest infection.  Again I suspect most likely from excessive icing.  I will have the patient apply warm compresses as needed to help dissolve hematoma.  I have advised NSAIDs.  I have given a prescription for a few Norco tablets.  Patient was precautioned to return to the ER for expanding redness, warmth, pain concern.        ADDITIONAL PROBLEM LIST  Diabetes-blood sugars controlled    DISPOSITION  AND DISCUSSIONS      Escalation of care considered, and ultimately not performed:acute inpatient care management, however at this time, the patient is most appropriate for outpatient management    Prescription drugs considered and/or prescribed: Prescribed Norco    In prescribing controlled substances to this patient, I certify that I have obtained and reviewed the medical history of Sarah Friedman. I have also made a good flor effort to obtain applicable records from other providers who have treated the patient and no other records are available at this time.     I have conducted a physical exam and documented it. I have reviewed Ms. Friedman’s prescription history as maintained by the [place] Prescription Monitoring Program.     I have assessed the patient’s risk for abuse, dependency, and addiction using the validated Opioid Risk Tool available at https://www.mdcalc.com/rprxoh-jjlh-dnle-ort-narcotic-abuse.     Given the above, I believe the benefits of controlled substance therapy outweigh the risks. The reasons for prescribing controlled substances include non-narcotic, oral analgesic alternatives have been inadequate for pain control. Accordingly, I have discussed the risk and benefits, treatment plan, and alternative therapies with the patient.      FINAL IMPRESSION  1.  Left lower extremity hematoma    This dictation was created using voice recognition software. The accuracy of the dictation is limited to the abilities of the software. I expect there may be some errors of grammar and possibly content. The nursing notes were reviewed and certain aspects of this information were incorporated into this note.    Electronically signed by: Brayden Michelle M.D., 10/10/2023

## 2023-10-12 ENCOUNTER — PATIENT MESSAGE (OUTPATIENT)
Dept: MEDICAL GROUP | Facility: MEDICAL CENTER | Age: 53
End: 2023-10-12
Payer: COMMERCIAL

## 2023-10-12 DIAGNOSIS — E11.9 TYPE 2 DIABETES MELLITUS WITHOUT COMPLICATION, WITH LONG-TERM CURRENT USE OF INSULIN (HCC): ICD-10-CM

## 2023-10-12 DIAGNOSIS — Z79.4 TYPE 2 DIABETES MELLITUS WITHOUT COMPLICATION, WITH LONG-TERM CURRENT USE OF INSULIN (HCC): ICD-10-CM

## 2023-10-14 ENCOUNTER — PATIENT MESSAGE (OUTPATIENT)
Dept: CARDIOLOGY | Facility: MEDICAL CENTER | Age: 53
End: 2023-10-14
Payer: COMMERCIAL

## 2023-10-14 DIAGNOSIS — I47.11 INAPPROPRIATE SINUS TACHYCARDIA (HCC): ICD-10-CM

## 2023-10-16 ENCOUNTER — TELEPHONE (OUTPATIENT)
Dept: CARDIOLOGY | Facility: MEDICAL CENTER | Age: 53
End: 2023-10-16
Payer: COMMERCIAL

## 2023-10-16 NOTE — TELEPHONE ENCOUNTER
Is the patient due for a refill? Yes    Was the patient seen the past year? No    Date of last office visit: 7/26/22    Does the patient have an upcoming appointment?  No   If yes, When?     Provider to refill:[facility]    Does the patients insurance require a 100 day supply?  No

## 2023-10-16 NOTE — TELEPHONE ENCOUNTER
[facility]    Caller: Arthur with [facility]    Medication Name and Dosage:  CORLANOR 5 MG Tab tablet [[id number]      Medication amount left: 0     Preferred Pharmacy: [facility] on file    Other questions (Topic): (please see previous med requests)     Callback Number (Will only call for issues): [phone] (home)      Thank You    Sujata POWERS

## 2023-10-17 RX ORDER — IVABRADINE 5 MG/1
TABLET, FILM COATED ORAL
Qty: 60 TABLET | Refills: 0 | Status: SHIPPED | OUTPATIENT
Start: 2023-10-17 | End: 2023-10-19 | Stop reason: SDUPTHER

## 2023-10-19 ENCOUNTER — OFFICE VISIT (OUTPATIENT)
Dept: CARDIOLOGY | Facility: MEDICAL CENTER | Age: 53
End: 2023-10-19
Attending: NURSE PRACTITIONER
Payer: COMMERCIAL

## 2023-10-19 VITALS
BODY MASS INDEX: 45 KG/M2 | RESPIRATION RATE: 16 BRPM | OXYGEN SATURATION: 92 % | WEIGHT: 254 LBS | DIASTOLIC BLOOD PRESSURE: 66 MMHG | HEART RATE: 80 BPM | HEIGHT: 63 IN | SYSTOLIC BLOOD PRESSURE: 120 MMHG

## 2023-10-19 DIAGNOSIS — I47.11 INAPPROPRIATE SINUS TACHYCARDIA (HCC): ICD-10-CM

## 2023-10-19 DIAGNOSIS — I77.810 ASCENDING AORTA DILATATION (HCC): ICD-10-CM

## 2023-10-19 DIAGNOSIS — E78.5 DYSLIPIDEMIA: ICD-10-CM

## 2023-10-19 DIAGNOSIS — I10 ESSENTIAL HYPERTENSION: ICD-10-CM

## 2023-10-19 PROCEDURE — 99213 OFFICE O/P EST LOW 20 MIN: CPT | Performed by: NURSE PRACTITIONER

## 2023-10-19 PROCEDURE — 99214 OFFICE O/P EST MOD 30 MIN: CPT | Performed by: NURSE PRACTITIONER

## 2023-10-19 PROCEDURE — 3074F SYST BP LT 130 MM HG: CPT | Performed by: NURSE PRACTITIONER

## 2023-10-19 PROCEDURE — 3078F DIAST BP <80 MM HG: CPT | Performed by: NURSE PRACTITIONER

## 2023-10-19 RX ORDER — IVABRADINE 5 MG/1
5 TABLET, FILM COATED ORAL 2 TIMES DAILY WITH MEALS
Qty: 180 TABLET | Refills: 3 | Status: SHIPPED | OUTPATIENT
Start: 2023-10-19

## 2023-10-19 ASSESSMENT — ENCOUNTER SYMPTOMS
COUGH: 0
ORTHOPNEA: 0
DIZZINESS: 0
FEVER: 0
PALPITATIONS: 0
CLAUDICATION: 0
ABDOMINAL PAIN: 0
MYALGIAS: 0
SHORTNESS OF BREATH: 0
PND: 0

## 2023-10-19 ASSESSMENT — FIBROSIS 4 INDEX: FIB4 SCORE: 1.83

## 2023-10-19 NOTE — PROGRESS NOTES
"Chief Complaint   Patient presents with    Tachycardia     F/V Dx: Inappropriate sinus tachycardia    Other     F/V Dx: Ascending aorta dilatation (HCC)       Subjective     Sarah Friedman is a 52 y.o. female who presents today for follow-up on her inappropriate sinus tachycardia, hypertension, dyslipidemia and dilated ascending aorta.    Patient of Dr. Josue.  She was last seen in clinic on 7/26/2022.  During that visit, patient was sent for follow-up echocardiogram.    Patient reports she has been doing okay since her last visit, she mentions having an episode of rapid heart rate at the end of last year.  She reports no further recurrences.  She did recently have a fall down her stairs as she missed her step.  She is having some leg pain from that but it is healing.    She otherwise denies chest pain, palpitations, orthopnea, PND, edema, shortness of breath or dizziness/lightheadedness.    She reports compliance with her medications.    Additionally, patient has the following medical problems:     -Hypothyroidism    -Diabetes: Taking metformin, Actos and empagliflozin    Past Medical History:   Diagnosis Date    Anesthesia 08/27/2019    \"hard to wake up\"  \"sleep apnea\"    Arrhythmia     \" sinus Tach\"    Arthritis     osteo    Blood clotting disorder ([facility]) 2005    DVT    Bowel habit changes     constipation    Depression     \"BPD\"    Diabetes ([facility]) 08/2019    insulin    Diabetic neuropathy ([facility])     Esophageal spasm     GERD (gastroesophageal reflux disease)     Hashimoto's disease     Hiatal hernia     High cholesterol     Hyperlipidemia     Hypothyroid     Sleep apnea     uses cpap    Snoring     sleep study done    Tachycardia      Past Surgical History:   Procedure Laterality Date    COLONOSCOPY N/A 8/30/2019    Procedure: COLONOSCOPY;  Surgeon: Ata Khalil M.D.;  Location: SURGERY SAME DAY [facility];  Service: Gastroenterology    GASTROSCOPY N/A 8/30/2019    Procedure: GASTROSCOPY - W/DILATION " biopsy;  Surgeon: Ata Khalil M.D.;  Location: [facility]ERY [facility];  Service: Gastroenterology    VAGINAL HYSTERECTOMY SCOPE TOTAL N/A 4/24/2018    Procedure: VAGINAL HYSTERECTOMY SCOPE TOTAL;  Surgeon: Francisco Javier Lainez M.D.;  Location: SURGERY SAME DAY [facility];  Service: Gynecology    SALPINGO OOPHORECTOMY Bilateral 4/24/2018    Procedure: SALPINGO OOPHORECTOMY;  Surgeon: Francisco Javier Lainez M.D.;  Location: SURGERY SAME DAY [facility];  Service: Gynecology    ANTERIOR AND POSTERIOR REPAIR  4/24/2018    Procedure: ANTERIOR AND POSTERIOR REPAIR;  Surgeon: Francisco Javier Lainez M.D.;  Location: SURGERY SAME DAY [facility];  Service: Gynecology    ENTEROCELE REPAIR  4/24/2018    Procedure: ENTEROCELE REPAIR- PERINEOPLASTY;  Surgeon: Francisco Javier Lainez M.D.;  Location: SURGERY SAME DAY [facility] system;  Service: Gynecology    BLADDER SLING FEMALE  4/24/2018    Procedure: BLADDER SLING FEMALE- TOT;  Surgeon: Francisco Javier Lainez M.D.;  Location: SURGERY SAME DAY [facility];  Service: Gynecology    VAGINAL SUSPENSION N/A 4/24/2018    Procedure: VAGINAL SUSPENSION- SACROSPINOUS VAULT;  Surgeon: Francisco Javier Lainez M.D.;  Location: SURGERY SAME DAY [facility] system;  Service: Gynecology    CYSTOSCOPY N/A 4/24/2018    Procedure: CYSTOSCOPY;  Surgeon: Francisco Javier Lainez M.D.;  Location: SURGERY SAME DAY [facility];  Service: Gynecology    ENDOSCOPY  2016    COLONOSCOPY  2016    OTHER  2016    esophageal dilation    OTHER ORTHOPEDIC SURGERY  2012    bone removed from toe after fracture    TONSILLECTOMY AND ADENOIDECTOMY  1977    OTHER  1971, 1978, 1985    left facial cheek and right upper thight reconstructive surgery     Family History   Problem Relation Age of Onset    Colorectal Cancer Mother     Breast Cancer Mother     Cancer Mother 70        pancreatic, breast in 30's    Heart Disease Father     Cancer Father         lung    Diabetes Sister     Diabetes Maternal Uncle     Heart Disease Maternal Grandmother         MI     "Hyperlipidemia Maternal Grandmother     Diabetes Maternal Grandfather     Hyperlipidemia Maternal Grandfather     Stroke Neg Hx     Peritoneal Cancer Neg Hx     Tubal Cancer Neg Hx     Ovarian Cancer Neg Hx      Social History     Socioeconomic History    Marital status: Single     Spouse name: Not on file    Number of children: Not on file    Years of education: Not on file    Highest education level: Not on file   Occupational History    Not on file   Tobacco Use    Smoking status: Never    Smokeless tobacco: Never   Vaping Use    Vaping Use: Never used   Substance and Sexual Activity    Alcohol use: Never    Drug use: No     Comment: previous medical marijuana use for pain    Sexual activity: Yes     Partners: Female   Other Topics Concern    Not on file   Social History Narrative    Not on file     Social Determinants of Health     Financial Resource Strain: Not on file   Food Insecurity: Not on file   Transportation Needs: Not on file   Physical Activity: Not on file   Stress: Not on file   Social Connections: Not on file   Intimate Partner Violence: Not on file   Housing Stability: Not on file     Allergies   Allergen Reactions    Hydromorphone Hcl Itching     Itching \"head to toe\"    Betadine Prepstick Plus [Povidone-Iodine] Rash     Rash swelling    Morphine Itching     Itching \"head to toe\"    Povidone Iodine Hives    Tape Itching     Itching \"leave scars\"     Outpatient Encounter Medications as of 10/19/2023   Medication Sig Dispense Refill    ivabradine (CORLANOR) 5 MG Tab tablet Take 1 Tablet by mouth 2 times a day with meals. 180 Tablet 3    Empagliflozin 25 MG Tab Take 1 Tablet by mouth every day. 90 Tablet 3    acyclovir (ZOVIRAX) 400 MG tablet TAKE 1 TABLET BY MOUTH EVERY 8 HOURS FOR 5 DAYS AS NEEDED COLD SORE      risperiDONE (RISPERDAL) 3 MG Tab Take 3 mg by mouth every evening.      oxybutynin (DITROPAN-XL) 15 MG CR tablet Take 1 Tablet by mouth every day. 90 Tablet 1    ibuprofen (MOTRIN) 800 MG " Tab Take 1 Tablet by mouth 2 times a day. 90 Tablet 0    estradiol (ESTRACE) 1 MG Tab Take 1 tablet by mouth once daily 90 Tablet 0    spironolactone (ALDACTONE) 25 MG Tab Take 1 Tablet by mouth every day. 90 Tablet 1    gabapentin (NEURONTIN) 800 MG tablet Take 1 Tablet by mouth 3 times a day. 270 Tablet 3    omeprazole (PRILOSEC) 40 MG delayed-release capsule Take 1 Capsule by mouth 2 times a day. 180 Capsule 3    levothyroxine (SYNTHROID) 137 MCG Tab TAKE 1 TABLET BY MOUTH EVERY SUNDAY AND Thursday. 24 Tablet 3    pioglitazone (ACTOS) 45 MG Tab Take 1 Tablet by mouth every day. 90 Tablet 3    estradiol (ESTRACE) 0.5 MG tablet Take 1 Tablet by mouth every day. 90 Tablet 3    metformin (GLUCOPHAGE) 1000 MG tablet Take 1 Tablet by mouth 2 times a day with meals. 180 Tablet 3    atorvastatin (LIPITOR) 20 MG Tab Take 1 Tablet by mouth every day. 90 Tablet 3    levothyroxine (SYNTHROID) 125 MCG Tab TAKE 125 MCG EVERY MONDAYS, TUESDAYS WEDNESDAYS, FRIDAYS AND SATURDAYS 65 Tablet 2    hydroCHLOROthiazide (HYDRODIURIL) 12.5 MG tablet Take 1 tablet by mouth once daily 90 Tablet 2    promethazine (PHENERGAN) 25 MG Tab Take 1 Tablet by mouth every 6 hours as needed for Nausea/Vomiting. 30 Tablet 0    Alcohol Swabs (ALCOHOL PREP) 70 % Pads Use three times daily to clean finger before blood glucose testing 100 Each 11    divalproex (DEPAKOTE) 500 MG Tablet Delayed Response Take 500 mg by mouth 3 times a day.      sumatriptan (IMITREX) 100 MG tablet TAKE 1/2 TO 1 (ONE-HALF TO ONE) TABLET BY MOUTH AS NEEDED FOR  MIGRAINE/HEADACHE 9 Tab 5    cyclobenzaprine (FLEXERIL) 10 MG Tab Take 1 Tab by mouth at bedtime as needed for Muscle Spasms. 30 Tab 5    polyethylene glycol 3350 (MIRALAX) 17 GM/SCOOP Powder Take 17 g by mouth 1 time daily as needed (constipation). 510 g 5    acetaminophen (TYLENOL) 500 MG Tab Take 500-1,000 mg by mouth every 6 hours as needed.      Multiple Vitamins-Minerals (MULTIVITAMIN PO) Take 1 Tab by mouth every  "day.      [DISCONTINUED] CORLANOR 5 MG Tab tablet TAKE 1 TABLET BY MOUTH TWICE DAILY WITH MEALS 60 Tablet 0    celecoxib (CELEBREX) 200 MG Cap Take 1 Capsule by mouth every day. (Patient not taking: Reported on 10/19/2023) 30 Capsule 1    [DISCONTINUED] liraglutide (VICTOZA) 18 MG/3ML Solution Pen-injector Inject 0.2 mL every day by subcutaneous route. (Patient not taking: Reported on 10/19/2023)      [DISCONTINUED] Omeprazole Magnesium (PRILOSEC PO)       [DISCONTINUED] risperiDONE (RISPERDAL) 2 MG Tab TAKE 1 TABLET BY MOUTH ONCE DAILY AT NIGHT AS DIRECTED (Patient not taking: Reported on 10/19/2023)       No facility-administered encounter medications on file as of 10/19/2023.     Review of Systems   Constitutional:  Negative for fever and malaise/fatigue.   Respiratory:  Negative for cough and shortness of breath.    Cardiovascular:  Negative for chest pain, palpitations, orthopnea, claudication, leg swelling and PND.   Gastrointestinal:  Negative for abdominal pain.   Musculoskeletal:  Negative for myalgias.   Neurological:  Negative for dizziness.   All other systems reviewed and are negative.             Objective     /66 (BP Location: Left arm, Patient Position: Sitting, BP Cuff Size: Adult)   Pulse 80   Resp 16   Ht 1.6 m (5' 3\")   Wt 115 kg (254 lb)   LMP 04/15/2018   SpO2 92%   BMI 44.99 kg/m²     Physical Exam  Vitals reviewed.   Constitutional:       Appearance: She is well-developed. She is obese.   HENT:      Head: Normocephalic and atraumatic.   Eyes:      Pupils: Pupils are equal, round, and reactive to light.   Neck:      Vascular: No JVD.   Cardiovascular:      Rate and Rhythm: Normal rate and regular rhythm.      Heart sounds: Normal heart sounds.   Pulmonary:      Effort: Pulmonary effort is normal. No respiratory distress.      Breath sounds: Normal breath sounds. No wheezing or rales.   Abdominal:      General: Bowel sounds are normal.      Palpations: Abdomen is soft. "   Musculoskeletal:         General: Normal range of motion.      Cervical back: Normal range of motion and neck supple.      Right lower leg: No edema.      Left lower leg: Edema (Slight swelling and tenderness present from fall) present.   Skin:     General: Skin is warm and dry.   Neurological:      General: No focal deficit present.      Mental Status: She is alert and oriented to person, place, and time.   Psychiatric:         Behavior: Behavior normal.       Lab Results   Component Value Date/Time    CHOLSTRLTOT 174 06/23/2022 01:39 PM    LDL 97 06/23/2022 01:39 PM    HDL 35 (A) 06/23/2022 01:39 PM    TRIGLYCERIDE 211 (H) 06/23/2022 01:39 PM       Lab Results   Component Value Date/Time    SODIUM 140 10/10/2023 03:48 PM    POTASSIUM 4.2 10/10/2023 03:48 PM    CHLORIDE 100 10/10/2023 03:48 PM    CO2 25 10/10/2023 03:48 PM    GLUCOSE 90 10/10/2023 03:48 PM    BUN 23 (H) 10/10/2023 03:48 PM    CREATININE 0.59 10/10/2023 03:48 PM     Lab Results   Component Value Date/Time    ALKPHOSPHAT 35 06/23/2022 01:39 PM    ASTSGOT 36 06/23/2022 01:39 PM    ALTSGPT 16 06/23/2022 01:39 PM    TBILIRUBIN 0.3 06/23/2022 01:39 PM      Transthoracic Echo Report 7/20/2022  Normal left ventricular systolic function. The left ventricular   ejection fraction is visually estimated to be 65%.  The right ventricle is normal in size and systolic function.  No significant valvular abnormalities.   The ascending aorta is dilated with a diameter of 4.2 cm.  No prior study is available for comparison.     [employer] event monitor 9/30/2022-10/14/2022   Summary:   The patient was monitored for 13 days 17 hours and 47 minutes.  Predominant underlying rhythm was sinus rhythm.     3 runs of supraventricular tachycardia occurred, the longest run lasting 5 beats with heart rate 155 bpm, and the fastest run lasting 4 beats with heart rate 157 bpm.     Rare PACs less than 1%.  Rare PVCs less than 1%.     Symptoms correlated with sinus rhythm with heart  rate 73 to 86 bpm without ectopy.            Assessment & Plan     1. Inappropriate sinus tachycardia  ivabradine (CORLANOR) 5 MG Tab tablet      2. Ascending aorta dilatation (HCC)  EC-ECHOCARDIOGRAM COMPLETE W/O CONT      3. Essential hypertension        4. Dyslipidemia            Medical Decision Making: Today's Assessment/Status/Plan:        Inappropriate sinus tachycardia:  -She has not had any events since last year.  Patient encouraged to monitor for this and if occurring more often, she will let our office know.  In the meantime, she can always take an extra half tab of ivabradine if she has another event.  -Continue Corlanor 5 mg twice a day    Dilated ascending aorta:  -Last measurement of 4.2 cm on 7/20/2022  -Patient to repeat echo at this time    Hypertension: Stable  -Continue spironolactone 25 mg daily  -Continue HCTZ 12.5 mg daily    Hyperlipidemia:  -Last LDL 97 on 6/23/2022  -Continue atorvastatin 20 mg daily    FU in clinic in 1 year with Dr. Josue. Sooner if needed.    Patient verbalizes understanding and agrees with the plan of care.     PLEASE NOTE: This Note was created using voice recognition Software. I have made every reasonable attempt to correct obvious errors, but I expect that there are errors of grammar and possibly content that I did not discover before finalizing the note

## 2023-11-09 ENCOUNTER — OFFICE VISIT (OUTPATIENT)
Dept: MEDICAL GROUP | Facility: MEDICAL CENTER | Age: 53
End: 2023-11-09
Payer: COMMERCIAL

## 2023-11-09 VITALS
HEART RATE: 77 BPM | RESPIRATION RATE: 16 BRPM | OXYGEN SATURATION: 95 % | WEIGHT: 252.2 LBS | SYSTOLIC BLOOD PRESSURE: 102 MMHG | BODY MASS INDEX: 44.69 KG/M2 | HEIGHT: 63 IN | DIASTOLIC BLOOD PRESSURE: 60 MMHG | TEMPERATURE: 97 F

## 2023-11-09 DIAGNOSIS — M54.42 CHRONIC BILATERAL LOW BACK PAIN WITH LEFT-SIDED SCIATICA: ICD-10-CM

## 2023-11-09 DIAGNOSIS — E11.9 TYPE 2 DIABETES MELLITUS WITHOUT COMPLICATION, WITHOUT LONG-TERM CURRENT USE OF INSULIN (HCC): ICD-10-CM

## 2023-11-09 DIAGNOSIS — G89.29 CHRONIC BILATERAL LOW BACK PAIN WITH LEFT-SIDED SCIATICA: ICD-10-CM

## 2023-11-09 DIAGNOSIS — M79.605 ACUTE LEG PAIN, LEFT: ICD-10-CM

## 2023-11-09 PROCEDURE — 3078F DIAST BP <80 MM HG: CPT | Performed by: STUDENT IN AN ORGANIZED HEALTH CARE EDUCATION/TRAINING PROGRAM

## 2023-11-09 PROCEDURE — 3074F SYST BP LT 130 MM HG: CPT | Performed by: STUDENT IN AN ORGANIZED HEALTH CARE EDUCATION/TRAINING PROGRAM

## 2023-11-09 PROCEDURE — 99214 OFFICE O/P EST MOD 30 MIN: CPT | Performed by: STUDENT IN AN ORGANIZED HEALTH CARE EDUCATION/TRAINING PROGRAM

## 2023-11-09 RX ORDER — TRAMADOL HYDROCHLORIDE 50 MG/1
50 TABLET ORAL 2 TIMES DAILY PRN
Qty: 60 TABLET | Refills: 0 | Status: SHIPPED | OUTPATIENT
Start: 2023-11-09 | End: 2023-12-09

## 2023-11-09 ASSESSMENT — FIBROSIS 4 INDEX: FIB4 SCORE: 1.83

## 2023-11-09 NOTE — LETTER
[facility]  [address]  [facility] [address]     November 9, 2023    Patient: Sarah Friedman   YOB: 1970   Date of Visit: 11/9/2023       To Whom It May Concern:    Sarah Friedman was seen and treated in our department on 11/9/2023. She is clear to return to work without restrictions.    Sincerely,     Vida Vick M.D.

## 2023-11-09 NOTE — PROGRESS NOTES
"Subjective:     CC: Left ankle pain, diabetes, chronic back pain    HPI:   Sarah presents today with    Problem   Acute Leg Pain, Left    She had a recent fall about a month ago.  She had significant bruising and swelling of the left ankle and leg.  She continues to have pain especially towards the end of the day     Type 2 Diabetes Mellitus Without Complication (Hcc)    Chronic, controlled, currently on metformin 1000 mg twice daily, Actos 45 mg daily, and Jardiance 25mg daily.  Previously on 40 units of Lantus nightly, this was discontinued.  Last A1c less than 7.  She is on a statin.  She is not on an ACE or ARB      Chronic Low Back Pain    Chronic, stable.  She was previously on Norco 1 tablet daily.  She thinks that tramadol has actually worked better for her in the past.  She is hoping to switch to tramadol.       ROS:  ROS    Objective:     Exam:  /60   Pulse 77   Temp 36.1 °C (97 °F)   Resp 16   Ht 1.6 m (5' 3\")   Wt 114 kg (252 lb 3.2 oz)   LMP 04/15/2018   SpO2 95%   BMI 44.68 kg/m²  Body mass index is 44.68 kg/m².    Physical Exam  Vitals reviewed.   Constitutional:       General: She is not in acute distress.     Appearance: She is not toxic-appearing.   HENT:      Head: Normocephalic and atraumatic.      Right Ear: External ear normal.      Left Ear: External ear normal.   Eyes:      General:         Right eye: No discharge.         Left eye: No discharge.      Extraocular Movements: Extraocular movements intact.      Conjunctiva/sclera: Conjunctivae normal.   Pulmonary:      Effort: Pulmonary effort is normal. No respiratory distress.   Skin:     General: Skin is warm and dry.   Neurological:      Mental Status: She is alert.   Psychiatric:         Mood and Affect: Mood normal.         Behavior: Behavior normal.         Thought Content: Thought content normal.         Judgment: Judgment normal.           Assessment & Plan:     52 y.o. female with the following -     1. Acute leg pain, " left  We discussed using Voltaren gel and tramadol has been ordered for pain.  She is not on hydrocodone anymore.  I gave her enough to take twice a day due to acute pain  - traMADol (ULTRAM) 50 MG Tab; Take 1 Tablet by mouth 2 times a day as needed for Severe Pain for up to 30 days.  Dispense: 60 Tablet; Refill: 0  - diclofenac sodium (VOLTAREN) 1 % Gel; Apply 4 g topically 4 times a day as needed (pain).  Dispense: 350 g; Refill: 0    2. Chronic bilateral low back pain with left-sided sciatica  Switch from hydrocodone to tramadol.  I gave her enough to take twice a day because of her acute leg pain.  Once this is controlled we will go back to once a day for her chronic back pain  - traMADol (ULTRAM) 50 MG Tab; Take 1 Tablet by mouth 2 times a day as needed for Severe Pain for up to 30 days.  Dispense: 60 Tablet; Refill: 0    3. Type 2 diabetes mellitus without complication, without long-term current use of insulin ([facility])  Retina view completed today.  She has been having some lightheadedness so we discussed holding the Jardiance for a week and seeing if she notices any difference.  - POCT Retinal Eye Exam    No follow-ups on file.    Please note that this dictation was created using voice recognition software. I have made every reasonable attempt to correct obvious errors, but I expect that there are errors of grammar and possibly content that I did not discover before finalizing the note.

## 2023-11-11 ENCOUNTER — PATIENT MESSAGE (OUTPATIENT)
Dept: MEDICAL GROUP | Facility: MEDICAL CENTER | Age: 53
End: 2023-11-11
Payer: COMMERCIAL

## 2023-11-11 DIAGNOSIS — M54.42 CHRONIC BILATERAL LOW BACK PAIN WITH LEFT-SIDED SCIATICA: ICD-10-CM

## 2023-11-11 DIAGNOSIS — G89.29 CHRONIC BILATERAL LOW BACK PAIN WITH LEFT-SIDED SCIATICA: ICD-10-CM

## 2023-11-13 RX ORDER — IBUPROFEN 800 MG/1
800 TABLET ORAL 2 TIMES DAILY
Qty: 90 TABLET | Refills: 0 | Status: SHIPPED | OUTPATIENT
Start: 2023-11-13 | End: 2024-01-05 | Stop reason: SDUPTHER

## 2023-12-06 RX ORDER — ESTRADIOL 1 MG/1
1 TABLET ORAL DAILY
Qty: 90 TABLET | Refills: 0 | Status: SHIPPED | OUTPATIENT
Start: 2023-12-06 | End: 2024-01-05 | Stop reason: SDUPTHER

## 2023-12-21 ENCOUNTER — OFFICE VISIT (OUTPATIENT)
Dept: MEDICAL GROUP | Facility: MEDICAL CENTER | Age: 53
End: 2023-12-21
Payer: COMMERCIAL

## 2023-12-21 VITALS
HEART RATE: 85 BPM | WEIGHT: 254.2 LBS | DIASTOLIC BLOOD PRESSURE: 60 MMHG | SYSTOLIC BLOOD PRESSURE: 110 MMHG | OXYGEN SATURATION: 95 % | TEMPERATURE: 96.7 F | BODY MASS INDEX: 45.04 KG/M2 | HEIGHT: 63 IN | RESPIRATION RATE: 16 BRPM

## 2023-12-21 DIAGNOSIS — I10 ESSENTIAL HYPERTENSION: ICD-10-CM

## 2023-12-21 DIAGNOSIS — M54.42 CHRONIC BILATERAL LOW BACK PAIN WITH LEFT-SIDED SCIATICA: ICD-10-CM

## 2023-12-21 DIAGNOSIS — Z79.4 TYPE 2 DIABETES MELLITUS WITHOUT COMPLICATION, WITH LONG-TERM CURRENT USE OF INSULIN (HCC): ICD-10-CM

## 2023-12-21 DIAGNOSIS — L81.9 HYPERPIGMENTATION: ICD-10-CM

## 2023-12-21 DIAGNOSIS — E11.9 TYPE 2 DIABETES MELLITUS WITHOUT COMPLICATION, WITH LONG-TERM CURRENT USE OF INSULIN (HCC): ICD-10-CM

## 2023-12-21 DIAGNOSIS — G89.29 CHRONIC BILATERAL LOW BACK PAIN WITH LEFT-SIDED SCIATICA: ICD-10-CM

## 2023-12-21 PROCEDURE — 99214 OFFICE O/P EST MOD 30 MIN: CPT | Performed by: STUDENT IN AN ORGANIZED HEALTH CARE EDUCATION/TRAINING PROGRAM

## 2023-12-21 PROCEDURE — 3078F DIAST BP <80 MM HG: CPT | Performed by: STUDENT IN AN ORGANIZED HEALTH CARE EDUCATION/TRAINING PROGRAM

## 2023-12-21 PROCEDURE — 3074F SYST BP LT 130 MM HG: CPT | Performed by: STUDENT IN AN ORGANIZED HEALTH CARE EDUCATION/TRAINING PROGRAM

## 2023-12-21 RX ORDER — HYDROCODONE BITARTRATE AND ACETAMINOPHEN 7.5; 325 MG/1; MG/1
1 TABLET ORAL
Qty: 30 TABLET | Refills: 0 | Status: SHIPPED | OUTPATIENT
Start: 2024-02-19 | End: 2024-03-20

## 2023-12-21 RX ORDER — HYDROCODONE BITARTRATE AND ACETAMINOPHEN 7.5; 325 MG/1; MG/1
1 TABLET ORAL
Qty: 30 TABLET | Refills: 0 | Status: SHIPPED | OUTPATIENT
Start: 2024-01-20 | End: 2024-02-19

## 2023-12-21 RX ORDER — HYDROCHLOROTHIAZIDE 12.5 MG/1
12.5 TABLET ORAL DAILY
Qty: 90 TABLET | Refills: 3 | Status: SHIPPED | OUTPATIENT
Start: 2023-12-21

## 2023-12-21 RX ORDER — HYDROCODONE BITARTRATE AND ACETAMINOPHEN 7.5; 325 MG/1; MG/1
1 TABLET ORAL
Qty: 30 TABLET | Refills: 0 | Status: SHIPPED | OUTPATIENT
Start: 2023-12-21 | End: 2024-01-20

## 2023-12-21 RX ORDER — ATORVASTATIN CALCIUM 20 MG/1
20 TABLET, FILM COATED ORAL DAILY
Qty: 90 TABLET | Refills: 3 | Status: SHIPPED | OUTPATIENT
Start: 2023-12-21

## 2023-12-21 ASSESSMENT — FIBROSIS 4 INDEX: FIB4 SCORE: 1.87

## 2023-12-21 NOTE — LETTER
[facility]  [address]  [facility] [address]     December 21, 2023    Patient: Sarah Friedman   YOB: 1970   Date of Visit: 12/21/2023       To Whom It May Concern:    Sarah Friedman was seen and treated in our department on 12/21/2023.     Due to chronic back pain with acute exacerbation please allow no repetitive bending, twisting, lifting, pushing, pulling.    She gets chronic migraines for which she takes tylenol and sumatriptan.     She has diabetic neuropathy which affects her feet. She should be allowed time to sit down throughout the day to relieve symptoms.    Sincerely,     Vida Vick M.D.

## 2023-12-21 NOTE — PROGRESS NOTES
"Subjective:     CC: Back pain, hypertension    HPI:   Sarah presents today with    Problem   Chronic Low Back Pain    Chronic, stable.  She was previously on Norco 1 tablet daily.  She did trial taking tramadol to see if it worked better than the Norco but she feels like the Norco worked better.  She is hoping for refill.  Controlled subs agreement is on file.  Urine drug screen is on file.  PDMP reviewed today.  She is also on gabapentin, Flexeril, and ibuprofen for nonnarcotic pain management     Essential Hypertension    Chronic, stable, currently on hydrochlorothiazide 12.5 mg daily and spironolactone 25 mg daily.             ROS:  ROS    Objective:     Exam:  /60 (BP Location: Left arm, Patient Position: Sitting)   Pulse 85   Temp 35.9 °C (96.7 °F)   Resp 16   Ht 1.6 m (5' 3\")   Wt 115 kg (254 lb 3.2 oz)   LMP 04/15/2018   SpO2 95%   BMI 45.03 kg/m²  Body mass index is 45.03 kg/m².    Physical Exam  Vitals reviewed.   Constitutional:       General: She is not in acute distress.     Appearance: She is not toxic-appearing.   HENT:      Head: Normocephalic and atraumatic.      Right Ear: External ear normal.      Left Ear: External ear normal.   Eyes:      General:         Right eye: No discharge.         Left eye: No discharge.      Extraocular Movements: Extraocular movements intact.      Conjunctiva/sclera: Conjunctivae normal.   Pulmonary:      Effort: Pulmonary effort is normal. No respiratory distress.   Skin:     General: Skin is warm and dry.      Comments: 10 cm patch of hyperpigmentation on the left ankle.   Neurological:      Mental Status: She is alert.   Psychiatric:         Mood and Affect: Mood normal.         Behavior: Behavior normal.         Thought Content: Thought content normal.         Judgment: Judgment normal.         Assessment & Plan:     53 y.o. female with the following -     1. Chronic bilateral low back pain with left-sided sciatica  [place] refill sent.   reviewed.  " Controlled substance agreement and urine drug screen on file  - HYDROcodone-acetaminophen (NORCO) 7.5-325 MG tab; Take 1 Tablet by mouth 1 time a day as needed for Severe Pain for up to 30 days.  Dispense: 30 Tablet; Refill: 0  - HYDROcodone-acetaminophen (NORCO) 7.5-325 MG tab; Take 1 Tablet by mouth 1 time a day as needed for Severe Pain for up to 30 days.  Dispense: 30 Tablet; Refill: 0  - HYDROcodone-acetaminophen (NORCO) 7.5-325 MG tab; Take 1 Tablet by mouth 1 time a day as needed for Severe Pain for up to 30 days.  Dispense: 30 Tablet; Refill: 0    2. Type 2 diabetes mellitus without complication, with long-term current use of insulin (HCC)  Refill of Lipitor given, continue Jardiance and metformin  - atorvastatin (LIPITOR) 20 MG Tab; Take 1 Tablet by mouth every day.  Dispense: 90 Tablet; Refill: 3    3. Essential hypertension  Continue hydrochlorothiazide and spironolactone at current dosing  - hydroCHLOROthiazide 12.5 MG tablet; Take 1 Tablet by mouth every day.  Dispense: 90 Tablet; Refill: 3    4. Hyperpigmentation  Referral to dermatology sent  - Referral to Dermatology    No follow-ups on file.    Please note that this dictation was created using voice recognition software. I have made every reasonable attempt to correct obvious errors, but I expect that there are errors of grammar and possibly content that I did not discover before finalizing the note.

## 2023-12-29 ENCOUNTER — HOSPITAL ENCOUNTER (OUTPATIENT)
Dept: CARDIOLOGY | Facility: MEDICAL CENTER | Age: 53
End: 2023-12-29
Attending: NURSE PRACTITIONER
Payer: COMMERCIAL

## 2023-12-29 DIAGNOSIS — I77.810 ASCENDING AORTA DILATATION (HCC): ICD-10-CM

## 2023-12-29 PROCEDURE — 93306 TTE W/DOPPLER COMPLETE: CPT

## 2023-12-30 LAB
LV EJECT FRACT  99904: 65
LV EJECT FRACT MOD 2C 99903: 69.52
LV EJECT FRACT MOD 4C 99902: 69.11
LV EJECT FRACT MOD BP 99901: 68.53

## 2023-12-30 PROCEDURE — 93306 TTE W/DOPPLER COMPLETE: CPT | Mod: 26 | Performed by: INTERNAL MEDICINE

## 2024-01-05 ENCOUNTER — PATIENT MESSAGE (OUTPATIENT)
Dept: MEDICAL GROUP | Facility: MEDICAL CENTER | Age: 54
End: 2024-01-05
Payer: COMMERCIAL

## 2024-01-05 DIAGNOSIS — M54.42 CHRONIC BILATERAL LOW BACK PAIN WITH LEFT-SIDED SCIATICA: ICD-10-CM

## 2024-01-05 DIAGNOSIS — E03.8 HYPOTHYROIDISM DUE TO HASHIMOTO'S THYROIDITIS: ICD-10-CM

## 2024-01-05 DIAGNOSIS — I10 ESSENTIAL HYPERTENSION: ICD-10-CM

## 2024-01-05 DIAGNOSIS — G89.29 CHRONIC BILATERAL LOW BACK PAIN WITH LEFT-SIDED SCIATICA: ICD-10-CM

## 2024-01-05 DIAGNOSIS — E06.3 HYPOTHYROIDISM DUE TO HASHIMOTO'S THYROIDITIS: ICD-10-CM

## 2024-01-07 RX ORDER — IBUPROFEN 800 MG/1
800 TABLET ORAL 2 TIMES DAILY
Qty: 90 TABLET | Refills: 0 | Status: SHIPPED | OUTPATIENT
Start: 2024-01-07 | End: 2024-02-15 | Stop reason: SDUPTHER

## 2024-01-07 RX ORDER — LEVOTHYROXINE SODIUM 0.12 MG/1
TABLET ORAL
Qty: 65 TABLET | Refills: 2 | Status: SHIPPED | OUTPATIENT
Start: 2024-01-07

## 2024-01-07 RX ORDER — SPIRONOLACTONE 25 MG/1
25 TABLET ORAL DAILY
Qty: 90 TABLET | Refills: 1 | Status: SHIPPED | OUTPATIENT
Start: 2024-01-07

## 2024-01-07 RX ORDER — ESTRADIOL 1 MG/1
1 TABLET ORAL DAILY
Qty: 90 TABLET | Refills: 0 | Status: SHIPPED | OUTPATIENT
Start: 2024-01-07 | End: 2024-03-05 | Stop reason: SDUPTHER

## 2024-01-10 ENCOUNTER — APPOINTMENT (RX ONLY)
Dept: URBAN - METROPOLITAN AREA CLINIC 4 | Facility: CLINIC | Age: 54
Setting detail: DERMATOLOGY
End: 2024-01-10

## 2024-01-10 DIAGNOSIS — I87.2 VENOUS INSUFFICIENCY (CHRONIC) (PERIPHERAL): ICD-10-CM | Status: INADEQUATELY CONTROLLED

## 2024-01-10 DIAGNOSIS — L68.0 HIRSUTISM: ICD-10-CM | Status: INADEQUATELY CONTROLLED

## 2024-01-10 DIAGNOSIS — D18.0 HEMANGIOMA: ICD-10-CM | Status: STABLE

## 2024-01-10 PROBLEM — D18.01 HEMANGIOMA OF SKIN AND SUBCUTANEOUS TISSUE: Status: ACTIVE | Noted: 2024-01-10

## 2024-01-10 PROCEDURE — 99204 OFFICE O/P NEW MOD 45 MIN: CPT

## 2024-01-10 PROCEDURE — ? PRESCRIPTION

## 2024-01-10 PROCEDURE — ? TREATMENT REGIMEN

## 2024-01-10 PROCEDURE — ? COUNSELING

## 2024-01-10 PROCEDURE — ? ADDITIONAL NOTES

## 2024-01-10 PROCEDURE — ? PHOTO-DOCUMENTATION

## 2024-01-10 RX ORDER — SENNOSIDES 25 MG/1
TABLET, FILM COATED ORAL
Qty: 28.35 | Refills: 3 | Status: ERX | COMMUNITY
Start: 2024-01-10

## 2024-01-10 RX ADMIN — SENNOSIDES: 25 TABLET, FILM COATED ORAL at 00:00

## 2024-01-10 ASSESSMENT — LOCATION ZONE DERM
LOCATION ZONE: LEG
LOCATION ZONE: LIP
LOCATION ZONE: TRUNK

## 2024-01-10 ASSESSMENT — LOCATION DETAILED DESCRIPTION DERM
LOCATION DETAILED: PHILTRUM
LOCATION DETAILED: SUBXIPHOID
LOCATION DETAILED: RIGHT DISTAL PRETIBIAL REGION
LOCATION DETAILED: LEFT DISTAL PRETIBIAL REGION

## 2024-01-10 ASSESSMENT — LOCATION SIMPLE DESCRIPTION DERM
LOCATION SIMPLE: LEFT PRETIBIAL REGION
LOCATION SIMPLE: ABDOMEN
LOCATION SIMPLE: RIGHT PRETIBIAL REGION
LOCATION SIMPLE: UPPER LIP

## 2024-01-10 NOTE — PROCEDURE: COUNSELING
Detail Level: Simple
Prescription Strength Graduated Compression Stockings Recommendations: The patient was counseled that prescription strength graduated compression stockings should be worn for all waking hours. They will follow up with a venous specialist to monitor graduated compression stocking usage and their symptoms.
Detail Level: Generalized
Detail Level: Zone

## 2024-01-10 NOTE — PROCEDURE: TREATMENT REGIMEN
Plan: Keep legs elevated above chest after work.
Detail Level: Zone
Initiate Treatment: Use of compression socks, apply lidocaine cream once a day before putting on compression socks.

## 2024-01-10 NOTE — HPI: BRUISES (PURPURA SIMPLEX)
How Severe Is It?: moderate
Is This A New Presentation, Or A Follow-Up?: Bruise
Additional History: Patient fell on October 1st as she was going down the stairs. She landed on her leg and got crush by her weight. She reports the bruise to be localized and tender. X-ray done at urgent care showed no fracture and ultrasound showed no DVT.

## 2024-02-15 ENCOUNTER — OFFICE VISIT (OUTPATIENT)
Dept: MEDICAL GROUP | Facility: MEDICAL CENTER | Age: 54
End: 2024-02-15
Attending: INTERNAL MEDICINE
Payer: MEDICAID

## 2024-02-15 VITALS
OXYGEN SATURATION: 93 % | BODY MASS INDEX: 47.4 KG/M2 | SYSTOLIC BLOOD PRESSURE: 100 MMHG | HEART RATE: 92 BPM | HEIGHT: 63 IN | TEMPERATURE: 97.1 F | WEIGHT: 267.5 LBS | DIASTOLIC BLOOD PRESSURE: 70 MMHG | RESPIRATION RATE: 16 BRPM

## 2024-02-15 DIAGNOSIS — E03.8 HYPOTHYROIDISM DUE TO HASHIMOTO'S THYROIDITIS: ICD-10-CM

## 2024-02-15 DIAGNOSIS — Z79.890 HORMONE REPLACEMENT THERAPY: ICD-10-CM

## 2024-02-15 DIAGNOSIS — M79.671 RIGHT FOOT PAIN: ICD-10-CM

## 2024-02-15 DIAGNOSIS — E55.9 VITAMIN D DEFICIENCY: ICD-10-CM

## 2024-02-15 DIAGNOSIS — G89.29 CHRONIC BILATERAL LOW BACK PAIN WITH LEFT-SIDED SCIATICA: ICD-10-CM

## 2024-02-15 DIAGNOSIS — E06.3 HYPOTHYROIDISM DUE TO HASHIMOTO'S THYROIDITIS: ICD-10-CM

## 2024-02-15 DIAGNOSIS — M54.42 CHRONIC BILATERAL LOW BACK PAIN WITH LEFT-SIDED SCIATICA: ICD-10-CM

## 2024-02-15 DIAGNOSIS — R07.9 CHEST PAIN, UNSPECIFIED TYPE: ICD-10-CM

## 2024-02-15 DIAGNOSIS — Z79.4 TYPE 2 DIABETES MELLITUS WITHOUT COMPLICATION, WITH LONG-TERM CURRENT USE OF INSULIN (HCC): ICD-10-CM

## 2024-02-15 DIAGNOSIS — G43.109 MIGRAINE WITH AURA AND WITHOUT STATUS MIGRAINOSUS, NOT INTRACTABLE: ICD-10-CM

## 2024-02-15 DIAGNOSIS — R63.5 WEIGHT GAIN: ICD-10-CM

## 2024-02-15 DIAGNOSIS — E11.9 TYPE 2 DIABETES MELLITUS WITHOUT COMPLICATION, WITH LONG-TERM CURRENT USE OF INSULIN (HCC): ICD-10-CM

## 2024-02-15 PROBLEM — R00.0 SINUS TACHYCARDIA: Status: RESOLVED | Noted: 2017-11-09 | Resolved: 2024-02-15

## 2024-02-15 PROBLEM — R04.0 NOSEBLEED: Status: RESOLVED | Noted: 2021-02-05 | Resolved: 2024-02-15

## 2024-02-15 PROBLEM — R11.0 NAUSEA: Status: RESOLVED | Noted: 2022-02-17 | Resolved: 2024-02-15

## 2024-02-15 PROBLEM — G62.9 NEUROPATHY: Status: RESOLVED | Noted: 2022-03-01 | Resolved: 2024-02-15

## 2024-02-15 PROBLEM — R10.11 RUQ PAIN: Status: RESOLVED | Noted: 2023-08-04 | Resolved: 2024-02-15

## 2024-02-15 PROBLEM — Z80.3 FAMILY HISTORY OF BREAST CANCER: Status: RESOLVED | Noted: 2020-11-04 | Resolved: 2024-02-15

## 2024-02-15 PROBLEM — M79.605 ACUTE LEG PAIN, LEFT: Status: RESOLVED | Noted: 2019-02-13 | Resolved: 2024-02-15

## 2024-02-15 PROBLEM — R60.9 EDEMA, UNSPECIFIED: Status: RESOLVED | Noted: 2022-03-01 | Resolved: 2024-02-15

## 2024-02-15 PROBLEM — M79.645 PAIN OF LEFT THUMB: Status: RESOLVED | Noted: 2023-04-24 | Resolved: 2024-02-15

## 2024-02-15 PROBLEM — M77.8 LEFT WRIST TENDINITIS: Status: RESOLVED | Noted: 2023-10-04 | Resolved: 2024-02-15
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
## 2024-02-15 PROBLEM — M25.532 CHRONIC WRIST PAIN, LEFT: Status: RESOLVED | Noted: 2023-04-24 | Resolved: 2024-02-15

## 2024-02-15 PROBLEM — K82.9 GALLBLADDER DISEASE: Status: RESOLVED | Noted: 2019-03-26 | Resolved: 2024-02-15

## 2024-02-15 PROBLEM — L60.8 DEFORMITY OF TOENAIL: Status: RESOLVED | Noted: 2020-10-06 | Resolved: 2024-02-15

## 2024-02-15 LAB
HBA1C MFR BLD: 6.9 % (ref ?–5.8)
POCT INT CON NEG: NEGATIVE
POCT INT CON POS: POSITIVE

## 2024-02-15 PROCEDURE — 99204 OFFICE O/P NEW MOD 45 MIN: CPT | Performed by: INTERNAL MEDICINE

## 2024-02-15 PROCEDURE — 99213 OFFICE O/P EST LOW 20 MIN: CPT | Performed by: INTERNAL MEDICINE

## 2024-02-15 PROCEDURE — 3074F SYST BP LT 130 MM HG: CPT | Performed by: INTERNAL MEDICINE

## 2024-02-15 PROCEDURE — 83036 HEMOGLOBIN GLYCOSYLATED A1C: CPT | Performed by: INTERNAL MEDICINE

## 2024-02-15 PROCEDURE — 3078F DIAST BP <80 MM HG: CPT | Performed by: INTERNAL MEDICINE

## 2024-02-15 RX ORDER — PROMETHAZINE HYDROCHLORIDE 25 MG/1
25 TABLET ORAL EVERY 6 HOURS PRN
Qty: 30 TABLET | Refills: 3 | Status: SHIPPED | OUTPATIENT
Start: 2024-02-15

## 2024-02-15 RX ORDER — TRAZODONE HYDROCHLORIDE 50 MG/1
50 TABLET ORAL NIGHTLY
COMMUNITY
Start: 2024-01-25

## 2024-02-15 RX ORDER — ESTRADIOL 0.5 MG/1
0.5 TABLET ORAL DAILY
Qty: 90 TABLET | Refills: 3 | Status: SHIPPED | OUTPATIENT
Start: 2024-02-15

## 2024-02-15 RX ORDER — RIZATRIPTAN BENZOATE 10 MG/1
10 TABLET ORAL
Qty: 10 TABLET | Refills: 3 | Status: SHIPPED | OUTPATIENT
Start: 2024-02-15

## 2024-02-15 RX ORDER — IBUPROFEN 800 MG/1
800 TABLET ORAL 2 TIMES DAILY
Qty: 90 TABLET | Refills: 0 | Status: SHIPPED | OUTPATIENT
Start: 2024-02-15 | End: 2024-02-27 | Stop reason: SDUPTHER

## 2024-02-15 RX ORDER — LEVOTHYROXINE SODIUM 137 UG/1
TABLET ORAL
Qty: 24 TABLET | Refills: 3 | Status: SHIPPED | OUTPATIENT
Start: 2024-02-15

## 2024-02-15 ASSESSMENT — FIBROSIS 4 INDEX: FIB4 SCORE: 1.87

## 2024-02-15 NOTE — ASSESSMENT & PLAN NOTE
She reports continued chest pain that she has been experiencing off-and-on about 1-2 times a month for years.  She has had multiple tests for workup and no etiology has been determined however she has never had any cardiac testing.  She states that the pain comes on randomly.  Sometimes it is with rest and sometimes with exertion.  She cannot identify any discrete triggers.  Does not seem to be associated with food.  It is very different than the symptoms she gets with esophageal spasms.  She has never been to the emergency room for it although she considered going when the pain lasted for greater than an hour.  Typically pain only lasts for 10 minutes or so before subsiding.  She has tried taking her hydrocodone for the pain with no improvement.  Her previous provider was concerned about gallbladder pathology and obtained imaging which was unremarkable.  She is not sure if she experiences tachycardia or palpitations with the pain.  She denies associated shortness of breath.

## 2024-02-16 ENCOUNTER — PATIENT MESSAGE (OUTPATIENT)
Dept: MEDICAL GROUP | Facility: MEDICAL CENTER | Age: 54
End: 2024-02-16
Payer: MEDICAID

## 2024-02-16 DIAGNOSIS — E11.9 TYPE 2 DIABETES MELLITUS WITHOUT COMPLICATION, WITHOUT LONG-TERM CURRENT USE OF INSULIN (HCC): ICD-10-CM

## 2024-02-16 PROBLEM — R09.81 CONGESTION OF NASAL SINUS: Status: RESOLVED | Noted: 2021-02-05 | Resolved: 2024-02-16

## 2024-02-16 PROBLEM — R63.5 WEIGHT GAIN: Status: ACTIVE | Noted: 2024-02-16

## 2024-02-16 RX ORDER — SEMAGLUTIDE 0.68 MG/ML
INJECTION, SOLUTION SUBCUTANEOUS
Qty: 3 ML | Refills: 5 | Status: SHIPPED | OUTPATIENT
Start: 2024-02-16

## 2024-02-16 NOTE — ASSESSMENT & PLAN NOTE
Currently taking levothyroxine 125 mcg every Monday Tuesday Wednesday Friday and Saturday, and 137 mcg every Thursday and Sunday.  She is due for updated labs.

## 2024-02-16 NOTE — ASSESSMENT & PLAN NOTE
She reports she is currently at her heaviest weight ever which is 267 pounds.  States that she has tried to change her diet.  She is somewhat limited in terms of physical activity due to her chronic pain.  She is interested in medication options to help with weight loss.

## 2024-02-16 NOTE — PROGRESS NOTES
"Sarah Friedman is a 53 y.o. female here for chest pain, foot pain, migraines, issues below, est care  HPI:    Chest pain  She reports continued chest pain that she has been experiencing off-and-on about 1-2 times a month for years.  She has had multiple tests for workup and no etiology has been determined however she has never had any cardiac testing.  She states that the pain comes on randomly.  Sometimes it is with rest and sometimes with exertion.  She cannot identify any discrete triggers.  Does not seem to be associated with food.  It is very different than the symptoms she gets with esophageal spasms.  She has never been to the emergency room for it although she considered going when the pain lasted for greater than an hour.  Typically pain only lasts for 10 minutes or so before subsiding.  She has tried taking her hydrocodone for the pain with no improvement.  Her previous provider was concerned about gallbladder pathology and obtained imaging which was unremarkable.  She is not sure if she experiences tachycardia or palpitations with the pain.  She denies associated shortness of breath.    Migraine with aura and without status migrainosus, not intractable  She reports worsening migraine headaches.  Previously she would have about 1-2 headaches per month for the past 6 months she has noticed up to 3 migraines per week.  She reports associated aura with the lights, followed by increased photophobia, phonophobia, and nausea.  Phenergan really helps with the nausea and she is requesting a refill.  Has been taking sumatriptan 100 mg at the onset of symptoms, reports that it takes at least an hour to work and is no longer fully relieving her headaches.      Right foot pain  Reports discomfort over the ball of her right foot for several months.  She has trouble describing exact symptoms but \"feels like there is something extra\" underneath the skin of the right foot.  She has chronically dry and cracking feet " but she has never noticed this before.  She denies trying any new creams, lotions.  Has not changed footwear.  Only present on the right with no symptoms on the left foot.  She is requesting referral to podiatry.      Hormone replacement therapy  She remains on estradiol 1.5 mg daily.  She is requesting refill today.    Hypothyroidism due to Hashimoto's thyroiditis  Currently taking levothyroxine 125 mcg every Monday Tuesday Wednesday Friday and Saturday, and 137 mcg every Thursday and Sunday.  She is due for updated labs.    Type 2 diabetes mellitus without complication (HCC)  She is currently taking metformin 1000 mg twice daily, Actos 45 mg daily, and Jardiance 25 mg daily.  Her A1c in clinic today is 6.9.  She was on Victoza in the past and tolerated it well.    Weight gain  She reports she is currently at her heaviest weight ever which is 267 pounds.  States that she has tried to change her diet.  She is somewhat limited in terms of physical activity due to her chronic pain.  She is interested in medication options to help with weight loss.  Current medicines (including changes today)  Current Outpatient Medications   Medication Sig Dispense Refill    traZODone (DESYREL) 50 MG Tab Take 50 mg by mouth every evening.      promethazine (PHENERGAN) 25 MG Tab Take 1 Tablet by mouth every 6 hours as needed for Nausea/Vomiting. 30 Tablet 3    rizatriptan (MAXALT) 10 MG tablet Take 1 Tablet by mouth one time as needed for Migraine for up to 1 dose. 10 Tablet 3    ibuprofen (MOTRIN) 800 MG Tab Take 1 Tablet by mouth 2 times a day. 90 Tablet 0    estradiol (ESTRACE) 0.5 MG tablet Take 1 Tablet by mouth every day. 90 Tablet 3    levothyroxine (SYNTHROID) 137 MCG Tab TAKE 1 TABLET BY MOUTH EVERY SUNDAY AND Thursday. 24 Tablet 3    metformin (GLUCOPHAGE) 1000 MG tablet Take 1 Tablet by mouth 2 times a day with meals. 180 Tablet 3    levothyroxine (SYNTHROID) 125 MCG Tab TAKE 125 MCG EVERY MONDAYS, TUESDAYS WEDNESDAYS,  FRIDAYS AND SATURDAYS 65 Tablet 2    estradiol (ESTRACE) 1 MG Tab Take 1 Tablet by mouth every day. 90 Tablet 0    spironolactone (ALDACTONE) 25 MG Tab Take 1 Tablet by mouth every day. 90 Tablet 1    HYDROcodone-acetaminophen (NORCO) 7.5-325 MG tab Take 1 Tablet by mouth 1 time a day as needed for Severe Pain for up to 30 days. 30 Tablet 0    [START ON 2/19/2024] HYDROcodone-acetaminophen (NORCO) 7.5-325 MG tab Take 1 Tablet by mouth 1 time a day as needed for Severe Pain for up to 30 days. 30 Tablet 0    atorvastatin (LIPITOR) 20 MG Tab Take 1 Tablet by mouth every day. 90 Tablet 3    hydroCHLOROthiazide 12.5 MG tablet Take 1 Tablet by mouth every day. 90 Tablet 3    diclofenac sodium (VOLTAREN) 1 % Gel Apply 4 g topically 4 times a day as needed (pain). 350 g 0    ivabradine (CORLANOR) 5 MG Tab tablet Take 1 Tablet by mouth 2 times a day with meals. 180 Tablet 3    Empagliflozin 25 MG Tab Take 1 Tablet by mouth every day. 90 Tablet 3    acyclovir (ZOVIRAX) 400 MG tablet TAKE 1 TABLET BY MOUTH EVERY 8 HOURS FOR 5 DAYS AS NEEDED COLD SORE      risperiDONE (RISPERDAL) 3 MG Tab Take 3 mg by mouth every evening.      oxybutynin (DITROPAN-XL) 15 MG CR tablet Take 1 Tablet by mouth every day. 90 Tablet 1    gabapentin (NEURONTIN) 800 MG tablet Take 1 Tablet by mouth 3 times a day. 270 Tablet 3    omeprazole (PRILOSEC) 40 MG delayed-release capsule Take 1 Capsule by mouth 2 times a day. 180 Capsule 3    pioglitazone (ACTOS) 45 MG Tab Take 1 Tablet by mouth every day. 90 Tablet 3    Alcohol Swabs (ALCOHOL PREP) 70 % Pads Use three times daily to clean finger before blood glucose testing 100 Each 11    divalproex (DEPAKOTE) 500 MG Tablet Delayed Response Take 500 mg by mouth 3 times a day.      cyclobenzaprine (FLEXERIL) 10 MG Tab Take 1 Tab by mouth at bedtime as needed for Muscle Spasms. 30 Tab 5    polyethylene glycol 3350 (MIRALAX) 17 GM/SCOOP Powder Take 17 g by mouth 1 time daily as needed (constipation). 510 g 5     acetaminophen (TYLENOL) 500 MG Tab Take 500-1,000 mg by mouth every 6 hours as needed.      Multiple Vitamins-Minerals (MULTIVITAMIN PO) Take 1 Tab by mouth every day.       No current facility-administered medications for this visit.     She  has a past medical history of Anesthesia (08/27/2019), Arrhythmia, Arthritis, Blood clotting disorder ([facility]) (2005), Bowel habit changes, Depression, Diabetes ([facility]) (08/2019), Diabetic neuropathy ([facility]), Esophageal spasm, GERD (gastroesophageal reflux disease), Hashimoto's disease, Hiatal hernia, High cholesterol, Hyperlipidemia, Hypothyroid, Sleep apnea, Snoring, and Tachycardia.    She has no past medical history of Addisons disease ([facility]) or Adrenal disorder ([facility]).  She  has a past surgical history that includes tonsillectomy and adenoidectomy (1977); endoscopy (2016); colonoscopy (2016); other orthopedic surgery (2012); other (2016); other (1971, 1978, 1985); vaginal hysterectomy scope total (N/A, 4/24/2018); salpingo oophorectomy (Bilateral, 4/24/2018); anterior and posterior repair (4/24/2018); enterocele repair (4/24/2018); bladder sling female (4/24/2018); vaginal suspension (N/A, 4/24/2018); cystoscopy (N/A, 4/24/2018); colonoscopy (N/A, 8/30/2019); and gastroscopy (N/A, 8/30/2019).  Social History     Tobacco Use    Smoking status: Never    Smokeless tobacco: Never   Vaping Use    Vaping Use: Never used   Substance Use Topics    Alcohol use: Never    Drug use: No     Comment: previous medical marijuana use for pain     Social History     Social History Narrative    Not on file     Family History   Problem Relation Age of Onset    Colorectal Cancer Mother     Breast Cancer Mother     Cancer Mother 70        pancreatic, breast in 30's    Heart Disease Father     Cancer Father         lung    Diabetes Sister     Diabetes Maternal Uncle     Heart Disease Maternal Grandmother         MI    Hyperlipidemia Maternal Grandmother     Diabetes Maternal Grandfather      Hyperlipidemia Maternal Grandfather     Stroke Neg Hx     Peritoneal Cancer Neg Hx     Tubal Cancer Neg Hx     Ovarian Cancer Neg Hx           Objective:     Vitals:    02/15/24 1541   BP: 100/70   Pulse: 92   Resp: 16   Temp: 36.2 °C (97.1 °F)   SpO2: 93%     Body mass index is 47.4 kg/m².  Physical Exam:    Constitutional: Alert, no distress.  Skin: Warm, dry, good turgor, no rashes in visible areas.  Eye: Equal, round and reactive, conjunctiva clear, lids normal.  ENMT: Lips without lesions, good dentition, oropharynx clear, TM's clear bilaterally.  Neck: Trachea midline, no masses, no thyromegaly. No cervical or supraclavicular lymphadenopathy.  Respiratory: Unlabored respiratory effort, lungs clear to auscultation, no wheezes, no ronchi.  Cardiovascular: Regular rate and rhythm, no murmurs appreciated, no lower extremity edema.  Abdomen: Soft, non-tender, no masses, no hepatosplenomegaly.  Psych: Alert and oriented x3, normal affect and mood.  Extrem: Cracked skin over feet, no rashes or lesions over the ball of her right foot, no warts or palpable abnormalities    Results:    Lab Results   Component Value Date/Time    HBA1C 6.9 (A) 02/15/2024 03:41 PM            Assessment and Plan:   The following treatment plan was discussed    1. Type 2 diabetes mellitus without complication, with long-term current use of insulin (HCC)  Stable and controlled with current medications.  We discussed adding Ozempic to her regimen to assist with weight loss and further control her blood sugars.  At this point, she is hesitant to add an injectable medication back but she will consider it.  We discussed that it can lead to considerable weight loss which is the goal for her.  - POCT Hemoglobin A1C  - Lipid Profile; Future  - metformin (GLUCOPHAGE) 1000 MG tablet; Take 1 Tablet by mouth 2 times a day with meals.  Dispense: 180 Tablet; Refill: 3  -Continue Actos and Jardiance  -Consider adding Ozempic or Trulicity based on patient  preference    2. Chest pain, unspecified type  Atypical chest pain however she is female and diabetic and has never had any cardiac stress testing.  Will try to obtain treadmill test however if she is unable to complete due to pain or reduced exercise tolerance, discussed may have to proceed with nuclear medicine testing.  - NM-CARDIAC TREADMILL ONLY-NO IMAGING; Future    3. Migraine with aura and without status migrainosus, not intractable  Uncontrolled and with increased frequency.  Hesitant to start an oral preventative medication given already polypharmacy.  Discussed switching to rizatriptan to see if this is more effective for her.  Referral placed to neurology to discuss alternatives such as Botox or injectable therapies.  Phenergan refilled.  - promethazine (PHENERGAN) 25 MG Tab; Take 1 Tablet by mouth every 6 hours as needed for Nausea/Vomiting.  Dispense: 30 Tablet; Refill: 3  - Referral to Neurology  - rizatriptan (MAXALT) 10 MG tablet; Take 1 Tablet by mouth one time as needed for Migraine for up to 1 dose.  Dispense: 10 Tablet; Refill: 3    4. Right foot pain  I suspect this may be some sort of neuropathy related to diabetes however patient desires evaluation by podiatry.  Referral placed.  - Referral to Podiatry    5. Chronic bilateral low back pain with left-sided sciatica  - ibuprofen (MOTRIN) 800 MG Tab; Take 1 Tablet by mouth 2 times a day.  Dispense: 90 Tablet; Refill: 0    6. Hormone replacement therapy  - estradiol (ESTRACE) 0.5 MG tablet; Take 1 Tablet by mouth every day.  Dispense: 90 Tablet; Refill: 3    7. Hypothyroidism due to Hashimoto's thyroiditis  Will obtain updated labs  - TSH WITH REFLEX TO FT4; Future  - levothyroxine (SYNTHROID) 137 MCG Tab; TAKE 1 TABLET BY MOUTH EVERY SUNDAY AND Thursday.  Dispense: 24 Tablet; Refill: 3    8. Vitamin D deficiency  -cont vit d supplement  - VITAMIN D,25 HYDROXY (DEFICIENCY); Future    9. Weight gain  Discussed options for weight loss.  I think her  best bet would be a GLP-1 agonist given her comorbid diabetes and also the degree of weight loss she is seeking.  She was hesitant to start on another injectable medication having just come off of insulin however will discuss with her partner and let me know.  Otherwise, could consider referral to weight loss surgery although we did not discuss this today.  Brief course of phentermine could be considered although she has numerous medical comorbidities especially with the uncertain chest pain that would make me hesitant at this time.        Followup: Return in about 3 months (around 5/15/2024).

## 2024-02-16 NOTE — ASSESSMENT & PLAN NOTE
She is currently taking metformin 1000 mg twice daily, Actos 45 mg daily, and Jardiance 25 mg daily.  Her A1c in clinic today is 6.9.  She was on Victoza in the past and tolerated it well.

## 2024-02-16 NOTE — ASSESSMENT & PLAN NOTE
"Reports discomfort over the ball of her right foot for several months.  She has trouble describing exact symptoms but \"feels like there is something extra\" underneath the skin of the right foot.  She has chronically dry and cracking feet but she has never noticed this before.  She denies trying any new creams, lotions.  Has not changed footwear.  Only present on the right with no symptoms on the left foot.  She is requesting referral to podiatry.    "

## 2024-02-16 NOTE — ASSESSMENT & PLAN NOTE
She reports worsening migraine headaches.  Previously she would have about 1-2 headaches per month for the past 6 months she has noticed up to 3 migraines per week.  She reports associated aura with the lights, followed by increased photophobia, phonophobia, and nausea.  Phenergan really helps with the nausea and she is requesting a refill.  Has been taking sumatriptan 100 mg at the onset of symptoms, reports that it takes at least an hour to work and is no longer fully relieving her headaches.

## 2024-02-20 ENCOUNTER — TELEPHONE (OUTPATIENT)
Dept: MEDICAL GROUP | Facility: MEDICAL CENTER | Age: 54
End: 2024-02-20
Payer: MEDICAID

## 2024-02-20 NOTE — TELEPHONE ENCOUNTER
MEDICATION PRIOR AUTHORIZATION NEEDED:    1. Name of Medication: ozempic     2. Requested By ([facility] of [facility]): [facility]     3. Is insurance on file current? yes    4. What is the name & phone number of the 3rd party payor? [place]   Via cover my meds

## 2024-02-27 DIAGNOSIS — M54.42 CHRONIC BILATERAL LOW BACK PAIN WITH LEFT-SIDED SCIATICA: ICD-10-CM

## 2024-02-27 DIAGNOSIS — G89.29 CHRONIC BILATERAL LOW BACK PAIN WITH LEFT-SIDED SCIATICA: ICD-10-CM

## 2024-02-27 RX ORDER — IBUPROFEN 800 MG/1
800 TABLET ORAL 2 TIMES DAILY
Qty: 90 TABLET | Refills: 5 | Status: SHIPPED | OUTPATIENT
Start: 2024-02-27

## 2024-02-27 NOTE — TELEPHONE ENCOUNTER
Received request via: Patient    Was the patient seen in the last year in this department? Yes    Does the patient have an active prescription (recently filled or refills available) for medication(s) requested? No    [facility]      Does the patient have retirement Plus and need 100 day supply (blood pressure, diabetes and cholesterol meds only)? Patient does not have SCP

## 2024-02-28 ENCOUNTER — HOSPITAL ENCOUNTER (OUTPATIENT)
Dept: RADIOLOGY | Facility: MEDICAL CENTER | Age: 54
End: 2024-02-28
Attending: INTERNAL MEDICINE
Payer: MEDICAID

## 2024-02-28 DIAGNOSIS — R07.9 CHEST PAIN, UNSPECIFIED TYPE: ICD-10-CM

## 2024-02-28 PROCEDURE — 93018 CV STRESS TEST I&R ONLY: CPT | Performed by: INTERNAL MEDICINE

## 2024-02-28 PROCEDURE — 93017 CV STRESS TEST TRACING ONLY: CPT

## 2024-03-01 ENCOUNTER — TELEPHONE (OUTPATIENT)
Dept: MEDICAL GROUP | Facility: MEDICAL CENTER | Age: 54
End: 2024-03-01
Payer: MEDICAID

## 2024-03-01 NOTE — TELEPHONE ENCOUNTER
FINAL PRIOR AUTHORIZATION STATUS:    1.  Name of Medication & Dose: rizatriptan      2. Prior Auth Status: Approved through       3. Action Taken: Pharmacy Notified: yes Patient Notified: yes

## 2024-03-01 NOTE — TELEPHONE ENCOUNTER
MEDICATION PRIOR AUTHORIZATION NEEDED:    1. Name of Medication: corlanor 5mg    2. Requested By ([facility] of [facility]     3. Is insurance on file current? [employer]     4. What is the name & phone number of the 3rd party payor? [employer]

## 2024-03-03 ENCOUNTER — PATIENT MESSAGE (OUTPATIENT)
Dept: MEDICAL GROUP | Facility: MEDICAL CENTER | Age: 54
End: 2024-03-03
Payer: MEDICAID

## 2024-03-03 DIAGNOSIS — Z90.710 S/P TOTAL HYSTERECTOMY: ICD-10-CM

## 2024-03-03 DIAGNOSIS — N39.41 URGE INCONTINENCE: ICD-10-CM

## 2024-03-03 DIAGNOSIS — E11.9 TYPE 2 DIABETES MELLITUS WITHOUT COMPLICATION, WITHOUT LONG-TERM CURRENT USE OF INSULIN (HCC): ICD-10-CM

## 2024-03-05 RX ORDER — OXYBUTYNIN CHLORIDE 15 MG/1
15 TABLET, EXTENDED RELEASE ORAL DAILY
Qty: 30 TABLET | Refills: 3 | Status: SHIPPED | OUTPATIENT
Start: 2024-03-05

## 2024-03-05 RX ORDER — ESTRADIOL 1 MG/1
1 TABLET ORAL DAILY
Qty: 30 TABLET | Refills: 11 | Status: SHIPPED | OUTPATIENT
Start: 2024-03-05

## 2024-03-05 RX ORDER — PIOGLITAZONEHYDROCHLORIDE 45 MG/1
45 TABLET ORAL DAILY
Qty: 30 TABLET | Refills: 11 | Status: SHIPPED | OUTPATIENT
Start: 2024-03-05

## 2024-03-08 NOTE — TELEPHONE ENCOUNTER
FINAL PRIOR AUTHORIZATION STATUS:    1.  Name of Medication & Dose: corlanor     2. Prior Auth Status: Denied.  Reason: not on formulary    3. Action Taken: Pharmacy Notified: yes Patient Notified: yes

## 2024-04-12 DIAGNOSIS — K44.9 HIATAL HERNIA: ICD-10-CM

## 2024-04-12 DIAGNOSIS — E11.42 DIABETIC POLYNEUROPATHY ASSOCIATED WITH TYPE 2 DIABETES MELLITUS (HCC): ICD-10-CM

## 2024-04-12 NOTE — TELEPHONE ENCOUNTER
Received request via: Patient    Was the patient seen in the last year in this department? Yes    Does the patient have an active prescription (recently filled or refills available) for medication(s) requested? No    [facility]    Does the patient have assisted Plus and need 100 day supply (blood pressure, diabetes and cholesterol meds only)? Patient does not have SCP    Future Appointments         Provider Department [place]    5/17/2024 3:20 PM (Arrive by 3:05 PM) Zuleima Almeida M.D. [facility]

## 2024-04-15 ENCOUNTER — TELEPHONE (OUTPATIENT)
Dept: MEDICAL GROUP | Facility: MEDICAL CENTER | Age: 54
End: 2024-04-15
Payer: MEDICAID

## 2024-04-15 NOTE — TELEPHONE ENCOUNTER
DOCUMENTATION OF PAR STATUS:    1. Name of Medication & Dose: HYDROcodone-Acetaminophen 7.5-325MG tablets      2. Name of Prescription Coverage Company & phone #: [place] [employer] Electronic PA Form (2017 NCPDP)     3. Date Prior Auth Submitted: 4/15/2024    4. What information was given to obtain insurance decision? PATIENT INFORMATION, PT INS    5. Prior Auth Status? Sent to Plan     6. Patient Notified: yes

## 2024-04-16 ENCOUNTER — TELEPHONE (OUTPATIENT)
Dept: CARDIOLOGY | Facility: MEDICAL CENTER | Age: 54
End: 2024-04-16
Payer: MEDICAID

## 2024-04-16 RX ORDER — OMEPRAZOLE 40 MG/1
40 CAPSULE, DELAYED RELEASE ORAL 2 TIMES DAILY
Qty: 60 CAPSULE | Refills: 5 | Status: SHIPPED | OUTPATIENT
Start: 2024-04-16

## 2024-04-16 RX ORDER — GABAPENTIN 800 MG/1
800 TABLET ORAL 3 TIMES DAILY
Qty: 90 TABLET | Refills: 5 | Status: SHIPPED | OUTPATIENT
Start: 2024-04-16

## 2024-04-16 NOTE — TELEPHONE ENCOUNTER
Received Refill PA request via  EMR MESSAGE   for CORLANOR 5 MG TABLET. (Quantity:180, Day Supply:90)     Insurance: MEDICAID  Member ID:  [id number]  BIN: [id number]  PCN: [id number]  Group: [id number] [id number]     Ran Test claim via [place] & medication Rejects stating prior authorization is required.

## 2024-04-16 NOTE — TELEPHONE ENCOUNTER
Prior Authorization for CORLANOR 5 MG TABLET (Quantity: 180, Days: 90) has been submitted via Cover My Meds: Key ([id number])    Insurance: MEDICAID    Will follow up in 24-48 business hours.

## 2024-04-24 DIAGNOSIS — E11.9 TYPE 2 DIABETES MELLITUS WITHOUT COMPLICATION, WITHOUT LONG-TERM CURRENT USE OF INSULIN (HCC): ICD-10-CM

## 2024-04-24 RX ORDER — SEMAGLUTIDE 0.68 MG/ML
0.5 INJECTION, SOLUTION SUBCUTANEOUS
Qty: 3 ML | Refills: 5 | Status: SHIPPED | OUTPATIENT
Start: 2024-04-24

## 2024-04-24 NOTE — TELEPHONE ENCOUNTER
Received request via: Pharmacy    Was the patient seen in the last year in this department? Yes    Does the patient have an active prescription (recently filled or refills available) for medication(s) requested? No    [facility]    Does the patient have long term Plus and need 100 day supply (blood pressure, diabetes and cholesterol meds only)? Patient does not have SCP

## 2024-04-29 ENCOUNTER — TELEPHONE (OUTPATIENT)
Dept: CARDIOLOGY | Facility: MEDICAL CENTER | Age: 54
End: 2024-04-29
Payer: MEDICAID

## 2024-04-29 NOTE — TELEPHONE ENCOUNTER
Prior Authorization for     CORLANOR 5 MG TABLET    has been denied for a quantity of 60 , day supply 30    Prior authorization was denied per the following: NEEDS TO TAKE IN COMBINATION WITH A BETA BLOCKER    Prior Authorization denial reference number: [id number]  Insurance: MEDICAID      Next Steps:Proceed with appeal process. Generate proposed appeal for provider approval & signature.

## 2024-04-29 NOTE — TELEPHONE ENCOUNTER
This PA was denied due to needs to be taken in combination with a beta blocker. How would you like to proceed?

## 2024-05-17 ENCOUNTER — OFFICE VISIT (OUTPATIENT)
Dept: MEDICAL GROUP | Facility: MEDICAL CENTER | Age: 54
End: 2024-05-17
Attending: INTERNAL MEDICINE
Payer: MEDICAID

## 2024-05-17 VITALS
DIASTOLIC BLOOD PRESSURE: 82 MMHG | SYSTOLIC BLOOD PRESSURE: 128 MMHG | BODY MASS INDEX: 45.18 KG/M2 | RESPIRATION RATE: 16 BRPM | HEART RATE: 78 BPM | OXYGEN SATURATION: 93 % | TEMPERATURE: 97.9 F | HEIGHT: 63 IN | WEIGHT: 255 LBS

## 2024-05-17 DIAGNOSIS — M54.42 CHRONIC BILATERAL LOW BACK PAIN WITH LEFT-SIDED SCIATICA: ICD-10-CM

## 2024-05-17 DIAGNOSIS — Z91.09 ENVIRONMENTAL ALLERGIES: ICD-10-CM

## 2024-05-17 DIAGNOSIS — R11.0 NAUSEA: ICD-10-CM

## 2024-05-17 DIAGNOSIS — G89.29 CHRONIC BILATERAL LOW BACK PAIN WITH LEFT-SIDED SCIATICA: ICD-10-CM

## 2024-05-17 DIAGNOSIS — E11.9 TYPE 2 DIABETES MELLITUS WITHOUT COMPLICATION, WITHOUT LONG-TERM CURRENT USE OF INSULIN (HCC): ICD-10-CM

## 2024-05-17 LAB
HBA1C MFR BLD: 6.7 % (ref ?–5.8)
POCT INT CON NEG: NEGATIVE
POCT INT CON POS: POSITIVE

## 2024-05-17 PROCEDURE — 3079F DIAST BP 80-89 MM HG: CPT | Performed by: INTERNAL MEDICINE

## 2024-05-17 PROCEDURE — 3074F SYST BP LT 130 MM HG: CPT | Performed by: INTERNAL MEDICINE

## 2024-05-17 PROCEDURE — 99214 OFFICE O/P EST MOD 30 MIN: CPT | Performed by: INTERNAL MEDICINE

## 2024-05-17 RX ORDER — CYCLOBENZAPRINE HCL 10 MG
10 TABLET ORAL NIGHTLY PRN
Qty: 30 TABLET | Refills: 5 | Status: SHIPPED | OUTPATIENT
Start: 2024-05-17

## 2024-05-17 RX ORDER — HYDROCODONE BITARTRATE AND ACETAMINOPHEN 7.5; 325 MG/1; MG/1
1 TABLET ORAL
Qty: 30 TABLET | Refills: 0 | Status: SHIPPED | OUTPATIENT
Start: 2024-06-16 | End: 2024-07-16

## 2024-05-17 RX ORDER — ONDANSETRON 4 MG/1
4 TABLET, FILM COATED ORAL EVERY 4 HOURS PRN
Qty: 20 TABLET | Refills: 1 | Status: SHIPPED | OUTPATIENT
Start: 2024-05-17

## 2024-05-17 RX ORDER — HYDROCODONE BITARTRATE AND ACETAMINOPHEN 7.5; 325 MG/1; MG/1
1 TABLET ORAL
Qty: 30 TABLET | Refills: 0 | Status: SHIPPED | OUTPATIENT
Start: 2024-05-17 | End: 2024-06-16

## 2024-05-17 ASSESSMENT — FIBROSIS 4 INDEX: FIB4 SCORE: 1.87

## 2024-05-17 NOTE — ASSESSMENT & PLAN NOTE
Has increased related to starting Ozempic.  Patient reports that on the day that she takes her shot and the following day she has moderate to severe symptoms and then mild to moderate symptoms the following 2 days.

## 2024-05-17 NOTE — PROGRESS NOTES
Subjective:   Sarah Friedman is a 53 y.o. female here today for allergies, f/u DM, nausea, pain    Environmental allergies  Has a history of severe allergies when she was living in [place] and [place].  Was doing better in [place] but this season seems to be developing severe symptoms again.  Has tried over-the-counter oral antihistamines but reports severe itchy watery eyes.  Has never tried eyedrops.      Type 2 diabetes mellitus without complication (HCC)  She presents today for follow-up diabetes.  At her last visit, we added Ozempic 0.5 mg weekly.  She took the 0.25 mg for the first week and then increased it to 0.5 mg but she has been having worsening nausea at this dose.  She reduced portion sizes somewhat.  Is having to take her Phenergan more frequently.  Would like to try Zofran.  Has lost 12 pounds.    Nausea  Has increased related to starting Ozempic.  Patient reports that on the day that she takes her shot and the following day she has moderate to severe symptoms and then mild to moderate symptoms the following 2 days.    Chronic low back pain  She continues to have intermittent flares of her back pain.  She is requesting a refill of her hydrocodone and Flexeril today which she takes as needed.  Last fill was about 5 weeks ago for 30 tablets.       Current medicines (including changes today)  Current Outpatient Medications   Medication Sig Dispense Refill    ondansetron (ZOFRAN) 4 MG Tab tablet Take 1 Tablet by mouth every four hours as needed for Nausea/Vomiting. 20 Tablet 1    cyclobenzaprine (FLEXERIL) 10 mg Tab Take 1 Tablet by mouth at bedtime as needed for Muscle Spasms. 30 Tablet 5    HYDROcodone-acetaminophen (NORCO) 7.5-325 MG tab Take 1 Tablet by mouth 1 time a day as needed for Severe Pain for up to 30 days. 30 Tablet 0    [START ON 6/16/2024] HYDROcodone-acetaminophen (NORCO) 7.5-325 MG tab Take 1 Tablet by mouth 1 time a day as needed for Severe Pain for up to 30 days. 30 Tablet 0     Semaglutide,0.25 or 0.5MG/DOS, (OZEMPIC, 0.25 OR 0.5 MG/DOSE,) 2 MG/3ML Solution Pen-injector Inject 0.5 mg under the skin every 7 days. 3 mL 5    omeprazole (PRILOSEC) 40 MG delayed-release capsule Take 1 Capsule by mouth 2 times a day. 60 Capsule 5    gabapentin (NEURONTIN) 800 MG tablet Take 1 Tablet by mouth 3 times a day. 90 Tablet 5    estradiol (ESTRACE) 1 MG Tab Take 1 Tablet by mouth every day. 30 Tablet 11    pioglitazone (ACTOS) 45 MG Tab Take 1 Tablet by mouth every day. 30 Tablet 11    oxybutynin (DITROPAN-XL) 15 MG CR tablet Take 1 Tablet by mouth every day. 30 Tablet 3    ibuprofen (MOTRIN) 800 MG Tab Take 1 Tablet by mouth 2 times a day. 90 Tablet 5    traZODone (DESYREL) 50 MG Tab Take 50 mg by mouth every evening.      promethazine (PHENERGAN) 25 MG Tab Take 1 Tablet by mouth every 6 hours as needed for Nausea/Vomiting. 30 Tablet 3    rizatriptan (MAXALT) 10 MG tablet Take 1 Tablet by mouth one time as needed for Migraine for up to 1 dose. 10 Tablet 3    estradiol (ESTRACE) 0.5 MG tablet Take 1 Tablet by mouth every day. 90 Tablet 3    levothyroxine (SYNTHROID) 137 MCG Tab TAKE 1 TABLET BY MOUTH EVERY SUNDAY AND Thursday. 24 Tablet 3    metformin (GLUCOPHAGE) 1000 MG tablet Take 1 Tablet by mouth 2 times a day with meals. 180 Tablet 3    levothyroxine (SYNTHROID) 125 MCG Tab TAKE 125 MCG EVERY MONDAYS, TUESDAYS WEDNESDAYS, FRIDAYS AND SATURDAYS 65 Tablet 2    spironolactone (ALDACTONE) 25 MG Tab Take 1 Tablet by mouth every day. 90 Tablet 1    atorvastatin (LIPITOR) 20 MG Tab Take 1 Tablet by mouth every day. 90 Tablet 3    hydroCHLOROthiazide 12.5 MG tablet Take 1 Tablet by mouth every day. 90 Tablet 3    diclofenac sodium (VOLTAREN) 1 % Gel Apply 4 g topically 4 times a day as needed (pain). 350 g 0    ivabradine (CORLANOR) 5 MG Tab tablet Take 1 Tablet by mouth 2 times a day with meals. 180 Tablet 3    Empagliflozin 25 MG Tab Take 1 Tablet by mouth every day. 90 Tablet 3    acyclovir (ZOVIRAX)  400 MG tablet TAKE 1 TABLET BY MOUTH EVERY 8 HOURS FOR 5 DAYS AS NEEDED COLD SORE      Alcohol Swabs (ALCOHOL PREP) 70 % Pads Use three times daily to clean finger before blood glucose testing 100 Each 11    divalproex (DEPAKOTE) 500 MG Tablet Delayed Response Take 250 mg by mouth 2 times a day.      polyethylene glycol 3350 (MIRALAX) 17 GM/SCOOP Powder Take 17 g by mouth 1 time daily as needed (constipation). 510 g 5    acetaminophen (TYLENOL) 500 MG Tab Take 500-1,000 mg by mouth every 6 hours as needed.      Multiple Vitamins-Minerals (MULTIVITAMIN PO) Take 1 Tab by mouth every day.       No current facility-administered medications for this visit.     She  has a past medical history of Anesthesia (08/27/2019), Arrhythmia, Arthritis, Blood clotting disorder ([facility]) (2005), Bowel habit changes, Depression, Diabetes ([facility]) (08/2019), Diabetic neuropathy ([facility]), Esophageal spasm, GERD (gastroesophageal reflux disease), Hashimoto's disease, Hiatal hernia, High cholesterol, Hyperlipidemia, Hypothyroid, Sleep apnea, Snoring, and Tachycardia.    She has no past medical history of Addisons disease ([facility]) or Adrenal disorder ([facility]).         Objective:     Vitals:    05/17/24 1528   BP: 128/82   Pulse: 78   Resp: 16   Temp: 36.6 °C (97.9 °F)   SpO2: 93%     Body mass index is 45.18 kg/m².   Physical Exam:  Constitutional: Alert, no distress.  Skin: Warm, dry, good turgor, no rashes in visible areas.  Eye: Equal, round and reactive, conjunctiva clear, lids normal.  Psych: Alert and oriented x3, normal affect and mood.      Results and Imaging:   Lab Results   Component Value Date/Time    HBA1C 6.7 (A) 05/17/2024 03:30 PM          Assessment and Plan:   The following treatment plan was discussed    1. Type 2 diabetes mellitus without complication, without long-term current use of insulin ([facility])  Stable and controlled with current medications.  We discussed further reducing portion sizes and waiting 20 to 30 minutes after eating  before she gets more food in order to make sure that she does not develop nausea with the Ozempic.  If she continues to have significant nausea discussed reducing dose to 0.25 mg or possibly switching to Trulicity 0.75 mg.  -Continue Ozempic 0.5 mg weekly, reduce portion sizes  -Continue Actos 45 mg daily, metformin 1000 mg twice daily, Jardiance 25 mg daily  - POCT Hemoglobin A1C    2. Chronic bilateral low back pain with left-sided sciatica  Stable, controlled with as needed hydrocodone and Flexeril.  Controlled substance agreement updated.  - Controlled Substance Treatment Agreement  - cyclobenzaprine (FLEXERIL) 10 mg Tab; Take 1 Tablet by mouth at bedtime as needed for Muscle Spasms.  Dispense: 30 Tablet; Refill: 5  - HYDROcodone-acetaminophen (NORCO) 7.5-325 MG tab; Take 1 Tablet by mouth 1 time a day as needed for Severe Pain for up to 30 days.  Dispense: 30 Tablet; Refill: 0  - HYDROcodone-acetaminophen (NORCO) 7.5-325 MG tab; Take 1 Tablet by mouth 1 time a day as needed for Severe Pain for up to 30 days.  Dispense: 30 Tablet; Refill: 0    3. Nausea  Trial of Zofran as below.  If it does not work as well she can use the Phenergan.  Counseled on how to reduce nausea related to Ozempic.  - ondansetron (ZOFRAN) 4 MG Tab tablet; Take 1 Tablet by mouth every four hours as needed for Nausea/Vomiting.  Dispense: 20 Tablet; Refill: 1    4. Environmental allergies  Uncontrolled.  Discussed using an antihistamine eyedrop like Pataday which she can get OTC.        Followup: Return in about 3 months (around 8/17/2024).

## 2024-05-17 NOTE — ASSESSMENT & PLAN NOTE
She presents today for follow-up diabetes.  At her last visit, we added Ozempic 0.5 mg weekly.  She took the 0.25 mg for the first week and then increased it to 0.5 mg but she has been having worsening nausea at this dose.  She reduced portion sizes somewhat.  Is having to take her Phenergan more frequently.  Would like to try Zofran.  Has lost 12 pounds.

## 2024-05-17 NOTE — ASSESSMENT & PLAN NOTE
She continues to have intermittent flares of her back pain.  She is requesting a refill of her hydrocodone and Flexeril today which she takes as needed.  Last fill was about 5 weeks ago for 30 tablets.

## 2024-05-17 NOTE — ASSESSMENT & PLAN NOTE
Has a history of severe allergies when she was living in [place] and [place].  Was doing better in [place] but this season seems to be developing severe symptoms again.  Has tried over-the-counter oral antihistamines but reports severe itchy watery eyes.  Has never tried eyedrops.

## 2024-06-07 DIAGNOSIS — N39.41 URGE INCONTINENCE: ICD-10-CM

## 2024-06-07 NOTE — TELEPHONE ENCOUNTER
Received request via: Pharmacy    Was the patient seen in the last year in this department? Yes    Does the patient have an active prescription (recently filled or refills available) for medication(s) requested? No    Pharmacy Name: [facility]    Does the patient have residential Plus and need 100 day supply (blood pressure, diabetes and cholesterol meds only)? Patient does not have [facility]    Future Appointments         Provider Department [place]    8/16/2024 3:20 PM (Arrive by 3:05 PM) Zuleima Almeida M.D. [facility]

## 2024-06-11 RX ORDER — OXYBUTYNIN CHLORIDE 15 MG/1
15 TABLET, EXTENDED RELEASE ORAL DAILY
Qty: 30 TABLET | Refills: 5 | Status: SHIPPED | OUTPATIENT
Start: 2024-06-11

## 2024-06-17 ENCOUNTER — PATIENT MESSAGE (OUTPATIENT)
Dept: MEDICAL GROUP | Facility: MEDICAL CENTER | Age: 54
End: 2024-06-17
Payer: MEDICAID

## 2024-06-17 DIAGNOSIS — M25.532 LEFT WRIST PAIN: ICD-10-CM

## 2024-06-17 DIAGNOSIS — M77.8 LEFT WRIST TENDINITIS: ICD-10-CM

## 2024-06-17 DIAGNOSIS — B00.2 ORAL HERPES: ICD-10-CM

## 2024-06-18 RX ORDER — ACYCLOVIR 400 MG/1
TABLET ORAL
Qty: 15 TABLET | Refills: 5 | Status: SHIPPED | OUTPATIENT
Start: 2024-06-18

## 2024-07-22 DIAGNOSIS — E11.9 TYPE 2 DIABETES MELLITUS WITHOUT COMPLICATION, WITHOUT LONG-TERM CURRENT USE OF INSULIN (HCC): ICD-10-CM

## 2024-07-22 DIAGNOSIS — R11.0 NAUSEA: ICD-10-CM

## 2024-07-23 RX ORDER — ONDANSETRON 4 MG/1
4 TABLET, FILM COATED ORAL EVERY 4 HOURS PRN
Qty: 20 TABLET | Refills: 5 | Status: SHIPPED | OUTPATIENT
Start: 2024-07-23 | End: 2024-07-25 | Stop reason: SDUPTHER

## 2024-07-23 RX ORDER — SEMAGLUTIDE 0.68 MG/ML
0.5 INJECTION, SOLUTION SUBCUTANEOUS
Qty: 3 ML | Refills: 5 | Status: SHIPPED | OUTPATIENT
Start: 2024-07-23

## 2024-07-24 ENCOUNTER — PATIENT MESSAGE (OUTPATIENT)
Dept: MEDICAL GROUP | Facility: MEDICAL CENTER | Age: 54
End: 2024-07-24
Payer: MEDICAID

## 2024-07-24 DIAGNOSIS — R11.0 NAUSEA: ICD-10-CM

## 2024-07-25 RX ORDER — ONDANSETRON 4 MG/1
4 TABLET, FILM COATED ORAL EVERY 4 HOURS PRN
Qty: 30 TABLET | Refills: 5 | Status: SHIPPED | OUTPATIENT
Start: 2024-07-25

## 2024-08-02 DIAGNOSIS — I10 ESSENTIAL HYPERTENSION: ICD-10-CM

## 2024-08-05 RX ORDER — SPIRONOLACTONE 25 MG/1
25 TABLET ORAL DAILY
Qty: 90 TABLET | Refills: 3 | Status: SHIPPED | OUTPATIENT
Start: 2024-08-05

## 2024-08-08 DIAGNOSIS — E11.9 TYPE 2 DIABETES MELLITUS WITHOUT COMPLICATION, WITHOUT LONG-TERM CURRENT USE OF INSULIN (HCC): ICD-10-CM

## 2024-08-09 RX ORDER — SEMAGLUTIDE 0.68 MG/ML
INJECTION, SOLUTION SUBCUTANEOUS
Qty: 3 ML | Refills: 3 | Status: SHIPPED | OUTPATIENT
Start: 2024-08-09 | End: 2024-08-16 | Stop reason: DRUGHIGH

## 2024-08-09 NOTE — TELEPHONE ENCOUNTER
Received request via: Pharmacy    Was the patient seen in the last year in this department? Yes    Does the patient have an active prescription (recently filled or refills available) for medication(s) requested? No    Pharmacy Name: [facility]    Does the patient have shelter Plus and need 100-day supply? (This applies to ALL medications) Patient does not have [facility]    Future Appointments         Provider Department [place]    8/16/2024 3:20 PM (Arrive by 3:05 PM) Zuleima Almeida M.D. [facility]

## 2024-08-16 ENCOUNTER — OFFICE VISIT (OUTPATIENT)
Dept: MEDICAL GROUP | Facility: MEDICAL CENTER | Age: 54
End: 2024-08-16
Attending: INTERNAL MEDICINE
Payer: MEDICAID

## 2024-08-16 VITALS
OXYGEN SATURATION: 95 % | HEART RATE: 81 BPM | HEIGHT: 63 IN | WEIGHT: 247.6 LBS | SYSTOLIC BLOOD PRESSURE: 110 MMHG | DIASTOLIC BLOOD PRESSURE: 70 MMHG | RESPIRATION RATE: 16 BRPM | TEMPERATURE: 97.6 F | BODY MASS INDEX: 43.87 KG/M2

## 2024-08-16 DIAGNOSIS — E11.9 TYPE 2 DIABETES MELLITUS WITHOUT COMPLICATION, WITHOUT LONG-TERM CURRENT USE OF INSULIN (HCC): ICD-10-CM

## 2024-08-16 DIAGNOSIS — E66.01 CLASS 3 SEVERE OBESITY DUE TO EXCESS CALORIES WITH SERIOUS COMORBIDITY AND BODY MASS INDEX (BMI) OF 40.0 TO 44.9 IN ADULT (HCC): ICD-10-CM

## 2024-08-16 DIAGNOSIS — H04.203 WATERY EYES: ICD-10-CM

## 2024-08-16 LAB
HBA1C MFR BLD: 6.5 % (ref ?–5.8)
POCT INT CON NEG: NEGATIVE
POCT INT CON POS: POSITIVE

## 2024-08-16 PROCEDURE — 3078F DIAST BP <80 MM HG: CPT | Performed by: INTERNAL MEDICINE

## 2024-08-16 PROCEDURE — 3074F SYST BP LT 130 MM HG: CPT | Performed by: INTERNAL MEDICINE

## 2024-08-16 PROCEDURE — 99213 OFFICE O/P EST LOW 20 MIN: CPT | Performed by: INTERNAL MEDICINE

## 2024-08-16 PROCEDURE — 83036 HEMOGLOBIN GLYCOSYLATED A1C: CPT | Performed by: INTERNAL MEDICINE

## 2024-08-16 PROCEDURE — 99214 OFFICE O/P EST MOD 30 MIN: CPT | Performed by: INTERNAL MEDICINE

## 2024-08-16 RX ORDER — SEMAGLUTIDE 1.34 MG/ML
1 INJECTION, SOLUTION SUBCUTANEOUS
Qty: 3 ML | Refills: 5 | Status: SHIPPED | OUTPATIENT
Start: 2024-08-16

## 2024-08-16 NOTE — ASSESSMENT & PLAN NOTE
She presents for follow up diabetes.  Has been on metformin 1000 milligrams twice daily, Actos 45 mg daily, Jardiance 25 mg daily.  At her last visit she started Ozempic 0.25 mg for 2 weeks and uptitrated to 0.5 mg weekly.  Initially she was having quite a bit of nausea associated with this but she has reduced her portion sizes which has helped.  She is taking Zofran regularly for breakthrough symptoms.  She reports that her weight loss has plateaued.  Weight is now in the 2 47-2 50 range for the past month but she has lost over 20 pounds, down 8 lbs in past 3 months.

## 2024-08-19 PROBLEM — H04.203 WATERY EYES: Status: ACTIVE | Noted: 2024-08-19

## 2024-08-19 PROBLEM — R07.9 CHEST PAIN: Status: RESOLVED | Noted: 2024-02-15 | Resolved: 2024-08-19

## 2024-08-20 NOTE — PROGRESS NOTES
"Subjective:   Sarah Friedman is a 53 y.o. female here today for f/u DM and weight management    Type 2 diabetes mellitus without complication ([facility])  She presents for follow up diabetes.  Has been on metformin 1000 milligrams twice daily, Actos 45 mg daily, Jardiance 25 mg daily.  At her last visit she started Ozempic 0.25 mg for 2 weeks and uptitrated to 0.5 mg weekly.  Initially she was having quite a bit of nausea associated with this but she has reduced her portion sizes which has helped.  She is taking Zofran regularly for breakthrough symptoms.  She reports that her weight loss has plateaued.  Weight is now in the 2 47-2 50 range for the past month but she has lost over 20 pounds, down 8 lbs in past 3 months.      Class 3 severe obesity due to excess calories with serious comorbidity and body mass index (BMI) of 40.0 to 44.9 in adult ([facility])  She has been successful with weight loss while taking Ozempic 0.5 mg weekly.  Has lost 8 pounds in the past 3 months but feels like her weight has plateaued around 247 to 250 pounds where it has been for the past month.  She is exercising by walking for 30 minutes to 1 hour 3-4 times per week.  She has reduced her portion sizes.  Her goal is to get down to 220 pounds.  Overall, she reports feeling better especially with her knees and back since the weight loss.    Watery eyes  Reports eyes have been watery especially at night causing her to rub them and leading to redness and irritation of the periorbital skin.  Has tried OTC lubricating drops but only the ones with the \"red remover\" seem to help.  Reports allergy symptoms and occasional red eye.  Denies eye pain, vision loss.  Has had more difficult using her progressive lenses lately and plans to follow up with her optometrist.         Current medicines (including changes today)  Current Outpatient Medications   Medication Sig Dispense Refill    Semaglutide, 1 MG/DOSE, (OZEMPIC, 1 MG/DOSE,) 4 MG/3ML Solution " Pen-injector Inject 1 mg under the skin every 7 days. 3 mL 5    spironolactone (ALDACTONE) 25 MG Tab Take 1 tablet by mouth once daily 90 Tablet 3    ondansetron (ZOFRAN) 4 MG Tab tablet Take 1 Tablet by mouth every four hours as needed for Nausea/Vomiting. 30 Tablet 5    acyclovir (ZOVIRAX) 400 MG tablet TAKE 1 TABLET BY MOUTH EVERY 8 HOURS FOR 5 DAYS AS NEEDED COLD SORE 15 Tablet 5    oxybutynin (DITROPAN-XL) 15 MG CR tablet Take 1 tablet by mouth once daily 30 Tablet 5    cyclobenzaprine (FLEXERIL) 10 mg Tab Take 1 Tablet by mouth at bedtime as needed for Muscle Spasms. 30 Tablet 5    omeprazole (PRILOSEC) 40 MG delayed-release capsule Take 1 Capsule by mouth 2 times a day. 60 Capsule 5    gabapentin (NEURONTIN) 800 MG tablet Take 1 Tablet by mouth 3 times a day. 90 Tablet 5    estradiol (ESTRACE) 1 MG Tab Take 1 Tablet by mouth every day. 30 Tablet 11    pioglitazone (ACTOS) 45 MG Tab Take 1 Tablet by mouth every day. 30 Tablet 11    ibuprofen (MOTRIN) 800 MG Tab Take 1 Tablet by mouth 2 times a day. 90 Tablet 5    traZODone (DESYREL) 50 MG Tab Take 50 mg by mouth every evening.      promethazine (PHENERGAN) 25 MG Tab Take 1 Tablet by mouth every 6 hours as needed for Nausea/Vomiting. 30 Tablet 3    rizatriptan (MAXALT) 10 MG tablet Take 1 Tablet by mouth one time as needed for Migraine for up to 1 dose. 10 Tablet 3    estradiol (ESTRACE) 0.5 MG tablet Take 1 Tablet by mouth every day. 90 Tablet 3    levothyroxine (SYNTHROID) 137 MCG Tab TAKE 1 TABLET BY MOUTH EVERY SUNDAY AND Thursday. 24 Tablet 3    metformin (GLUCOPHAGE) 1000 MG tablet Take 1 Tablet by mouth 2 times a day with meals. 180 Tablet 3    levothyroxine (SYNTHROID) 125 MCG Tab TAKE 125 MCG EVERY MONDAYS, TUESDAYS WEDNESDAYS, FRIDAYS AND SATURDAYS 65 Tablet 2    atorvastatin (LIPITOR) 20 MG Tab Take 1 Tablet by mouth every day. 90 Tablet 3    hydroCHLOROthiazide 12.5 MG tablet Take 1 Tablet by mouth every day. 90 Tablet 3    diclofenac sodium  (VOLTAREN) 1 % Gel Apply 4 g topically 4 times a day as needed (pain). 350 g 0    ivabradine (CORLANOR) 5 MG Tab tablet Take 1 Tablet by mouth 2 times a day with meals. 180 Tablet 3    Empagliflozin 25 MG Tab Take 1 Tablet by mouth every day. 90 Tablet 3    Alcohol Swabs (ALCOHOL PREP) 70 % Pads Use three times daily to clean finger before blood glucose testing 100 Each 11    divalproex (DEPAKOTE) 500 MG Tablet Delayed Response Take 250 mg by mouth 2 times a day.      polyethylene glycol 3350 (MIRALAX) 17 GM/SCOOP Powder Take 17 g by mouth 1 time daily as needed (constipation). 510 g 5    acetaminophen (TYLENOL) 500 MG Tab Take 500-1,000 mg by mouth every 6 hours as needed.      Multiple Vitamins-Minerals (MULTIVITAMIN PO) Take 1 Tab by mouth every day.       No current facility-administered medications for this visit.     She  has a past medical history of Anesthesia (08/27/2019), Arrhythmia, Arthritis, Blood clotting disorder ([facility]) (2005), Bowel habit changes, Depression, Diabetes ([facility]) (08/2019), Diabetic neuropathy ([facility]), Esophageal spasm, GERD (gastroesophageal reflux disease), Hashimoto's disease, Hiatal hernia, High cholesterol, Hyperlipidemia, Hypothyroid, Sleep apnea, Snoring, and Tachycardia.    She has no past medical history of Addisons disease ([facility]) or Adrenal disorder ([facility]).         Objective:     Vitals:    08/16/24 1531   BP: 110/70   Pulse: 81   Resp: 16   Temp: 36.4 °C (97.6 °F)   SpO2: 95%     Body mass index is 43.86 kg/m².   Physical Exam:  Constitutional: Alert, no distress.  Skin: Warm, dry, good turgor, no rashes in visible areas.  Eye: Equal, round and reactive, conjunctiva clear, lids normal.  Psych: Alert and oriented x3, normal affect and mood.  Monofilament testing with a 10 gram force: sensation intact: intact bilaterally  Visual Inspection: Feet without maceration, ulcers, fissures.  Nails oncotic (seeing podiatry for regular foot and nail care)  Pedal pulses: intact  bilaterally      Results and Imaging:   Lab Results   Component Value Date/Time    HBA1C 6.5 (A) 08/16/2024 03:27 PM          Assessment and Plan:   The following treatment plan was discussed    1. Type 2 diabetes mellitus without complication, without long-term current use of insulin ([facility])  Stable, well controlled with current meds which were refilled today.  In an effort to reduce her pill burden as well as improve her weight loss, we discussed uptitrating her Ozempic to 1 mg weekly.  If she can tolerate this without nausea significantly worsening then we discussed that she can stop her pioglitazone.  We will continue her other medications.  If she cannot tolerate this then she can go back down to the 0.5 mg but she will need to stay on her pioglitazone  - POCT Hemoglobin A1C  - Semaglutide, 1 MG/DOSE, (OZEMPIC, 1 MG/DOSE,) 4 MG/3ML Solution Pen-injector; Inject 1 mg under the skin every 7 days.  Dispense: 3 mL; Refill: 5  -cont jardiance 25 mg daily, metformin 1000 mg BID  -stop Actos  - Diabetic Monofilament LE Exam    2. Class 3 severe obesity due to excess calories with serious comorbidity and body mass index (BMI) of 40.0 to 44.9 in adult ([facility])  Her goal is to continue to progress with her weight loss.  We discussed uptitrating her Ozempic as above.  We reviewed that her nausea may worsen with a stronger dose which could limit the usefulness of the medication.  We reviewed the importance of healthy lifestyle changes and regular physical activity.    3. Watery eyes  Etiology is unclear however since she is having some additional allergy symptoms, worth trying an antihistamine eyedrop to see if this helps.  We discussed purchasing an over-the-counter such as Pataday and trying for 1 to 2 weeks.  She can discontinue if ineffective.  She will also follow-up with her optometrist.        Followup: Return in about 3 months (around 11/16/2024) for diabetes, weight loss.

## 2024-08-20 NOTE — ASSESSMENT & PLAN NOTE
"Reports eyes have been watery especially at night causing her to rub them and leading to redness and irritation of the periorbital skin.  Has tried OTC lubricating drops but only the ones with the \"red remover\" seem to help.  Reports allergy symptoms and occasional red eye.  Denies eye pain, vision loss.  Has had more difficult using her progressive lenses lately and plans to follow up with her optometrist.    "

## 2024-08-20 NOTE — ASSESSMENT & PLAN NOTE
She has been successful with weight loss while taking Ozempic 0.5 mg weekly.  Has lost 8 pounds in the past 3 months but feels like her weight has plateaued around 247 to 250 pounds where it has been for the past month.  She is exercising by walking for 30 minutes to 1 hour 3-4 times per week.  She has reduced her portion sizes.  Her goal is to get down to 220 pounds.  Overall, she reports feeling better especially with her knees and back since the weight loss.

## 2024-09-28 DIAGNOSIS — K44.9 HIATAL HERNIA: ICD-10-CM

## 2024-10-01 RX ORDER — OMEPRAZOLE 40 MG/1
40 CAPSULE, DELAYED RELEASE ORAL 2 TIMES DAILY
Qty: 60 CAPSULE | Refills: 5 | Status: SHIPPED | OUTPATIENT
Start: 2024-10-01

## 2024-10-04 DIAGNOSIS — E06.3 HYPOTHYROIDISM DUE TO HASHIMOTO'S THYROIDITIS: ICD-10-CM

## 2024-10-04 RX ORDER — LEVOTHYROXINE SODIUM 125 UG/1
TABLET ORAL
Qty: 65 TABLET | Refills: 2 | Status: SHIPPED | OUTPATIENT
Start: 2024-10-04

## 2024-10-06 DIAGNOSIS — E11.42 DIABETIC POLYNEUROPATHY ASSOCIATED WITH TYPE 2 DIABETES MELLITUS (HCC): ICD-10-CM

## 2024-10-08 RX ORDER — GABAPENTIN 800 MG/1
800 TABLET ORAL 3 TIMES DAILY
Qty: 90 TABLET | Refills: 5 | Status: SHIPPED | OUTPATIENT
Start: 2024-10-08

## 2024-10-24 ENCOUNTER — PATIENT MESSAGE (OUTPATIENT)
Dept: MEDICAL GROUP | Facility: MEDICAL CENTER | Age: 54
End: 2024-10-24
Payer: MEDICAID

## 2024-10-24 DIAGNOSIS — G89.29 CHRONIC BILATERAL LOW BACK PAIN WITH LEFT-SIDED SCIATICA: ICD-10-CM

## 2024-10-24 DIAGNOSIS — M54.42 CHRONIC BILATERAL LOW BACK PAIN WITH LEFT-SIDED SCIATICA: ICD-10-CM

## 2024-10-29 RX ORDER — HYDROCODONE BITARTRATE AND ACETAMINOPHEN 7.5; 325 MG/1; MG/1
1 TABLET ORAL
Qty: 30 TABLET | Refills: 0 | Status: SHIPPED | OUTPATIENT
Start: 2024-10-29 | End: 2024-11-28

## 2024-11-08 ENCOUNTER — PATIENT MESSAGE (OUTPATIENT)
Dept: MEDICAL GROUP | Facility: MEDICAL CENTER | Age: 54
End: 2024-11-08
Payer: MEDICAID

## 2024-11-08 ENCOUNTER — PATIENT MESSAGE (OUTPATIENT)
Dept: CARDIOLOGY | Facility: MEDICAL CENTER | Age: 54
End: 2024-11-08
Payer: MEDICAID

## 2024-11-08 DIAGNOSIS — I47.11 INAPPROPRIATE SINUS TACHYCARDIA (HCC): ICD-10-CM

## 2024-11-08 DIAGNOSIS — M54.42 CHRONIC BILATERAL LOW BACK PAIN WITH LEFT-SIDED SCIATICA: ICD-10-CM

## 2024-11-08 DIAGNOSIS — G89.29 CHRONIC BILATERAL LOW BACK PAIN WITH LEFT-SIDED SCIATICA: ICD-10-CM

## 2024-11-11 DIAGNOSIS — M54.42 CHRONIC BILATERAL LOW BACK PAIN WITH LEFT-SIDED SCIATICA: ICD-10-CM

## 2024-11-11 DIAGNOSIS — G89.29 CHRONIC BILATERAL LOW BACK PAIN WITH LEFT-SIDED SCIATICA: ICD-10-CM

## 2024-11-11 RX ORDER — IBUPROFEN 800 MG/1
800 TABLET, FILM COATED ORAL 2 TIMES DAILY
Qty: 90 TABLET | Refills: 5 | Status: CANCELLED | OUTPATIENT
Start: 2024-11-11

## 2024-11-11 RX ORDER — IVABRADINE 5 MG/1
5 TABLET, FILM COATED ORAL 2 TIMES DAILY WITH MEALS
Qty: 180 TABLET | Refills: 0 | Status: SHIPPED | OUTPATIENT
Start: 2024-11-11

## 2024-11-12 RX ORDER — IBUPROFEN 800 MG/1
800 TABLET, FILM COATED ORAL 2 TIMES DAILY
Qty: 60 TABLET | Refills: 5 | Status: SHIPPED | OUTPATIENT
Start: 2024-11-12

## 2024-11-12 NOTE — PATIENT COMMUNICATION
RX ordered.    Coordinators: Please [facility] [address]. Thanks!

## 2024-11-15 ENCOUNTER — TELEPHONE (OUTPATIENT)
Dept: MEDICAL GROUP | Facility: MEDICAL CENTER | Age: 54
End: 2024-11-15

## 2024-11-15 ENCOUNTER — HOSPITAL ENCOUNTER (OUTPATIENT)
Facility: MEDICAL CENTER | Age: 54
End: 2024-11-15
Attending: INTERNAL MEDICINE
Payer: MEDICAID

## 2024-11-15 ENCOUNTER — OFFICE VISIT (OUTPATIENT)
Dept: MEDICAL GROUP | Facility: MEDICAL CENTER | Age: 54
End: 2024-11-15
Attending: INTERNAL MEDICINE
Payer: MEDICAID

## 2024-11-15 VITALS
HEIGHT: 63 IN | SYSTOLIC BLOOD PRESSURE: 112 MMHG | TEMPERATURE: 97.9 F | BODY MASS INDEX: 41.76 KG/M2 | DIASTOLIC BLOOD PRESSURE: 72 MMHG | RESPIRATION RATE: 16 BRPM | WEIGHT: 235.7 LBS | OXYGEN SATURATION: 94 % | HEART RATE: 79 BPM

## 2024-11-15 DIAGNOSIS — Z12.31 SCREENING MAMMOGRAM, ENCOUNTER FOR: ICD-10-CM

## 2024-11-15 DIAGNOSIS — Z79.891 CHRONIC USE OF OPIATE FOR THERAPEUTIC PURPOSE: ICD-10-CM

## 2024-11-15 DIAGNOSIS — J01.90 ACUTE NON-RECURRENT SINUSITIS, UNSPECIFIED LOCATION: ICD-10-CM

## 2024-11-15 DIAGNOSIS — E11.9 TYPE 2 DIABETES MELLITUS WITHOUT COMPLICATION, WITHOUT LONG-TERM CURRENT USE OF INSULIN (HCC): ICD-10-CM

## 2024-11-15 DIAGNOSIS — U07.1 COVID: ICD-10-CM

## 2024-11-15 DIAGNOSIS — J06.9 VIRAL URI: ICD-10-CM

## 2024-11-15 PROBLEM — R63.5 WEIGHT GAIN: Status: RESOLVED | Noted: 2024-02-16 | Resolved: 2024-11-15

## 2024-11-15 LAB
FLUAV RNA SPEC QL NAA+PROBE: NEGATIVE
FLUBV RNA SPEC QL NAA+PROBE: NEGATIVE
HBA1C MFR BLD: 6.2 % (ref ?–5.8)
POCT INT CON NEG: NEGATIVE
POCT INT CON POS: POSITIVE
RSV RNA SPEC QL NAA+PROBE: NEGATIVE
SARS-COV-2 RNA RESP QL NAA+PROBE: POSITIVE

## 2024-11-15 PROCEDURE — 80307 DRUG TEST PRSMV CHEM ANLYZR: CPT

## 2024-11-15 PROCEDURE — 99214 OFFICE O/P EST MOD 30 MIN: CPT | Performed by: INTERNAL MEDICINE

## 2024-11-15 PROCEDURE — 83036 HEMOGLOBIN GLYCOSYLATED A1C: CPT | Performed by: INTERNAL MEDICINE

## 2024-11-15 PROCEDURE — 3078F DIAST BP <80 MM HG: CPT | Performed by: INTERNAL MEDICINE

## 2024-11-15 PROCEDURE — 3074F SYST BP LT 130 MM HG: CPT | Performed by: INTERNAL MEDICINE

## 2024-11-15 PROCEDURE — 0241U POCT CEPHEID COV-2, FLU A/B, RSV - PCR: CPT | Performed by: INTERNAL MEDICINE

## 2024-11-15 PROCEDURE — 82570 ASSAY OF URINE CREATININE: CPT

## 2024-11-15 PROCEDURE — 82043 UR ALBUMIN QUANTITATIVE: CPT

## 2024-11-15 NOTE — PROGRESS NOTES
Subjective:   Sarah Friedman is a 53 y.o. female here today for URI symptoms    Type 2 diabetes mellitus without complication (HCC)  She presents today for follow-up diabetes.  At her last visit we increased her Ozempic dose from 0.5 mg to 1 mg daily which she has been tolerating well.  She also continues on metformin 1000 mg twice daily, pioglitazone 45 mg daily, and Jardiance 25 mg daily.  She continues to lose weight and has lost 20 pounds in the past 6 months.    Chronic use of opiate for therapeutic purpose  Recently have refilled her prescription.  She is due for her UDS.  She continues to use her hydrocodone intermittently for pain.    Viral URI  She reports initially becoming sick on Monday.  She developed a sore throat which then progressed into a runny nose with congestion, significant postnasal drip, left-sided sinus pressure and green/purulent nasal discharge.  She reports low-grade fever with Tmax of 99.  She also reports a headache and extreme fatigue.  She reports a cough and hoarse voice.  She denies any recent sick contacts.  She received her influenza vaccine in September and her COVID-vaccine approximately 2 weeks ago.       Current medicines (including changes today)  Current Outpatient Medications   Medication Sig Dispense Refill    amoxicillin-clavulanate (AUGMENTIN) 875-125 MG Tab Take 1 Tablet by mouth 2 times a day for 7 days. 14 Tablet 0    molnupiravir 200 MG capsule Take 4 Capsules by mouth 2 times a day for 5 days. 40 Capsule 0    ibuprofen (MOTRIN) 800 MG Tab Take 1 Tablet by mouth 2 times a day. 60 Tablet 5    ivabradine (CORLANOR) 5 MG Tab tablet Take 1 Tablet by mouth 2 times a day with meals. 180 Tablet 0    HYDROcodone-acetaminophen (NORCO) 7.5-325 MG tab Take 1 Tablet by mouth 1 time a day as needed for Severe Pain for up to 30 days. 30 Tablet 0    gabapentin (NEURONTIN) 800 MG tablet TAKE 1 TABLET BY MOUTH THREE TIMES DAILY 90 Tablet 5    levothyroxine (SYNTHROID) 125 MCG  Tab TAKE 125 MCG EVERY MONDAYS, TUESDAYS WEDNESDAYS, FRIDAYS AND SATURDAYS 65 Tablet 2    omeprazole (PRILOSEC) 40 MG delayed-release capsule Take 1 capsule by mouth twice daily 60 Capsule 5    Semaglutide, 1 MG/DOSE, (OZEMPIC, 1 MG/DOSE,) 4 MG/3ML Solution Pen-injector Inject 1 mg under the skin every 7 days. 3 mL 5    spironolactone (ALDACTONE) 25 MG Tab Take 1 tablet by mouth once daily 90 Tablet 3    ondansetron (ZOFRAN) 4 MG Tab tablet Take 1 Tablet by mouth every four hours as needed for Nausea/Vomiting. 30 Tablet 5    acyclovir (ZOVIRAX) 400 MG tablet TAKE 1 TABLET BY MOUTH EVERY 8 HOURS FOR 5 DAYS AS NEEDED COLD SORE 15 Tablet 5    oxybutynin (DITROPAN-XL) 15 MG CR tablet Take 1 tablet by mouth once daily 30 Tablet 5    cyclobenzaprine (FLEXERIL) 10 mg Tab Take 1 Tablet by mouth at bedtime as needed for Muscle Spasms. 30 Tablet 5    estradiol (ESTRACE) 1 MG Tab Take 1 Tablet by mouth every day. 30 Tablet 11    traZODone (DESYREL) 50 MG Tab Take 50 mg by mouth every evening.      promethazine (PHENERGAN) 25 MG Tab Take 1 Tablet by mouth every 6 hours as needed for Nausea/Vomiting. 30 Tablet 3    rizatriptan (MAXALT) 10 MG tablet Take 1 Tablet by mouth one time as needed for Migraine for up to 1 dose. 10 Tablet 3    estradiol (ESTRACE) 0.5 MG tablet Take 1 Tablet by mouth every day. 90 Tablet 3    levothyroxine (SYNTHROID) 137 MCG Tab TAKE 1 TABLET BY MOUTH EVERY SUNDAY AND Thursday. 24 Tablet 3    metformin (GLUCOPHAGE) 1000 MG tablet Take 1 Tablet by mouth 2 times a day with meals. 180 Tablet 3    atorvastatin (LIPITOR) 20 MG Tab Take 1 Tablet by mouth every day. 90 Tablet 3    hydroCHLOROthiazide 12.5 MG tablet Take 1 Tablet by mouth every day. 90 Tablet 3    diclofenac sodium (VOLTAREN) 1 % Gel Apply 4 g topically 4 times a day as needed (pain). 350 g 0    Empagliflozin 25 MG Tab Take 1 Tablet by mouth every day. 90 Tablet 3    Alcohol Swabs (ALCOHOL PREP) 70 % Pads Use three times daily to clean finger  before blood glucose testing 100 Each 11    divalproex (DEPAKOTE) 500 MG Tablet Delayed Response Take 250 mg by mouth 2 times a day.      polyethylene glycol 3350 (MIRALAX) 17 GM/SCOOP Powder Take 17 g by mouth 1 time daily as needed (constipation). 510 g 5    acetaminophen (TYLENOL) 500 MG Tab Take 500-1,000 mg by mouth every 6 hours as needed.      Multiple Vitamins-Minerals (MULTIVITAMIN PO) Take 1 Tab by mouth every day.       No current facility-administered medications for this visit.     She  has a past medical history of Anesthesia (08/27/2019), Arrhythmia, Arthritis, Blood clotting disorder ([facility]) (2005), Bowel habit changes, Depression, Diabetes ([facility]) (08/2019), Diabetic neuropathy ([facility]), Esophageal spasm, GERD (gastroesophageal reflux disease), Hashimoto's disease, Hiatal hernia, High cholesterol, Hyperlipidemia, Hypothyroid, Sleep apnea, Snoring, and Tachycardia.    She has no past medical history of Addisons disease ([facility]) or Adrenal disorder ([facility]).         Objective:     Vitals:    11/15/24 1330   BP: 112/72   Pulse: 79   Resp: 16   Temp: 36.6 °C (97.9 °F)   SpO2: 94%     Body mass index is 41.75 kg/m².   Physical Exam:  Constitutional: Alert, no distress.  Skin: Warm, dry, good turgor, no rashes in visible areas.  Eye: Equal, round and reactive, conjunctiva clear, lids normal.  ENMT: oropharynx mildly injected, nasal mucosa erythematous  Neck: Trachea midline, no masses, no thyromegaly. No cervical or supraclavicular lymphadenopathy  Respiratory: Unlabored respiratory effort, lungs clear to auscultation, no wheezes, no ronchi.  Psych: Alert and oriented x3, normal affect and mood.      Results and Imaging:   Lab Results   Component Value Date/Time    HBA1C 6.2 (A) 11/15/2024 02:13 PM       Latest Reference Range & Units 11/15/24 16:09   Influenza virus A RNA Negative, Invalid  Negative   Influenza virus B, PCR Negative, Invalid  Negative   RSV, PCR Negative, Invalid  Negative   SARS-CoV-2 by PCR  Negative, Invalid  Positive !   !: Data is abnormal      Assessment and Plan:   The following treatment plan was discussed    1. Type 2 diabetes mellitus without complication, without long-term current use of insulin (HCC)  Stable, continue patient's current medications except have her stop the Actos  -Continue Ozempic 1 mg daily, Jardiance 25 mg daily, metformin 1000 mg twice daily  - POCT Microalbumin Creat Ratio Urine; Future  - POCT  A1C  - Comp Metabolic Panel; Future    2. Viral URI  COVID test positive therefore have sent over for molnupiravir for her to start today.  - POCT CoV-2, Flu A/B, RSV by PCR  -Molnupiravir 800 mg twice daily x 5 days    3. Acute non-recurrent sinusitis, unspecified location  Discussed that she should hold off on antibiotics and initially just take the antiviral but if she continues to have worsening or progressive symptoms with relation to her sinuses over the next 3 to 5 days then she can start the Augmentin.  - amoxicillin-clavulanate (AUGMENTIN) 875-125 MG Tab; Take 1 Tablet by mouth 2 times a day for 7 days.  Dispense: 14 Tablet; Refill: 0    4. Screening mammogram, encounter for  - MA-SCREENING MAMMO BILAT W/TOMOSYNTHESIS W/CAD; Future    5. Chronic use of opiate for therapeutic purpose  - PAIN MANAGEMENT PANEL, SCRN W/ RFLX TO QNT; Future        Followup: Return in about 3 months (around 2/15/2025).

## 2024-11-15 NOTE — ASSESSMENT & PLAN NOTE
Recently have refilled her prescription.  She is due for her UDS.  She continues to use her hydrocodone intermittently for pain.

## 2024-11-15 NOTE — ASSESSMENT & PLAN NOTE
She presents today for follow-up diabetes.  At her last visit we increased her Ozempic dose from 0.5 mg to 1 mg daily which she has been tolerating well.  She also continues on metformin 1000 mg twice daily, pioglitazone 45 mg daily, and Jardiance 25 mg daily.  She continues to lose weight and has lost 20 pounds in the past 6 months.

## 2024-11-15 NOTE — ASSESSMENT & PLAN NOTE
She reports initially becoming sick on Monday.  She developed a sore throat which then progressed into a runny nose with congestion, significant postnasal drip, left-sided sinus pressure and green/purulent nasal discharge.  She reports low-grade fever with Tmax of 99.  She also reports a headache and extreme fatigue.  She reports a cough and hoarse voice.  She denies any recent sick contacts.  She received her influenza vaccine in September and her COVID-vaccine approximately 2 weeks ago.

## 2024-11-16 LAB
CREAT UR-MCNC: 181 MG/DL
MICROALBUMIN UR-MCNC: 2.6 MG/DL
MICROALBUMIN/CREAT UR: 14 MG/G (ref 0–30)

## 2024-11-16 NOTE — TELEPHONE ENCOUNTER
Phone Number Called: [phone]    Call outcome: Spoke to patient regarding message below.    Message: Spoke to patient about testing positive for Covid. Let know to the Molnuprivir and if not starting to feel better in a couple of days to start the Augmentin. She understood. She stated she had just seen the [employer] message

## 2024-11-17 LAB
AMPHET CTO UR CFM-MCNC: NEGATIVE NG/ML
BARBITURATES CTO UR CFM-MCNC: NEGATIVE NG/ML
BENZODIAZ CTO UR CFM-MCNC: NEGATIVE NG/ML
BUPRENORPHINE UR-MCNC: NEGATIVE NG/ML
CANNABINOIDS CTO UR CFM-MCNC: NEGATIVE NG/ML
CARISOPRODOL UR-MCNC: NEGATIVE NG/ML
COCAINE CTO UR CFM-MCNC: NEGATIVE NG/ML
CREAT UR-MCNC: 166.1 MG/DL (ref 20–400)
DRUG SCREEN COMMENT UR-IMP: NORMAL
ETHYL GLUCURONIDE UR QL SCN: NEGATIVE NG/ML
FENTANYL UR-MCNC: NEGATIVE NG/ML
MEPERIDINE CTO UR SCN-MCNC: NEGATIVE NG/ML
METHADONE CTO UR CFM-MCNC: NEGATIVE NG/ML
OPIATES UR QL SCN: NEGATIVE NG/ML
OXYCDOXYM URSCRN 2005102: NEGATIVE NG/ML
PCP CTO UR CFM-MCNC: NEGATIVE NG/ML
PROPOXYPH CTO UR CFM-MCNC: NEGATIVE NG/ML
TAPENTADOL UR-MCNC: NEGATIVE NG/ML
TRAMADOL CTO UR SCN-MCNC: NEGATIVE NG/ML
ZOLPIDEM UR-MCNC: NEGATIVE NG/ML

## 2024-11-25 DIAGNOSIS — E11.9 TYPE 2 DIABETES MELLITUS WITHOUT COMPLICATION, WITH LONG-TERM CURRENT USE OF INSULIN (HCC): ICD-10-CM

## 2024-11-25 DIAGNOSIS — Z79.4 TYPE 2 DIABETES MELLITUS WITHOUT COMPLICATION, WITH LONG-TERM CURRENT USE OF INSULIN (HCC): ICD-10-CM

## 2024-11-25 RX ORDER — EMPAGLIFLOZIN 10 MG/1
TABLET, FILM COATED ORAL
Qty: 90 TABLET | Refills: 3 | Status: CANCELLED | OUTPATIENT
Start: 2024-11-25

## 2024-11-25 NOTE — TELEPHONE ENCOUNTER
Received request via: Pharmacy    Was the patient seen in the last year in this department? Yes    Does the patient have an active prescription (recently filled or refills available) for medication(s) requested? No    [facility]    Does the patient have [facility] and need 100-day supply? (This applies to ALL medications) Patient does not have SCP

## 2024-12-09 DIAGNOSIS — Z79.4 TYPE 2 DIABETES MELLITUS WITHOUT COMPLICATION, WITH LONG-TERM CURRENT USE OF INSULIN (HCC): ICD-10-CM

## 2024-12-09 DIAGNOSIS — E11.9 TYPE 2 DIABETES MELLITUS WITHOUT COMPLICATION, WITH LONG-TERM CURRENT USE OF INSULIN (HCC): ICD-10-CM

## 2024-12-09 DIAGNOSIS — I10 ESSENTIAL HYPERTENSION: ICD-10-CM

## 2024-12-10 RX ORDER — ATORVASTATIN CALCIUM 20 MG/1
20 TABLET, FILM COATED ORAL DAILY
Qty: 90 TABLET | Refills: 3 | Status: SHIPPED | OUTPATIENT
Start: 2024-12-10

## 2024-12-10 RX ORDER — HYDROCHLOROTHIAZIDE 12.5 MG/1
12.5 TABLET ORAL DAILY
Qty: 90 TABLET | Refills: 3 | Status: SHIPPED | OUTPATIENT
Start: 2024-12-10

## 2024-12-10 NOTE — TELEPHONE ENCOUNTER
Received request via: Patient    Was the patient seen in the last year in this department? Yes    Does the patient have an active prescription (recently filled or refills available) for medication(s) requested? No    Pharmacy Name: [facility]    Does the patient have [facility] and need 100-day supply? (This applies to ALL medications) Patient does not have SCP

## 2024-12-27 DIAGNOSIS — N39.41 URGE INCONTINENCE: ICD-10-CM

## 2024-12-27 RX ORDER — OXYBUTYNIN CHLORIDE 15 MG/1
15 TABLET, EXTENDED RELEASE ORAL DAILY
Qty: 30 TABLET | Refills: 5 | Status: SHIPPED | OUTPATIENT
Start: 2024-12-27

## 2024-12-27 NOTE — TELEPHONE ENCOUNTER
Received request via: Pharmacy    Was the patient seen in the last year in this department? Yes    Does the patient have an active prescription (recently filled or refills available) for medication(s) requested? No    Pharmacy Name: [facility]    Does the patient have [facility] and need 100-day supply? (This applies to ALL medications) Patient does not have [facility]  Future Appointments         Provider Department [place]    2/21/2025 2:20 PM (Arrive by 2:05 PM) Zuleima Almeida M.D. [facility]

## 2024-12-29 ENCOUNTER — PATIENT MESSAGE (OUTPATIENT)
Dept: MEDICAL GROUP | Facility: MEDICAL CENTER | Age: 54
End: 2024-12-29
Payer: MEDICAID

## 2025-01-09 ENCOUNTER — TELEPHONE (OUTPATIENT)
Dept: MEDICAL GROUP | Facility: MEDICAL CENTER | Age: 55
End: 2025-01-09
Payer: MEDICAID

## 2025-01-09 NOTE — TELEPHONE ENCOUNTER
Can we put her in the TCM slot for either the 15 or 17th?  You can say I approved TCM over ride.  If not, she will need to be seen on the 23rd.    Zuleima Almeida M.D.

## 2025-01-09 NOTE — TELEPHONE ENCOUNTER
Spoke with patient, she was upset because she had to rescheduled todays appointment 1/9, due to Dr Almeida calling out. Soonest appointment available was 1/22, and patient stated it was too far out. That this was an urgent matter that Dr Almeida was aware of and she needed to be seen sooner than that. I notified patient that 1/22 was the soonest but if she would like, I can schedule her with another provider for a sooner date. Patient stated no, that Dr Almeida was her doctor and she wanted to say with her. Appointment booked for 1/23. And patient is requesting a call back from the medical assistant. I notified patient the MA and provider were out today but they should be back in office tomorrow.

## 2025-01-17 ENCOUNTER — TELEPHONE (OUTPATIENT)
Dept: MEDICAL GROUP | Facility: MEDICAL CENTER | Age: 55
End: 2025-01-17
Payer: MEDICAID

## 2025-01-17 ENCOUNTER — OFFICE VISIT (OUTPATIENT)
Dept: MEDICAL GROUP | Facility: MEDICAL CENTER | Age: 55
End: 2025-01-17
Attending: INTERNAL MEDICINE
Payer: MEDICAID

## 2025-01-17 VITALS
DIASTOLIC BLOOD PRESSURE: 70 MMHG | RESPIRATION RATE: 16 BRPM | HEIGHT: 63 IN | BODY MASS INDEX: 40.8 KG/M2 | TEMPERATURE: 97.7 F | HEART RATE: 77 BPM | OXYGEN SATURATION: 95 % | WEIGHT: 230.3 LBS | SYSTOLIC BLOOD PRESSURE: 114 MMHG

## 2025-01-17 DIAGNOSIS — E11.9 TYPE 2 DIABETES MELLITUS WITHOUT COMPLICATION, WITHOUT LONG-TERM CURRENT USE OF INSULIN (HCC): ICD-10-CM

## 2025-01-17 DIAGNOSIS — N95.1 HOT FLASHES DUE TO MENOPAUSE: ICD-10-CM

## 2025-01-17 PROCEDURE — 3074F SYST BP LT 130 MM HG: CPT | Performed by: INTERNAL MEDICINE

## 2025-01-17 PROCEDURE — 3078F DIAST BP <80 MM HG: CPT | Performed by: INTERNAL MEDICINE

## 2025-01-17 PROCEDURE — 99214 OFFICE O/P EST MOD 30 MIN: CPT | Performed by: INTERNAL MEDICINE

## 2025-01-17 PROCEDURE — 99213 OFFICE O/P EST LOW 20 MIN: CPT | Performed by: INTERNAL MEDICINE

## 2025-01-17 RX ORDER — SEMAGLUTIDE 1.34 MG/ML
1 INJECTION, SOLUTION SUBCUTANEOUS
Qty: 3 ML | Refills: 5 | Status: SHIPPED | OUTPATIENT
Start: 2025-01-17

## 2025-01-17 RX ORDER — VENLAFAXINE HYDROCHLORIDE 37.5 MG/1
37.5 CAPSULE, EXTENDED RELEASE ORAL DAILY
Qty: 30 CAPSULE | Refills: 1 | Status: SHIPPED | OUTPATIENT
Start: 2025-01-17

## 2025-01-17 NOTE — TELEPHONE ENCOUNTER
DOCUMENTATION OF PAR STATUS:    1. Name of Medication & Dose: Rizatriptan Benzoate 10MG tablets      2. Name of Prescription Coverage Company & phone #: [employer]    3. Date Prior Auth Submitted: 1/17/2025    4. What information was given to obtain insurance decision? Chart notes    5. Prior Auth Status? Pending    6. Patient Notified: no

## 2025-01-17 NOTE — ASSESSMENT & PLAN NOTE
Patient had a hysterectomy and oophorectomy in 2018 and was started on hormone replacement therapy with estrogen that time.  Lately, she has become less tolerant to the estrogen as she has started to have more breast tenderness.  She was taking 2 mg a day but she decreased to 1.5 mg a day and noticed that this improved her breast tenderness.  Despite the estrogen, she is still having significant hot flashes.  Over the past 6 months they have been worsening.  She reports that she is having at least 10/day.  She is taking gabapentin as well as oxybutynin for other issues and not sure these have made any changes in her hot flashes.

## 2025-01-21 PROBLEM — J06.9 VIRAL URI: Status: RESOLVED | Noted: 2024-11-15 | Resolved: 2025-01-21

## 2025-01-21 NOTE — ASSESSMENT & PLAN NOTE
She is currently treating her diabetes with semaglutide 1 mg weekly, metformin 1000 mg twice daily, and Jardiance 25 mg daily.  She is requesting a refill of her semaglutide today.

## 2025-01-21 NOTE — PROGRESS NOTES
Subjective:   Sarah Friedman is a 54 y.o. female here today for hot flashes, medication refills    Hot flashes due to menopause  Patient had a hysterectomy and oophorectomy in 2018 and was started on hormone replacement therapy with estrogen that time.  Lately, she has become less tolerant to the estrogen as she has started to have more breast tenderness.  She was taking 2 mg a day but she decreased to 1.5 mg a day and noticed that this improved her breast tenderness.  Despite the estrogen, she is still having significant hot flashes.  Over the past 6 months they have been worsening.  She reports that she is having at least 10/day.  She is taking gabapentin as well as oxybutynin for other issues and not sure these have made any changes in her hot flashes.      Type 2 diabetes mellitus without complication (HCC)  She is currently treating her diabetes with semaglutide 1 mg weekly, metformin 1000 mg twice daily, and Jardiance 25 mg daily.  She is requesting a refill of her semaglutide today.       Current medicines (including changes today)  Current Outpatient Medications   Medication Sig Dispense Refill    venlafaxine XR (EFFEXOR XR) 37.5 MG CAPSULE SR 24 HR Take 1 Capsule by mouth every day. 30 Capsule 1    Semaglutide, 1 MG/DOSE, (OZEMPIC, 1 MG/DOSE,) 4 MG/3ML Solution Pen-injector Inject 1 mg under the skin every 7 days. 3 mL 5    oxybutynin (DITROPAN-XL) 15 MG CR tablet Take 1 tablet by mouth once daily 30 Tablet 5    hydroCHLOROthiazide 12.5 MG tablet Take 1 Tablet by mouth every day. 90 Tablet 3    atorvastatin (LIPITOR) 20 MG Tab Take 1 Tablet by mouth every day. 90 Tablet 3    Empagliflozin 25 MG Tab Take 1 Tablet by mouth every day. 90 Tablet 3    ibuprofen (MOTRIN) 800 MG Tab Take 1 Tablet by mouth 2 times a day. 60 Tablet 5    ivabradine (CORLANOR) 5 MG Tab tablet Take 1 Tablet by mouth 2 times a day with meals. 180 Tablet 0    gabapentin (NEURONTIN) 800 MG tablet TAKE 1 TABLET BY MOUTH THREE TIMES  DAILY 90 Tablet 5    levothyroxine (SYNTHROID) 125 MCG Tab TAKE 125 MCG EVERY MONDAYS, TUESDAYS WEDNESDAYS, FRIDAYS AND SATURDAYS 65 Tablet 2    omeprazole (PRILOSEC) 40 MG delayed-release capsule Take 1 capsule by mouth twice daily 60 Capsule 5    spironolactone (ALDACTONE) 25 MG Tab Take 1 tablet by mouth once daily 90 Tablet 3    ondansetron (ZOFRAN) 4 MG Tab tablet Take 1 Tablet by mouth every four hours as needed for Nausea/Vomiting. 30 Tablet 5    acyclovir (ZOVIRAX) 400 MG tablet TAKE 1 TABLET BY MOUTH EVERY 8 HOURS FOR 5 DAYS AS NEEDED COLD SORE 15 Tablet 5    cyclobenzaprine (FLEXERIL) 10 mg Tab Take 1 Tablet by mouth at bedtime as needed for Muscle Spasms. 30 Tablet 5    estradiol (ESTRACE) 1 MG Tab Take 1 Tablet by mouth every day. 30 Tablet 11    traZODone (DESYREL) 50 MG Tab Take 50 mg by mouth every evening.      promethazine (PHENERGAN) 25 MG Tab Take 1 Tablet by mouth every 6 hours as needed for Nausea/Vomiting. 30 Tablet 3    rizatriptan (MAXALT) 10 MG tablet Take 1 Tablet by mouth one time as needed for Migraine for up to 1 dose. 10 Tablet 3    estradiol (ESTRACE) 0.5 MG tablet Take 1 Tablet by mouth every day. 90 Tablet 3    levothyroxine (SYNTHROID) 137 MCG Tab TAKE 1 TABLET BY MOUTH EVERY SUNDAY AND Thursday. 24 Tablet 3    metformin (GLUCOPHAGE) 1000 MG tablet Take 1 Tablet by mouth 2 times a day with meals. 180 Tablet 3    diclofenac sodium (VOLTAREN) 1 % Gel Apply 4 g topically 4 times a day as needed (pain). 350 g 0    Alcohol Swabs (ALCOHOL PREP) 70 % Pads Use three times daily to clean finger before blood glucose testing 100 Each 11    divalproex (DEPAKOTE) 500 MG Tablet Delayed Response Take 250 mg by mouth 2 times a day.      polyethylene glycol 3350 (MIRALAX) 17 GM/SCOOP Powder Take 17 g by mouth 1 time daily as needed (constipation). 510 g 5    acetaminophen (TYLENOL) 500 MG Tab Take 500-1,000 mg by mouth every 6 hours as needed.      Multiple Vitamins-Minerals (MULTIVITAMIN PO) Take 1  Tab by mouth every day.       No current facility-administered medications for this visit.     She  has a past medical history of Anesthesia (08/27/2019), Arrhythmia, Arthritis, Blood clotting disorder ([facility]) (2005), Bowel habit changes, Depression, Diabetes ([facility]) (08/2019), Diabetic neuropathy ([facility]), Esophageal spasm, GERD (gastroesophageal reflux disease), Hashimoto's disease, Hiatal hernia, High cholesterol, Hyperlipidemia, Hypothyroid, Sleep apnea, Snoring, and Tachycardia.    She has no past medical history of Addisons disease ([facility]) or Adrenal disorder ([facility]).         Objective:     Vitals:    01/17/25 1256   BP: 114/70   Pulse: 77   Resp: 16   Temp: 36.5 °C (97.7 °F)   SpO2: 95%     Body mass index is 40.8 kg/m².   Physical Exam:  Constitutional: Alert, no distress.  Skin: Warm, dry, good turgor, no rashes in visible areas.  Eye: Equal, round and reactive, conjunctiva clear, lids normal.  Psych: Alert and oriented x3, normal affect and mood.      Assessment and Plan:   The following treatment plan was discussed    1. Hot flashes due to menopause  We reviewed the nonhormonal options for hot flashes.  The only thing she is not taking currently is an SSRI or SNRI she wants to avoid it if that could cause weight gain therefore we discussed starting venlafaxine at a low dose to see how much this helps.  We discussed that it is unlikely to fully resolve her hot flashes and that she should clear this with increasing physical activity and daily modifications.  It is okay for her to continue with the lower dose of estrogen.  We will follow-up in 4 weeks.    - venlafaxine XR (EFFEXOR XR) 37.5 MG CAPSULE SR 24 HR; Take 1 Capsule by mouth every day.  Dispense: 30 Capsule; Refill: 1    2. Type 2 diabetes mellitus without complication, without long-term current use of insulin ([facility])  Stable, well controlled with current meds which were refilled today.   -cont metformin and jardiance  - Semaglutide, 1 MG/DOSE, (OZEMPIC, 1  MG/DOSE,) 4 MG/3ML Solution Pen-injector; Inject 1 mg under the skin every 7 days.  Dispense: 3 mL; Refill: 5        Followup: Return in about 4 weeks (around 2/14/2025) for hot flashes.

## 2025-01-24 ENCOUNTER — PATIENT MESSAGE (OUTPATIENT)
Dept: MEDICAL GROUP | Facility: MEDICAL CENTER | Age: 55
End: 2025-01-24
Payer: MEDICAID

## 2025-01-24 DIAGNOSIS — G43.109 MIGRAINE WITH AURA AND WITHOUT STATUS MIGRAINOSUS, NOT INTRACTABLE: ICD-10-CM

## 2025-01-28 RX ORDER — SUMATRIPTAN SUCCINATE 100 MG/1
100 TABLET ORAL
Qty: 9 TABLET | Refills: 3 | Status: SHIPPED | OUTPATIENT
Start: 2025-01-28

## 2025-01-31 DIAGNOSIS — E11.9 TYPE 2 DIABETES MELLITUS WITHOUT COMPLICATION, WITH LONG-TERM CURRENT USE OF INSULIN (HCC): ICD-10-CM

## 2025-01-31 DIAGNOSIS — Z79.4 TYPE 2 DIABETES MELLITUS WITHOUT COMPLICATION, WITH LONG-TERM CURRENT USE OF INSULIN (HCC): ICD-10-CM

## 2025-02-04 NOTE — TELEPHONE ENCOUNTER
Received request via: Patient    Was the patient seen in the last year in this department? Yes    Does the patient have an active prescription (recently filled or refills available) for medication(s) requested? No    [facility]    Does the patient have residential Plus and need 100-day supply? (This applies to ALL medications) Patient does not have SCP    Patient comment: Hi Dr Almeida! I have no more refills for this. Could you please send another script for me? Thanks! Sarah Friedman

## 2025-02-07 ENCOUNTER — TELEPHONE (OUTPATIENT)
Dept: CARDIOLOGY | Facility: MEDICAL CENTER | Age: 55
End: 2025-02-07
Payer: MEDICAID

## 2025-02-07 DIAGNOSIS — I47.11 INAPPROPRIATE SINUS TACHYCARDIA (HCC): ICD-10-CM

## 2025-02-07 NOTE — TELEPHONE ENCOUNTER
Is the patient due for a refill? Yes    Was the patient seen the last 15 months? No       Does the patient have an upcoming appointment?  No      Provider to refill:KATELIN    Does the patient have [facility] Plus and need 100-day supply? (This applies to ALL medications) Patient does not have SCP

## 2025-02-08 NOTE — TELEPHONE ENCOUNTER
To KATELIN, please approve if appropriate. Medication on on RN medication protocol. ~thank you     Pt due for annual visit    To scheduling, please call pt to dee annual visit. ~thank you

## 2025-02-10 ENCOUNTER — TELEPHONE (OUTPATIENT)
Dept: CARDIOLOGY | Facility: MEDICAL CENTER | Age: 55
End: 2025-02-10
Payer: MEDICAID

## 2025-02-10 RX ORDER — IVABRADINE 5 MG/1
5 TABLET, FILM COATED ORAL 2 TIMES DAILY WITH MEALS
Qty: 180 TABLET | Refills: 0 | Status: SHIPPED | OUTPATIENT
Start: 2025-02-10

## 2025-02-11 NOTE — TELEPHONE ENCOUNTER
Caller: Sarah Friedman    Topic/issue: Patient called back and scheduled follow up with Dr. Josue for 03/31/2025 (declined seeing NP)    Out of medication and wanting to make sure prescription gets sent in today.    Patient would like call back if there are any issues.    Callback Number: [phone]     Thank you,  Emani COURTNEY

## 2025-02-14 DIAGNOSIS — E06.3 HYPOTHYROIDISM DUE TO HASHIMOTO'S THYROIDITIS: ICD-10-CM

## 2025-02-18 RX ORDER — LEVOTHYROXINE SODIUM 137 UG/1
TABLET ORAL
Qty: 24 TABLET | Refills: 2 | Status: SHIPPED | OUTPATIENT
Start: 2025-02-18

## 2025-02-18 NOTE — TELEPHONE ENCOUNTER
Received request via: Pharmacy    Was the patient seen in the last year in this department? Yes    Does the patient have an active prescription (recently filled or refills available) for medication(s) requested? No    [facility]    Does the patient have [facility]term Plus and need 100-day supply? (This applies to ALL medications) Patient does not have SCP    Future Appointments         Provider Department [facility]    2/20/2025 1:40 PM (Arrive by 1:25 PM) Zuleima Almeida M.D. [facility]    3/31/2025 2:00 PM Cristofer Josue M.D. [facility]

## 2025-02-20 ENCOUNTER — OFFICE VISIT (OUTPATIENT)
Dept: MEDICAL GROUP | Facility: MEDICAL CENTER | Age: 55
End: 2025-02-20
Attending: INTERNAL MEDICINE
Payer: MEDICAID

## 2025-02-20 VITALS
DIASTOLIC BLOOD PRESSURE: 68 MMHG | SYSTOLIC BLOOD PRESSURE: 116 MMHG | OXYGEN SATURATION: 96 % | TEMPERATURE: 97.3 F | HEART RATE: 67 BPM | WEIGHT: 225.2 LBS | BODY MASS INDEX: 39.9 KG/M2 | HEIGHT: 63 IN | RESPIRATION RATE: 16 BRPM

## 2025-02-20 DIAGNOSIS — E06.3 HYPOTHYROIDISM DUE TO HASHIMOTO'S THYROIDITIS: ICD-10-CM

## 2025-02-20 DIAGNOSIS — N95.1 HOT FLASHES DUE TO MENOPAUSE: ICD-10-CM

## 2025-02-20 DIAGNOSIS — E66.9 OBESITY (BMI 30-39.9): ICD-10-CM

## 2025-02-20 DIAGNOSIS — E11.9 TYPE 2 DIABETES MELLITUS WITHOUT COMPLICATION, WITHOUT LONG-TERM CURRENT USE OF INSULIN (HCC): ICD-10-CM

## 2025-02-20 DIAGNOSIS — E78.2 MIXED HYPERLIPIDEMIA: ICD-10-CM

## 2025-02-20 PROCEDURE — 99214 OFFICE O/P EST MOD 30 MIN: CPT | Performed by: INTERNAL MEDICINE

## 2025-02-20 PROCEDURE — 3074F SYST BP LT 130 MM HG: CPT | Performed by: INTERNAL MEDICINE

## 2025-02-20 PROCEDURE — 3078F DIAST BP <80 MM HG: CPT | Performed by: INTERNAL MEDICINE

## 2025-02-20 PROCEDURE — 99213 OFFICE O/P EST LOW 20 MIN: CPT | Performed by: INTERNAL MEDICINE

## 2025-02-20 RX ORDER — VENLAFAXINE HYDROCHLORIDE 75 MG/1
75 CAPSULE, EXTENDED RELEASE ORAL DAILY
Qty: 30 CAPSULE | Refills: 5 | Status: SHIPPED | OUTPATIENT
Start: 2025-02-20

## 2025-02-20 NOTE — ASSESSMENT & PLAN NOTE
She continues to lose weight.  Over the past month she is down another 5 pounds.  She is taking Ozempic 1 mg weekly.  She is wondering if she should increase the dose.

## 2025-02-20 NOTE — ASSESSMENT & PLAN NOTE
She presents today for follow-up on hot flashes.  About 1 month ago we started her on venlafaxine 37.5 mg daily.  She notes both improvement in her mood and reduction in her hot flashes.  She was previously having about 8 to 10/day and now she is down to about 5 or 6.  She would like to try uptitrating the dose to see if she can get better control of her hot flashes.

## 2025-02-21 NOTE — ASSESSMENT & PLAN NOTE
She continues on levothyroxine 125 mcg Monday Tuesday Friday Saturday and 137 mcg Thursday and Sunday which has historically controlled her hypothyroidism well.  She is due for updated labs.

## 2025-02-21 NOTE — PROGRESS NOTES
Subjective:   Sarah Friedman is a 54 y.o. female here today for follow-up on hot flashes, discussed Ozempic dose    Hot flashes due to menopause  She presents today for follow-up on hot flashes.  About 1 month ago we started her on venlafaxine 37.5 mg daily.  She notes both improvement in her mood and reduction in her hot flashes.  She was previously having about 8 to 10/day and now she is down to about 5 or 6.  She would like to try uptitrating the dose to see if she can get better control of her hot flashes.      Obesity (BMI 30-39.9)  She continues to lose weight.  Over the past month she is down another 5 pounds.  She is taking Ozempic 1 mg weekly.  She is wondering if she should increase the dose.      Hypothyroidism due to Hashimoto's thyroiditis  She continues on levothyroxine 125 mcg Monday Tuesday Friday Saturday and 137 mcg Thursday and Sunday which has historically controlled her hypothyroidism well.  She is due for updated labs.       Current medicines (including changes today)  Current Outpatient Medications   Medication Sig Dispense Refill    venlafaxine XR (EFFEXOR XR) 75 MG CAPSULE SR 24 HR Take 1 Capsule by mouth every day. 30 Capsule 5    levothyroxine (SYNTHROID) 137 MCG Tab TAKE 1 TABLET BY MOUTH  EVERY SUNDAY AND THURSDAY 24 Tablet 2    ivabradine (CORLANOR) 5 MG Tab tablet TAKE 1 TABLET BY MOUTH TWICE DAILY WITH MEALS 180 Tablet 0    metformin (GLUCOPHAGE) 1000 MG tablet Take 1 Tablet by mouth 2 times a day with meals. 180 Tablet 3    sumatriptan (IMITREX) 100 MG tablet Take 1 Tablet by mouth one time as needed for Migraine for up to 1 dose. 9 Tablet 3    Semaglutide, 1 MG/DOSE, (OZEMPIC, 1 MG/DOSE,) 4 MG/3ML Solution Pen-injector Inject 1 mg under the skin every 7 days. 3 mL 5    oxybutynin (DITROPAN-XL) 15 MG CR tablet Take 1 tablet by mouth once daily 30 Tablet 5    hydroCHLOROthiazide 12.5 MG tablet Take 1 Tablet by mouth every day. 90 Tablet 3    atorvastatin (LIPITOR) 20 MG Tab Take  1 Tablet by mouth every day. 90 Tablet 3    Empagliflozin 25 MG Tab Take 1 Tablet by mouth every day. 90 Tablet 3    ibuprofen (MOTRIN) 800 MG Tab Take 1 Tablet by mouth 2 times a day. 60 Tablet 5    gabapentin (NEURONTIN) 800 MG tablet TAKE 1 TABLET BY MOUTH THREE TIMES DAILY 90 Tablet 5    levothyroxine (SYNTHROID) 125 MCG Tab TAKE 125 MCG EVERY MONDAYS, TUESDAYS WEDNESDAYS, FRIDAYS AND SATURDAYS 65 Tablet 2    omeprazole (PRILOSEC) 40 MG delayed-release capsule Take 1 capsule by mouth twice daily 60 Capsule 5    spironolactone (ALDACTONE) 25 MG Tab Take 1 tablet by mouth once daily 90 Tablet 3    ondansetron (ZOFRAN) 4 MG Tab tablet Take 1 Tablet by mouth every four hours as needed for Nausea/Vomiting. 30 Tablet 5    acyclovir (ZOVIRAX) 400 MG tablet TAKE 1 TABLET BY MOUTH EVERY 8 HOURS FOR 5 DAYS AS NEEDED COLD SORE 15 Tablet 5    cyclobenzaprine (FLEXERIL) 10 mg Tab Take 1 Tablet by mouth at bedtime as needed for Muscle Spasms. 30 Tablet 5    estradiol (ESTRACE) 1 MG Tab Take 1 Tablet by mouth every day. 30 Tablet 11    traZODone (DESYREL) 50 MG Tab Take 50 mg by mouth every evening.      promethazine (PHENERGAN) 25 MG Tab Take 1 Tablet by mouth every 6 hours as needed for Nausea/Vomiting. 30 Tablet 3    estradiol (ESTRACE) 0.5 MG tablet Take 1 Tablet by mouth every day. 90 Tablet 3    diclofenac sodium (VOLTAREN) 1 % Gel Apply 4 g topically 4 times a day as needed (pain). 350 g 0    Alcohol Swabs (ALCOHOL PREP) 70 % Pads Use three times daily to clean finger before blood glucose testing 100 Each 11    divalproex (DEPAKOTE) 500 MG Tablet Delayed Response Take 250 mg by mouth 2 times a day.      polyethylene glycol 3350 (MIRALAX) 17 GM/SCOOP Powder Take 17 g by mouth 1 time daily as needed (constipation). 510 g 5    acetaminophen (TYLENOL) 500 MG Tab Take 500-1,000 mg by mouth every 6 hours as needed.      Multiple Vitamins-Minerals (MULTIVITAMIN PO) Take 1 Tab by mouth every day.       No current  facility-administered medications for this visit.     She  has a past medical history of Anesthesia (08/27/2019), Arrhythmia, Arthritis, Blood clotting disorder ([facility]) (2005), Bowel habit changes, Depression, Diabetes ([facility]) (08/2019), Diabetic neuropathy ([facility]), Esophageal spasm, GERD (gastroesophageal reflux disease), Hashimoto's disease, Hiatal hernia, High cholesterol, Hyperlipidemia, Hypothyroid, Sleep apnea, Snoring, and Tachycardia.    She has no past medical history of Addisons disease ([facility]) or Adrenal disorder ([facility]).         Objective:     Vitals:    02/20/25 1405   BP: 116/68   Pulse: 67   Resp: 16   Temp: 36.3 °C (97.3 °F)   SpO2: 96%     Body mass index is 39.89 kg/m².   Physical Exam:  Constitutional: Alert, no distress.  Skin: Warm, dry, good turgor, no rashes in visible areas.  Eye: Equal, round and reactive, conjunctiva clear, lids normal.  Psych: Alert and oriented x3, normal affect and mood.      Assessment and Plan:   The following treatment plan was discussed    1. Hot flashes due to menopause  Somewhat improved with lower dose of venlafaxine, patient requesting to try increased dose to see if we get more improvement especially since she has had mood benefit.  Discussed increasing to 75 mg  - venlafaxine XR (EFFEXOR XR) 75 MG CAPSULE SR 24 HR; Take 1 Capsule by mouth every day.  Dispense: 30 Capsule; Refill: 5    2. Obesity (BMI 30-39.9)  Given that she continues to lose weight at a sustainable rate, we discussed continuing the Ozempic at its current dose as opposed to uptitrating however if she starts to plateau in terms of her weight loss or her blood sugars worsen then we can certainly consider increasing to 2 mg    3. Hypothyroidism due to Hashimoto's thyroiditis  Stable with current regimen although we will obtain updated labs.  - TSH WITH REFLEX TO FT4; Future    4. Mixed hyperlipidemia  - Lipid Profile; Future    5. Type 2 diabetes mellitus without complication, without long-term current use  of insulin (HCC)  Stable with current regimen.  We will update labs.  -Ozempic 1 mg weekly, metformin 1000 mg twice daily, Jardiance 25 mg daily  - HEMOGLOBIN A1C; Future  - Lipid Profile; Future  - Comp Metabolic Panel; Future          Followup: Return in about 3 months (around 5/20/2025).

## 2025-02-28 DIAGNOSIS — Z90.710 S/P TOTAL HYSTERECTOMY: ICD-10-CM

## 2025-03-03 NOTE — TELEPHONE ENCOUNTER
Received request via: Pharmacy    Was the patient seen in the last year in this department? Yes    Does the patient have an active prescription (recently filled or refills available) for medication(s) requested? No    [email or web address]    Does the patient have detention Plus and need 100-day supply? (This applies to ALL medications) Patient does not have SCP

## 2025-03-04 DIAGNOSIS — B00.2 ORAL HERPES: ICD-10-CM

## 2025-03-04 RX ORDER — ESTRADIOL 1 MG/1
1 TABLET ORAL DAILY
Qty: 30 TABLET | Refills: 5 | Status: SHIPPED | OUTPATIENT
Start: 2025-03-04

## 2025-03-04 RX ORDER — ACYCLOVIR 400 MG/1
TABLET ORAL
Qty: 15 TABLET | Refills: 5 | Status: SHIPPED | OUTPATIENT
Start: 2025-03-04

## 2025-03-04 NOTE — TELEPHONE ENCOUNTER
Received request via: Pharmacy    Was the patient seen in the last year in this department? Yes    Does the patient have an active prescription (recently filled or refills available) for medication(s) requested? No    [facility]    Does the patient have skilled nursing Plus and need 100-day supply? (This applies to ALL medications) Patient does not have SCP

## 2025-03-05 DIAGNOSIS — E11.9 TYPE 2 DIABETES MELLITUS WITHOUT COMPLICATION, WITHOUT LONG-TERM CURRENT USE OF INSULIN (HCC): ICD-10-CM

## 2025-03-05 RX ORDER — SEMAGLUTIDE 1.34 MG/ML
1 INJECTION, SOLUTION SUBCUTANEOUS
Qty: 3 ML | Refills: 5 | Status: SHIPPED | OUTPATIENT
Start: 2025-03-05

## 2025-03-06 NOTE — TELEPHONE ENCOUNTER
Received request via: Pharmacy    Was the patient seen in the last year in this department? Yes    Does the patient have an active prescription (recently filled or refills available) for medication(s) requested? No    [email or web address]    Does the patient have senior living Plus and need 100-day supply? (This applies to ALL medications) Patient does not have SCP

## 2025-03-20 ENCOUNTER — RESULTS FOLLOW-UP (OUTPATIENT)
Dept: MEDICAL GROUP | Facility: MEDICAL CENTER | Age: 55
End: 2025-03-20

## 2025-03-20 ENCOUNTER — HOSPITAL ENCOUNTER (OUTPATIENT)
Dept: LAB | Facility: MEDICAL CENTER | Age: 55
End: 2025-03-20
Attending: INTERNAL MEDICINE
Payer: MEDICAID

## 2025-03-20 DIAGNOSIS — E78.2 MIXED HYPERLIPIDEMIA: ICD-10-CM

## 2025-03-20 DIAGNOSIS — E11.9 TYPE 2 DIABETES MELLITUS WITHOUT COMPLICATION, WITHOUT LONG-TERM CURRENT USE OF INSULIN (HCC): ICD-10-CM

## 2025-03-20 DIAGNOSIS — E06.3 HYPOTHYROIDISM DUE TO HASHIMOTO'S THYROIDITIS: ICD-10-CM

## 2025-03-20 LAB
ALBUMIN SERPL BCP-MCNC: 4.2 G/DL (ref 3.2–4.9)
ALBUMIN/GLOB SERPL: 1.5 G/DL
ALP SERPL-CCNC: 36 U/L (ref 30–99)
ALT SERPL-CCNC: 10 U/L (ref 2–50)
ANION GAP SERPL CALC-SCNC: 13 MMOL/L (ref 7–16)
AST SERPL-CCNC: 19 U/L (ref 12–45)
BILIRUB SERPL-MCNC: 0.3 MG/DL (ref 0.1–1.5)
BUN SERPL-MCNC: 20 MG/DL (ref 8–22)
CALCIUM ALBUM COR SERPL-MCNC: 9.6 MG/DL (ref 8.5–10.5)
CALCIUM SERPL-MCNC: 9.8 MG/DL (ref 8.5–10.5)
CHLORIDE SERPL-SCNC: 100 MMOL/L (ref 96–112)
CHOLEST SERPL-MCNC: 167 MG/DL (ref 100–199)
CO2 SERPL-SCNC: 28 MMOL/L (ref 20–33)
CREAT SERPL-MCNC: 0.71 MG/DL (ref 0.5–1.4)
EST. AVERAGE GLUCOSE BLD GHB EST-MCNC: 140 MG/DL
GFR SERPLBLD CREATININE-BSD FMLA CKD-EPI: 100 ML/MIN/1.73 M 2
GLOBULIN SER CALC-MCNC: 2.8 G/DL (ref 1.9–3.5)
GLUCOSE SERPL-MCNC: 119 MG/DL (ref 65–99)
HBA1C MFR BLD: 6.5 % (ref 4–5.6)
HDLC SERPL-MCNC: 43 MG/DL
LDLC SERPL CALC-MCNC: 91 MG/DL
POTASSIUM SERPL-SCNC: 4.4 MMOL/L (ref 3.6–5.5)
PROT SERPL-MCNC: 7 G/DL (ref 6–8.2)
SODIUM SERPL-SCNC: 141 MMOL/L (ref 135–145)
T4 FREE SERPL-MCNC: 1.34 NG/DL (ref 0.93–1.7)
TRIGL SERPL-MCNC: 164 MG/DL (ref 0–149)
TSH SERPL DL<=0.005 MIU/L-ACNC: 0.06 UIU/ML (ref 0.38–5.33)

## 2025-03-20 PROCEDURE — 83036 HEMOGLOBIN GLYCOSYLATED A1C: CPT

## 2025-03-20 PROCEDURE — 36415 COLL VENOUS BLD VENIPUNCTURE: CPT

## 2025-03-20 PROCEDURE — 80053 COMPREHEN METABOLIC PANEL: CPT

## 2025-03-20 PROCEDURE — 84443 ASSAY THYROID STIM HORMONE: CPT

## 2025-03-20 PROCEDURE — 80061 LIPID PANEL: CPT

## 2025-03-20 PROCEDURE — 84439 ASSAY OF FREE THYROXINE: CPT

## 2025-03-21 ENCOUNTER — OFFICE VISIT (OUTPATIENT)
Dept: MEDICAL GROUP | Facility: MEDICAL CENTER | Age: 55
End: 2025-03-21
Attending: INTERNAL MEDICINE
Payer: MEDICAID

## 2025-03-21 VITALS
TEMPERATURE: 96.9 F | BODY MASS INDEX: 39.87 KG/M2 | SYSTOLIC BLOOD PRESSURE: 90 MMHG | RESPIRATION RATE: 16 BRPM | OXYGEN SATURATION: 96 % | DIASTOLIC BLOOD PRESSURE: 60 MMHG | HEIGHT: 63 IN | WEIGHT: 225 LBS | HEART RATE: 72 BPM

## 2025-03-21 DIAGNOSIS — E06.3 HYPOTHYROIDISM DUE TO HASHIMOTO'S THYROIDITIS: ICD-10-CM

## 2025-03-21 PROCEDURE — 99213 OFFICE O/P EST LOW 20 MIN: CPT | Performed by: INTERNAL MEDICINE

## 2025-03-21 PROCEDURE — 3078F DIAST BP <80 MM HG: CPT | Performed by: INTERNAL MEDICINE

## 2025-03-21 PROCEDURE — 3074F SYST BP LT 130 MM HG: CPT | Performed by: INTERNAL MEDICINE

## 2025-03-21 RX ORDER — LEVOTHYROXINE SODIUM 125 UG/1
125 TABLET ORAL
Qty: 90 TABLET | Refills: 2 | Status: SHIPPED | OUTPATIENT
Start: 2025-03-21

## 2025-03-21 ASSESSMENT — PATIENT HEALTH QUESTIONNAIRE - PHQ9
3. TROUBLE FALLING OR STAYING ASLEEP OR SLEEPING TOO MUCH: NEARLY EVERY DAY
9. THOUGHTS THAT YOU WOULD BE BETTER OFF DEAD, OR OF HURTING YOURSELF: NOT AT ALL
5. POOR APPETITE OR OVEREATING: SEVERAL DAYS
8. MOVING OR SPEAKING SO SLOWLY THAT OTHER PEOPLE COULD HAVE NOTICED. OR THE OPPOSITE, BEING SO FIGETY OR RESTLESS THAT YOU HAVE BEEN MOVING AROUND A LOT MORE THAN USUAL: NOT AT ALL
1. LITTLE INTEREST OR PLEASURE IN DOING THINGS: SEVERAL DAYS
7. TROUBLE CONCENTRATING ON THINGS, SUCH AS READING THE NEWSPAPER OR WATCHING TELEVISION: NOT AT ALL
SUM OF ALL RESPONSES TO PHQ QUESTIONS 1-9: 11
4. FEELING TIRED OR HAVING LITTLE ENERGY: NEARLY EVERY DAY
6. FEELING BAD ABOUT YOURSELF - OR THAT YOU ARE A FAILURE OR HAVE LET YOURSELF OR YOUR FAMILY DOWN: NOT AL ALL
2. FEELING DOWN, DEPRESSED, IRRITABLE, OR HOPELESS: NEARLY EVERY DAY
SUM OF ALL RESPONSES TO PHQ9 QUESTIONS 1 AND 2: 4

## 2025-03-21 ASSESSMENT — FIBROSIS 4 INDEX: FIB4 SCORE: 1.27

## 2025-03-22 DIAGNOSIS — M79.605 ACUTE LEG PAIN, LEFT: ICD-10-CM

## 2025-03-22 DIAGNOSIS — M54.42 CHRONIC BILATERAL LOW BACK PAIN WITH LEFT-SIDED SCIATICA: ICD-10-CM

## 2025-03-22 DIAGNOSIS — G89.29 CHRONIC BILATERAL LOW BACK PAIN WITH LEFT-SIDED SCIATICA: ICD-10-CM

## 2025-03-25 ENCOUNTER — PATIENT MESSAGE (OUTPATIENT)
Dept: MEDICAL GROUP | Facility: MEDICAL CENTER | Age: 55
End: 2025-03-25
Payer: MEDICAID

## 2025-03-25 DIAGNOSIS — M47.816 SPONDYLOSIS OF LUMBAR REGION WITHOUT MYELOPATHY OR RADICULOPATHY: ICD-10-CM

## 2025-03-25 RX ORDER — TRAMADOL HYDROCHLORIDE 50 MG/1
TABLET ORAL
Qty: 60 TABLET | Refills: 0 | OUTPATIENT
Start: 2025-03-25

## 2025-03-25 NOTE — ASSESSMENT & PLAN NOTE
Patient reports that she has been feeling more tremulous, anxious, jittery lately.  She was initially thinking this was blood sugar related however her blood sugars have been normal when tested and she is feeling this way.  She denies any lightheadedness or syncope.  We reviewed her most recent labs which show TSH is suppressed.  She is also been having hot flashes as previously discussed.  Her current levothyroxine dosing is 125 mcg Monday Tuesday Wednesday Friday and Saturday.  On Thursdays and Sundays she takes 137 mcg.  It had been years since she had had her TSH checked.

## 2025-03-25 NOTE — PROGRESS NOTES
Subjective:   Sarah Friedman is a 54 y.o. female here today for f/u labs, feeling unwell    Hypothyroidism due to Hashimoto's thyroiditis  Patient reports that she has been feeling more tremulous, anxious, jittery lately.  She was initially thinking this was blood sugar related however her blood sugars have been normal when tested and she is feeling this way.  She denies any lightheadedness or syncope.  We reviewed her most recent labs which show TSH is suppressed.  She is also been having hot flashes as previously discussed.  Her current levothyroxine dosing is 125 mcg Monday Tuesday Wednesday Friday and Saturday.  On Thursdays and Sundays she takes 137 mcg.  It had been years since she had had her TSH checked.       Current medicines (including changes today)  Current Outpatient Medications   Medication Sig Dispense Refill    levothyroxine (SYNTHROID) 125 MCG Tab Take 1 Tablet by mouth every morning on an empty stomach. 90 Tablet 2    Semaglutide, 1 MG/DOSE, (OZEMPIC, 1 MG/DOSE,) 4 MG/3ML Solution Pen-injector Inject 1 mg under the skin every 7 days. 3 mL 5    estradiol (ESTRACE) 1 MG Tab Take 1 tablet by mouth once daily 30 Tablet 5    acyclovir (ZOVIRAX) 400 MG tablet TAKE 1 TABLET BY MOUTH EVERY 8 HOURS FOR 5 DAYS AS NEEDED COLD SORE 15 Tablet 5    venlafaxine XR (EFFEXOR XR) 75 MG CAPSULE SR 24 HR Take 1 Capsule by mouth every day. 30 Capsule 5    ivabradine (CORLANOR) 5 MG Tab tablet TAKE 1 TABLET BY MOUTH TWICE DAILY WITH MEALS 180 Tablet 0    metformin (GLUCOPHAGE) 1000 MG tablet Take 1 Tablet by mouth 2 times a day with meals. 180 Tablet 3    sumatriptan (IMITREX) 100 MG tablet Take 1 Tablet by mouth one time as needed for Migraine for up to 1 dose. 9 Tablet 3    oxybutynin (DITROPAN-XL) 15 MG CR tablet Take 1 tablet by mouth once daily 30 Tablet 5    hydroCHLOROthiazide 12.5 MG tablet Take 1 Tablet by mouth every day. 90 Tablet 3    atorvastatin (LIPITOR) 20 MG Tab Take 1 Tablet by mouth every day.  90 Tablet 3    Empagliflozin 25 MG Tab Take 1 Tablet by mouth every day. 90 Tablet 3    ibuprofen (MOTRIN) 800 MG Tab Take 1 Tablet by mouth 2 times a day. 60 Tablet 5    gabapentin (NEURONTIN) 800 MG tablet TAKE 1 TABLET BY MOUTH THREE TIMES DAILY 90 Tablet 5    omeprazole (PRILOSEC) 40 MG delayed-release capsule Take 1 capsule by mouth twice daily 60 Capsule 5    spironolactone (ALDACTONE) 25 MG Tab Take 1 tablet by mouth once daily 90 Tablet 3    ondansetron (ZOFRAN) 4 MG Tab tablet Take 1 Tablet by mouth every four hours as needed for Nausea/Vomiting. 30 Tablet 5    cyclobenzaprine (FLEXERIL) 10 mg Tab Take 1 Tablet by mouth at bedtime as needed for Muscle Spasms. 30 Tablet 5    traZODone (DESYREL) 50 MG Tab Take 50 mg by mouth every evening.      estradiol (ESTRACE) 0.5 MG tablet Take 1 Tablet by mouth every day. 90 Tablet 3    diclofenac sodium (VOLTAREN) 1 % Gel Apply 4 g topically 4 times a day as needed (pain). 350 g 0    Alcohol Swabs (ALCOHOL PREP) 70 % Pads Use three times daily to clean finger before blood glucose testing 100 Each 11    divalproex (DEPAKOTE) 500 MG Tablet Delayed Response Take 250 mg by mouth 2 times a day.      polyethylene glycol 3350 (MIRALAX) 17 GM/SCOOP Powder Take 17 g by mouth 1 time daily as needed (constipation). 510 g 5    acetaminophen (TYLENOL) 500 MG Tab Take 500-1,000 mg by mouth every 6 hours as needed.      Multiple Vitamins-Minerals (MULTIVITAMIN PO) Take 1 Tab by mouth every day.       No current facility-administered medications for this visit.     She  has a past medical history of Anesthesia (08/27/2019), Arrhythmia, Arthritis, Blood clotting disorder ([facility]) (2005), Bowel habit changes, Depression, Diabetes ([facility]) (08/2019), Diabetic neuropathy ([facility]), Esophageal spasm, GERD (gastroesophageal reflux disease), Hashimoto's disease, Hiatal hernia, High cholesterol, Hyperlipidemia, Hypothyroid, Sleep apnea, Snoring, and Tachycardia.    She has no past medical history of  Addisons disease (HCC) or Adrenal disorder (HCC).         Objective:     Vitals:    03/21/25 1551   BP: 90/60   Pulse: 72   Resp: 16   Temp: 36.1 °C (96.9 °F)   SpO2: 96%     Body mass index is 39.86 kg/m².   Physical Exam:  Constitutional: Alert, no distress.  Skin: Warm, dry, good turgor, no rashes in visible areas.  Eye: Equal, round and reactive, conjunctiva clear, lids normal.  Psych: Alert and oriented x3, normal affect and mood.      Results and Imaging:   Hospital Outpatient Visit on 03/20/2025   Component Date Value Ref Range Status    Sodium 03/20/2025 141  135 - 145 mmol/L Final    Potassium 03/20/2025 4.4  3.6 - 5.5 mmol/L Final    Chloride 03/20/2025 100  96 - 112 mmol/L Final    Co2 03/20/2025 28  20 - 33 mmol/L Final    Anion Gap 03/20/2025 13.0  7.0 - 16.0 Final    Glucose 03/20/2025 119 (H)  65 - 99 mg/dL Final    Bun 03/20/2025 20  8 - 22 mg/dL Final    Creatinine 03/20/2025 0.71  0.50 - 1.40 mg/dL Final    Calcium 03/20/2025 9.8  8.5 - 10.5 mg/dL Final    Correct Calcium 03/20/2025 9.6  8.5 - 10.5 mg/dL Final    AST(SGOT) 03/20/2025 19  12 - 45 U/L Final    ALT(SGPT) 03/20/2025 10  2 - 50 U/L Final    Alkaline Phosphatase 03/20/2025 36  30 - 99 U/L Final    Total Bilirubin 03/20/2025 0.3  0.1 - 1.5 mg/dL Final    Albumin 03/20/2025 4.2  3.2 - 4.9 g/dL Final    Total Protein 03/20/2025 7.0  6.0 - 8.2 g/dL Final    Globulin 03/20/2025 2.8  1.9 - 3.5 g/dL Final    A-G Ratio 03/20/2025 1.5  g/dL Final    Glycohemoglobin 03/20/2025 6.5 (H)  4.0 - 5.6 % Final    Comment: Increased risk for diabetes:  5.7 -6.4%  Diabetes:  >6.4%  Glycemic control for adults with diabetes:  <7.0%    The above interpretations are per ADA guidelines.  Diagnosis  of diabetes mellitus on the basis of elevated Hemoglobin A1c  should be confirmed by repeating the Hb A1c test.      Est Avg Glucose 03/20/2025 140  mg/dL Final    Comment: The eAG calculation is based on the A1c-Derived Daily Glucose  (ADAG) study.  See the ADA's  website for additional information.      TSH 03/20/2025 0.057 (L)  0.380 - 5.330 uIU/mL Final    Comment: The 2011 American Thyroid Association (ZOYA) guidelines  recommended that the interpretation of thyroid function in  pregnancy be based on trimester specific reference ranges.    1st Trimester  0.100-2.500 mIU/L  2nd Trimester  0.200-3.000 mIU/L  3rd Trimester  0.300-3.500 mIU/L    These established reference ranges have not been validated  at [facility].      Cholesterol,Tot 03/20/2025 167  100 - 199 mg/dL Final    Triglycerides 03/20/2025 164 (H)  0 - 149 mg/dL Final    HDL 03/20/2025 43  >=40 mg/dL Final    LDL 03/20/2025 91  <100 mg/dL Final    Free T-4 03/20/2025 1.34  0.93 - 1.70 ng/dL Final    GFR (CKD-EPI) 03/20/2025 100  >60 mL/min/1.73 m 2 Final    Comment: Estimated Glomerular Filtration Rate is calculated using  race neutral CKD-EPI 2021 equation per NKF-ASN recommendations.           Assessment and Plan:   The following treatment plan was discussed    1. Hypothyroidism due to Hashimoto's thyroiditis  We reviewed her labs and I suspect that her symptoms are related to overtreatment of her hypothyroidism given that they are all consistent with hyperthyroidism.  Therefore we discussed dose reduction of her Synthroid to 125 mcg daily with repeat labs in 6 weeks.  We can further adjust dosing as needed based on those results.  - levothyroxine (SYNTHROID) 125 MCG Tab; Take 1 Tablet by mouth every morning on an empty stomach.  Dispense: 90 Tablet; Refill: 2  - TSH+FREE T4        Followup: Return in about 3 months (around 6/21/2025).

## 2025-03-26 RX ORDER — TRAMADOL HYDROCHLORIDE 50 MG/1
50 TABLET ORAL
Qty: 30 TABLET | Refills: 0 | Status: SHIPPED | OUTPATIENT
Start: 2025-03-26 | End: 2025-04-25

## 2025-03-28 ASSESSMENT — ENCOUNTER SYMPTOMS
DIZZINESS: 0
WEAKNESS: 0
VOMITING: 0
FOCAL WEAKNESS: 0
FEVER: 0
PALPITATIONS: 0
NIGHT SWEATS: 0
NEAR-SYNCOPE: 0
WHEEZING: 0
SYNCOPE: 0
SHORTNESS OF BREATH: 0
COUGH: 0
ABDOMINAL PAIN: 0
DIARRHEA: 0
PND: 0
DYSPNEA ON EXERTION: 0
ORTHOPNEA: 0
IRREGULAR HEARTBEAT: 0
NAUSEA: 0

## 2025-03-29 NOTE — PROGRESS NOTES
Cardiology Follow-up Consultation Note    Date of note:    ***  Primary Care Provider: Kamille Lindsey M.D.    Patient Name: Sarah Friedman   YOB: 1970  MRN:              [id number]    Chief Complaint: Inappropriate sinus tachycardia    History of Present Illness: Ms. Sarah Friedman is a 54 y.o. female whose current medical problems include hypertension, dyslipidemia, DM, hypothyroidism, and GERD who is here for follow-up inappropriate sinus tachcyardia.    The patient was previously seen in my clinic 07/26/22.  The patient then followed with ANNE-MARIE Baltazar, in 10/2023.  No changes were made to her medications at the last visit.    The patient previously followed at [facility] for cardiology (Dr. Marinelli). The patient was started on ivabradine with improvement in symptoms but insurance did not cover it initially. The patient was not able to tolerate high doses of metoprolol due to hypotension.  Eventually, the patient was placed back on ivabradine.    The patient returns today for follow-up. *** The patient reports feeling unwell today due to her 16-year-old cat passing away yesterday.  Otherwise, she reports feeling okay.  Ever since being started on ivabradine, she has felt much better and no longer having palpitations.  She denies any chest pain or shortness of breath on exertion.  No orthopnea, PND, or leg swelling.  No palpitations.  No syncope or presyncopal episodes.      Cardiovascular Risk Factors:  1. Smoking status: Never smoker  2. Type II Diabetes Mellitus: On medication  Lab Results   Component Value Date/Time    HBA1C 6.5 (H) 03/20/2025 09:01 AM    HBA1C 6.2 (A) 11/15/2024 02:13 PM     3. Hypertension: On medication  4. Dyslipidemia: On statin  Cholesterol,Tot   Date Value Ref Range Status   02/18/2021 153 100 - 199 mg/dL Final     LDL   Date Value Ref Range Status   02/18/2021 77 <100 mg/dL Final     HDL   Date Value Ref Range Status   02/18/2021 45 >=40 mg/dL  Final     Triglycerides   Date Value Ref Range Status   02/18/2021 156 (H) 0 - 149 mg/dL Final     5. Family history of early Coronary Artery Disease in a first degree relative (Male less than 55 years of age; Female less than 65 years of age): Father with MI   6.  Obesity and/or Metabolic Syndrome: BMI 45.17  7. Sedentary lifestyle: Not sedentary    Review of Systems   Constitutional: Negative for fever, malaise/fatigue and night sweats.   Cardiovascular:  Negative for chest pain, dyspnea on exertion, irregular heartbeat, leg swelling, near-syncope, orthopnea, palpitations, paroxysmal nocturnal dyspnea and syncope.   Respiratory:  Negative for cough, shortness of breath and wheezing.    Gastrointestinal:  Negative for abdominal pain, diarrhea, nausea and vomiting.   Neurological:  Negative for dizziness, focal weakness and weakness.       All other systems reviewed and are negative.       Current Outpatient Medications   Medication Sig Dispense Refill    traMADol (ULTRAM) 50 MG Tab Take 1 Tablet by mouth 1 time a day as needed for Mild Pain or Moderate Pain for up to 30 days. 30 Tablet 0    levothyroxine (SYNTHROID) 125 MCG Tab Take 1 Tablet by mouth every morning on an empty stomach. 90 Tablet 2    Semaglutide, 1 MG/DOSE, (OZEMPIC, 1 MG/DOSE,) 4 MG/3ML Solution Pen-injector Inject 1 mg under the skin every 7 days. 3 mL 5    estradiol (ESTRACE) 1 MG Tab Take 1 tablet by mouth once daily 30 Tablet 5    acyclovir (ZOVIRAX) 400 MG tablet TAKE 1 TABLET BY MOUTH EVERY 8 HOURS FOR 5 DAYS AS NEEDED COLD SORE 15 Tablet 5    venlafaxine XR (EFFEXOR XR) 75 MG CAPSULE SR 24 HR Take 1 Capsule by mouth every day. 30 Capsule 5    ivabradine (CORLANOR) 5 MG Tab tablet TAKE 1 TABLET BY MOUTH TWICE DAILY WITH MEALS 180 Tablet 0    metformin (GLUCOPHAGE) 1000 MG tablet Take 1 Tablet by mouth 2 times a day with meals. 180 Tablet 3    sumatriptan (IMITREX) 100 MG tablet Take 1 Tablet by mouth one time as needed for Migraine for up to  "1 dose. 9 Tablet 3    oxybutynin (DITROPAN-XL) 15 MG CR tablet Take 1 tablet by mouth once daily 30 Tablet 5    hydroCHLOROthiazide 12.5 MG tablet Take 1 Tablet by mouth every day. 90 Tablet 3    atorvastatin (LIPITOR) 20 MG Tab Take 1 Tablet by mouth every day. 90 Tablet 3    Empagliflozin 25 MG Tab Take 1 Tablet by mouth every day. 90 Tablet 3    ibuprofen (MOTRIN) 800 MG Tab Take 1 Tablet by mouth 2 times a day. 60 Tablet 5    gabapentin (NEURONTIN) 800 MG tablet TAKE 1 TABLET BY MOUTH THREE TIMES DAILY 90 Tablet 5    omeprazole (PRILOSEC) 40 MG delayed-release capsule Take 1 capsule by mouth twice daily 60 Capsule 5    spironolactone (ALDACTONE) 25 MG Tab Take 1 tablet by mouth once daily 90 Tablet 3    ondansetron (ZOFRAN) 4 MG Tab tablet Take 1 Tablet by mouth every four hours as needed for Nausea/Vomiting. 30 Tablet 5    cyclobenzaprine (FLEXERIL) 10 mg Tab Take 1 Tablet by mouth at bedtime as needed for Muscle Spasms. 30 Tablet 5    traZODone (DESYREL) 50 MG Tab Take 50 mg by mouth every evening.      estradiol (ESTRACE) 0.5 MG tablet Take 1 Tablet by mouth every day. 90 Tablet 3    diclofenac sodium (VOLTAREN) 1 % Gel Apply 4 g topically 4 times a day as needed (pain). 350 g 0    Alcohol Swabs (ALCOHOL PREP) 70 % Pads Use three times daily to clean finger before blood glucose testing 100 Each 11    divalproex (DEPAKOTE) 500 MG Tablet Delayed Response Take 250 mg by mouth 2 times a day.      polyethylene glycol 3350 (MIRALAX) 17 GM/SCOOP Powder Take 17 g by mouth 1 time daily as needed (constipation). 510 g 5    acetaminophen (TYLENOL) 500 MG Tab Take 500-1,000 mg by mouth every 6 hours as needed.      Multiple Vitamins-Minerals (MULTIVITAMIN PO) Take 1 Tab by mouth every day.       No current facility-administered medications for this visit.         Allergies   Allergen Reactions    Hydromorphone Hcl Itching     Itching \"head to toe\"    Betadine Prepstick Plus [Povidone-Iodine] Rash     Rash swelling    " "Morphine Itching     Itching \"head to toe\"    Povidone Iodine Hives    Tape Itching     Itching \"leave scars\"       Physical Exam:  Ambulatory Vitals  There were no vitals taken for this visit.   Oxygen Therapy:     BP Readings from Last 4 Encounters:   03/21/25 90/60   02/20/25 116/68   01/17/25 114/70   11/15/24 112/72       Weight/BMI: There is no height or weight on file to calculate BMI.  Wt Readings from Last 4 Encounters:   03/21/25 102 kg (225 lb)   02/20/25 102 kg (225 lb 3.2 oz)   01/17/25 104 kg (230 lb 4.8 oz)   11/15/24 107 kg (235 lb 11.2 oz)       General: Well appearing and in no apparent distress  Eyes: nl conjunctiva, no icteric sclera  ENT: wearing a mask, normal external appearance of ears  Neck: no visible JVP,  no carotid bruits  Lungs: normal respiratory effort, CTAB  Heart: RRR, no murmurs, no rubs or gallops,  no edema bilateral lower extremities. No LV/RV heave on cardiac palpatation. + bilateral radial pulses.  + bilateral dp pulses.   Abdomen: soft, non tender, non distended, no masses, normal bowel sounds.  No HSM.  Extremities/MSK: no clubbing, no cyanosis  Neurological: No focal sensory deficits  Psychiatric: Appropriate affect, A/O x 3, intact judgement and insight  Skin: Warm extremities      Lab Data Review:  Lab Results   Component Value Date/Time    CHOLSTRLTOT 167 03/20/2025 09:01 AM    LDL 91 03/20/2025 09:01 AM    HDL 43 03/20/2025 09:01 AM    TRIGLYCERIDE 164 (H) 03/20/2025 09:01 AM       Lab Results   Component Value Date/Time    SODIUM 141 03/20/2025 09:01 AM    POTASSIUM 4.4 03/20/2025 09:01 AM    CHLORIDE 100 03/20/2025 09:01 AM    CO2 28 03/20/2025 09:01 AM    GLUCOSE 119 (H) 03/20/2025 09:01 AM    BUN 20 03/20/2025 09:01 AM    CREATININE 0.71 03/20/2025 09:01 AM     Lab Results   Component Value Date/Time    ALKPHOSPHAT 36 03/20/2025 09:01 AM    ASTSGOT 19 03/20/2025 09:01 AM    ALTSGPT 10 03/20/2025 09:01 AM    TBILIRUBIN 0.3 03/20/2025 09:01 AM      Lab Results "   Component Value Date/Time    WBC 6.6 10/10/2023 03:48 PM    HEMOGLOBIN 14.9 10/10/2023 03:48 PM     Lab Results   Component Value Date/Time    HBA1C 6.5 (H) 03/20/2025 09:01 AM    HBA1C 6.2 (A) 11/15/2024 02:13 PM       Cardiac Imaging and Procedures Review:    EKG dated 4/11/2022: My personal interpretation is sinus rhythm, rate 84    Echo 1/28/2019 - from [facility]'s notes  Normal LV function, ascending aorta 4.3, mild AI, trace PI    Stress test MPI 5/3/16 - from [facility] notes  No ischemia or infarction.     Assessment & Plan     No diagnosis found.        Shared Medical Decision Making:    Inappropriate sinus tachcyardia  Doing well on ivabradine with heart rate within normal limits  -Continue ivabradine 5mg twice daily    Dilates ascending aorta  Echo 2019 showed dilated ascending aorta measuring 4.3 cm.  Repeat echocardiogram shows stable dilated ascending aorta measuring 4.2 cm  -Repeat echocardiogram in 1 year  -Manage BP as below    Dyslipidemia  -Continue atorvastatin 20mg daily    Hypertension  BP well controlled this visit/borderline low.  -Continue HCTZ and aldactone 25mg daily    All of the patient's excellent questions were answered to the best of my knowledge and to her satisfaction.  It was a pleasure seeing Ms. Sarah Friedman in my clinic today. No follow-ups on file. Patient is aware to call the cardiology clinic with any questions or concerns.      Cristofer Josue MD  [facility]  [place], [address]  [address] [address]  Phone: [phone]  Fax: [phone]

## 2025-03-31 ENCOUNTER — APPOINTMENT (OUTPATIENT)
Dept: CARDIOLOGY | Facility: MEDICAL CENTER | Age: 55
End: 2025-03-31
Attending: STUDENT IN AN ORGANIZED HEALTH CARE EDUCATION/TRAINING PROGRAM
Payer: MEDICAID

## 2025-04-02 ENCOUNTER — TELEPHONE (OUTPATIENT)
Dept: MEDICAL GROUP | Facility: MEDICAL CENTER | Age: 55
End: 2025-04-02
Payer: MEDICAID

## 2025-04-02 NOTE — TELEPHONE ENCOUNTER
DOCUMENTATION OF PAR STATUS:    1. Name of Medication & Dose:    traMADol (ULTRAM) 50 MG Tab     2. Name of Prescription Coverage Company & phone #: [employer]    3. Date Prior Auth Submitted: 4/2/25    4. What information was given to obtain insurance decision? Chart notes,icd-10 code, med hx.    5. Prior Auth Status? Pending    6. Patient Notified: yes

## 2025-04-02 NOTE — TELEPHONE ENCOUNTER
FINAL PRIOR AUTHORIZATION STATUS:    1.  Name of Medication & Dose: traMADol (ULTRAM) 50 MG Tab     2. Prior Auth Status: Denied.  Reason: scanned under .    3. Action Taken: Pharmacy Notified: N\A Patient Notified: yes

## 2025-04-08 ENCOUNTER — OFFICE VISIT (OUTPATIENT)
Dept: CARDIOLOGY | Facility: MEDICAL CENTER | Age: 55
End: 2025-04-08
Attending: NURSE PRACTITIONER
Payer: MEDICAID

## 2025-04-08 VITALS
OXYGEN SATURATION: 96 % | HEIGHT: 63 IN | SYSTOLIC BLOOD PRESSURE: 94 MMHG | RESPIRATION RATE: 16 BRPM | DIASTOLIC BLOOD PRESSURE: 62 MMHG | WEIGHT: 224 LBS | BODY MASS INDEX: 39.69 KG/M2 | HEART RATE: 83 BPM

## 2025-04-08 DIAGNOSIS — I47.11 INAPPROPRIATE SINUS TACHYCARDIA (HCC): ICD-10-CM

## 2025-04-08 DIAGNOSIS — Z79.899 HIGH RISK MEDICATION USE: ICD-10-CM

## 2025-04-08 LAB — EKG IMPRESSION: NORMAL

## 2025-04-08 PROCEDURE — 93005 ELECTROCARDIOGRAM TRACING: CPT | Mod: TC | Performed by: NURSE PRACTITIONER

## 2025-04-08 PROCEDURE — 99213 OFFICE O/P EST LOW 20 MIN: CPT | Performed by: NURSE PRACTITIONER

## 2025-04-08 PROCEDURE — 93010 ELECTROCARDIOGRAM REPORT: CPT | Performed by: STUDENT IN AN ORGANIZED HEALTH CARE EDUCATION/TRAINING PROGRAM

## 2025-04-08 PROCEDURE — 3078F DIAST BP <80 MM HG: CPT | Performed by: NURSE PRACTITIONER

## 2025-04-08 PROCEDURE — 99212 OFFICE O/P EST SF 10 MIN: CPT | Performed by: NURSE PRACTITIONER

## 2025-04-08 PROCEDURE — 99214 OFFICE O/P EST MOD 30 MIN: CPT | Performed by: NURSE PRACTITIONER

## 2025-04-08 PROCEDURE — 3074F SYST BP LT 130 MM HG: CPT | Performed by: NURSE PRACTITIONER

## 2025-04-08 RX ORDER — IVABRADINE 7.5 MG/1
7.5 TABLET, FILM COATED ORAL 2 TIMES DAILY WITH MEALS
Qty: 180 TABLET | Refills: 3 | Status: SHIPPED | OUTPATIENT
Start: 2025-04-08

## 2025-04-08 ASSESSMENT — FIBROSIS 4 INDEX: FIB4 SCORE: 1.27

## 2025-04-08 NOTE — PROGRESS NOTES
"Chief Complaint   Patient presents with    Follow-Up     FV DX: Inappropriate sinus tachycardia       Subjective     Sarah Castillo Friedman is a 54 y.o. female who presents today inappropriate sinus tachycardia, dyslipidemia, and mild dilated ascending aorta follow-up.  Patient has additional medical problems of hypothyroid and DM    Patient of Dr. Josue.  She was last seen by ANNE-MARIE Baltazar on 10/19/2023.    Today, Patient feels well, her mobility is currently limited due to sciatic pain.  However, patient notes before suffering from sciatic pain she would be able to walk 30 minutes but had to stop due to tachycardia and STRINGER.  Patient also notes an associated rare palpitations which are baseline for her.  Otherwise, patient denies chest pain,  dizziness/lightheadedness, orthopnea, PND or Edema.     EKG in office today personally interpreted by me as sinus rhythm with no acute ST changes.    We will increase patient's Corlanor dose to improve patient's exercise tolerance. Close follow-up in 6 months to reevaluate.  Sooner if needed.    Past Medical History:   Diagnosis Date    Anesthesia 08/27/2019    \"hard to wake up\"  \"sleep apnea\"    Arrhythmia     \" sinus Tach\"    Arthritis     osteo    Blood clotting disorder (HCC) 2005    DVT    Bowel habit changes     constipation    Depression     \"BPD\"    Diabetes ([facility]) 08/2019    insulin    Diabetic neuropathy ([facility])     Esophageal spasm     GERD (gastroesophageal reflux disease)     Hashimoto's disease     Hiatal hernia     High cholesterol     Hyperlipidemia     Hypothyroid     Sleep apnea     uses cpap    Snoring     sleep study done    Tachycardia      Past Surgical History:   Procedure Laterality Date    COLONOSCOPY N/A 8/30/2019    Procedure: COLONOSCOPY;  Surgeon: Ata Khalil M.D.;  Location: SURGERY SAME DAY [facility];  Service: Gastroenterology    GASTROSCOPY N/A 8/30/2019    Procedure: GASTROSCOPY - W/DILATION biopsy;  Surgeon: Ata Khalil M.D.;  " Location: SURGERY SAME DAY [facility];  Service: Gastroenterology    VAGINAL HYSTERECTOMY SCOPE TOTAL N/A 4/24/2018    Procedure: VAGINAL HYSTERECTOMY SCOPE TOTAL;  Surgeon: Francisco Javier Lainez M.D.;  Location: SURGERY SAME DAY [facility];  Service: Gynecology    SALPINGO OOPHORECTOMY Bilateral 4/24/2018    Procedure: SALPINGO OOPHORECTOMY;  Surgeon: Francisco Javier Lainez M.D.;  Location: SURGERY SAME DAY [facility];  Service: Gynecology    ANTERIOR AND POSTERIOR REPAIR  4/24/2018    Procedure: ANTERIOR AND POSTERIOR REPAIR;  Surgeon: Francisco Javier Lainez M.D.;  Location: SURGERY SAME DAY [facility];  Service: Gynecology    ENTEROCELE REPAIR  4/24/2018    Procedure: ENTEROCELE REPAIR- PERINEOPLASTY;  Surgeon: Francisco Javier Lainez M.D.;  Location: SURGERY SAME DAY [facility];  Service: Gynecology    BLADDER SLING FEMALE  4/24/2018    Procedure: BLADDER SLING FEMALE- TOT;  Surgeon: Francisco Javier Lainez M.D.;  Location: SURGERY SAME DAY [facility];  Service: Gynecology    VAGINAL SUSPENSION N/A 4/24/2018    Procedure: VAGINAL SUSPENSION- SACROSPINOUS VAULT;  Surgeon: Francisco Javier Lainez M.D.;  Location: SURGERY SAME DAY [facility];  Service: Gynecology    CYSTOSCOPY N/A 4/24/2018    Procedure: CYSTOSCOPY;  Surgeon: Francisco Javier Lainez M.D.;  Location: SURGERY SAME DAY [facility];  Service: Gynecology    ENDOSCOPY  2016    COLONOSCOPY  2016    OTHER  2016    esophageal dilation    OTHER ORTHOPEDIC SURGERY  2012    bone removed from toe after fracture    TONSILLECTOMY AND ADENOIDECTOMY  1977    OTHER  1971, 1978, 1985    left facial cheek and right upper thight reconstructive surgery     Family History   Problem Relation Age of Onset    Colorectal Cancer Mother     Breast Cancer Mother     Cancer Mother 70        pancreatic, breast in 30's    Heart Disease Father     Cancer Father         lung    Diabetes Sister     Diabetes Maternal Uncle     Heart Disease Maternal Grandmother         MI    Hyperlipidemia Maternal Grandmother      "Diabetes Maternal Grandfather     Hyperlipidemia Maternal Grandfather     Stroke Neg Hx     Peritoneal Cancer Neg Hx     Tubal Cancer Neg Hx     Ovarian Cancer Neg Hx      Social History     Socioeconomic History    Marital status: Single     Spouse name: Not on file    Number of children: Not on file    Years of education: Not on file    Highest education level: Not on file   Occupational History    Not on file   Tobacco Use    Smoking status: Never    Smokeless tobacco: Never   Vaping Use    Vaping status: Never Used   Substance and Sexual Activity    Alcohol use: Never    Drug use: No     Comment: previous medical marijuana use for pain    Sexual activity: Yes     Partners: Female   Other Topics Concern    Not on file   Social History Narrative    Not on file     Social Drivers of Health     Financial Resource Strain: Not on file   Food Insecurity: Not on file   Transportation Needs: Not on file   Physical Activity: Not on file   Stress: Not on file   Social Connections: Not on file   Intimate Partner Violence: Not on file   Housing Stability: Not on file     Allergies   Allergen Reactions    Hydromorphone Hcl Itching     Itching \"head to toe\"    Betadine Prepstick Plus [Povidone-Iodine] Rash     Rash swelling    Morphine Itching     Itching \"head to toe\"    Povidone Iodine Hives    Tape Itching     Itching \"leave scars\"     Outpatient Encounter Medications as of 4/8/2025   Medication Sig Dispense Refill    ivabradine (CORLANOR) 7.5 MG Tab tablet Take 1 Tablet by mouth 2 times a day with meals. 180 Tablet 3    traMADol (ULTRAM) 50 MG Tab Take 1 Tablet by mouth 1 time a day as needed for Mild Pain or Moderate Pain for up to 30 days. 30 Tablet 0    levothyroxine (SYNTHROID) 125 MCG Tab Take 1 Tablet by mouth every morning on an empty stomach. 90 Tablet 2    Semaglutide, 1 MG/DOSE, (OZEMPIC, 1 MG/DOSE,) 4 MG/3ML Solution Pen-injector Inject 1 mg under the skin every 7 days. 3 mL 5    estradiol (ESTRACE) 1 MG Tab " Take 1 tablet by mouth once daily 30 Tablet 5    acyclovir (ZOVIRAX) 400 MG tablet TAKE 1 TABLET BY MOUTH EVERY 8 HOURS FOR 5 DAYS AS NEEDED COLD SORE 15 Tablet 5    venlafaxine XR (EFFEXOR XR) 75 MG CAPSULE SR 24 HR Take 1 Capsule by mouth every day. 30 Capsule 5    metformin (GLUCOPHAGE) 1000 MG tablet Take 1 Tablet by mouth 2 times a day with meals. 180 Tablet 3    sumatriptan (IMITREX) 100 MG tablet Take 1 Tablet by mouth one time as needed for Migraine for up to 1 dose. 9 Tablet 3    oxybutynin (DITROPAN-XL) 15 MG CR tablet Take 1 tablet by mouth once daily 30 Tablet 5    hydroCHLOROthiazide 12.5 MG tablet Take 1 Tablet by mouth every day. 90 Tablet 3    atorvastatin (LIPITOR) 20 MG Tab Take 1 Tablet by mouth every day. 90 Tablet 3    Empagliflozin 25 MG Tab Take 1 Tablet by mouth every day. 90 Tablet 3    ibuprofen (MOTRIN) 800 MG Tab Take 1 Tablet by mouth 2 times a day. 60 Tablet 5    gabapentin (NEURONTIN) 800 MG tablet TAKE 1 TABLET BY MOUTH THREE TIMES DAILY 90 Tablet 5    omeprazole (PRILOSEC) 40 MG delayed-release capsule Take 1 capsule by mouth twice daily 60 Capsule 5    spironolactone (ALDACTONE) 25 MG Tab Take 1 tablet by mouth once daily 90 Tablet 3    ondansetron (ZOFRAN) 4 MG Tab tablet Take 1 Tablet by mouth every four hours as needed for Nausea/Vomiting. 30 Tablet 5    cyclobenzaprine (FLEXERIL) 10 mg Tab Take 1 Tablet by mouth at bedtime as needed for Muscle Spasms. 30 Tablet 5    traZODone (DESYREL) 50 MG Tab Take 50 mg by mouth every evening.      estradiol (ESTRACE) 0.5 MG tablet Take 1 Tablet by mouth every day. 90 Tablet 3    diclofenac sodium (VOLTAREN) 1 % Gel Apply 4 g topically 4 times a day as needed (pain). 350 g 0    Alcohol Swabs (ALCOHOL PREP) 70 % Pads Use three times daily to clean finger before blood glucose testing 100 Each 11    divalproex (DEPAKOTE) 500 MG Tablet Delayed Response Take 250 mg by mouth 2 times a day.      polyethylene glycol 3350 (MIRALAX) 17 GM/SCOOP Powder  "Take 17 g by mouth 1 time daily as needed (constipation). 510 g 5    acetaminophen (TYLENOL) 500 MG Tab Take 500-1,000 mg by mouth every 6 hours as needed.      Multiple Vitamins-Minerals (MULTIVITAMIN PO) Take 1 Tab by mouth every day.      [DISCONTINUED] ivabradine (CORLANOR) 5 MG Tab tablet TAKE 1 TABLET BY MOUTH TWICE DAILY WITH MEALS 180 Tablet 0     No facility-administered encounter medications on file as of 4/8/2025.     ROS Complete review of systems negative except as noted in HPI/subjective             Objective     BP 94/62 (BP Location: Left arm, Patient Position: Sitting, BP Cuff Size: Adult long)   Pulse 83   Resp 16   Ht 1.6 m (5' 3\")   Wt 102 kg (224 lb)   LMP 04/15/2018   SpO2 96%   BMI 39.68 kg/m²     Physical Exam  Vitals reviewed.   Constitutional:       Appearance: She is well-developed.   HENT:      Head: Normocephalic and atraumatic.   Eyes:      Pupils: Pupils are equal, round, and reactive to light.   Neck:      Vascular: No JVD.   Cardiovascular:      Rate and Rhythm: Normal rate and regular rhythm.      Heart sounds: Normal heart sounds. No murmur heard.     No friction rub. No gallop.   Pulmonary:      Effort: Pulmonary effort is normal. No respiratory distress.      Breath sounds: Normal breath sounds.   Abdominal:      General: Bowel sounds are normal. There is no distension.      Palpations: Abdomen is soft.   Musculoskeletal:      Right lower leg: No edema.      Left lower leg: No edema.   Skin:     General: Skin is warm and dry.      Findings: No erythema.   Neurological:      Mental Status: She is alert and oriented to person, place, and time.   Psychiatric:         Behavior: Behavior normal.     Transthoracic Echo Report 7/20/2022  Normal left ventricular systolic function. The left ventricular   ejection fraction is visually estimated to be 65%.  The right ventricle is normal in size and systolic function.  No significant valvular abnormalities.   The ascending aorta is " dilated with a diameter of 4.2 cm.  No prior study is available for comparison.      [employer] event monitor 9/30/2022-10/14/2022   Summary:   The patient was monitored for 13 days 17 hours and 47 minutes.  Predominant underlying rhythm was sinus rhythm.     3 runs of supraventricular tachycardia occurred, the longest run lasting 5 beats with heart rate 155 bpm, and the fastest run lasting 4 beats with heart rate 157 bpm.     Rare PACs less than 1%.  Rare PVCs less than 1%.     Symptoms correlated with sinus rhythm with heart rate 73 to 86 bpm without ectopy.     TTE (12/29/2023):  Normal left ventricular systolic function.   The left ventricular ejection fraction is visually estimated to be 65%.  Normal right ventricular size and systolic function.  No significant valvular abnormalities.   The ascending aorta diameter is 4.3 cm.     Compared to the prior study on 07/20/2022, no significant changes.   Ascending aorta is similar in size.             Assessment & Plan     1. Inappropriate sinus tachycardia (HCC)  EKG    ivabradine (CORLANOR) 7.5 MG Tab tablet      2. High risk medication use  ivabradine (CORLANOR) 7.5 MG Tab tablet          Medical Decision Making: Today's Assessment/Status/Plan:        Inappropriate sinus tachycardia:  -Breakthrough during exercise.  -Increase Corlanor 7.5 mg twice a day     Dilated ascending aorta:  -Last measurement of 4.3 cm on on echo in 2023  -Stable at this time. Repeat in 3-5 years     Hypertension: Stable  -Continue spironolactone 25 mg daily  -Continue HCTZ 12.5 mg daily     Hyperlipidemia:  -Last LDL 91 on 6/23/2025  -Continue atorvastatin 20 mg daily    FU in clinic in 6 months with Dr. Josue. Sooner if needed.    Patient verbalizes understanding and agrees with the plan of care.       JITENDRA Childress, CCK, HF-Cert   [facility]  [phone]    PLEASE NOTE: This Note was created using voice recognition Software. I have made  every reasonable attempt to correct obvious errors, but I expect that there are errors of grammar and possibly content that I did not discover before finalizing the note

## 2025-04-11 DIAGNOSIS — E11.42 DIABETIC POLYNEUROPATHY ASSOCIATED WITH TYPE 2 DIABETES MELLITUS (HCC): ICD-10-CM

## 2025-04-11 DIAGNOSIS — K44.9 HIATAL HERNIA: ICD-10-CM

## 2025-04-14 NOTE — TELEPHONE ENCOUNTER
Received request via: Pharmacy    Was the patient seen in the last year in this department? Yes    Does the patient have an active prescription (recently filled or refills available) for medication(s) requested? No    Pharmacy Name: [facility]    Does the patient have USP Plus and need 100-day supply? (This applies to ALL medications) Patient does not have SCP

## 2025-04-15 RX ORDER — GABAPENTIN 800 MG/1
800 TABLET ORAL 3 TIMES DAILY
Qty: 90 TABLET | Refills: 0 | Status: SHIPPED | OUTPATIENT
Start: 2025-04-15

## 2025-04-15 RX ORDER — OMEPRAZOLE 40 MG/1
40 CAPSULE, DELAYED RELEASE ORAL 2 TIMES DAILY
Qty: 60 CAPSULE | Refills: 0 | Status: SHIPPED | OUTPATIENT
Start: 2025-04-15

## 2025-04-17 ENCOUNTER — PATIENT MESSAGE (OUTPATIENT)
Dept: MEDICAL GROUP | Facility: MEDICAL CENTER | Age: 55
End: 2025-04-17
Payer: MEDICAID

## 2025-04-18 DIAGNOSIS — M47.816 SPONDYLOSIS OF LUMBAR REGION WITHOUT MYELOPATHY OR RADICULOPATHY: ICD-10-CM

## 2025-04-18 RX ORDER — TRAMADOL HYDROCHLORIDE 50 MG/1
50 TABLET ORAL
Qty: 30 TABLET | Refills: 0 | Status: SHIPPED | OUTPATIENT
Start: 2025-04-18 | End: 2025-05-18

## 2025-04-18 NOTE — TELEPHONE ENCOUNTER
Patient stated in a [employer] message that she received a weeks supply.    Received request via: Pharmacy    Was the patient seen in the last year in this department? Yes    Does the patient have an active prescription (recently filled or refills available) for medication(s) requested? No    Pharmacy Name: [facility]    Does the patient have intermediate Plus and need 100-day supply? (This applies to ALL medications) Patient does not have SCP

## 2025-04-25 ENCOUNTER — TELEPHONE (OUTPATIENT)
Dept: MEDICAL GROUP | Facility: MEDICAL CENTER | Age: 55
End: 2025-04-25
Payer: MEDICAID

## 2025-04-25 DIAGNOSIS — E11.9 TYPE 2 DIABETES MELLITUS WITHOUT COMPLICATION, WITH LONG-TERM CURRENT USE OF INSULIN (HCC): ICD-10-CM

## 2025-04-25 DIAGNOSIS — Z79.4 TYPE 2 DIABETES MELLITUS WITHOUT COMPLICATION, WITH LONG-TERM CURRENT USE OF INSULIN (HCC): ICD-10-CM

## 2025-04-25 NOTE — TELEPHONE ENCOUNTER
Received request via: Pharmacy    Was the patient seen in the last year in this department? Yes    Does the patient have an active prescription (recently filled or refills available) for medication(s) requested? No    [facility] Name[facility]    Does the patient have residential Plus and need 100-day supply? (This applies to ALL medications) Patient does not have SCP    Future Appointments         Provider Department Center    5/22/2025 2:00 PM (Arrive by 1:45 PM) Zuleima Almeida M.D. [facility]    10/7/2025 3:45 PM Cristofer Josue M.D. [facility] - Operated by [facility]  Arrive at: Cardiology [facility] [place] - Arrival

## 2025-05-09 DIAGNOSIS — M54.42 CHRONIC BILATERAL LOW BACK PAIN WITH LEFT-SIDED SCIATICA: ICD-10-CM

## 2025-05-09 DIAGNOSIS — E11.42 DIABETIC POLYNEUROPATHY ASSOCIATED WITH TYPE 2 DIABETES MELLITUS (HCC): ICD-10-CM

## 2025-05-09 DIAGNOSIS — G89.29 CHRONIC BILATERAL LOW BACK PAIN WITH LEFT-SIDED SCIATICA: ICD-10-CM

## 2025-05-09 DIAGNOSIS — K44.9 HIATAL HERNIA: ICD-10-CM

## 2025-05-12 DIAGNOSIS — R11.0 NAUSEA: ICD-10-CM

## 2025-05-13 ENCOUNTER — TELEPHONE (OUTPATIENT)
Dept: MEDICAL GROUP | Facility: MEDICAL CENTER | Age: 55
End: 2025-05-13
Payer: MEDICAID

## 2025-05-13 NOTE — TELEPHONE ENCOUNTER
Received request via: Pharmacy    Was the patient seen in the last year in this department? Yes    Does the patient have an active prescription (recently filled or refills available) for medication(s) requested? No    [facility]    Does the patient have MCC Plus and need 100-day supply? (This applies to ALL medications) Patient does not have SCP    Future Appointments         Provider Department Center    5/22/2025 2:00 PM (Arrive by 1:45 PM) Zuleima Almeida M.D. [facility]    10/7/2025 3:45 PM Cristofer Josue M.D. [facility] - Operated by [facility]  [place] [facility] [place]

## 2025-05-13 NOTE — TELEPHONE ENCOUNTER
DOCUMENTATION OF PAR STATUS:    1. Name of Medication & Dose: jardiance     2. Name of Prescription Coverage Company & phone #: [employer]     3. Date Prior Auth Submitted: 5/13/2025    4. What information was given to obtain insurance decision? Chart notes    5. Prior Auth Status? Pending    6. Patient Notified: yes

## 2025-05-13 NOTE — TELEPHONE ENCOUNTER
DOCUMENTATION OF PAR STATUS:    1. Name of Medication & Dose: Tramadol     2. Name of Prescription Coverage Company & phone #: [place]    3. Date Prior Auth Submitted: 5/13/2025    4. What information was given to obtain insurance decision? Chart notes    5. Prior Auth Status? Pending    6. Patient Notified: yes

## 2025-05-14 RX ORDER — IBUPROFEN 800 MG/1
800 TABLET, FILM COATED ORAL 2 TIMES DAILY
Qty: 60 TABLET | Refills: 3 | Status: SHIPPED | OUTPATIENT
Start: 2025-05-14

## 2025-05-14 RX ORDER — OMEPRAZOLE 40 MG/1
40 CAPSULE, DELAYED RELEASE ORAL 2 TIMES DAILY
Qty: 60 CAPSULE | Refills: 5 | Status: SHIPPED | OUTPATIENT
Start: 2025-05-14

## 2025-05-14 RX ORDER — GABAPENTIN 800 MG/1
800 TABLET ORAL 3 TIMES DAILY
Qty: 90 TABLET | Refills: 5 | Status: SHIPPED | OUTPATIENT
Start: 2025-05-14

## 2025-05-14 RX ORDER — ONDANSETRON 4 MG/1
4 TABLET, FILM COATED ORAL EVERY 4 HOURS PRN
Qty: 20 TABLET | Refills: 5 | Status: SHIPPED | OUTPATIENT
Start: 2025-05-14

## 2025-05-14 NOTE — TELEPHONE ENCOUNTER
Received request via: Pharmacy    Was the patient seen in the last year in this department? Yes    Does the patient have an active prescription (recently filled or refills available) for medication(s) requested? No    [facility]    Does the patient have FPC Plus and need 100-day supply? (This applies to ALL medications) Patient does not have SCP  Future Appointments         Provider Department Center    5/22/2025 2:00 PM (Arrive by 1:45 PM) Zuleima Almeida M.D. [facility]    10/7/2025 3:45 PM Cristofer Josue M.D. [facility] - [facility]  [place] [facility] [place]

## 2025-05-14 NOTE — TELEPHONE ENCOUNTER
Received request via: Pharmacy    Was the patient seen in the last year in this department? Yes    Does the patient have an active prescription (recently filled or refills available) for medication(s) requested? No    [facility]    Does the patient have long term Plus and need 100-day supply? (This applies to ALL medications) Patient does not have SCP

## 2025-05-16 ENCOUNTER — PATIENT MESSAGE (OUTPATIENT)
Dept: MEDICAL GROUP | Facility: MEDICAL CENTER | Age: 55
End: 2025-05-16
Payer: MEDICAID

## 2025-05-16 DIAGNOSIS — E11.9 TYPE 2 DIABETES MELLITUS WITHOUT COMPLICATION, WITHOUT LONG-TERM CURRENT USE OF INSULIN (HCC): Primary | ICD-10-CM

## 2025-05-20 ENCOUNTER — TELEPHONE (OUTPATIENT)
Dept: MEDICAL GROUP | Facility: MEDICAL CENTER | Age: 55
End: 2025-05-20
Payer: MEDICAID

## 2025-05-20 DIAGNOSIS — M54.42 CHRONIC BILATERAL LOW BACK PAIN WITH LEFT-SIDED SCIATICA: ICD-10-CM

## 2025-05-20 DIAGNOSIS — G89.29 CHRONIC BILATERAL LOW BACK PAIN WITH LEFT-SIDED SCIATICA: ICD-10-CM

## 2025-05-20 RX ORDER — DAPAGLIFLOZIN 10 MG/1
10 TABLET, FILM COATED ORAL DAILY
Qty: 100 TABLET | Refills: 3 | Status: SHIPPED | OUTPATIENT
Start: 2025-05-20 | End: 2026-06-24

## 2025-05-20 RX ORDER — CYCLOBENZAPRINE HCL 10 MG
10 TABLET ORAL NIGHTLY PRN
Qty: 30 TABLET | Refills: 5 | Status: SHIPPED | OUTPATIENT
Start: 2025-05-20

## 2025-05-20 NOTE — TELEPHONE ENCOUNTER
Received request via: Pharmacy    Was the patient seen in the last year in this department? Yes    Does the patient have an active prescription (recently filled or refills available) for medication(s) requested? No    [facility]    Does the patient have shelter Plus and need 100-day supply? (This applies to ALL medications) Patient does not have SCP

## 2025-05-20 NOTE — TELEPHONE ENCOUNTER
FINAL PRIOR AUTHORIZATION STATUS:    1.  Name of Medication & Dose: Jardiance     2. Prior Auth Status: Denied.  Reason: see letter in media    3. Action Taken: Pharmacy Notified: N\A Patient Notified: yes

## 2025-05-21 ENCOUNTER — TELEPHONE (OUTPATIENT)
Dept: CARDIOLOGY | Facility: MEDICAL CENTER | Age: 55
End: 2025-05-21
Payer: MEDICAID

## 2025-05-21 NOTE — TELEPHONE ENCOUNTER
Prior Authorization for ivabradine (CORLANOR) 7.5 MG Tab tablet   has been approved for a quantity of 60 , day supply 30    Prior Authorization reference number: [id number]  Insurance: [place] [employer]  Effective dates: 05/21/2025 until 05/21/2026  Copay: $0     Is patient eligible to fill with [facility]? Yes    Next Steps: Pa Renewal, pt fills at [facility] pharmacy

## 2025-05-21 NOTE — TELEPHONE ENCOUNTER
Received New Start PA request via [employer]OT  for   ivabradine (CORLANOR) 7.5 MG Tab tablet  . (Quantity:60, Day Supply:30)     Insurance: [place] Medicaid  Member ID:  [id number]  BIN: [id number]  PCN: [id number]  Group: [id number]     Ran Test claim via [facility] & medication Rejects stating prior authorization is required.    Prior Authorization for ivabradine (CORLANOR) 7.5 MG Tab tablet   (Quantity: 60, Days: 30) has been submitted via Cover My Meds: Key ([id number])          Will follow up in 24-48 business hours.

## 2025-05-22 ENCOUNTER — OFFICE VISIT (OUTPATIENT)
Dept: MEDICAL GROUP | Facility: MEDICAL CENTER | Age: 55
End: 2025-05-22
Attending: INTERNAL MEDICINE
Payer: MEDICAID

## 2025-05-22 ENCOUNTER — TELEPHONE (OUTPATIENT)
Dept: CARDIOLOGY | Facility: MEDICAL CENTER | Age: 55
End: 2025-05-22
Payer: MEDICAID

## 2025-05-22 VITALS
BODY MASS INDEX: 38.16 KG/M2 | OXYGEN SATURATION: 97 % | HEART RATE: 85 BPM | TEMPERATURE: 97.5 F | HEIGHT: 63 IN | SYSTOLIC BLOOD PRESSURE: 100 MMHG | DIASTOLIC BLOOD PRESSURE: 62 MMHG | WEIGHT: 215.4 LBS | RESPIRATION RATE: 16 BRPM

## 2025-05-22 DIAGNOSIS — M54.42 CHRONIC BILATERAL LOW BACK PAIN WITH LEFT-SIDED SCIATICA: ICD-10-CM

## 2025-05-22 DIAGNOSIS — N95.1 HOT FLASHES DUE TO MENOPAUSE: ICD-10-CM

## 2025-05-22 DIAGNOSIS — E11.9 TYPE 2 DIABETES MELLITUS WITHOUT COMPLICATION, WITHOUT LONG-TERM CURRENT USE OF INSULIN (HCC): Primary | ICD-10-CM

## 2025-05-22 DIAGNOSIS — G89.29 CHRONIC BILATERAL LOW BACK PAIN WITH LEFT-SIDED SCIATICA: ICD-10-CM

## 2025-05-22 PROCEDURE — 99212 OFFICE O/P EST SF 10 MIN: CPT | Performed by: INTERNAL MEDICINE

## 2025-05-22 PROCEDURE — 3074F SYST BP LT 130 MM HG: CPT | Performed by: INTERNAL MEDICINE

## 2025-05-22 PROCEDURE — 3078F DIAST BP <80 MM HG: CPT | Performed by: INTERNAL MEDICINE

## 2025-05-22 PROCEDURE — 99214 OFFICE O/P EST MOD 30 MIN: CPT | Performed by: INTERNAL MEDICINE

## 2025-05-22 RX ORDER — TRAMADOL HYDROCHLORIDE 50 MG/1
50 TABLET ORAL
Qty: 30 TABLET | Refills: 1 | Status: SHIPPED | OUTPATIENT
Start: 2025-05-22 | End: 2025-06-21

## 2025-05-22 RX ORDER — VENLAFAXINE HYDROCHLORIDE 150 MG/1
150 CAPSULE, EXTENDED RELEASE ORAL DAILY
Qty: 30 CAPSULE | Refills: 3 | Status: SHIPPED | OUTPATIENT
Start: 2025-05-22

## 2025-05-22 ASSESSMENT — FIBROSIS 4 INDEX: FIB4 SCORE: 1.27

## 2025-05-22 NOTE — TELEPHONE ENCOUNTER
Sarah Friedman (Key: [id number])  PA Case ID #: [id number]  Rx #: [id number]  Need Help? Call us at [phone]  Outcome  Approved on May 21 by [employer] 2017  PA Case: [id number], Status: Approved, Coverage Starts on: 5/21/2025 12:00:00 AM, Coverage Ends on: 5/21/2026 12:00:00 AM.  Effective Date: 5/21/2025  Authorization Expiration Date: 5/21/2026  Drug  Ivabradine HCl 7.5MG tablets  ePA cloud logo  Form  [employer] Electronic PA Form (2017 NCPDP)  Original Claim Info  75  Prescriber Instructions

## 2025-05-23 NOTE — ASSESSMENT & PLAN NOTE
She reports that her hot flashes are starting to increase again.  She thinks this may be related to the warmer weather.  She is currently taking venlafaxine 75 mg daily.  Initially, she felt like this medication worked very well.  She is wondering about a dose increase.  We discussed that for hot flashes and vasomotor symptoms associated with menopause, the 75 mg dose is the high end of the dosing, per up-to-date doses up to 150 mg have been studied but no significant benefit over the 75 mg and increased incidence of side effects.  Despite this, she would still like to try the higher dose.  Additionally she is taking gabapentin and estradiol.  She is limited in the amount of estradiol she can take due to significant breast tenderness with higher doses.  She has not tried clonidine.

## 2025-05-23 NOTE — PROGRESS NOTES
Subjective:   Sarah Friedman is a 54 y.o. female here today for multiple concerns    Chronic low back pain  She has started a new job which requires her to be seated.  She notes that in light of this she has had some increased low back pain especially at the end of the day.  She sometimes uses Norco 7.5/325 in the evenings but actually finds this less effective than tramadol and Flexeril.  She has gone back to taking 50 mg of tramadol and 10 mg of Flexeril before bed and she feels like this adequately relaxes her back, relieves her pain, and allows her to sleep.  She has had trouble getting her tramadol for 30 days however we reviewed that since 5/14 it appears that insurance has approved a 30-day supply.  She was given the confirmation code and the printout from the insurance with this information that she could bring to the pharmacy.  She was due for an updated controlled substance agreement today.    Hot flashes due to menopause  She reports that her hot flashes are starting to increase again.  She thinks this may be related to the warmer weather.  She is currently taking venlafaxine 75 mg daily.  Initially, she felt like this medication worked very well.  She is wondering about a dose increase.  We discussed that for hot flashes and vasomotor symptoms associated with menopause, the 75 mg dose is the high end of the dosing, per up-to-date doses up to 150 mg have been studied but no significant benefit over the 75 mg and increased incidence of side effects.  Despite this, she would still like to try the higher dose.  Additionally she is taking gabapentin and estradiol.  She is limited in the amount of estradiol she can take due to significant breast tenderness with higher doses.  She has not tried clonidine.    Type 2 diabetes mellitus without complication (HCC)  She has been having trouble getting her SGLT2.  Insurance stopped covering Jardiance and now prefers Farxiga.  She was just recently able to pick  this up.  She continues on semaglutide 1 mg weekly.  In terms of her weight loss, she feels like she has plateaued around 215 pounds although there has still been progression.  We compared weights from last visit in April and she is still down 9 pounds.  She is wondering if raising the dose would be helpful.  She would like to wait 1 more month and see how she does.  Additionally she is taking metformin 1000 mg twice daily.       Current medicines (including changes today)  Current Medications[1]  She  has a past medical history of Anesthesia (08/27/2019), Arrhythmia, Arthritis, Blood clotting disorder ([facility]) (2005), Bowel habit changes, Depression, Diabetes ([facility]) (08/2019), Diabetic neuropathy ([facility]), Esophageal spasm, GERD (gastroesophageal reflux disease), Hashimoto's disease, Hiatal hernia, High cholesterol, Hyperlipidemia, Hypothyroid, Sleep apnea, Snoring, and Tachycardia.    She has no past medical history of Addisons disease ([facility]) or Adrenal disorder ([facility]).         Objective:     Vitals:    05/22/25 1351   BP: 100/62   Pulse: 85   Resp: 16   Temp: 36.4 °C (97.5 °F)   SpO2: 97%     Body mass index is 38.16 kg/m².   Physical Exam:  Constitutional: Alert, no distress.  Skin: Warm, dry, good turgor, no rashes in visible areas.  Eye: Equal, round and reactive, conjunctiva clear, lids normal.  Psych: Alert and oriented x3, normal affect and mood.      Assessment and Plan:   The following treatment plan was discussed    1. Chronic bilateral low back pain with left-sided sciatica  Recently exacerbated by her new job.  She feels that she can control it with her current nonnarcotic medications and 1 tablet of tramadol at night and that this works better than her hydrocodone.  Therefore I have refilled this for her.  I have updated a controlled substance agreement.  - traMADol (ULTRAM) 50 MG Tab; Take 1 Tablet by mouth 1 time a day as needed for Moderate Pain for up to 30 days.  Dispense: 30 Tablet; Refill: 1  -  Controlled Substance Treatment Agreement    2. Hot flashes due to menopause  Worsening per patient.  She would like to try a higher dose of the venlafaxine.  We discussed that there may not be much change from the 75 mg based on available data but she would still like to give it a shot.  Otherwise, we discussed that she is already on estrogen and gabapentin.  We could consider adding clonidine but would be cautious with her blood pressure given she has been running a little bit low, would consider potential dose reduction of her other medication (may be discontinuing 1 dose of the Corlanor if she is going to be taking the clonidine nightly).  Otherwise, we discussed there are not really any other options that would be available with her current insurance.  -Continue estradiol 1.5 mg daily  -Continue gabapentin 800 mg TID  - venlafaxine (EFFEXOR-XR) 150 MG extended-release capsule; Take 1 Capsule by mouth every day.  Dispense: 30 Capsule; Refill: 3  - Follow-up in 6 weeks, consider add clonidine at that time but adjust other blood pressure medications    3. Type 2 diabetes mellitus without complication, without long-term current use of insulin (HCC) (Primary)  Stable, we discussed continuing current medications for now.  We discussed there should be a similar response to the Farxiga as she had with Jardiance.  We will hold off on increasing her Ozempic for this month and see her back in 6 weeks.  If weight loss has stopped at that point we could consider increasing to 2 mg.  - Metformin 1000 mg twice daily, Farxiga 10 mg daily, Ozempic 1 mg weekly  - Follow-up in 6 weeks, consider Ozempic increase at that time if weight loss has stopped        Followup: Return in about 6 weeks (around 7/3/2025) for hot flashes, weight management.                [1]   Current Outpatient Medications   Medication Sig Dispense Refill    traMADol (ULTRAM) 50 MG Tab Take 1 Tablet by mouth 1 time a day as needed for Moderate Pain for up to  30 days. 30 Tablet 1    venlafaxine (EFFEXOR-XR) 150 MG extended-release capsule Take 1 Capsule by mouth every day. 30 Capsule 3    cyclobenzaprine (FLEXERIL) 10 MG Tab Take 1 Tablet by mouth at bedtime as needed for Muscle Spasms. 30 Tablet 5    dapagliflozin propanediol (FARXIGA) 10 MG Tab Take 1 Tablet by mouth every day. 100 Tablet 3    ibuprofen (MOTRIN) 800 MG Tab Take 1 tablet by mouth twice daily 60 Tablet 3    omeprazole (PRILOSEC) 40 MG delayed-release capsule Take 1 capsule by mouth twice daily 60 Capsule 5    gabapentin (NEURONTIN) 800 MG tablet TAKE 1 TABLET BY MOUTH THREE TIMES DAILY 90 Tablet 5    ondansetron (ZOFRAN) 4 MG Tab tablet TAKE 1 TABLET BY MOUTH EVERY 4 HOURS AS NEEDED FOR NAUSEA AND VOMITING 20 Tablet 5    ivabradine (CORLANOR) 7.5 MG Tab tablet Take 1 Tablet by mouth 2 times a day with meals. 180 Tablet 3    levothyroxine (SYNTHROID) 125 MCG Tab Take 1 Tablet by mouth every morning on an empty stomach. 90 Tablet 2    Semaglutide, 1 MG/DOSE, (OZEMPIC, 1 MG/DOSE,) 4 MG/3ML Solution Pen-injector Inject 1 mg under the skin every 7 days. 3 mL 5    estradiol (ESTRACE) 1 MG Tab Take 1 tablet by mouth once daily 30 Tablet 5    acyclovir (ZOVIRAX) 400 MG tablet TAKE 1 TABLET BY MOUTH EVERY 8 HOURS FOR 5 DAYS AS NEEDED COLD SORE 15 Tablet 5    metformin (GLUCOPHAGE) 1000 MG tablet Take 1 Tablet by mouth 2 times a day with meals. 180 Tablet 3    sumatriptan (IMITREX) 100 MG tablet Take 1 Tablet by mouth one time as needed for Migraine for up to 1 dose. 9 Tablet 3    oxybutynin (DITROPAN-XL) 15 MG CR tablet Take 1 tablet by mouth once daily 30 Tablet 5    hydroCHLOROthiazide 12.5 MG tablet Take 1 Tablet by mouth every day. 90 Tablet 3    atorvastatin (LIPITOR) 20 MG Tab Take 1 Tablet by mouth every day. 90 Tablet 3    spironolactone (ALDACTONE) 25 MG Tab Take 1 tablet by mouth once daily 90 Tablet 3    traZODone (DESYREL) 50 MG Tab Take 50 mg by mouth every evening.      estradiol (ESTRACE) 0.5 MG  tablet Take 1 Tablet by mouth every day. 90 Tablet 3    diclofenac sodium (VOLTAREN) 1 % Gel Apply 4 g topically 4 times a day as needed (pain). 350 g 0    Alcohol Swabs (ALCOHOL PREP) 70 % Pads Use three times daily to clean finger before blood glucose testing 100 Each 11    divalproex (DEPAKOTE) 500 MG Tablet Delayed Response Take 250 mg by mouth 2 times a day.      polyethylene glycol 3350 (MIRALAX) 17 GM/SCOOP Powder Take 17 g by mouth 1 time daily as needed (constipation). 510 g 5    acetaminophen (TYLENOL) 500 MG Tab Take 500-1,000 mg by mouth every 6 hours as needed.      Multiple Vitamins-Minerals (MULTIVITAMIN PO) Take 1 Tab by mouth every day.       No current facility-administered medications for this visit.

## 2025-05-23 NOTE — ASSESSMENT & PLAN NOTE
She has started a new job which requires her to be seated.  She notes that in light of this she has had some increased low back pain especially at the end of the day.  She sometimes uses Norco 7.5/325 in the evenings but actually finds this less effective than tramadol and Flexeril.  She has gone back to taking 50 mg of tramadol and 10 mg of Flexeril before bed and she feels like this adequately relaxes her back, relieves her pain, and allows her to sleep.  She has had trouble getting her tramadol for 30 days however we reviewed that since 5/14 it appears that insurance has approved a 30-day supply.  She was given the confirmation code and the printout from the insurance with this information that she could bring to the pharmacy.  She was due for an updated controlled substance agreement today.

## 2025-05-23 NOTE — ASSESSMENT & PLAN NOTE
She has been having trouble getting her SGLT2.  Insurance stopped covering Jardiance and now prefers Farxiga.  She was just recently able to pick this up.  She continues on semaglutide 1 mg weekly.  In terms of her weight loss, she feels like she has plateaued around 215 pounds although there has still been progression.  We compared weights from last visit in April and she is still down 9 pounds.  She is wondering if raising the dose would be helpful.  She would like to wait 1 more month and see how she does.  Additionally she is taking metformin 1000 mg twice daily.

## 2025-05-27 DIAGNOSIS — Z79.899 HIGH RISK MEDICATION USE: ICD-10-CM

## 2025-05-27 DIAGNOSIS — I47.11 INAPPROPRIATE SINUS TACHYCARDIA (HCC): ICD-10-CM

## 2025-05-27 NOTE — TELEPHONE ENCOUNTER
Is the patient due for a refill? Yes    Was the patient seen the last 15 months? Yes    Date of last office visit: 4/8/25    Does the patient have an upcoming appointment?  Yes   If yes, When? 10/7/25    Provider to refill:[facility]    Does the patient have correction Plus and need 100-day supply? (This applies to ALL medications) Patient does not have SCP

## 2025-05-28 RX ORDER — IVABRADINE 7.5 MG/1
7.5 TABLET, FILM COATED ORAL 2 TIMES DAILY WITH MEALS
Qty: 180 TABLET | Refills: 3 | OUTPATIENT
Start: 2025-05-28

## 2025-06-04 ENCOUNTER — PATIENT MESSAGE (OUTPATIENT)
Dept: MEDICAL GROUP | Facility: MEDICAL CENTER | Age: 55
End: 2025-06-04
Payer: MEDICAID

## 2025-06-04 DIAGNOSIS — M54.42 CHRONIC BILATERAL LOW BACK PAIN WITH LEFT-SIDED SCIATICA: ICD-10-CM

## 2025-06-04 DIAGNOSIS — G89.29 CHRONIC BILATERAL LOW BACK PAIN WITH LEFT-SIDED SCIATICA: ICD-10-CM

## 2025-06-04 RX ORDER — IBUPROFEN 800 MG/1
800 TABLET, FILM COATED ORAL 2 TIMES DAILY
Qty: 60 TABLET | Refills: 5 | Status: SHIPPED | OUTPATIENT
Start: 2025-06-04 | End: 2025-06-04

## 2025-06-04 RX ORDER — IBUPROFEN 800 MG/1
800 TABLET, FILM COATED ORAL 2 TIMES DAILY
Qty: 60 TABLET | Refills: 3 | Status: SHIPPED | OUTPATIENT
Start: 2025-06-04 | End: 2025-06-04 | Stop reason: SDUPTHER

## 2025-06-04 RX ORDER — IBUPROFEN 800 MG/1
800 TABLET, FILM COATED ORAL EVERY 8 HOURS PRN
Qty: 60 TABLET | Refills: 5 | Status: SHIPPED | OUTPATIENT
Start: 2025-06-04

## 2025-06-04 NOTE — TELEPHONE ENCOUNTER
Received request via: Pharmacy    Was the patient seen in the last year in this department? Yes    Does the patient have an active prescription (recently filled or refills available) for medication(s) requested? No    [facility]    Does the patient have USP Plus and need 100-day supply? (This applies to ALL medications) Patient does not have SCP

## 2025-06-09 ENCOUNTER — PATIENT MESSAGE (OUTPATIENT)
Dept: MEDICAL GROUP | Facility: MEDICAL CENTER | Age: 55
End: 2025-06-09
Payer: MEDICAID

## 2025-06-10 ENCOUNTER — TELEPHONE (OUTPATIENT)
Dept: MEDICAL GROUP | Facility: MEDICAL CENTER | Age: 55
End: 2025-06-10
Payer: MEDICAID

## 2025-06-10 DIAGNOSIS — M54.42 CHRONIC BILATERAL LOW BACK PAIN WITH LEFT-SIDED SCIATICA: Primary | ICD-10-CM

## 2025-06-10 DIAGNOSIS — M48.061 NEUROFORAMINAL STENOSIS OF LUMBAR SPINE: ICD-10-CM

## 2025-06-10 DIAGNOSIS — G89.29 CHRONIC BILATERAL LOW BACK PAIN WITH LEFT-SIDED SCIATICA: Primary | ICD-10-CM

## 2025-06-10 DIAGNOSIS — Z79.891 CHRONIC USE OF OPIATE FOR THERAPEUTIC PURPOSE: ICD-10-CM

## 2025-06-10 RX ORDER — HYDROCODONE BITARTRATE AND ACETAMINOPHEN 7.5; 325 MG/1; MG/1
1 TABLET ORAL
Qty: 30 TABLET | Refills: 0 | Status: SHIPPED | OUTPATIENT
Start: 2025-06-10 | End: 2025-07-10

## 2025-06-10 NOTE — TELEPHONE ENCOUNTER
Phone Number Called: [phone]    Call outcome: Left detailed message for patient. Informed to call back with any additional questions.    Message: Patient left voicemail stating that she needed to make an appointment by another MA for her Norco. She had also left a MyChart message at getting her Norco and was told to make an appointment. Called and lvm for patient to let her everything was taken care of and the [place] had been sent to [facility].

## 2025-06-10 NOTE — TELEPHONE ENCOUNTER
DOCUMENTATION OF PAR STATUS:    1. Name of Medication & Dose: Norco     2. Name of Prescription Coverage Company & phone #: [employer]    3. Date Prior Auth Submitted: 6/10/2025    4. What information was given to obtain insurance decision? Chart notes and Control substance agreement    5. Prior Auth Status? Pending    6. Patient Notified: yes

## 2025-06-10 NOTE — TELEPHONE ENCOUNTER
Caller Name: [facility]  Call Back Number:     How would the patient prefer to be contacted with a response: Phone call OK to leave a detailed message    Received fax for a new prescription for the Hydrocodone/Acetaminophen (Norco). States patient is to send a message as well. [employer] message was sent on 6/6/25.

## 2025-06-13 ENCOUNTER — TELEPHONE (OUTPATIENT)
Dept: MEDICAL GROUP | Facility: MEDICAL CENTER | Age: 55
End: 2025-06-13
Payer: MEDICAID

## 2025-06-13 NOTE — TELEPHONE ENCOUNTER
FINAL PRIOR AUTHORIZATION STATUS:    1.  Name of Medication & Dose: Norco     2. Prior Auth Status: Denied.  Reason: see attached letter in media    3. Action Taken: Pharmacy Notified: N\A Patient Notified: yes

## 2025-06-13 NOTE — TELEPHONE ENCOUNTER
DOCUMENTATION OF PAR STATUS:    1. Name of Medication & Dose: Norco     2. Name of Prescription Coverage Company & phone #: [employer]    3. Date Prior Auth Submitted: 6/13/20252    4. What information was given to obtain insurance decision? Cart notes x2,CDS    5. Prior Auth Status? Pending    6. Patient Notified: yes

## 2025-06-17 ENCOUNTER — TELEPHONE (OUTPATIENT)
Dept: MEDICAL GROUP | Facility: MEDICAL CENTER | Age: 55
End: 2025-06-17
Payer: MEDICAID

## 2025-06-17 ENCOUNTER — PATIENT MESSAGE (OUTPATIENT)
Dept: MEDICAL GROUP | Facility: MEDICAL CENTER | Age: 55
End: 2025-06-17
Payer: MEDICAID

## 2025-06-17 NOTE — TELEPHONE ENCOUNTER
FINAL PRIOR AUTHORIZATION STATUS:    1.  Name of Medication & Dose: Norco     2. Prior Auth Status: Denied.  Reason: see letter #2 in media    3. Action Taken: Pharmacy Notified: N\A Patient Notified: yes

## 2025-06-20 ENCOUNTER — TELEPHONE (OUTPATIENT)
Dept: MEDICAL GROUP | Facility: MEDICAL CENTER | Age: 55
End: 2025-06-20
Payer: MEDICAID

## 2025-07-04 DIAGNOSIS — N39.41 URGE INCONTINENCE: ICD-10-CM

## 2025-07-07 NOTE — TELEPHONE ENCOUNTER
Received request via: Pharmacy    Was the patient seen in the last year in this department? Yes    Does the patient have an active prescription (recently filled or refills available) for medication(s) requested? No    Pharmacy Name: [facility]    Does the patient have alf Plus and need 100-day supply? (This applies to ALL medications) Patient does not have SCP

## 2025-07-08 RX ORDER — OXYBUTYNIN CHLORIDE 15 MG/1
15 TABLET, EXTENDED RELEASE ORAL DAILY
Qty: 30 TABLET | Refills: 5 | Status: SHIPPED | OUTPATIENT
Start: 2025-07-08

## 2025-07-09 DIAGNOSIS — G89.29 CHRONIC BILATERAL LOW BACK PAIN WITH LEFT-SIDED SCIATICA: Primary | ICD-10-CM

## 2025-07-09 DIAGNOSIS — M54.42 CHRONIC BILATERAL LOW BACK PAIN WITH LEFT-SIDED SCIATICA: Primary | ICD-10-CM

## 2025-07-09 DIAGNOSIS — R11.0 NAUSEA: ICD-10-CM

## 2025-07-09 RX ORDER — ONDANSETRON 4 MG/1
4 TABLET, FILM COATED ORAL EVERY 4 HOURS PRN
Qty: 20 TABLET | Refills: 3 | Status: SHIPPED | OUTPATIENT
Start: 2025-07-09

## 2025-07-09 RX ORDER — HYDROCODONE BITARTRATE AND ACETAMINOPHEN 7.5; 325 MG/1; MG/1
1 TABLET ORAL
Qty: 30 TABLET | Refills: 0 | Status: SHIPPED | OUTPATIENT
Start: 2025-07-09 | End: 2025-08-08

## 2025-07-11 ENCOUNTER — TELEPHONE (OUTPATIENT)
Dept: MEDICAL GROUP | Facility: MEDICAL CENTER | Age: 55
End: 2025-07-11
Payer: MEDICAID

## 2025-07-11 ENCOUNTER — OFFICE VISIT (OUTPATIENT)
Dept: MEDICAL GROUP | Facility: MEDICAL CENTER | Age: 55
End: 2025-07-11
Attending: INTERNAL MEDICINE
Payer: MEDICAID

## 2025-07-11 VITALS
RESPIRATION RATE: 16 BRPM | DIASTOLIC BLOOD PRESSURE: 70 MMHG | BODY MASS INDEX: 36.31 KG/M2 | WEIGHT: 205 LBS | TEMPERATURE: 97.9 F | HEART RATE: 81 BPM | SYSTOLIC BLOOD PRESSURE: 105 MMHG

## 2025-07-11 DIAGNOSIS — B36.0 TINEA VERSICOLOR: Primary | ICD-10-CM

## 2025-07-11 DIAGNOSIS — E11.9 TYPE 2 DIABETES MELLITUS WITHOUT COMPLICATION, WITHOUT LONG-TERM CURRENT USE OF INSULIN (HCC): ICD-10-CM

## 2025-07-11 PROCEDURE — 99214 OFFICE O/P EST MOD 30 MIN: CPT | Performed by: INTERNAL MEDICINE

## 2025-07-11 PROCEDURE — 3078F DIAST BP <80 MM HG: CPT | Performed by: INTERNAL MEDICINE

## 2025-07-11 PROCEDURE — 3074F SYST BP LT 130 MM HG: CPT | Performed by: INTERNAL MEDICINE

## 2025-07-11 PROCEDURE — 99212 OFFICE O/P EST SF 10 MIN: CPT | Performed by: INTERNAL MEDICINE

## 2025-07-11 RX ORDER — PRENATAL VIT 91/IRON/FOLIC/DHA 28-975-200
COMBINATION PACKAGE (EA) ORAL
Qty: 15 G | Refills: 0 | Status: SHIPPED | OUTPATIENT
Start: 2025-07-11

## 2025-07-11 ASSESSMENT — FIBROSIS 4 INDEX: FIB4 SCORE: 1.27

## 2025-07-11 NOTE — TELEPHONE ENCOUNTER
FINAL PRIOR AUTHORIZATION STATUS:    1.  Name of Medication & Dose: Hydrocodone-acetaminophen     2. Prior Auth Status: Approved through 1/7/26     3. Action Taken: Pharmacy Notified: yes Patient Notified: patient has appointment in 7/11

## 2025-07-12 NOTE — ASSESSMENT & PLAN NOTE
She has noticed patchy hypopigmentation on both forearms for the past month or so.  Has never had this in the past.  Denies any associated pain or itching.  Believes it has spread slightly up the forearms.

## 2025-07-12 NOTE — ASSESSMENT & PLAN NOTE
She continues to do well on her current regimen.  She is down another 10 pounds from her last visit, continues on Ozempic 1 mg weekly, metformin 1000 mg twice daily, dapagliflozin 10 mg daily.

## 2025-07-12 NOTE — PROGRESS NOTES
Subjective:   Sarah Friedman is a 54 y.o. female here today for discoloration on arms, recheck for Ozempic, bump on shin    Tinea versicolor  She has noticed patchy hypopigmentation on both forearms for the past month or so.  Has never had this in the past.  Denies any associated pain or itching.  Believes it has spread slightly up the forearms.    Type 2 diabetes mellitus without complication (HCC)  She continues to do well on her current regimen.  She is down another 10 pounds from her last visit, continues on Ozempic 1 mg weekly, metformin 1000 mg twice daily, dapagliflozin 10 mg daily.     Additionally, she reports a hard bump over the medial aspect of the right tibia.  Of note, she had a injury to this area about 3 years ago which was very severe although no fracture.  Recently she was feeling the area and she noticed a hard bump.  She just wants to make sure it is nothing to worry about.  She does not think it has grown or changed over time and it is only tender if she presses very hard.    Current medicines (including changes today)  Current Medications[1]  She  has a past medical history of Anesthesia (08/27/2019), Arrhythmia, Arthritis, Blood clotting disorder ([facility]) (2005), Bowel habit changes, Depression, Diabetes ([facility]) (08/2019), Diabetic neuropathy ([facility]), Esophageal spasm, GERD (gastroesophageal reflux disease), Hashimoto's disease, Hiatal hernia, High cholesterol, Hyperlipidemia, Hypothyroid, Sleep apnea, Snoring, and Tachycardia.    She has no past medical history of Addisons disease ([facility]) or Adrenal disorder ([facility]).         Objective:     Vitals:    07/11/25 1722   BP: 105/70   Pulse: 81   Resp: 16   Temp: 36.6 °C (97.9 °F)     Body mass index is 36.31 kg/m².   Physical Exam:  Constitutional: Alert, no distress.  Skin: Warm, dry, good turgor, patchy hypopigmentation over bilateral forearms consistent with tinea versicolor  Eye: Equal, round and reactive, conjunctiva clear, lids normal.  Psych:  Alert and oriented x3, normal affect and mood.  MSK: Small bony prominence palpable in area of concern over the left medial tibia without any significant tenderness      Assessment and Plan:   The following treatment plan was discussed    1. Tinea versicolor (Primary)  Most likely diagnosis for the rash on her arms given has become much more prominent with sun exposure, we discussed treatment with topical Lamisil.  If does not improve, she was instructed to follow-up.  - terbinafine (LAMISIL) 1 % cream; Apply thin layer to bilateral forearms twice daily x 2 weeks  Dispense: 15 g; Refill: 0    2. Type 2 diabetes mellitus without complication, without long-term current use of insulin (HCC)  Stable and well-controlled with current medication.  Given that she continues to lose weight at a sustainable rate we will keep her Ozempic at the same dose.  - Metformin 1000 mg twice daily, Ozempic 1 mg weekly, Farxiga 10 mg daily.    Reassurance provided regarding small bump on tibia, likely posttraumatic.  She will continue to monitor for any changes in size or new associated pain otherwise imaging not indicated.    Followup: Return in about 3 months (around 10/11/2025).                [1]   Current Outpatient Medications   Medication Sig Dispense Refill    terbinafine (LAMISIL) 1 % cream Apply thin layer to bilateral forearms twice daily x 2 weeks 15 g 0    ondansetron (ZOFRAN) 4 MG Tab tablet TAKE 1 TABLET BY MOUTH EVERY 4 HOURS AS NEEDED FOR NAUSEA AND VOMITING 20 Tablet 3    HYDROcodone-acetaminophen (NORCO) 7.5-325 MG tab Take 1 Tablet by mouth 1 time a day as needed for Severe Pain for up to 30 days. 30 Tablet 0    oxybutynin (DITROPAN-XL) 15 MG CR tablet Take 1 tablet by mouth once daily 30 Tablet 5    ibuprofen (MOTRIN) 800 MG Tab Take 1 Tablet by mouth every 8 hours as needed for Moderate Pain. 60 Tablet 5    venlafaxine (EFFEXOR-XR) 150 MG extended-release capsule Take 1 Capsule by mouth every day. 30 Capsule 3     cyclobenzaprine (FLEXERIL) 10 MG Tab Take 1 Tablet by mouth at bedtime as needed for Muscle Spasms. 30 Tablet 5    dapagliflozin propanediol (FARXIGA) 10 MG Tab Take 1 Tablet by mouth every day. 100 Tablet 3    omeprazole (PRILOSEC) 40 MG delayed-release capsule Take 1 capsule by mouth twice daily 60 Capsule 5    gabapentin (NEURONTIN) 800 MG tablet TAKE 1 TABLET BY MOUTH THREE TIMES DAILY 90 Tablet 5    ivabradine (CORLANOR) 7.5 MG Tab tablet Take 1 Tablet by mouth 2 times a day with meals. 180 Tablet 3    levothyroxine (SYNTHROID) 125 MCG Tab Take 1 Tablet by mouth every morning on an empty stomach. 90 Tablet 2    Semaglutide, 1 MG/DOSE, (OZEMPIC, 1 MG/DOSE,) 4 MG/3ML Solution Pen-injector Inject 1 mg under the skin every 7 days. 3 mL 5    estradiol (ESTRACE) 1 MG Tab Take 1 tablet by mouth once daily 30 Tablet 5    acyclovir (ZOVIRAX) 400 MG tablet TAKE 1 TABLET BY MOUTH EVERY 8 HOURS FOR 5 DAYS AS NEEDED COLD SORE 15 Tablet 5    metformin (GLUCOPHAGE) 1000 MG tablet Take 1 Tablet by mouth 2 times a day with meals. 180 Tablet 3    sumatriptan (IMITREX) 100 MG tablet Take 1 Tablet by mouth one time as needed for Migraine for up to 1 dose. 9 Tablet 3    hydroCHLOROthiazide 12.5 MG tablet Take 1 Tablet by mouth every day. 90 Tablet 3    atorvastatin (LIPITOR) 20 MG Tab Take 1 Tablet by mouth every day. 90 Tablet 3    spironolactone (ALDACTONE) 25 MG Tab Take 1 tablet by mouth once daily 90 Tablet 3    traZODone (DESYREL) 50 MG Tab Take 50 mg by mouth every evening.      estradiol (ESTRACE) 0.5 MG tablet Take 1 Tablet by mouth every day. 90 Tablet 3    diclofenac sodium (VOLTAREN) 1 % Gel Apply 4 g topically 4 times a day as needed (pain). 350 g 0    Alcohol Swabs (ALCOHOL PREP) 70 % Pads Use three times daily to clean finger before blood glucose testing 100 Each 11    divalproex (DEPAKOTE) 500 MG Tablet Delayed Response Take 250 mg by mouth 2 times a day.      polyethylene glycol 3350 (MIRALAX) 17 GM/SCOOP Powder  Take 17 g by mouth 1 time daily as needed (constipation). 510 g 5    acetaminophen (TYLENOL) 500 MG Tab Take 500-1,000 mg by mouth every 6 hours as needed.      Multiple Vitamins-Minerals (MULTIVITAMIN PO) Take 1 Tab by mouth every day.       No current facility-administered medications for this visit.

## 2025-07-25 DIAGNOSIS — I10 ESSENTIAL HYPERTENSION: ICD-10-CM

## 2025-07-25 RX ORDER — SPIRONOLACTONE 25 MG/1
25 TABLET ORAL DAILY
Qty: 90 TABLET | Refills: 2 | Status: SHIPPED | OUTPATIENT
Start: 2025-07-25

## 2025-08-01 ENCOUNTER — PATIENT MESSAGE (OUTPATIENT)
Dept: MEDICAL GROUP | Facility: MEDICAL CENTER | Age: 55
End: 2025-08-01
Payer: MEDICAID

## 2025-08-01 DIAGNOSIS — E11.9 TYPE 2 DIABETES MELLITUS WITHOUT COMPLICATION, WITHOUT LONG-TERM CURRENT USE OF INSULIN (HCC): ICD-10-CM

## 2025-08-01 DIAGNOSIS — M54.42 CHRONIC BILATERAL LOW BACK PAIN WITH LEFT-SIDED SCIATICA: Primary | ICD-10-CM

## 2025-08-01 DIAGNOSIS — G89.29 CHRONIC BILATERAL LOW BACK PAIN WITH LEFT-SIDED SCIATICA: Primary | ICD-10-CM

## 2025-08-01 RX ORDER — TRAMADOL HYDROCHLORIDE 50 MG/1
50 TABLET ORAL
Qty: 30 TABLET | Refills: 2 | Status: SHIPPED | OUTPATIENT
Start: 2025-08-01 | End: 2025-10-30

## 2025-08-01 RX ORDER — SEMAGLUTIDE 1.34 MG/ML
1 INJECTION, SOLUTION SUBCUTANEOUS
Qty: 3 ML | Refills: 5 | Status: SHIPPED | OUTPATIENT
Start: 2025-08-01 | End: 2025-08-20

## 2025-08-19 ENCOUNTER — HOSPITAL ENCOUNTER (OUTPATIENT)
Facility: MEDICAL CENTER | Age: 55
End: 2025-08-19
Attending: INTERNAL MEDICINE
Payer: MEDICAID

## 2025-08-19 VITALS — HEIGHT: 63 IN | WEIGHT: 205 LBS | BODY MASS INDEX: 36.32 KG/M2

## 2025-08-19 DIAGNOSIS — Z12.31 SCREENING MAMMOGRAM, ENCOUNTER FOR: ICD-10-CM

## 2025-08-19 PROCEDURE — 77067 SCR MAMMO BI INCL CAD: CPT

## 2025-08-19 ASSESSMENT — FIBROSIS 4 INDEX: FIB4 SCORE: 1.27

## 2025-08-20 DIAGNOSIS — E11.9 TYPE 2 DIABETES MELLITUS WITHOUT COMPLICATION, WITHOUT LONG-TERM CURRENT USE OF INSULIN (HCC): ICD-10-CM

## 2025-08-20 RX ORDER — SEMAGLUTIDE 1.34 MG/ML
1 INJECTION, SOLUTION SUBCUTANEOUS
Qty: 3 ML | Refills: 5 | Status: SHIPPED | OUTPATIENT
Start: 2025-08-20

## 2025-08-21 ENCOUNTER — PATIENT MESSAGE (OUTPATIENT)
Dept: MEDICAL GROUP | Facility: MEDICAL CENTER | Age: 55
End: 2025-08-21
Payer: MEDICAID

## 2025-08-21 DIAGNOSIS — G89.29 CHRONIC BILATERAL LOW BACK PAIN WITH LEFT-SIDED SCIATICA: ICD-10-CM

## 2025-08-21 DIAGNOSIS — M47.816 SPONDYLOSIS OF LUMBAR REGION WITHOUT MYELOPATHY OR RADICULOPATHY: ICD-10-CM

## 2025-08-21 DIAGNOSIS — Z79.891 CHRONIC USE OF OPIATE FOR THERAPEUTIC PURPOSE: Primary | ICD-10-CM

## 2025-08-21 DIAGNOSIS — M54.42 CHRONIC BILATERAL LOW BACK PAIN WITH LEFT-SIDED SCIATICA: ICD-10-CM

## 2025-08-22 RX ORDER — HYDROCODONE BITARTRATE AND ACETAMINOPHEN 7.5; 325 MG/1; MG/1
1 TABLET ORAL
Qty: 30 TABLET | Refills: 0 | Status: SHIPPED | OUTPATIENT
Start: 2025-08-22 | End: 2025-09-21

## 2025-08-26 DIAGNOSIS — G89.29 CHRONIC BILATERAL LOW BACK PAIN WITH LEFT-SIDED SCIATICA: ICD-10-CM

## 2025-08-26 DIAGNOSIS — Z79.891 CHRONIC USE OF OPIATE FOR THERAPEUTIC PURPOSE: ICD-10-CM

## 2025-08-26 DIAGNOSIS — M54.42 CHRONIC BILATERAL LOW BACK PAIN WITH LEFT-SIDED SCIATICA: ICD-10-CM

## 2025-08-26 DIAGNOSIS — M47.816 SPONDYLOSIS OF LUMBAR REGION WITHOUT MYELOPATHY OR RADICULOPATHY: ICD-10-CM

## 2025-08-26 RX ORDER — HYDROCODONE BITARTRATE AND ACETAMINOPHEN 7.5; 325 MG/1; MG/1
1 TABLET ORAL
Qty: 30 TABLET | Refills: 0 | Status: CANCELLED | OUTPATIENT
Start: 2025-08-26 | End: 2025-09-25

## 2025-08-29 DIAGNOSIS — Z90.710 S/P TOTAL HYSTERECTOMY: ICD-10-CM

## 2025-08-29 RX ORDER — ESTRADIOL 1 MG/1
1 TABLET ORAL DAILY
Qty: 30 TABLET | Refills: 5 | Status: SHIPPED | OUTPATIENT
Start: 2025-08-29

## (undated) DEVICE — ARMREST CRADLE FOAM - (2PR/PK 12PR/CA)

## (undated) DEVICE — TUBING SETDISPOS HIGH FLOW II - (10/BX)

## (undated) DEVICE — SPONGE GAUZE NON-STERILE 4X4 - (2000/CA 10PK/CA)

## (undated) DEVICE — DEVICE SUTURE CAPTURING

## (undated) DEVICE — BANDAID SHEER STRIP 3/4 IN (100EA/BX 12BX/CA)

## (undated) DEVICE — BANDAID X-LARGE 2 X 4 IN LF (50EA/BX)

## (undated) DEVICE — SUTURE GENERAL

## (undated) DEVICE — TROCAR STEP 11MM - (3/CA)

## (undated) DEVICE — NEPTUNE 4 PORT MANIFOLD - (20/PK)

## (undated) DEVICE — WATER IRRIGATION STERILE 1000ML (12EA/CA)

## (undated) DEVICE — PROTECTOR ULNA NERVE - (36PR/CA)

## (undated) DEVICE — TUBING CLEARLINK DUO-VENT - C-FLO (48EA/CA)

## (undated) DEVICE — TUBE CONNECTING SUCTION - CLEAR PLASTIC STERILE 72 IN (50EA/CA)

## (undated) DEVICE — PAD SANITARY 11IN MAXI IND WRAPPED  (12EA/PK 24PK/CA)

## (undated) DEVICE — TRAY SRGPRP PVP IOD WT PRP - (20/CA)

## (undated) DEVICE — SUCTION INSTRUMENT YANKAUER BULBOUS TIP W/O VENT (50EA/CA)

## (undated) DEVICE — CANISTER SUCTION RIGID RED 1500CC (40EA/CA)

## (undated) DEVICE — CANISTER SUCTION 3000ML MECHANICAL FILTER AUTO SHUTOFF MEDI-VAC NONSTERILE LF DISP  (40EA/CA)

## (undated) DEVICE — HEAD HOLDER JUNIOR/ADULT

## (undated) DEVICE — SYRINGE SAFETY 3 ML 18 GA X 1 1/2 BLUNT LL (100/BX 8BX/CA)

## (undated) DEVICE — CATHETER IV 20 GA X 1-1/4 ---SURG.& SDS ONLY--- (50EA/BX)

## (undated) DEVICE — GLOVE BIOGEL SZ 8 SURGICAL PF LTX - (50PR/BX 4BX/CA)

## (undated) DEVICE — Device

## (undated) DEVICE — TROCAR STEP 5MM - (3/CA)

## (undated) DEVICE — SET IRRIGATION CYSTOSCOPY TUBE L80 IN (20EA/CA)

## (undated) DEVICE — KIT PROCEDURE DOUBLE ENDO ONLY (5/CA)

## (undated) DEVICE — BITE BLOCK ADULT 60FR (100EA/CA)

## (undated) DEVICE — SET EXTENSION WITH 2 PORTS (48EA/CA) ***PART #2C8610 IS A SUBSTITUTE*****

## (undated) DEVICE — SYRINGE 10 ML CONTROL LL (25EA/BX 4BX/CA)

## (undated) DEVICE — WATER IRRIG. STER 3000 ML - (4/CA)

## (undated) DEVICE — TUBE NG SALEM SUMP 14FR (50EA/BX)

## (undated) DEVICE — MASK ANESTHESIA ADULT  - (100/CA)

## (undated) DEVICE — PACK LAPAROSCOPY - (1/CA)

## (undated) DEVICE — SUTURE 2-0 VICRYL PLUS CT-1 36 (36PK/BX)"

## (undated) DEVICE — CATHETER URETHRAL FOLEY SILICONE OD16 FR 10 ML (10EA/CA)

## (undated) DEVICE — SUTURE 0 VICRYL PLUS CT-1 - 8 X 18 INCH (12/BX)

## (undated) DEVICE — FILM CASSETTE ENDO

## (undated) DEVICE — DRAPE VAGINAL BIB W/ POUCH (10EA/CA)

## (undated) DEVICE — BLADE SURGICAL #11 - (50/BX)

## (undated) DEVICE — PACK MINOR BASIN - (2EA/CA)

## (undated) DEVICE — LIGASURE LAPAROSCOPIC 5MM - (6EA/CA)

## (undated) DEVICE — KIT ANESTHESIA W/CIRCUIT & 3/LT BAG W/FILTER (20EA/CA)

## (undated) DEVICE — CONTAINER, SPECIMEN, STERILE

## (undated) DEVICE — SOD. CHL 10CC SYRINGE PREFILL - W/10 CC (30/BX)

## (undated) DEVICE — MANIFOLD NEPTUNE 1 PORT (20/PK)

## (undated) DEVICE — BASIN EMESIS DISP. - (250/CA)

## (undated) DEVICE — ELECTRODE 850 FOAM ADHESIVE - HYDROGEL RADIOTRNSPRNT (50/PK)

## (undated) DEVICE — BITEBLOCK ENDOSCOPIC PEDI. - (25/BX)

## (undated) DEVICE — GOWN SURGEONS X-LARGE - DISP. (30/CA)

## (undated) DEVICE — SUTURE 4-0 VICRYL PLUS FS-2 - 27 INCH (36/BX)

## (undated) DEVICE — KIT  I.V. START (100EA/CA)

## (undated) DEVICE — TUBE E-T HI-LO CUFF 7.0MM (10EA/PK)

## (undated) DEVICE — SET LEADWIRE 5 LEAD BEDSIDE DISPOSABLE ECG (1SET OF 5/EA)

## (undated) DEVICE — CHLORAPREP 26 ML APPLICATOR - ORANGE TINT(25/CA)

## (undated) DEVICE — SODIUM CHL IRRIGATION 0.9% 1000ML (12EA/CA)

## (undated) DEVICE — TRAY FOLEY CATHETER STATLOCK 16FR SURESTEP  (10EA/CA)

## (undated) DEVICE — KIT CUSTOM PROCEDURE SINGLE FOR ENDO  (15/CA)

## (undated) DEVICE — BLADE SURGICAL #15 - (50/BX 3BX/CA)

## (undated) DEVICE — GLOVESZ 8.5 BIOGEL PI MICRO - PF LF (50PR/BX)

## (undated) DEVICE — LACTATED RINGERS INJ 1000 ML - (14EA/CA 60CA/PF)

## (undated) DEVICE — NEEDLE INSUFFLATION FOR STEP - (12/BX)

## (undated) DEVICE — SENSOR SPO2 NEO LNCS ADHESIVE (20/BX) SEE USER NOTES

## (undated) DEVICE — SET SUCTION/IRRIGATION WITH DISPOSABLE TIP (6/CA )PART #0250-070-520 IS A SUB

## (undated) DEVICE — ELECTRODE DUAL RETURN W/ CORD - (50/PK)